# Patient Record
Sex: FEMALE | Race: WHITE | HISPANIC OR LATINO | Employment: PART TIME | URBAN - METROPOLITAN AREA
[De-identification: names, ages, dates, MRNs, and addresses within clinical notes are randomized per-mention and may not be internally consistent; named-entity substitution may affect disease eponyms.]

---

## 2017-01-12 DIAGNOSIS — E66.01 MORBID OBESITY WITH BMI OF 40.0-44.9, ADULT (HCC): ICD-10-CM

## 2017-01-12 RX ORDER — PHENTERMINE HYDROCHLORIDE 37.5 MG/1
CAPSULE ORAL
Qty: 30 CAPSULE | Refills: 0 | OUTPATIENT
Start: 2017-01-12

## 2017-01-16 ENCOUNTER — OFFICE VISIT (OUTPATIENT)
Dept: FAMILY MEDICINE CLINIC | Facility: CLINIC | Age: 42
End: 2017-01-16

## 2017-01-16 VITALS
RESPIRATION RATE: 16 BRPM | HEART RATE: 82 BPM | OXYGEN SATURATION: 97 % | HEIGHT: 63 IN | SYSTOLIC BLOOD PRESSURE: 120 MMHG | TEMPERATURE: 98.4 F | DIASTOLIC BLOOD PRESSURE: 68 MMHG | WEIGHT: 232 LBS | BODY MASS INDEX: 41.11 KG/M2

## 2017-01-16 DIAGNOSIS — I10 BENIGN ESSENTIAL HYPERTENSION: Primary | ICD-10-CM

## 2017-01-16 DIAGNOSIS — E66.01 MORBID OBESITY WITH BMI OF 40.0-44.9, ADULT (HCC): ICD-10-CM

## 2017-01-16 PROCEDURE — 99213 OFFICE O/P EST LOW 20 MIN: CPT | Performed by: PHYSICIAN ASSISTANT

## 2017-01-16 RX ORDER — PHENTERMINE HYDROCHLORIDE 37.5 MG/1
37.5 CAPSULE ORAL EVERY MORNING
Qty: 30 CAPSULE | Refills: 0
Start: 2017-01-16 | End: 2017-02-13 | Stop reason: SDUPTHER

## 2017-01-16 NOTE — PROGRESS NOTES
"Subjective   Angie Patel is a 41 y.o. female.   Chief Complaint   Patient presents with   • Obesity   • Follow-up       History of Present Illness   Shelia is a 41-year-old female who presents for weight loss management.  She has lost 18 pounds since December 15, 2016.  She has been compliant with her phentermine medication.States she is doing well with the die pills.  Denied any chest pain,shortness of air,wheezing or swelling of ankles.  Diet has been healthy. Angie is doing low carbs and increased protein.  Sleep has been normal.  Angie has been not been exercising routinely.      The following portions of the patient's history were reviewed and updated as appropriate: allergies, current medications, past family history, past medical history, past social history and past surgical history.    Review of Systems   Constitutional: Negative.  Negative for chills, fatigue and fever.   HENT: Negative.    Eyes: Negative.    Respiratory: Negative.  Negative for cough, shortness of breath and wheezing.    Cardiovascular: Negative.  Negative for chest pain, palpitations and leg swelling.   Gastrointestinal: Negative.    Endocrine: Negative.    Genitourinary: Negative.    Musculoskeletal: Negative.    Skin: Negative.    Allergic/Immunologic: Negative.    Neurological: Negative.    Hematological: Negative.    Psychiatric/Behavioral: Negative.  Negative for sleep disturbance and suicidal ideas.   All other systems reviewed and are negative.    Vitals:    01/16/17 0857 01/16/17 0914   BP: 116/82 120/68   BP Location: Right arm Left arm   Patient Position: Sitting Sitting   Cuff Size: Adult Adult   Pulse: 82    Resp: 16    Temp: 98.4 °F (36.9 °C)    TempSrc: Oral    SpO2: 97%    Weight: 232 lb (105 kg)    Height: 63\" (160 cm)      Wt Readings from Last 3 Encounters:   01/16/17 232 lb (105 kg)   12/15/16 250 lb (113 kg)   10/21/16 234 lb (106 kg)       BP Readings from Last 3 Encounters:   01/16/17 120/68   12/15/16 110/62 "   10/21/16 98/58     Body mass index is 41.1 kg/(m^2).    Allergies   Allergen Reactions   • Iodine    • Shellfish Allergy        Objective   Physical Exam   Constitutional: She is oriented to person, place, and time. Vital signs are normal. She appears well-developed and well-nourished.   Obese female   Neck: Trachea normal and phonation normal. Neck supple. No edema present.   Cardiovascular: Normal rate, regular rhythm, S1 normal, S2 normal, normal heart sounds and normal pulses.    Pulmonary/Chest: Effort normal and breath sounds normal.   Abdominal: Soft. Normal appearance and bowel sounds are normal. There is no hepatomegaly. There is no tenderness.   Neurological: She is alert and oriented to person, place, and time.   Skin: Skin is warm, dry and intact.   Psychiatric: She has a normal mood and affect. Her speech is normal and behavior is normal. Judgment and thought content normal. Cognition and memory are normal.       Assessment/Plan   Angie was seen today for obesity and follow-up.    Diagnoses and all orders for this visit:    Benign essential hypertension    Morbid obesity with BMI of 40.0-44.9, adult  -     phentermine 37.5 MG capsule; Take 1 capsule by mouth Every Morning.        1.  Stale hypertension: I have rechecked her blood pressure today's office visit and got 120/68 *.  She will continue her current medications at home.  2.  Improving obesity: She has lost 16 pounds on the first month of phentermine.  Dr. Sony Painting has approved a refill.  See Anjel report below.  Angie will return to office in one month for reevaluation.    Dragon transcription disclaimer    Much of this encounter note is an electronic transcription/translation of spoken language to printed text.  The electronic translation of spoken language may permit erroneous, or at times, nonsensical words or phrases to be inadvertently transcribed.  Although I have reviewed the note for such errors, some may still exist.    Kya  Aroldo BARAJAS  Family Practice

## 2017-01-16 NOTE — MR AVS SNAPSHOT
Angie Patel   2017 8:45 AM   Office Visit    Provider:  Kya Kendrick PA-C   Department:  [facility]   Dept Phone:  [phone]                Your Full Care Plan              Your Updated Medication List          This list is accurate as of: 17  9:17 AM.  Always use your most recent med list.                furosemide 20 MG tablet   Commonly known as:  LASIX   Take 1 tablet by mouth daily. As needed       metFORMIN 500 MG tablet   Commonly known as:  GLUCOPHAGE   Take 2 pills in am and one pill at dinnertime.       MOTRIN  MG tablet   Generic drug:  ibuprofen       phentermine 37.5 MG capsule   Take 1 capsule by mouth Every Morning.       spironolactone 100 MG tablet   Commonly known as:  ALDACTONE   TAKE 1 TABLET BY MOUTH EVERY DAY       VENTOLIN  (90 BASE) MCG/ACT inhaler   Generic drug:  albuterol   INHALE 2 PUFFS BY MOUTH FOUR TIMES DAILY AS NEEDED       vitamin D 70790 UNITS capsule capsule   Commonly known as:  ERGOCALCIFEROL   Take 1 capsule by mouth every 7 days.               You Were Diagnosed With        Codes Comments    Benign essential hypertension    -  Primary ICD-10-CM: I10  ICD-9-CM: 401.1     Morbid obesity with BMI of 40.0-44.9, adult     ICD-10-CM: E66.01, Z68.41  ICD-9-CM: 278.01, V85.41       Instructions     None    Patient Instructions History      [employer] Signup     [facility] [employer] allows you to send messages to your doctor, view your test results, renew your prescriptions, schedule appointments, and more. To sign up, go to [employer] and click on the Sign Up Now link in the New User? box. Enter your [employer] Activation Code exactly as it appears below along with the last four digits of your Social Security Number and your Date of Birth () to complete the sign-up process. If you do not sign up before the expiration date, you must request a new code.    [employer] Activation Code:  [email or web address]  Expires: 1/30/2017  8:56 AM    If you have questions, you can email [email or web address] or call [phone] to talk to our MyChart staff. Remember, [employer] is NOT to be used for urgent needs. For medical emergencies, dial 911.               Other Info from Your Visit           Your Appointments     Feb 13, 2017  9:15 AM EST   Office Visit with Kya Kendrick PA-C   [facility] (--)    [address] [id number] [phone]           Arrive 15 minutes prior to appointment.              Allergies     Iodine      Shellfish Allergy        Reason for Visit     Obesity     Follow-up           Vital Signs     Blood Pressure Pulse Temperature Respirations Height Weight    120/68 (BP Location: Left arm, Patient Position: Sitting, Cuff Size: Adult) 82 98.4 °F (36.9 °C) (Oral) 16 63\" (160 cm) 232 lb (105 kg)    Oxygen Saturation Body Mass Index Smoking Status             97% 41.1 kg/m2 Former Smoker         Problems and Diagnoses Noted     Benign essential hypertension    Morbid obesity with BMI of 40.0-44.9, adult      "

## 2017-01-16 NOTE — PROGRESS NOTES
I have reviewed the notes, assessments, and/or procedures performed by ELIAN Sanchez, I concur with her/his documentation of.Angie Patel.

## 2017-01-18 RX ORDER — SPIRONOLACTONE 100 MG/1
TABLET, FILM COATED ORAL
Qty: 90 TABLET | Refills: 0 | Status: SHIPPED | OUTPATIENT
Start: 2017-01-18 | End: 2017-05-01 | Stop reason: SDUPTHER

## 2017-02-13 ENCOUNTER — OFFICE VISIT (OUTPATIENT)
Dept: FAMILY MEDICINE CLINIC | Facility: CLINIC | Age: 42
End: 2017-02-13

## 2017-02-13 VITALS
RESPIRATION RATE: 16 BRPM | SYSTOLIC BLOOD PRESSURE: 122 MMHG | WEIGHT: 222 LBS | BODY MASS INDEX: 39.34 KG/M2 | DIASTOLIC BLOOD PRESSURE: 78 MMHG | HEART RATE: 91 BPM | HEIGHT: 63 IN | TEMPERATURE: 98.8 F | OXYGEN SATURATION: 98 %

## 2017-02-13 DIAGNOSIS — E66.01 MORBID OBESITY, UNSPECIFIED OBESITY TYPE (HCC): ICD-10-CM

## 2017-02-13 DIAGNOSIS — I10 BENIGN ESSENTIAL HYPERTENSION: Primary | ICD-10-CM

## 2017-02-13 PROCEDURE — 99213 OFFICE O/P EST LOW 20 MIN: CPT | Performed by: PHYSICIAN ASSISTANT

## 2017-02-13 RX ORDER — PHENTERMINE HYDROCHLORIDE 37.5 MG/1
37.5 CAPSULE ORAL EVERY MORNING
Qty: 30 CAPSULE | Refills: 0
Start: 2017-02-13 | End: 2017-02-13 | Stop reason: SDUPTHER

## 2017-02-13 NOTE — PROGRESS NOTES
"Subjective   Angie Patel is a 41 y.o. female.   Chief Complaint   Patient presents with   • Obesity   • Follow-up       History of Present Illness     Angie is a 41-year-old female who presents for hypertension and weight loss management.  She has lost 10 pounds since her office visit on January 16, 2017.  She has been eating more protein and 10 carbohydrates a day.  Angie has not been exercising at this time.   Appetite has been healthy.  Sleep has been Denied any chest pain, shortness of air,dizziness,headaches, or swelling of ankles.  No problems with the Phentermine medications.    The following portions of the patient's history were reviewed and updated as appropriate: allergies, current medications, past family history, past medical history, past social history and past surgical history.    Review of Systems   Constitutional: Negative.    HENT: Negative.    Eyes: Negative.    Respiratory: Negative.  Negative for cough, shortness of breath and wheezing.    Cardiovascular: Negative.  Negative for chest pain, palpitations and leg swelling.   Gastrointestinal: Negative.    Endocrine: Negative.    Genitourinary: Negative.    Musculoskeletal: Negative.    Skin: Negative.    Allergic/Immunologic: Negative.    Neurological: Negative.    Hematological: Negative.    Psychiatric/Behavioral: Negative.  Negative for sleep disturbance and suicidal ideas.   All other systems reviewed and are negative.    Vitals:    02/13/17 0931   BP: 122/78   BP Location: Right arm   Patient Position: Sitting   Cuff Size: Adult   Pulse: 91   Resp: 16   Temp: 98.8 °F (37.1 °C)   TempSrc: Oral   SpO2: 98%   Weight: 222 lb (101 kg)   Height: 63\" (160 cm)     Wt Readings from Last 3 Encounters:   02/13/17 222 lb (101 kg)   01/16/17 232 lb (105 kg)   12/15/16 250 lb (113 kg)       BP Readings from Last 3 Encounters:   02/13/17 122/78   01/16/17 120/68   12/15/16 110/62     Body mass index is 39.33 kg/(m^2).    Allergies   Allergen Reactions "   • Iodine    • Shellfish Allergy        Objective   Physical Exam   Constitutional: She is oriented to person, place, and time. Vital signs are normal. She appears well-developed and well-nourished.   Neck: Trachea normal and phonation normal. Neck supple. Carotid bruit is not present. No edema present. No thyroid mass and no thyromegaly present.   Cardiovascular: Normal rate, regular rhythm, S1 normal, S2 normal, normal heart sounds and normal pulses.    Pulmonary/Chest: Effort normal and breath sounds normal.   Abdominal: Soft. Normal appearance and bowel sounds are normal. There is no hepatomegaly. There is no tenderness.   Neurological: She is alert and oriented to person, place, and time.   Skin: Skin is warm, dry and intact.   Psychiatric: She has a normal mood and affect. Her speech is normal and behavior is normal. Judgment and thought content normal. Cognition and memory are normal.       Assessment/Plan   Angie was seen today for obesity and follow-up.    Diagnoses and all orders for this visit:    Benign essential hypertension    Morbid obesity, unspecified obesity type  -     phentermine 37.5 MG capsule; Take 1 capsule by mouth Every Morning.  -     phentermine 37.5 MG capsule; Take 1 capsule by mouth Every Morning.      1. Stable hypertension:  I have rechecked her blood pressure today's office visit and got 128/72 in left arm.  She will continue her current medications at home.  2.  Improving morbid obesity: She has lost 28 pounds during the phentermine and December 2016.  After [facility] has approved a refill.  See below for Anjel information.  I have discussed with Shelia that this may be her last month on phentermine.  Urged her to increase her physical activity and continue eating healthy.  She voiced understanding.      As part of this patient's treatment plan I am prescribing controlled substances.  The patient has been made aware of appropriate use of such medications, including potential risk of  somnolence. Limited ability to drive and/or work safely, and potential for dependence or overdose.  It has also been made clear that these medications are for use by this patient only, without concomitant use of alcohol or other substances unless prescribed.  THERESA report has been reviewed and scanned into the patient's chart.  Theresa number is [id number] dated December 13, 2016.      Dragon transcription disclaimer    Much of this encounter note is an electronic transcription/translation of spoken language to printed text.  The electronic translation of spoken language may permit erroneous, or at times, nonsensical words or phrases to be inadvertently transcribed.  Although I have reviewed the note for such errors, some may still exist.    Kya Kendrick PA-C  [facility]

## 2017-02-14 RX ORDER — PHENTERMINE HYDROCHLORIDE 37.5 MG/1
37.5 CAPSULE ORAL EVERY MORNING
Qty: 30 CAPSULE | Refills: 0
Start: 2017-02-14 | End: 2017-05-01 | Stop reason: SDUPTHER

## 2017-03-03 ENCOUNTER — HOSPITAL ENCOUNTER (EMERGENCY)
Facility: HOSPITAL | Age: 42
Discharge: HOME | End: 2017-03-03
Attending: EMERGENCY MEDICINE | Admitting: EMERGENCY MEDICINE

## 2017-03-03 ENCOUNTER — APPOINTMENT (OUTPATIENT)
Dept: CT IMAGING | Facility: HOSPITAL | Age: 42
End: 2017-03-03

## 2017-03-03 VITALS
DIASTOLIC BLOOD PRESSURE: 76 MMHG | WEIGHT: 211 LBS | RESPIRATION RATE: 16 BRPM | SYSTOLIC BLOOD PRESSURE: 122 MMHG | OXYGEN SATURATION: 100 % | HEIGHT: 63 IN | HEART RATE: 74 BPM | BODY MASS INDEX: 37.39 KG/M2 | TEMPERATURE: 98.4 F

## 2017-03-03 DIAGNOSIS — E28.2 PCOS (POLYCYSTIC OVARIAN SYNDROME): Primary | ICD-10-CM

## 2017-03-03 DIAGNOSIS — N20.1 RIGHT URETERAL STONE: Primary | ICD-10-CM

## 2017-03-03 LAB
ALBUMIN SERPL-MCNC: 4.3 G/DL (ref 3.5–5.2)
ALBUMIN/GLOB SERPL: 1.2 G/DL
ALP SERPL-CCNC: 48 U/L (ref 40–129)
ALT SERPL W P-5'-P-CCNC: 43 U/L (ref 5–33)
ANION GAP SERPL CALCULATED.3IONS-SCNC: 16.9 MMOL/L
AST SERPL-CCNC: 23 U/L (ref 5–32)
BACTERIA UR QL AUTO: ABNORMAL /HPF
BASOPHILS # BLD AUTO: 0.04 10*3/MM3 (ref 0–0.2)
BASOPHILS NFR BLD AUTO: 0.4 % (ref 0–2)
BILIRUB SERPL-MCNC: 0.6 MG/DL (ref 0.2–1.2)
BILIRUB UR QL STRIP: ABNORMAL
BUN BLD-MCNC: 15 MG/DL (ref 6–20)
BUN/CREAT SERPL: 14.2 (ref 7–25)
CALCIUM SPEC-SCNC: 9.6 MG/DL (ref 8.6–10.5)
CHLORIDE SERPL-SCNC: 96 MMOL/L (ref 98–107)
CLARITY UR: ABNORMAL
CO2 SERPL-SCNC: 24.1 MMOL/L (ref 22–29)
COLOR UR: YELLOW
CREAT BLD-MCNC: 1.06 MG/DL (ref 0.57–1)
DEPRECATED RDW RBC AUTO: 37.7 FL (ref 37–54)
EOSINOPHIL # BLD AUTO: 0.18 10*3/MM3 (ref 0.1–0.3)
EOSINOPHIL NFR BLD AUTO: 1.7 % (ref 0–4)
ERYTHROCYTE [DISTWIDTH] IN BLOOD BY AUTOMATED COUNT: 11.9 % (ref 11.5–14.5)
GFR SERPL CREATININE-BSD FRML MDRD: 57 ML/MIN/1.73
GLOBULIN UR ELPH-MCNC: 3.7 GM/DL
GLUCOSE BLD-MCNC: 116 MG/DL (ref 65–99)
GLUCOSE UR STRIP-MCNC: NEGATIVE MG/DL
HCT VFR BLD AUTO: 42.7 % (ref 37–47)
HGB BLD-MCNC: 14.8 G/DL (ref 12–16)
HGB UR QL STRIP.AUTO: ABNORMAL
HYALINE CASTS UR QL AUTO: ABNORMAL /LPF
IMM GRANULOCYTES # BLD: 0.04 10*3/MM3 (ref 0–0.03)
IMM GRANULOCYTES NFR BLD: 0.4 % (ref 0–0.5)
KETONES UR QL STRIP: ABNORMAL
LEUKOCYTE ESTERASE UR QL STRIP.AUTO: NEGATIVE
LYMPHOCYTES # BLD AUTO: 2.18 10*3/MM3 (ref 0.6–4.8)
LYMPHOCYTES NFR BLD AUTO: 20.6 % (ref 20–45)
MCH RBC QN AUTO: 29.9 PG (ref 27–31)
MCHC RBC AUTO-ENTMCNC: 34.7 G/DL (ref 31–37)
MCV RBC AUTO: 86.3 FL (ref 81–99)
MONOCYTES # BLD AUTO: 0.67 10*3/MM3 (ref 0–1)
MONOCYTES NFR BLD AUTO: 6.3 % (ref 3–8)
MUCOUS THREADS URNS QL MICRO: ABNORMAL /HPF
NEUTROPHILS # BLD AUTO: 7.46 10*3/MM3 (ref 1.5–8.3)
NEUTROPHILS NFR BLD AUTO: 70.6 % (ref 45–70)
NITRITE UR QL STRIP: NEGATIVE
NRBC BLD MANUAL-RTO: 0 /100 WBC (ref 0–0)
PH UR STRIP.AUTO: 5.5 [PH] (ref 4.5–8)
PLATELET # BLD AUTO: 283 10*3/MM3 (ref 140–500)
PMV BLD AUTO: 11.6 FL (ref 7.4–10.4)
POTASSIUM BLD-SCNC: 3.6 MMOL/L (ref 3.5–5.2)
PROT SERPL-MCNC: 8 G/DL (ref 6–8.5)
PROT UR QL STRIP: ABNORMAL
RBC # BLD AUTO: 4.95 10*6/MM3 (ref 4.2–5.4)
RBC # UR: ABNORMAL /HPF
REF LAB TEST METHOD: ABNORMAL
SODIUM BLD-SCNC: 137 MMOL/L (ref 136–145)
SP GR UR STRIP: 1.03 (ref 1–1.03)
SQUAMOUS #/AREA URNS HPF: ABNORMAL /HPF
UROBILINOGEN UR QL STRIP: ABNORMAL
WBC NRBC COR # BLD: 10.57 10*3/MM3 (ref 4.8–10.8)
WBC UR QL AUTO: ABNORMAL /HPF

## 2017-03-03 PROCEDURE — 99283 EMERGENCY DEPT VISIT LOW MDM: CPT

## 2017-03-03 PROCEDURE — 99284 EMERGENCY DEPT VISIT MOD MDM: CPT | Performed by: EMERGENCY MEDICINE

## 2017-03-03 PROCEDURE — 80053 COMPREHEN METABOLIC PANEL: CPT | Performed by: EMERGENCY MEDICINE

## 2017-03-03 PROCEDURE — 96375 TX/PRO/DX INJ NEW DRUG ADDON: CPT

## 2017-03-03 PROCEDURE — 25010000002 ONDANSETRON PER 1 MG: Performed by: EMERGENCY MEDICINE

## 2017-03-03 PROCEDURE — 25010000002 HYDROMORPHONE PER 4 MG: Performed by: EMERGENCY MEDICINE

## 2017-03-03 PROCEDURE — 85025 COMPLETE CBC W/AUTO DIFF WBC: CPT | Performed by: EMERGENCY MEDICINE

## 2017-03-03 PROCEDURE — 25010000002 KETOROLAC TROMETHAMINE PER 15 MG: Performed by: EMERGENCY MEDICINE

## 2017-03-03 PROCEDURE — 96361 HYDRATE IV INFUSION ADD-ON: CPT

## 2017-03-03 PROCEDURE — 81001 URINALYSIS AUTO W/SCOPE: CPT | Performed by: EMERGENCY MEDICINE

## 2017-03-03 PROCEDURE — 74176 CT ABD & PELVIS W/O CONTRAST: CPT

## 2017-03-03 PROCEDURE — 87086 URINE CULTURE/COLONY COUNT: CPT | Performed by: EMERGENCY MEDICINE

## 2017-03-03 PROCEDURE — 96374 THER/PROPH/DIAG INJ IV PUSH: CPT

## 2017-03-03 RX ORDER — KETOROLAC TROMETHAMINE 30 MG/ML
30 INJECTION, SOLUTION INTRAMUSCULAR; INTRAVENOUS ONCE
Status: COMPLETED | OUTPATIENT
Start: 2017-03-03 | End: 2017-03-03

## 2017-03-03 RX ORDER — LEVOFLOXACIN 500 MG/1
500 TABLET, FILM COATED ORAL EVERY 24 HOURS
Status: DISCONTINUED | OUTPATIENT
Start: 2017-03-03 | End: 2017-03-04 | Stop reason: HOSPADM

## 2017-03-03 RX ORDER — HYDROCODONE BITARTRATE AND ACETAMINOPHEN 5; 325 MG/1; MG/1
1 TABLET ORAL ONCE
Status: COMPLETED | OUTPATIENT
Start: 2017-03-03 | End: 2017-03-03

## 2017-03-03 RX ORDER — SODIUM CHLORIDE 0.9 % (FLUSH) 0.9 %
10 SYRINGE (ML) INJECTION AS NEEDED
Status: DISCONTINUED | OUTPATIENT
Start: 2017-03-03 | End: 2017-03-04 | Stop reason: HOSPADM

## 2017-03-03 RX ORDER — CIPROFLOXACIN 500 MG/1
500 TABLET, FILM COATED ORAL 2 TIMES DAILY
Qty: 14 TABLET | Refills: 0 | Status: SHIPPED | OUTPATIENT
Start: 2017-03-03 | End: 2017-03-10

## 2017-03-03 RX ORDER — HYDROCODONE BITARTRATE AND ACETAMINOPHEN 5; 325 MG/1; MG/1
1 TABLET ORAL EVERY 8 HOURS PRN
Qty: 15 TABLET | Refills: 0 | Status: SHIPPED | OUTPATIENT
Start: 2017-03-03 | End: 2017-03-08

## 2017-03-03 RX ORDER — ONDANSETRON 4 MG/1
4 TABLET, FILM COATED ORAL EVERY 8 HOURS PRN
Qty: 20 TABLET | Refills: 0 | Status: SHIPPED | OUTPATIENT
Start: 2017-03-03 | End: 2017-03-10

## 2017-03-03 RX ORDER — TAMSULOSIN HYDROCHLORIDE 0.4 MG/1
1 CAPSULE ORAL DAILY
Qty: 5 CAPSULE | Refills: 0 | Status: SHIPPED | OUTPATIENT
Start: 2017-03-03 | End: 2017-03-08

## 2017-03-03 RX ORDER — ONDANSETRON 2 MG/ML
4 INJECTION INTRAMUSCULAR; INTRAVENOUS ONCE
Status: COMPLETED | OUTPATIENT
Start: 2017-03-03 | End: 2017-03-03

## 2017-03-03 RX ADMIN — HYDROMORPHONE HYDROCHLORIDE 1 MG: 1 INJECTION, SOLUTION INTRAMUSCULAR; INTRAVENOUS; SUBCUTANEOUS at 21:05

## 2017-03-03 RX ADMIN — HYDROCODONE BITARTRATE AND ACETAMINOPHEN 1 TABLET: 5; 325 TABLET ORAL at 23:10

## 2017-03-03 RX ADMIN — ONDANSETRON 4 MG: 2 INJECTION, SOLUTION INTRAMUSCULAR; INTRAVENOUS at 21:16

## 2017-03-03 RX ADMIN — SODIUM CHLORIDE 1000 ML: 900 INJECTION, SOLUTION INTRAVENOUS at 21:00

## 2017-03-03 RX ADMIN — KETOROLAC TROMETHAMINE 30 MG: 30 INJECTION, SOLUTION INTRAMUSCULAR at 21:15

## 2017-03-03 RX ADMIN — LEVOFLOXACIN 500 MG: 500 TABLET, FILM COATED ORAL at 23:10

## 2017-03-04 NOTE — DISCHARGE INSTRUCTIONS
Drink plenty of water as tolerated.  Please return to the emergency room for any worsening pain, fevers, nausea, vomiting, difficulties urinating, weakness or any other concerns.

## 2017-03-04 NOTE — ED PROVIDER NOTES
Subjective   History of Present Illness  History of Present Illness    Chief complaint: Abdominal pain, nausea, vomiting    Location: Right sided abdomen, radiating to the belly button    Quality/Severity:  Moderate to severe pain    Timing/Duration: Sudden Onset about 30 minutes ago    Modifying Factors: None    Narrative: Patient presents tonight for evaluation of right-sided abdominal pains with nausea and vomiting symptoms.  She says she went out to eat with her  for dinner tonight and was feeling well at that time.  Shortly afterward,, however, she began having sudden onset pain to the right side of her abdomen that radiated towards her back and down towards her belly button.  She says it feels like her belly button is trying to be removed from the inside out.  She did have some nausea with at least one episode of vomiting.  She denies any diarrhea.  She denies any recent fevers.  She denies any blood in her stools, dysuria or hematuria.  She's never had symptoms like this before.  Her only abdominal surgeries involve gynecologic procedures such as hysterectomy and tubal ligation.  There are no known sick contacts.    Associated Symptoms: As above    Review of Systems   Constitutional: Negative for activity change, chills and fever.   HENT: Negative.  Negative for hearing loss.    Eyes: Negative for pain and visual disturbance.   Respiratory: Negative for cough and shortness of breath.    Cardiovascular: Negative for chest pain.   Gastrointestinal: Positive for abdominal pain, nausea and vomiting. Negative for anal bleeding, blood in stool, constipation and diarrhea.   Genitourinary: Negative for dysuria, frequency and hematuria.   Musculoskeletal: Positive for back pain. Negative for myalgias.   Skin: Negative for color change, rash and wound.   Neurological: Negative for seizures, syncope and headaches.   All other systems reviewed and are negative.      Past Medical History   Diagnosis Date   • Asthma     • Fibromyalgia    • Hypertension    • Polycystic ovaries        Allergies   Allergen Reactions   • Iodine    • Shellfish Allergy        Past Surgical History   Procedure Laterality Date   • Hysterectomy     • Tubal abdominal ligation     • Oophorectomy         History reviewed. No pertinent family history.    Social History     Social History   • Marital status:      Spouse name: N/A   • Number of children: N/A   • Years of education: N/A     Social History Main Topics   • Smoking status: Former Smoker   • Smokeless tobacco: None   • Alcohol use None   • Drug use: None   • Sexual activity: Not Asked     Other Topics Concern   • None     Social History Narrative   • None       ED Triage Vitals   Temp Heart Rate Resp BP SpO2   03/03/17 2038 03/03/17 2038 03/03/17 2038 03/03/17 2038 03/03/17 2038   98.4 °F (36.9 °C) 95 20 119/74 100 %      Temp src Heart Rate Source Patient Position BP Location FiO2 (%)   -- -- -- -- --                Objective   Physical Exam   Constitutional: She is oriented to person, place, and time. She appears well-developed and well-nourished. She appears distressed (moderate).   HENT:   Head: Normocephalic and atraumatic.   Eyes: EOM are normal. Pupils are equal, round, and reactive to light. Right eye exhibits no discharge. Left eye exhibits no discharge.   Neck: Normal range of motion. Neck supple.   Cardiovascular: Normal rate, regular rhythm, normal heart sounds and intact distal pulses.  Exam reveals no gallop and no friction rub.    No murmur heard.  Pulmonary/Chest: Effort normal. No respiratory distress. She has no wheezes. She has no rales. She exhibits no tenderness.   Abdominal: Soft. She exhibits no mass. There is tenderness (mild diffuse right-sided tenderness to palpation but no peritoneal signs). There is no rebound and no guarding. No hernia.   Musculoskeletal: Normal range of motion. She exhibits no edema or deformity.   Neurological: She is alert and oriented to  person, place, and time. No cranial nerve deficit. She exhibits normal muscle tone.   Skin: Skin is warm and dry. No rash noted. She is not diaphoretic. No erythema. No pallor.   Psychiatric: She has a normal mood and affect. Her behavior is normal. Judgment and thought content normal.   Nursing note and vitals reviewed.    Results for orders placed or performed during the hospital encounter of 03/03/17   Comprehensive Metabolic Panel   Result Value Ref Range    Glucose 116 (H) 65 - 99 mg/dL    BUN 15 6 - 20 mg/dL    Creatinine 1.06 (H) 0.57 - 1.00 mg/dL    Sodium 137 136 - 145 mmol/L    Potassium 3.6 3.5 - 5.2 mmol/L    Chloride 96 (L) 98 - 107 mmol/L    CO2 24.1 22.0 - 29.0 mmol/L    Calcium 9.6 8.6 - 10.5 mg/dL    Total Protein 8.0 6.0 - 8.5 g/dL    Albumin 4.30 3.50 - 5.20 g/dL    ALT (SGPT) 43 (H) 5 - 33 U/L    AST (SGOT) 23 5 - 32 U/L    Alkaline Phosphatase 48 40 - 129 U/L    Total Bilirubin 0.6 0.2 - 1.2 mg/dL    eGFR Non African Amer 57 (L) >60 mL/min/1.73    Globulin 3.7 gm/dL    A/G Ratio 1.2 g/dL    BUN/Creatinine Ratio 14.2 7.0 - 25.0    Anion Gap 16.9 mmol/L   Urinalysis With / Culture If Indicated   Result Value Ref Range    Color, UA Yellow Yellow, Straw    Appearance, UA Slightly Cloudy (A) Clear    pH, UA 5.5 4.5 - 8.0    Specific Gravity, UA 1.029 1.003 - 1.030    Glucose, UA Negative Negative    Ketones, UA 15 mg/dL (1+) (A) Negative, 80 mg/dL (3+), >=160 mg/dL (4+)    Bilirubin, UA Moderate (2+) (A) Negative    Blood, UA Large (3+) (A) Negative    Protein,  mg/dL (2+) (A) Negative    Leuk Esterase, UA Negative Negative    Nitrite, UA Negative Negative    Urobilinogen, UA 1.0 E.U./dL 0.2 - 1.0 E.U./dL   CBC Auto Differential   Result Value Ref Range    WBC 10.57 4.80 - 10.80 10*3/mm3    RBC 4.95 4.20 - 5.40 10*6/mm3    Hemoglobin 14.8 12.0 - 16.0 g/dL    Hematocrit 42.7 37.0 - 47.0 %    MCV 86.3 81.0 - 99.0 fL    MCH 29.9 27.0 - 31.0 pg    MCHC 34.7 31.0 - 37.0 g/dL    RDW 11.9 11.5 - 14.5  %    RDW-SD 37.7 37.0 - 54.0 fl    MPV 11.6 (H) 7.4 - 10.4 fL    Platelets 283 140 - 500 10*3/mm3    Neutrophil % 70.6 (H) 45.0 - 70.0 %    Lymphocyte % 20.6 20.0 - 45.0 %    Monocyte % 6.3 3.0 - 8.0 %    Eosinophil % 1.7 0.0 - 4.0 %    Basophil % 0.4 0.0 - 2.0 %    Immature Grans % 0.4 0.0 - 0.5 %    Neutrophils, Absolute 7.46 1.50 - 8.30 10*3/mm3    Lymphocytes, Absolute 2.18 0.60 - 4.80 10*3/mm3    Monocytes, Absolute 0.67 0.00 - 1.00 10*3/mm3    Eosinophils, Absolute 0.18 0.10 - 0.30 10*3/mm3    Basophils, Absolute 0.04 0.00 - 0.20 10*3/mm3    Immature Grans, Absolute 0.04 (H) 0.00 - 0.03 10*3/mm3    nRBC 0.0 0.0 - 0.0 /100 WBC   Urinalysis, Microscopic Only   Result Value Ref Range    RBC, UA Too Numerous to Count (A) None Seen /HPF    WBC, UA 3-5 (A) None Seen /HPF    Bacteria, UA 2+ (A) None Seen /HPF    Squamous Epithelial Cells, UA 13-20 (A) None Seen, 0-2 /HPF    Hyaline Casts, UA None Seen None Seen /LPF    Mucus, UA Moderate/2+ (A) None Seen, Trace /HPF    Methodology Manual Light Microscopy        RADIOLOGY        Study: CT abdomen and pelvis without contrast    Findings: Mild right hydronephrosis secondary to 5 mm stone in the proximal ureter.  Otherwise negative.  Including appendix.  Hysterectomy.    Interpreted Contemporaneously by Dr. Lo, independently viewed by me          Procedures         ED Course  ED Course   Comment By Time   I reviewed the labs and CT results.  Clinical suspicion of kidney stone is confirmed.  Patient feeling much better after IV Dilaudid and Toradol.  We'll discharge home to continue symptomatic management.  Gave information for urology follow-up as it may become necessary for this 5 mm proximal ureteral stone. Jose Mulligan MD 03/03 2977                  MDM  Number of Diagnoses or Management Options  Diagnosis management comments: My differential diagnosis for abdominal pain includes but is not limited to:  Gastritis, gastroenteritis, peptic ulcer disease, GERD,  esophageal perforation, acute appendicitis, mesenteric adenitis, Meckel’s diverticulum, epiploic appendagitis, diverticulitis, colon cancer, ulcerative colitis, Crohn’s disease, intussusception, small bowel obstruction, adhesions, ischemic bowel, perforated viscus, ileus, obstipation, biliary colic, cholecystitis, cholelithiasis, Pete-Mark Obi, hepatitis, pancreatitis, common bile duct obstruction, cholangitis, bile leak, splenic trauma, splenic rupture, splenic infarction, splenic abscess, abdominal abscess, ascites, spontaneous bacterial peritonitis, hernia, UTI, cystitis, prostatitis, ureterolithiasis, urinary obstruction, ovarian cyst, torsion, pregnancy, ectopic pregnancy, PID, pelvic abscess, mittelschmerz, endometriosis, AAA, myocardial infarction, pneumonia, cancer, porphyria, DKA, medications, sickle cell, viral syndrome, zoster       Amount and/or Complexity of Data Reviewed  Clinical lab tests: ordered and reviewed  Tests in the radiology section of CPT®: ordered and reviewed    Risk of Complications, Morbidity, and/or Mortality  Presenting problems: moderate  Diagnostic procedures: moderate  Management options: moderate        Final diagnoses:   Right ureteral stone            Jose Mulligan MD  03/03/17 2075

## 2017-03-05 LAB — BACTERIA SPEC AEROBE CULT: NORMAL

## 2017-03-06 RX ORDER — LISINOPRIL 5 MG/1
TABLET ORAL
Qty: 30 TABLET | Refills: 0 | Status: ON HOLD | OUTPATIENT
Start: 2017-03-06 | End: 2017-03-07 | Stop reason: ALTCHOICE

## 2017-03-07 ENCOUNTER — HOSPITAL ENCOUNTER (OUTPATIENT)
Facility: HOSPITAL | Age: 42
Setting detail: HOSPITAL OUTPATIENT SURGERY
Discharge: HOME | End: 2017-03-07
Attending: UROLOGY | Admitting: UROLOGY

## 2017-03-07 ENCOUNTER — ANESTHESIA EVENT (OUTPATIENT)
Dept: PERIOP | Facility: HOSPITAL | Age: 42
End: 2017-03-07

## 2017-03-07 ENCOUNTER — ANESTHESIA (OUTPATIENT)
Dept: PERIOP | Facility: HOSPITAL | Age: 42
End: 2017-03-07

## 2017-03-07 ENCOUNTER — APPOINTMENT (OUTPATIENT)
Dept: GENERAL RADIOLOGY | Facility: HOSPITAL | Age: 42
End: 2017-03-07

## 2017-03-07 VITALS
BODY MASS INDEX: 37.31 KG/M2 | WEIGHT: 210.56 LBS | TEMPERATURE: 98.2 F | HEIGHT: 63 IN | DIASTOLIC BLOOD PRESSURE: 68 MMHG | OXYGEN SATURATION: 94 % | HEART RATE: 92 BPM | SYSTOLIC BLOOD PRESSURE: 137 MMHG | RESPIRATION RATE: 16 BRPM

## 2017-03-07 PROBLEM — N20.1 RIGHT URETERAL STONE: Status: ACTIVE | Noted: 2017-03-07

## 2017-03-07 PROCEDURE — C1769 GUIDE WIRE: HCPCS | Performed by: UROLOGY

## 2017-03-07 PROCEDURE — 74420 UROGRAPHY RTRGR +-KUB: CPT

## 2017-03-07 PROCEDURE — 25010000002 MIDAZOLAM PER 1 MG: Performed by: ANESTHESIOLOGY

## 2017-03-07 PROCEDURE — 0 IOPAMIDOL PER 1 ML: Performed by: UROLOGY

## 2017-03-07 PROCEDURE — 25010000002 FENTANYL CITRATE (PF) 100 MCG/2ML SOLUTION: Performed by: NURSE ANESTHETIST, CERTIFIED REGISTERED

## 2017-03-07 PROCEDURE — C1758 CATHETER, URETERAL: HCPCS | Performed by: UROLOGY

## 2017-03-07 PROCEDURE — C2617 STENT, NON-COR, TEM W/O DEL: HCPCS | Performed by: UROLOGY

## 2017-03-07 PROCEDURE — 25010000002 PROPOFOL 10 MG/ML EMULSION: Performed by: NURSE ANESTHETIST, CERTIFIED REGISTERED

## 2017-03-07 PROCEDURE — 25010000002 CEFTRIAXONE: Performed by: UROLOGY

## 2017-03-07 DEVICE — URETERAL STENT
Type: IMPLANTABLE DEVICE | Site: URETER | Status: FUNCTIONAL
Brand: PERCUFLEX™ PLUS

## 2017-03-07 RX ORDER — SODIUM CHLORIDE 0.9 % (FLUSH) 0.9 %
1-10 SYRINGE (ML) INJECTION AS NEEDED
Status: DISCONTINUED | OUTPATIENT
Start: 2017-03-07 | End: 2017-03-07 | Stop reason: HOSPADM

## 2017-03-07 RX ORDER — FENTANYL CITRATE 50 UG/ML
50 INJECTION, SOLUTION INTRAMUSCULAR; INTRAVENOUS
Status: DISCONTINUED | OUTPATIENT
Start: 2017-03-07 | End: 2017-03-07 | Stop reason: HOSPADM

## 2017-03-07 RX ORDER — PROMETHAZINE HYDROCHLORIDE 25 MG/ML
12.5 INJECTION, SOLUTION INTRAMUSCULAR; INTRAVENOUS ONCE AS NEEDED
Status: DISCONTINUED | OUTPATIENT
Start: 2017-03-07 | End: 2017-03-07 | Stop reason: HOSPADM

## 2017-03-07 RX ORDER — MIDAZOLAM HYDROCHLORIDE 1 MG/ML
1 INJECTION INTRAMUSCULAR; INTRAVENOUS
Status: DISCONTINUED | OUTPATIENT
Start: 2017-03-07 | End: 2017-03-07 | Stop reason: HOSPADM

## 2017-03-07 RX ORDER — PROMETHAZINE HYDROCHLORIDE 25 MG/1
25 SUPPOSITORY RECTAL ONCE AS NEEDED
Status: DISCONTINUED | OUTPATIENT
Start: 2017-03-07 | End: 2017-03-07 | Stop reason: HOSPADM

## 2017-03-07 RX ORDER — ENALAPRILAT 2.5 MG/2ML
1.25 INJECTION INTRAVENOUS ONCE AS NEEDED
Status: DISCONTINUED | OUTPATIENT
Start: 2017-03-07 | End: 2017-03-07 | Stop reason: HOSPADM

## 2017-03-07 RX ORDER — SODIUM CHLORIDE, SODIUM LACTATE, POTASSIUM CHLORIDE, CALCIUM CHLORIDE 600; 310; 30; 20 MG/100ML; MG/100ML; MG/100ML; MG/100ML
INJECTION, SOLUTION INTRAVENOUS CONTINUOUS PRN
Status: DISCONTINUED | OUTPATIENT
Start: 2017-03-07 | End: 2017-03-07 | Stop reason: SURG

## 2017-03-07 RX ORDER — MAGNESIUM HYDROXIDE 1200 MG/15ML
LIQUID ORAL AS NEEDED
Status: DISCONTINUED | OUTPATIENT
Start: 2017-03-07 | End: 2017-03-07 | Stop reason: HOSPADM

## 2017-03-07 RX ORDER — HYDROCODONE BITARTRATE AND ACETAMINOPHEN 5; 325 MG/1; MG/1
TABLET ORAL
Status: COMPLETED
Start: 2017-03-07 | End: 2017-03-07

## 2017-03-07 RX ORDER — ONDANSETRON 2 MG/ML
4 INJECTION INTRAMUSCULAR; INTRAVENOUS ONCE AS NEEDED
Status: DISCONTINUED | OUTPATIENT
Start: 2017-03-07 | End: 2017-03-07 | Stop reason: HOSPADM

## 2017-03-07 RX ORDER — PROMETHAZINE HYDROCHLORIDE 25 MG/ML
6.25 INJECTION, SOLUTION INTRAMUSCULAR; INTRAVENOUS ONCE AS NEEDED
Status: DISCONTINUED | OUTPATIENT
Start: 2017-03-07 | End: 2017-03-07 | Stop reason: HOSPADM

## 2017-03-07 RX ORDER — HYDRALAZINE HYDROCHLORIDE 20 MG/ML
5 INJECTION INTRAMUSCULAR; INTRAVENOUS
Status: DISCONTINUED | OUTPATIENT
Start: 2017-03-07 | End: 2017-03-07 | Stop reason: HOSPADM

## 2017-03-07 RX ORDER — ONDANSETRON 4 MG/1
8 TABLET, FILM COATED ORAL EVERY 8 HOURS PRN
Qty: 20 TABLET | Refills: 1 | Status: SHIPPED | OUTPATIENT
Start: 2017-03-07 | End: 2017-05-01

## 2017-03-07 RX ORDER — CIPROFLOXACIN 500 MG/1
500 TABLET, FILM COATED ORAL 2 TIMES DAILY
Qty: 10 TABLET | Refills: 0 | Status: SHIPPED | OUTPATIENT
Start: 2017-03-07 | End: 2017-03-12

## 2017-03-07 RX ORDER — PROMETHAZINE HYDROCHLORIDE 25 MG/1
25 TABLET ORAL ONCE AS NEEDED
Status: DISCONTINUED | OUTPATIENT
Start: 2017-03-07 | End: 2017-03-07 | Stop reason: HOSPADM

## 2017-03-07 RX ORDER — LABETALOL HYDROCHLORIDE 5 MG/ML
5 INJECTION, SOLUTION INTRAVENOUS
Status: DISCONTINUED | OUTPATIENT
Start: 2017-03-07 | End: 2017-03-07 | Stop reason: HOSPADM

## 2017-03-07 RX ORDER — HYDROCODONE BITARTRATE AND ACETAMINOPHEN 5; 325 MG/1; MG/1
2 TABLET ORAL ONCE AS NEEDED
Status: COMPLETED | OUTPATIENT
Start: 2017-03-07 | End: 2017-03-07

## 2017-03-07 RX ORDER — MIDAZOLAM HYDROCHLORIDE 1 MG/ML
2 INJECTION INTRAMUSCULAR; INTRAVENOUS
Status: DISCONTINUED | OUTPATIENT
Start: 2017-03-07 | End: 2017-03-07 | Stop reason: HOSPADM

## 2017-03-07 RX ORDER — HYDROCODONE BITARTRATE AND ACETAMINOPHEN 7.5; 325 MG/1; MG/1
1 TABLET ORAL EVERY 4 HOURS PRN
Qty: 40 TABLET | Refills: 0 | Status: SHIPPED | OUTPATIENT
Start: 2017-03-07 | End: 2018-01-08

## 2017-03-07 RX ORDER — FAMOTIDINE 10 MG/ML
20 INJECTION, SOLUTION INTRAVENOUS ONCE
Status: COMPLETED | OUTPATIENT
Start: 2017-03-07 | End: 2017-03-07

## 2017-03-07 RX ORDER — FENTANYL CITRATE 50 UG/ML
INJECTION, SOLUTION INTRAMUSCULAR; INTRAVENOUS AS NEEDED
Status: DISCONTINUED | OUTPATIENT
Start: 2017-03-07 | End: 2017-03-07 | Stop reason: SURG

## 2017-03-07 RX ORDER — SODIUM CHLORIDE, SODIUM LACTATE, POTASSIUM CHLORIDE, CALCIUM CHLORIDE 600; 310; 30; 20 MG/100ML; MG/100ML; MG/100ML; MG/100ML
9 INJECTION, SOLUTION INTRAVENOUS CONTINUOUS
Status: DISCONTINUED | OUTPATIENT
Start: 2017-03-07 | End: 2017-03-07 | Stop reason: HOSPADM

## 2017-03-07 RX ORDER — PROPOFOL 10 MG/ML
VIAL (ML) INTRAVENOUS AS NEEDED
Status: DISCONTINUED | OUTPATIENT
Start: 2017-03-07 | End: 2017-03-07 | Stop reason: SURG

## 2017-03-07 RX ORDER — LIDOCAINE HYDROCHLORIDE 20 MG/ML
INJECTION, SOLUTION INFILTRATION; PERINEURAL AS NEEDED
Status: DISCONTINUED | OUTPATIENT
Start: 2017-03-07 | End: 2017-03-07 | Stop reason: SURG

## 2017-03-07 RX ORDER — MORPHINE SULFATE 2 MG/ML
2 INJECTION, SOLUTION INTRAMUSCULAR; INTRAVENOUS
Status: DISCONTINUED | OUTPATIENT
Start: 2017-03-07 | End: 2017-03-07 | Stop reason: HOSPADM

## 2017-03-07 RX ADMIN — FENTANYL CITRATE 50 MCG: 50 INJECTION INTRAMUSCULAR; INTRAVENOUS at 17:52

## 2017-03-07 RX ADMIN — LIDOCAINE HYDROCHLORIDE 100 MG: 20 INJECTION, SOLUTION INFILTRATION; PERINEURAL at 17:52

## 2017-03-07 RX ADMIN — HYDROCODONE BITARTRATE AND ACETAMINOPHEN 2 TABLET: 5; 325 TABLET ORAL at 19:09

## 2017-03-07 RX ADMIN — PROPOFOL 150 MG: 10 INJECTION, EMULSION INTRAVENOUS at 17:52

## 2017-03-07 RX ADMIN — SODIUM CHLORIDE, POTASSIUM CHLORIDE, SODIUM LACTATE AND CALCIUM CHLORIDE 9 ML/HR: 600; 310; 30; 20 INJECTION, SOLUTION INTRAVENOUS at 16:29

## 2017-03-07 RX ADMIN — SODIUM CHLORIDE, POTASSIUM CHLORIDE, SODIUM LACTATE AND CALCIUM CHLORIDE: 600; 310; 30; 20 INJECTION, SOLUTION INTRAVENOUS at 17:27

## 2017-03-07 RX ADMIN — CEFTRIAXONE 1 G: 1 INJECTION, POWDER, FOR SOLUTION INTRAMUSCULAR; INTRAVENOUS at 18:00

## 2017-03-07 RX ADMIN — FAMOTIDINE 20 MG: 10 INJECTION, SOLUTION INTRAVENOUS at 16:29

## 2017-03-07 RX ADMIN — MIDAZOLAM HYDROCHLORIDE 2 MG: 1 INJECTION, SOLUTION INTRAMUSCULAR; INTRAVENOUS at 17:41

## 2017-03-07 RX ADMIN — FENTANYL CITRATE 50 MCG: 50 INJECTION INTRAMUSCULAR; INTRAVENOUS at 18:16

## 2017-03-07 NOTE — ANESTHESIA PROCEDURE NOTES
Airway  Urgency: elective    Date/Time: 3/7/2017 5:54 PM  Airway not difficult    General Information and Staff    Patient location during procedure: OR  Anesthesiologist: MAUREEN WILLIAM  CRNA: BOB WILLIAM    Indications and Patient Condition  Indications for airway management: airway protection    Preoxygenated: yes  MILS not maintained throughout  Mask difficulty assessment: 1 - vent by mask    Final Airway Details  Final airway type: supraglottic airway      Successful airway: classic  Size 4    Number of attempts at approach: 1    Additional Comments  Pre O2, SIAI

## 2017-03-07 NOTE — ANESTHESIA PREPROCEDURE EVALUATION
Anesthesia Evaluation     Patient summary reviewed and Nursing notes reviewed   no history of anesthetic complications:  NPO Status: > 8 hours   Airway   Mallampati: II  TM distance: >3 FB  Neck ROM: full  Dental - normal exam     Pulmonary - normal exam   (+) asthma,   Cardiovascular - normal exam    (+) hypertension,       Neuro/Psych  (+) headaches, psychiatric history,    GI/Hepatic/Renal/Endo    (+) morbid obesity,     Musculoskeletal     (+) myalgias, neck pain,   Abdominal    Substance History      OB/GYN          Other                                    Anesthesia Plan    ASA 3     general     Anesthetic plan and risks discussed with patient.

## 2017-03-07 NOTE — PLAN OF CARE
Problem: Patient Care Overview (Adult)  Goal: Plan of Care Review  Outcome: Ongoing (interventions implemented as appropriate)    03/07/17 1513   Coping/Psychosocial Response Interventions   Plan Of Care Reviewed With patient   Patient Care Overview   Progress no change       Goal: Adult Individualization and Mutuality  Outcome: Ongoing (interventions implemented as appropriate)    03/07/17 1513   Individualization   Patient Specific Preferences na       Goal: Discharge Needs Assessment  Outcome: Ongoing (interventions implemented as appropriate)    03/07/17 1513   Discharge Needs Assessment   Concerns To Be Addressed denies needs/concerns at this time         Problem: Perioperative Period (Adult)  Goal: Signs and Symptoms of Listed Potential Problems Will be Absent or Manageable (Perioperative Period)  Outcome: Ongoing (interventions implemented as appropriate)    03/07/17 1513   Perioperative Period   Problems Assessed (Perioperative Period) pain;wound complications;embolism;hemorrhage;hypothermia;infection   Problems Present (Perioperative Period) pain

## 2017-03-07 NOTE — OP NOTE
[facility]  Rashid Malloy MD      CYSTOSCOPY URETEROSCOPY LASER LITHOTRIPSY  Procedure Note    Angie Patel  3/7/2017    Pre-op Diagnosis:   Proximal right ureteral stone  Post-op Diagnosis:     Name Procedure(s):  CYSTOSCOPY RT RETROGRADE PYELOGRAM, URETEROSCOPY LASER LITHOTRIPSY W/ STENT, rerograde pyelogram     Surgeon(s):  Rashid Malloy MD    Anesthesia: General    Surgical Assistant: Staff    Staff:   Circulator: Josy Hamilton RN  Laser Staff: Shannon Spurling, RN  Radiology Technologist: Anirudh Newton, EDWARD; Kylee Michaud  Scrub Person: Pedro Dickinson    Findings: Impacted proximal right ureteral stone with hydronephrosis    Description of Procedure: Adequate induction with general anesthesia the patient was placed in the modified supine lithotomy position on the operating table and prepped and draped in a sterile fashion over the genitalia.  Cystoscopy was performed with the 23 Barbadian sheath and 30° lens.  Urethra and bladder are basically normal.  Retrograde pyelogram was performed on the right side and confirmed a proximal obstructing right ureteral stone with hydronephrosis.  A guidewires passed up the right ureter beyond the stone to the kidney.  I then dilated the right ureter progressively from 6 Barbadian to 12 Barbadian.  During the process some small pieces of stone or stone the right ureter.  Right ureteroscopy with the flexible ureteroscope was performed.  Identified area with a stone had been impacted proximally.  KOB is performed.  The stone was identified in the renal pelvis and using the holmium laser cracked up the smaller pieces.    Eventually I removed the ureteroscope.  Over the remaining guidewire was passed a 30 cm 4.6 Barbadian double pigtail ureteral stent without retrieval line.  The patient was then transferred to the recovery room in satisfactory condition.    Complications none.    Estimated Blood Loss: Minimal    Specimens: None      Drains:    30 cm 4.6 Barbadian  double pigtail ureteral stent without retrieval line          Complications: None      Rashid Malloy MD     Date: 3/7/2017  Time: 6:59 PM

## 2017-03-07 NOTE — H&P
CHIEF COMPLAINT: Right ureteral stone with right flank pain, nausea, and vomiting.     HISTORY OF PRESENT ILLNESS: This 41-year-old female relates that this past Friday she developed pain in her right flank with nausea and vomiting and went to the emergency room. She has had some pain and discomfort since that time. She has been on Flomax and hydrocodone. Yesterday, she had difficulty voiding. Her stream was slow. She developed cold sweats.     PAST MEDICAL HISTORY:  1.  Hysterectomy.   2.  Shoulder surgery recently.      SOCIAL HISTORY: . Never smoked. Denies alcohol.     FAMILY HISTORY: Noncontributory.     MEDICATIONS:  1.  Flomax.   2.  Hydrocodone.   3.  Metformin.   4.  Phentermine.   5.  Spironolactone.     ALLERGIES:   1.  GLIPIZIDE.   2.  SHELLFISH.     PHYSICAL EXAMINATION:  GENERAL: Well-developed, well-nourished female in no acute distress.   HEENT: EOMs intact.   NECK: Supple.   LUNGS: Clear to auscultation and percussion bilaterally.   HEART: Regular sinus rhythm, normal S1 and S2, no murmur noted.   ABDOMEN: Soft, nontender. Maybe a little bit of tenderness in her right flank to palpation.   EXTREMITIES: Within normal limits without edema.   NEUROLOGIC: Cranial nerves grossly normal.     IMPRESSION: X-ray examination indicates a right ureteral stone.     PLAN: Cystoscopy with retrograde pyelography. Possible right ureteroscopy, laser destruction of her stone, and stent placement. She knows the risks, including ureteral trauma necessitating open surgery.         LUCIA Warren:rose  D:   03/07/2017 11:47:12  T:   03/07/2017 11:57:36  Job ID:   [id number]  Document ID:   [id number]  cc:   [facility]  Kya Kendrick PA-C

## 2017-03-08 NOTE — ANESTHESIA POSTPROCEDURE EVALUATION
Patient: Angie Patel    Procedure Summary     Date Anesthesia Start Anesthesia Stop Room / Location    03/07/17 0373 0275  [facility] / [facility]       Procedure Diagnosis Surgeon Provider    CYSTOSCOPY RT URETEROSCOPY LASER LITHOTRIPSY W/ STENT, rerograde pyelogram  (Right ) No diagnosis on file. MD Hua Solis MD          Anesthesia Type: general  Last vitals  /83 (03/07/17 1920)    Temp 36.8 °C (98.2 °F) (03/07/17 1911)    Pulse 91 (03/07/17 1920)   Resp 16 (03/07/17 1920)    SpO2 94 % (03/07/17 1920)      Post Anesthesia Care and Evaluation    Patient location during evaluation: bedside  Patient participation: complete - patient participated  Level of consciousness: awake  Pain management: adequate  Airway patency: patent  Anesthetic complications: No anesthetic complications    Cardiovascular status: acceptable  Respiratory status: acceptable  Hydration status: acceptable

## 2017-03-08 NOTE — DISCHARGE INSTRUCTIONS
*****You had Norco for pain at 7:09 pm*****      Outpatient Surgery Guidelines, Adult  Outpatient procedures are those for which the person having the procedure is allowed to go home the same day as the procedure. Various procedures are done on an outpatient basis. You should follow some general guidelines if you will be having an outpatient procedure.  AFTER THE  PROCEDURE  After surgery, you will be taken to a recovery area, where your progress will be monitored. If there are no complications, you will be allowed to go home when you are awake, stable, and taking fluids well. You may have numbness around the surgical site. Healing will take some time. You will have tenderness at the surgical site and may have some swelling and bruising. You may also have some nausea.  HOME CARE INSTRUCTIONS  · Do not drive for 24 hours, or as directed by your health care provider. Do not drive while taking prescription pain medicines.  · Do not drink alcohol for 24 hours.  · Do not make important decisions or sign legal documents for 24 hours.  · Plan on having a responsible adult stay with your for 24 hours following your procedure.  · You may resume a normal diet and activities as directed.  · Do not lift anything heavier than 10 pounds (4.5 kg) or play contact sports until your health care provider says it is okay.  · Only take over-the-counter or prescription medicines as directed by your health care provider.  · Follow up with your health care provider as directed.  SEEK MEDICAL CARE IF:  · You have increased bleeding (more than a small spot) from the surgical site.  · You have redness, swelling, or increasing pain in the wound.  · You see pus coming from the wound.  · You have a fever > 101.  · You notice a bad smell coming from the wound or dressing.  · You feel lightheaded or faint.  · You develop a rash.  · You have trouble breathing.  · You develop allergies.  MAKE SURE YOU:  · Understand these instructions.  · Will watch  your condition.  · Will get help right away if you are not doing well or get worse.

## 2017-03-08 NOTE — PLAN OF CARE
Problem: Patient Care Overview (Adult)  Goal: Plan of Care Review  Outcome: Outcome(s) achieved Date Met:  03/07/17 03/07/17 1944   Coping/Psychosocial Response Interventions   Plan Of Care Reviewed With patient;spouse   Patient Care Overview   Progress improving   Outcome Evaluation   Outcome Summary/Follow up Plan ready for discharge       Goal: Adult Individualization and Mutuality  Outcome: Outcome(s) achieved Date Met:  03/07/17  Goal: Discharge Needs Assessment  Outcome: Outcome(s) achieved Date Met:  03/07/17    Problem: Perioperative Period (Adult)  Goal: Signs and Symptoms of Listed Potential Problems Will be Absent or Manageable (Perioperative Period)  Outcome: Outcome(s) achieved Date Met:  03/07/17

## 2017-03-08 NOTE — PLAN OF CARE
Problem: Patient Care Overview (Adult)  Goal: Plan of Care Review  Outcome: Ongoing (interventions implemented as appropriate)    03/07/17 3478   Coping/Psychosocial Response Interventions   Plan Of Care Reviewed With patient   Patient Care Overview   Progress progress toward functional goals as expected       Goal: Adult Individualization and Mutuality  Outcome: Ongoing (interventions implemented as appropriate)    Problem: Perioperative Period (Adult)  Goal: Signs and Symptoms of Listed Potential Problems Will be Absent or Manageable (Perioperative Period)  Outcome: Ongoing (interventions implemented as appropriate)

## 2017-03-10 ENCOUNTER — OFFICE VISIT (OUTPATIENT)
Dept: FAMILY MEDICINE CLINIC | Facility: CLINIC | Age: 42
End: 2017-03-10

## 2017-03-10 VITALS
HEART RATE: 97 BPM | SYSTOLIC BLOOD PRESSURE: 128 MMHG | HEIGHT: 63 IN | TEMPERATURE: 98 F | WEIGHT: 215 LBS | OXYGEN SATURATION: 98 % | RESPIRATION RATE: 16 BRPM | DIASTOLIC BLOOD PRESSURE: 88 MMHG | BODY MASS INDEX: 38.09 KG/M2

## 2017-03-10 DIAGNOSIS — K59.03 CONSTIPATION DUE TO PAIN MEDICATION THERAPY: ICD-10-CM

## 2017-03-10 DIAGNOSIS — N20.1 RIGHT URETERAL STONE: Primary | ICD-10-CM

## 2017-03-10 DIAGNOSIS — R31.9 HEMATURIA: ICD-10-CM

## 2017-03-10 PROCEDURE — 99213 OFFICE O/P EST LOW 20 MIN: CPT | Performed by: PHYSICIAN ASSISTANT

## 2017-03-10 RX ORDER — METHENAMINE, SODIUM PHOSPHATE, MONOBASIC, MONOHYDRATE, PHENYL SALICYLATE, METHYLENE BLUE, AND HYOSCYAMINE SULFATE 120; 40.8; 36; 10; .12 MG/1; MG/1; MG/1; MG/1; MG/1
CAPSULE ORAL
Refills: 0 | COMMUNITY
Start: 2017-03-08 | End: 2017-05-01

## 2017-03-10 RX ORDER — LACTULOSE 10 G/15ML
20 SOLUTION ORAL 2 TIMES DAILY PRN
Qty: 236 ML | Refills: 0 | Status: SHIPPED | OUTPATIENT
Start: 2017-03-10 | End: 2017-05-01

## 2017-03-10 NOTE — PROGRESS NOTES
Subjective   Angie Patel is a 41 y.o. female.   Chief Complaint   Patient presents with   • Abdominal Pain       History of Present Illness   Angie is a 41 year old female whom presents for up from [facility] emergency room and neurology.  Jory states she was seen at [facility] on March 3,2017 she was diagnosed with right kidney stone.  She was given pain medication, Flomax and Cipro antibiotic's.  She was scheduled to see urology a few days later.  [facility] lithotripsy and a stent placement this week.  States he prescribed an additional Cipro antibiotic prescription and increased her pain medication.  Jory states she has been having ongoing right flank pain since the stent placement.  She has also had hematuria and discomfort when urinating.  States she notified urology office of her symptoms and was told to keep her appointment next week.  Tinea states that she's been having constipation issue since being on the pain medication.  She has tried multiple over-the-counter medications without relief of constipation.  She has been drinking more water and apple/cranberry juice.  It has been restless due to pain due to kidney stone.  Denied any chest pain, shortness of air, nausea, vomiting, diarrhea, dark black stools, fevers, chills, or decreased urination.      The following portions of the patient's history were reviewed and updated as appropriate: allergies, current medications, past family history, past medical history, past social history and past surgical history.    Review of Systems   Constitutional: Negative.  Negative for chills, diaphoresis, fatigue and fever.   HENT: Negative.    Eyes: Negative.    Respiratory: Negative.  Negative for cough, shortness of breath and wheezing.    Cardiovascular: Negative.    Gastrointestinal: Positive for abdominal pain, constipation and nausea. Negative for blood in stool, diarrhea, rectal pain and  "vomiting.   Endocrine: Negative.    Genitourinary: Positive for flank pain and hematuria.   Skin: Negative.    Allergic/Immunologic: Negative.    Neurological: Negative.  Negative for dizziness and light-headedness.   Hematological: Negative.    Psychiatric/Behavioral: Negative.    All other systems reviewed and are negative.    Vitals:    03/10/17 1510   BP: 128/88   BP Location: Right arm   Patient Position: Sitting   Cuff Size: Adult   Pulse: 97   Resp: 16   Temp: 98 °F (36.7 °C)   TempSrc: Oral   SpO2: 98%   Weight: 215 lb (97.5 kg)   Height: 63\" (160 cm)     Wt Readings from Last 3 Encounters:   03/10/17 215 lb (97.5 kg)   03/07/17 210 lb 9 oz (95.5 kg)   03/03/17 211 lb (95.7 kg)       BP Readings from Last 3 Encounters:   03/10/17 128/88   03/07/17 137/68   03/03/17 122/76     Body mass index is 38.09 kg/(m^2).    Allergies   Allergen Reactions   • Iodine Shortness Of Breath     Swelling     • Shellfish Allergy Shortness Of Breath     swelling       Objective   Physical Exam   Constitutional: She is oriented to person, place, and time. Vital signs are normal. She appears well-developed and well-nourished.   Obese female   Neck: Trachea normal and phonation normal. Neck supple.   Cardiovascular: Normal rate, regular rhythm, S1 normal, S2 normal, normal heart sounds and normal pulses.    Pulmonary/Chest: Effort normal and breath sounds normal.   Abdominal: Soft. Normal appearance and bowel sounds are normal. There is no hepatomegaly. There is no tenderness. There is CVA tenderness (slight right flank pain S/P stent placement/kidney stone).       Lymphadenopathy:        Right: No inguinal adenopathy present.        Left: No inguinal adenopathy present.   Neurological: She is alert and oriented to person, place, and time.   Skin: Skin is warm, dry and intact.   Psychiatric: She has a normal mood and affect. Her speech is normal and behavior is normal. Judgment and thought content normal. Cognition and memory are " normal.       Assessment/Plan   Angie was seen today for abdominal pain.    Diagnoses and all orders for this visit:    Right ureteral stone  -     Urine Culture    Hematuria  -     Urine Culture    Constipation due to pain medication therapy  -     lactulose (CHRONULAC) 10 GM/15ML solution; Take 30 mL by mouth 2 (Two) Times a Day As Needed (constipation).      1.  New hematuria with right ureteral stone:  I have reviewed her hospital and neurology notes with her at today's office visit.  A urine culture was collected and sent to the laboratory for further evaluation.  Unable to do urinalysis in office due to Uribel medication. U suspect her pain is from her ureteral stone and stent. Angie will keep her appointment with Urology next week.  Her symptoms worsen she is to go to the nearest emergency room.  She voiced understanding.  2.  New constipation due to pain medication: Angie has tried over-the-counter medications for her constipation.  States she needs to take the pain medication due to her kidney stone.  I have prescribed lactulose solution to pharmacy.  She will take as directed.        Yesica transcription disclaimer    Much of this encounter note is an electronic transcription/translation of spoken language to printed text.  The electronic translation of spoken language may permit erroneous, or at times, nonsensical words or phrases to be inadvertently transcribed.  Although I have reviewed the note for such errors, some may still exist.    Kya Kendrick PA-C  Family Practice

## 2017-03-12 LAB
BACTERIA UR CULT: NO GROWTH
BACTERIA UR CULT: NORMAL

## 2017-03-13 ENCOUNTER — TELEPHONE (OUTPATIENT)
Dept: FAMILY MEDICINE CLINIC | Facility: CLINIC | Age: 42
End: 2017-03-13

## 2017-03-13 NOTE — TELEPHONE ENCOUNTER
----- Message from Kya Kendrick PA-C sent at 3/12/2017  8:51 AM EDT -----  Please notify patient that her urine C&S showed no growth.  Keep appointment with urology.

## 2017-05-01 ENCOUNTER — OFFICE VISIT (OUTPATIENT)
Dept: FAMILY MEDICINE CLINIC | Facility: CLINIC | Age: 42
End: 2017-05-01

## 2017-05-01 VITALS
BODY MASS INDEX: 39.99 KG/M2 | WEIGHT: 225.7 LBS | DIASTOLIC BLOOD PRESSURE: 80 MMHG | SYSTOLIC BLOOD PRESSURE: 132 MMHG | HEART RATE: 87 BPM | HEIGHT: 63 IN | OXYGEN SATURATION: 98 %

## 2017-05-01 DIAGNOSIS — H53.9 VISION CHANGES: Primary | ICD-10-CM

## 2017-05-01 DIAGNOSIS — E66.01 MORBID OBESITY, UNSPECIFIED OBESITY TYPE (HCC): ICD-10-CM

## 2017-05-01 DIAGNOSIS — R60.9 PITTING EDEMA: ICD-10-CM

## 2017-05-01 DIAGNOSIS — E66.9 OBESITY (BMI 35.0-39.9 WITHOUT COMORBIDITY): ICD-10-CM

## 2017-05-01 DIAGNOSIS — H53.8 BLURRED VISION, BILATERAL: ICD-10-CM

## 2017-05-01 DIAGNOSIS — D32.9 MENINGIOMA (HCC): ICD-10-CM

## 2017-05-01 PROCEDURE — 99214 OFFICE O/P EST MOD 30 MIN: CPT | Performed by: PHYSICIAN ASSISTANT

## 2017-05-01 RX ORDER — PHENTERMINE HYDROCHLORIDE 37.5 MG/1
37.5 CAPSULE ORAL EVERY MORNING
Qty: 30 CAPSULE | Refills: 0
Start: 2017-05-01 | End: 2017-06-05 | Stop reason: SDUPTHER

## 2017-05-01 RX ORDER — SPIRONOLACTONE 100 MG/1
100 TABLET, FILM COATED ORAL DAILY
Qty: 90 TABLET | Refills: 3 | Status: SHIPPED | OUTPATIENT
Start: 2017-05-01 | End: 2018-04-15 | Stop reason: SDUPTHER

## 2017-05-01 RX ORDER — PHENTERMINE HYDROCHLORIDE 37.5 MG/1
37.5 CAPSULE ORAL EVERY MORNING
Qty: 30 CAPSULE | Refills: 0 | Status: CANCELLED
Start: 2017-05-01

## 2017-05-07 DIAGNOSIS — E28.2 PCOS (POLYCYSTIC OVARIAN SYNDROME): ICD-10-CM

## 2017-06-03 DIAGNOSIS — E28.2 PCOS (POLYCYSTIC OVARIAN SYNDROME): ICD-10-CM

## 2017-06-05 ENCOUNTER — OFFICE VISIT (OUTPATIENT)
Dept: FAMILY MEDICINE CLINIC | Facility: CLINIC | Age: 42
End: 2017-06-05

## 2017-06-05 VITALS
TEMPERATURE: 98.4 F | OXYGEN SATURATION: 98 % | HEART RATE: 80 BPM | WEIGHT: 217 LBS | SYSTOLIC BLOOD PRESSURE: 118 MMHG | BODY MASS INDEX: 38.45 KG/M2 | HEIGHT: 63 IN | RESPIRATION RATE: 16 BRPM | DIASTOLIC BLOOD PRESSURE: 62 MMHG

## 2017-06-05 DIAGNOSIS — I10 BENIGN ESSENTIAL HYPERTENSION: ICD-10-CM

## 2017-06-05 DIAGNOSIS — E66.9 OBESITY (BMI 35.0-39.9 WITHOUT COMORBIDITY): ICD-10-CM

## 2017-06-05 DIAGNOSIS — E78.00 HYPERCHOLESTEROLEMIA: Primary | ICD-10-CM

## 2017-06-05 DIAGNOSIS — E66.01 MORBID OBESITY, UNSPECIFIED OBESITY TYPE (HCC): ICD-10-CM

## 2017-06-05 DIAGNOSIS — E55.9 VITAMIN D DEFICIENCY: ICD-10-CM

## 2017-06-05 LAB
25(OH)D3+25(OH)D2 SERPL-MCNC: 18.7 NG/ML
ALBUMIN SERPL-MCNC: 4.2 G/DL (ref 3.5–5.2)
ALBUMIN/GLOB SERPL: 1.4 G/DL
ALP SERPL-CCNC: 45 U/L (ref 40–129)
ALT SERPL-CCNC: 23 U/L (ref 5–33)
AST SERPL-CCNC: 18 U/L (ref 5–32)
BASOPHILS # BLD AUTO: 0.04 10*3/MM3 (ref 0–0.2)
BASOPHILS NFR BLD AUTO: 0.6 % (ref 0–2)
BILIRUB SERPL-MCNC: 0.6 MG/DL (ref 0.2–1.2)
BUN SERPL-MCNC: 13 MG/DL (ref 6–20)
BUN/CREAT SERPL: 16 (ref 7–25)
CALCIUM SERPL-MCNC: 9.2 MG/DL (ref 8.6–10.5)
CHLORIDE SERPL-SCNC: 99 MMOL/L (ref 98–107)
CHOLEST SERPL-MCNC: 161 MG/DL (ref 0–200)
CO2 SERPL-SCNC: 24.2 MMOL/L (ref 22–29)
CREAT SERPL-MCNC: 0.81 MG/DL (ref 0.57–1)
EOSINOPHIL # BLD AUTO: 0.34 10*3/MM3 (ref 0.1–0.3)
EOSINOPHIL NFR BLD AUTO: 4.8 % (ref 0–4)
ERYTHROCYTE [DISTWIDTH] IN BLOOD BY AUTOMATED COUNT: 12.5 % (ref 11.5–14.5)
GLOBULIN SER CALC-MCNC: 3.1 GM/DL
GLUCOSE SERPL-MCNC: 100 MG/DL (ref 65–99)
HCT VFR BLD AUTO: 41.6 % (ref 37–47)
HDLC SERPL-MCNC: 39 MG/DL (ref 40–60)
HGB BLD-MCNC: 13.9 G/DL (ref 12–16)
IMM GRANULOCYTES # BLD: 0.01 10*3/MM3 (ref 0–0.03)
IMM GRANULOCYTES NFR BLD: 0.1 % (ref 0–0.5)
LDLC SERPL CALC-MCNC: 99 MG/DL (ref 0–100)
LDLC/HDLC SERPL: 2.53 {RATIO}
LYMPHOCYTES # BLD AUTO: 2.21 10*3/MM3 (ref 0.6–4.8)
LYMPHOCYTES NFR BLD AUTO: 30.9 % (ref 20–45)
MCH RBC QN AUTO: 29.8 PG (ref 27–31)
MCHC RBC AUTO-ENTMCNC: 33.4 G/DL (ref 31–37)
MCV RBC AUTO: 89.1 FL (ref 81–99)
MONOCYTES # BLD AUTO: 0.43 10*3/MM3 (ref 0–1)
MONOCYTES NFR BLD AUTO: 6 % (ref 3–8)
NEUTROPHILS # BLD AUTO: 4.12 10*3/MM3 (ref 1.5–8.3)
NEUTROPHILS NFR BLD AUTO: 57.6 % (ref 45–70)
NRBC BLD AUTO-RTO: 0 /100 WBC (ref 0–0)
PLATELET # BLD AUTO: 292 10*3/MM3 (ref 140–500)
POTASSIUM SERPL-SCNC: 4.4 MMOL/L (ref 3.5–5.2)
PROT SERPL-MCNC: 7.3 G/DL (ref 6–8.5)
RBC # BLD AUTO: 4.67 10*6/MM3 (ref 4.2–5.4)
SODIUM SERPL-SCNC: 139 MMOL/L (ref 136–145)
TRIGL SERPL-MCNC: 116 MG/DL (ref 0–150)
VLDLC SERPL CALC-MCNC: 23.2 MG/DL (ref 7–27)
WBC # BLD AUTO: 7.15 10*3/MM3 (ref 4.8–10.8)

## 2017-06-05 PROCEDURE — 99213 OFFICE O/P EST LOW 20 MIN: CPT | Performed by: PHYSICIAN ASSISTANT

## 2017-06-05 RX ORDER — ALBUTEROL SULFATE 90 UG/1
AEROSOL, METERED RESPIRATORY (INHALATION)
Qty: 18 G | Refills: 0 | Status: SHIPPED | OUTPATIENT
Start: 2017-06-05 | End: 2017-06-24 | Stop reason: SDUPTHER

## 2017-06-05 NOTE — PROGRESS NOTES
Subjective   Angie Patel is a 42 y.o. female.   She  is here today to f/u on   Chief Complaint   Patient presents with   • Obesity   • Hyperlipidemia   • Hypertension     Chief Complaint   Patient presents with   • Obesity   • Hyperlipidemia   • Hypertension         History of Present Illness     Angie is a 42-year-old female who presents for obesity management.  Lost 8 pounds since May 1, 2017.  She has been taking phentermine once a day.Angie has been going to [employer] six times a week.  She exercises 90 minutes a day. Appetite has been healthy.  Sleep has been normal.  Angie is doing well with her medications for her hypercholesterolemia, hypertension and vitamin D deficiency.  She is fasting at today's office visit.  She has been taking her metformin medication twice a day.  Denied any chest pain, shortness of air, dizziness, or swelling of her ankles.      The following portions of the patient's history were reviewed and updated as appropriate: allergies, current medications, past family history, past medical history, past social history and past surgical history.    Review of Systems   Constitutional: Negative.    HENT: Negative.    Eyes: Negative.    Respiratory: Negative.  Negative for cough, shortness of breath and wheezing.    Cardiovascular: Negative.  Negative for chest pain, palpitations and leg swelling.   Gastrointestinal: Negative.    Endocrine: Negative.    Genitourinary: Negative.    Musculoskeletal: Negative.    Skin: Negative.    Allergic/Immunologic: Negative.    Neurological: Negative.    Hematological: Negative.    Psychiatric/Behavioral: Positive for sleep disturbance. Negative for suicidal ideas.   All other systems reviewed and are negative.      Objective    Vitals:    06/05/17 0756   BP: 118/62   Pulse: 80   Resp: 16   Temp: 98.4 °F (36.9 °C)   SpO2: 98%       Allergies   Allergen Reactions   • Iodine Shortness Of Breath     Swelling     • Shellfish Allergy Shortness Of Breath      swelling       Body mass index is 38.44 kg/(m^2).     Wt Readings from Last 3 Encounters:   06/05/17 217 lb (98.4 kg)   05/01/17 225 lb 11.2 oz (102 kg)   03/10/17 215 lb (97.5 kg)       BP Readings from Last 3 Encounters:   06/05/17 118/62   05/01/17 132/80   03/10/17 128/88     BP Readings from Last 3 Encounters:   06/05/17 118/62   05/01/17 132/80   03/10/17 128/88       Physical Exam   Constitutional: She is oriented to person, place, and time. Vital signs are normal. She appears well-developed and well-nourished.   Neck: Trachea normal and phonation normal. Neck supple. Carotid bruit is not present. No edema present.   Cardiovascular: Normal rate, regular rhythm, S1 normal, S2 normal, normal heart sounds and normal pulses.    Pulmonary/Chest: Effort normal and breath sounds normal.   Abdominal: Soft. Normal appearance and bowel sounds are normal. There is no hepatomegaly. There is no tenderness.   Neurological: She is alert and oriented to person, place, and time.   Skin: Skin is warm, dry and intact.   Psychiatric: She has a normal mood and affect. Her speech is normal and behavior is normal. Judgment and thought content normal. Cognition and memory are normal.       Invalid input(s): 2W    Assessment/Plan   Angie was seen today for obesity, hyperlipidemia and hypertension.    Diagnoses and all orders for this visit:    Hypercholesterolemia  -     Comprehensive Metabolic Panel  -     Lipid Panel With LDL / HDL Ratio    Benign essential hypertension  -     CBC & Differential  -     Comprehensive Metabolic Panel  -     Lipid Panel With LDL / HDL Ratio    Vitamin D deficiency  -     Vitamin D 25 Hydroxy    Obesity (BMI 35.0-39.9 without comorbidity)      1.  Stable hypertension: I have rechecked her blood pressure at today's office visit and got 102/64 in left arm.  She is currently doing well with her medications.  No refills required at this time.  2.  Chronic type for cholesterolemia: She is fasting will  have a CBC, CMP and a lipid profile.  Angie will be notified of test results when completed.  3.  Chronic vitamin D deficiency: She has been taking her vitamin D medication once a week.  She will have a vitamin D blood work collected at today's office visit.  She'll be notified of test results when completed.  4.  Improving obesity:  Angie has lost 8 pounds in the past month.  Dr. Saravia has approved a refill on her phentermine medication.  See below for Theresa information.  Angie was instructed to continue exercising and eating healthy.  She will schedule follow-up appointment in one month for reevaluation.          There are no Patient Instructions on file for this visit.      As part of this patient's treatment plan I am prescribing controlled substances.  The patient has been made aware of appropriate use of such medications, including potential risk of somnolence. Limited ability to drive and/or work safely, and potential for dependence or overdose.  It has also been made clear that these medications are for use by this patient only, without concomitant use of alcohol or other substances unless prescribed.  THERESA report has been reviewed and scanned into the patient's chart.  Theresa number is [id number] dated April 29, 2017.    Dragon transcription disclaimer    Much of this encounter note is an electronic transcription/translation of spoken language to printed text.  The electronic translation of spoken language may permit erroneous, or at times, nonsensical words or phrases to be inadvertently transcribed.  Although I have reviewed the note for such errors, some may still exist.    Kya Kendrick PA-C  Family Practice

## 2017-06-07 ENCOUNTER — TELEPHONE (OUTPATIENT)
Dept: FAMILY MEDICINE CLINIC | Facility: CLINIC | Age: 42
End: 2017-06-07

## 2017-06-07 NOTE — TELEPHONE ENCOUNTER
----- Message from Kya Kendrick PA-C sent at 6/7/2017  7:34 AM EDT -----  1.  Notify patient that her Vitamin D level was 18.7.  Has she been taking her Vitamin D 3 50,000 IU once a week? If, so please continue.  Plan to repeat in 6 months.  2.  CBC was normal.  3.  FBS was slight elevated at 100 but has improved from 3 months.  4.  Cholesterol was 161,triglycerides 116,  HDL 39 and LDL was 99.  These are normal. Please continue her medications at home,.

## 2017-06-07 NOTE — TELEPHONE ENCOUNTER
LEFT MESSAGE INFORMED PATIENT TO CONTINUE VIT D AND TO RECHECK IN 6 MOS AND SUGAR WAS SLIGHTLY ELEVATED AND TO RECHECK IN 3 MOS

## 2017-06-12 RX ORDER — PHENTERMINE HYDROCHLORIDE 37.5 MG/1
37.5 CAPSULE ORAL EVERY MORNING
Qty: 30 CAPSULE | Refills: 0
Start: 2017-06-12 | End: 2017-07-31

## 2017-06-26 RX ORDER — ALBUTEROL SULFATE 90 UG/1
AEROSOL, METERED RESPIRATORY (INHALATION)
Qty: 18 G | Refills: 3 | Status: SHIPPED | OUTPATIENT
Start: 2017-06-26 | End: 2017-09-11 | Stop reason: SDUPTHER

## 2017-06-29 DIAGNOSIS — E28.2 PCOS (POLYCYSTIC OVARIAN SYNDROME): ICD-10-CM

## 2017-07-03 ENCOUNTER — OFFICE VISIT (OUTPATIENT)
Dept: FAMILY MEDICINE CLINIC | Facility: CLINIC | Age: 42
End: 2017-07-03

## 2017-07-03 VITALS
HEART RATE: 99 BPM | OXYGEN SATURATION: 100 % | WEIGHT: 222 LBS | DIASTOLIC BLOOD PRESSURE: 76 MMHG | BODY MASS INDEX: 39.34 KG/M2 | HEIGHT: 63 IN | SYSTOLIC BLOOD PRESSURE: 110 MMHG | TEMPERATURE: 97.9 F | RESPIRATION RATE: 16 BRPM

## 2017-07-03 DIAGNOSIS — R07.89 CHEST TIGHTNESS OR PRESSURE: Primary | ICD-10-CM

## 2017-07-03 DIAGNOSIS — E66.9 OBESITY (BMI 35.0-39.9 WITHOUT COMORBIDITY): ICD-10-CM

## 2017-07-03 DIAGNOSIS — F41.9 ANXIETY: ICD-10-CM

## 2017-07-03 PROCEDURE — 99214 OFFICE O/P EST MOD 30 MIN: CPT | Performed by: PHYSICIAN ASSISTANT

## 2017-07-03 PROCEDURE — 93000 ELECTROCARDIOGRAM COMPLETE: CPT | Performed by: PHYSICIAN ASSISTANT

## 2017-07-03 RX ORDER — BUSPIRONE HYDROCHLORIDE 5 MG/1
5 TABLET ORAL 3 TIMES DAILY
Qty: 90 TABLET | Refills: 1 | Status: SHIPPED | OUTPATIENT
Start: 2017-07-03 | End: 2017-08-27 | Stop reason: SDUPTHER

## 2017-07-03 NOTE — PROGRESS NOTES
Subjective   Angie Patel is a 42 y.o. female.   Chief Complaint   Patient presents with   • Weight Loss     management       History of Present Illness     Angie  is a 42-year-old female who presents for weight loss management.  She has gained 5 pounds since June 5, 2017.  She has been having a chest tightness and asthma issues in her chest.  Stated that the tightness occurred while visiting her family in [place]. Her son had a concussion and they stayed with her parents.  She was exposed to constant tobacco smoke.  When she got home she started all of her medications  again.  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   The breathing issues are better but still having chest tightness.  States the chest tightness comes and goes.  She has noticed it happens more when she is stressed.  Denied any chest pain,shortness of air,wheezing,headaches or swelling of ankles.  Sleep has been decreased the past few weeks.  Appetite has been better.      The following portions of the patient's history were reviewed and updated as appropriate: allergies, current medications, past family history, past medical history, past social history and past surgical history.    Review of Systems   Constitutional: Negative.    HENT: Negative.    Eyes: Negative.    Respiratory: Positive for chest tightness.    Cardiovascular: Negative.    Gastrointestinal: Negative.    Endocrine: Negative.    Genitourinary: Negative.    Musculoskeletal: Negative.    Skin: Negative.    Allergic/Immunologic: Negative.    Neurological: Negative.    Hematological: Negative.    Psychiatric/Behavioral: Positive for sleep disturbance. Negative for suicidal ideas. The patient is nervous/anxious.    All other systems reviewed and are negative.    Vitals:    07/03/17 0815   BP: 110/76   BP Location: Right arm   Patient Position: Sitting   Cuff Size: Large Adult   Pulse: 99   Resp: 16   Temp: 97.9 °F (36.6 °C)   TempSrc: Oral   SpO2: 100%   Weight: 222  "lb (101 kg)   Height: 63\" (160 cm)     Body mass index is 39.33 kg/(m^2).    Wt Readings from Last 3 Encounters:   07/03/17 222 lb (101 kg)   06/05/17 217 lb (98.4 kg)   05/01/17 225 lb 11.2 oz (102 kg)       BP Readings from Last 3 Encounters:   07/03/17 110/76   06/05/17 118/62   05/01/17 132/80     Objective   Physical Exam   Constitutional: She is oriented to person, place, and time. Vital signs are normal. She appears well-developed and well-nourished.   HENT:   Head: Normocephalic and atraumatic.   Right Ear: Hearing, tympanic membrane, external ear and ear canal normal.   Left Ear: Hearing, tympanic membrane, external ear and ear canal normal.   Nose: Nose normal. Right sinus exhibits no maxillary sinus tenderness and no frontal sinus tenderness. Left sinus exhibits no maxillary sinus tenderness and no frontal sinus tenderness.   Mouth/Throat: Uvula is midline, oropharynx is clear and moist and mucous membranes are normal.   Eyes: Conjunctivae, EOM and lids are normal.   Neck: Trachea normal and phonation normal. Neck supple. Carotid bruit is not present. No edema present. No thyromegaly present.   Cardiovascular: Normal rate, regular rhythm, S1 normal, S2 normal, normal heart sounds and normal pulses.    Pulmonary/Chest: Effort normal and breath sounds normal.   Abdominal: Soft. Normal appearance, normal aorta and bowel sounds are normal. There is no hepatomegaly. There is no tenderness.   Lymphadenopathy:     She has no cervical adenopathy.   Neurological: She is alert and oriented to person, place, and time.   Skin: Skin is warm, dry and intact.   Psychiatric: Her speech is normal and behavior is normal. Judgment and thought content normal. Her mood appears anxious. Cognition and memory are normal.           ECG 12 Lead  Date/Time: 7/3/2017 8:51 AM  Performed by: CORNELIUS MILNER  Authorized by: CORNELIUS MILNER   Comparison: not compared with previous ECG   Rhythm: sinus rhythm  Rate: normal  BPM: " 80  Conduction: conduction normal  ST Segments: ST segments normal  T Waves: T waves normal  QRS axis: normal  Clinical impression: normal ECG  Comments: Indication: Chest tightness  P/RI:  96/146 ms  QRS:  90 ms  Qt/QTc:  436/503 ms            Assessment/Plan   Angie was seen today for weight loss.    Diagnoses and all orders for this visit:    Chest tightness or pressure  -     ECG 12 Lead    Obesity (BMI 35.0-39.9 without comorbidity)    Anxiety  -     busPIRone (BUSPAR) 5 MG tablet; Take 1 tablet by mouth 3 (Three) Times a Day.        1.  New chest tightness: Angie had an EKG which was normal at today's office visit.  I suspect her symptoms are related to stress.  We will monitor for now.  2.  New Anxiety: I have prescribed generic BuSpar to pharmacy.  She will take this up to 3 times a day as needed.  Angie will return to office in one month for reevaluation.  3.  Chronic Obesity: She will stop her phentermine medication.  The medication is not helping her lose weight.  She will continue to exercise and eat healthy.        Dragon transcription disclaimer    Much of this encounter note is an electronic transcription/translation of spoken language to printed text.  The electronic translation of spoken language may permit erroneous, or at times, nonsensical words or phrases to be inadvertently transcribed.  Although I have reviewed the note for such errors, some may still exist.    Kya Kendrick PA-C  Family Practice

## 2017-07-25 DIAGNOSIS — R60.9 PITTING EDEMA: ICD-10-CM

## 2017-07-25 RX ORDER — FUROSEMIDE 20 MG/1
TABLET ORAL
Qty: 30 TABLET | Refills: 0 | Status: SHIPPED | OUTPATIENT
Start: 2017-07-25 | End: 2017-09-05 | Stop reason: SDUPTHER

## 2017-07-25 RX ORDER — FUROSEMIDE 20 MG/1
TABLET ORAL
Qty: 30 TABLET | Refills: 0 | Status: SHIPPED | OUTPATIENT
Start: 2017-07-25 | End: 2017-07-31 | Stop reason: DRUGHIGH

## 2017-07-31 ENCOUNTER — OFFICE VISIT (OUTPATIENT)
Dept: FAMILY MEDICINE CLINIC | Facility: CLINIC | Age: 42
End: 2017-07-31

## 2017-07-31 VITALS
HEART RATE: 101 BPM | SYSTOLIC BLOOD PRESSURE: 110 MMHG | OXYGEN SATURATION: 98 % | HEIGHT: 63 IN | BODY MASS INDEX: 38.27 KG/M2 | TEMPERATURE: 98.4 F | WEIGHT: 216 LBS | DIASTOLIC BLOOD PRESSURE: 70 MMHG | RESPIRATION RATE: 16 BRPM

## 2017-07-31 DIAGNOSIS — F41.9 ANXIETY: Primary | ICD-10-CM

## 2017-07-31 DIAGNOSIS — E66.9 OBESITY (BMI 35.0-39.9 WITHOUT COMORBIDITY): ICD-10-CM

## 2017-07-31 PROCEDURE — 99213 OFFICE O/P EST LOW 20 MIN: CPT | Performed by: PHYSICIAN ASSISTANT

## 2017-07-31 NOTE — PROGRESS NOTES
"Subjective   Angie Patel is a 42 y.o. female.   Chief Complaint   Patient presents with   • Obesity     management       History of Present Illness     Angie is a 42 year old female who presents with obesity management.  She has lost 6 pounds since July 3, 2017. She has been off of the medication for the past month.  Diet has been healthy.  Sleep has been normal. Angie has been exercising several times a week.   Doing well with the Buspar medication.  States the medication has helped with her anxiety.  Stress has been decreased since her friend's daughter has moved.  Denied any suicide or homicidal thoughts.        The following portions of the patient's history were reviewed and updated as appropriate: allergies, current medications, past family history, past medical history, past social history and past surgical history.    Review of Systems   Constitutional: Negative.    HENT: Negative.    Eyes: Negative.    Respiratory: Negative.  Negative for cough, shortness of breath and wheezing.    Cardiovascular: Negative.  Negative for chest pain, palpitations and leg swelling.   Gastrointestinal: Negative.    Endocrine: Negative.    Genitourinary: Negative.    Musculoskeletal: Negative.    Skin: Negative.    Allergic/Immunologic: Negative.    Neurological: Negative.    Hematological: Negative.    Psychiatric/Behavioral: Negative.  Negative for sleep disturbance and suicidal ideas.   All other systems reviewed and are negative.    Vitals:    07/31/17 0840   BP: 110/70   BP Location: Left arm   Patient Position: Sitting   Cuff Size: Large Adult   Pulse: 101   Resp: 16   Temp: 98.4 °F (36.9 °C)   TempSrc: Oral   SpO2: 98%   Weight: 216 lb (98 kg)   Height: 63\" (160 cm)     Wt Readings from Last 3 Encounters:   07/31/17 216 lb (98 kg)   07/03/17 222 lb (101 kg)   06/05/17 217 lb (98.4 kg)       BP Readings from Last 3 Encounters:   07/31/17 110/70   07/03/17 110/76   06/05/17 118/62     Body mass index is 38.26 " kg/(m^2).  Allergies   Allergen Reactions   • Iodine Shortness Of Breath     Swelling     • Shellfish Allergy Shortness Of Breath     swelling       Objective   Physical Exam   Constitutional: She is oriented to person, place, and time. Vital signs are normal. She appears well-developed and well-nourished.   Neck: Trachea normal and normal range of motion. Neck supple. No edema present.   Cardiovascular: Normal rate, regular rhythm, S1 normal, S2 normal, normal heart sounds and normal pulses.    Pulmonary/Chest: Effort normal and breath sounds normal.   Abdominal: Soft. Normal appearance and bowel sounds are normal. There is no hepatomegaly. There is no tenderness.   Neurological: She is alert and oriented to person, place, and time.   Skin: Skin is warm, dry and intact.   Psychiatric: She has a normal mood and affect. Her speech is normal and behavior is normal. Judgment and thought content normal. Cognition and memory are normal.       Assessment/Plan   Angie was seen today for obesity.    Diagnoses and all orders for this visit:    Anxiety    Obesity (BMI 35.0-39.9 without comorbidity)    1.  Stable anxiety: Angie is doing well with the BuSpar medication.  No refills required at this time.  I've asked her to return to office in 3 months for reevaluation.  2.  Improving obesity: She has been off the phentermine for the past month.  She has lost 6 pounds by dieting and exercising.  I have given her encouragement and praise.  She will continue eating healthy and exercising regularly.        Dragon transcription disclaimer    Much of this encounter note is an electronic transcription/translation of spoken language to printed text.  The electronic translation of spoken language may permit erroneous, or at times, nonsensical words or phrases to be inadvertently transcribed.  Although I have reviewed the note for such errors, some may still exist.    Kya Kendrick PA-C  Family Practice

## 2017-08-27 DIAGNOSIS — F41.9 ANXIETY: ICD-10-CM

## 2017-08-28 RX ORDER — BUSPIRONE HYDROCHLORIDE 5 MG/1
TABLET ORAL
Qty: 90 TABLET | Refills: 0 | Status: SHIPPED | OUTPATIENT
Start: 2017-08-28 | End: 2017-10-12 | Stop reason: SDUPTHER

## 2017-09-05 DIAGNOSIS — R60.9 PITTING EDEMA: ICD-10-CM

## 2017-09-05 RX ORDER — FUROSEMIDE 20 MG/1
TABLET ORAL
Qty: 30 TABLET | Refills: 2 | Status: SHIPPED | OUTPATIENT
Start: 2017-09-05 | End: 2017-11-25 | Stop reason: SDUPTHER

## 2017-09-11 RX ORDER — ALBUTEROL SULFATE 90 UG/1
AEROSOL, METERED RESPIRATORY (INHALATION)
Qty: 18 G | Refills: 6 | Status: SHIPPED | OUTPATIENT
Start: 2017-09-11 | End: 2018-01-27 | Stop reason: SDUPTHER

## 2017-10-12 DIAGNOSIS — F41.9 ANXIETY: ICD-10-CM

## 2017-10-12 RX ORDER — BUSPIRONE HYDROCHLORIDE 5 MG/1
TABLET ORAL
Qty: 90 TABLET | Refills: 0 | Status: SHIPPED | OUTPATIENT
Start: 2017-10-12 | End: 2017-11-12 | Stop reason: SDUPTHER

## 2017-10-18 DIAGNOSIS — E28.2 PCOS (POLYCYSTIC OVARIAN SYNDROME): ICD-10-CM

## 2017-11-06 ENCOUNTER — TELEPHONE (OUTPATIENT)
Dept: FAMILY MEDICINE CLINIC | Facility: CLINIC | Age: 42
End: 2017-11-06

## 2017-11-06 ENCOUNTER — OFFICE VISIT (OUTPATIENT)
Dept: FAMILY MEDICINE CLINIC | Facility: CLINIC | Age: 42
End: 2017-11-06

## 2017-11-06 ENCOUNTER — HOSPITAL ENCOUNTER (OUTPATIENT)
Dept: CT IMAGING | Facility: HOSPITAL | Age: 42
Discharge: HOME | End: 2017-11-06
Admitting: PHYSICIAN ASSISTANT

## 2017-11-06 VITALS
HEART RATE: 85 BPM | TEMPERATURE: 98.5 F | HEIGHT: 63 IN | BODY MASS INDEX: 41.99 KG/M2 | RESPIRATION RATE: 16 BRPM | OXYGEN SATURATION: 99 % | WEIGHT: 237 LBS | DIASTOLIC BLOOD PRESSURE: 70 MMHG | SYSTOLIC BLOOD PRESSURE: 118 MMHG

## 2017-11-06 DIAGNOSIS — F41.9 ANXIETY: ICD-10-CM

## 2017-11-06 DIAGNOSIS — R10.9 LEFT FLANK PAIN: Primary | ICD-10-CM

## 2017-11-06 DIAGNOSIS — R10.9 ACUTE LEFT FLANK PAIN: Primary | ICD-10-CM

## 2017-11-06 DIAGNOSIS — Z23 NEED FOR INFLUENZA VACCINATION: ICD-10-CM

## 2017-11-06 DIAGNOSIS — Z87.442 HISTORY OF KIDNEY STONES: ICD-10-CM

## 2017-11-06 DIAGNOSIS — R31.9 HEMATURIA, UNSPECIFIED TYPE: ICD-10-CM

## 2017-11-06 LAB
BILIRUB BLD-MCNC: NEGATIVE MG/DL
CLARITY, POC: CLEAR
COLOR UR: YELLOW
GLUCOSE UR STRIP-MCNC: NEGATIVE MG/DL
KETONES UR QL: NEGATIVE
LEUKOCYTE EST, POC: NEGATIVE
NITRITE UR-MCNC: NEGATIVE MG/ML
PH UR: 5 [PH] (ref 5–8)
PROT UR STRIP-MCNC: NEGATIVE MG/DL
RBC # UR STRIP: ABNORMAL /UL
SP GR UR: 1.02 (ref 1–1.03)
UROBILINOGEN UR QL: NORMAL

## 2017-11-06 PROCEDURE — 81002 URINALYSIS NONAUTO W/O SCOPE: CPT | Performed by: PHYSICIAN ASSISTANT

## 2017-11-06 PROCEDURE — 90686 IIV4 VACC NO PRSV 0.5 ML IM: CPT | Performed by: PHYSICIAN ASSISTANT

## 2017-11-06 PROCEDURE — 74176 CT ABD & PELVIS W/O CONTRAST: CPT

## 2017-11-06 PROCEDURE — 99214 OFFICE O/P EST MOD 30 MIN: CPT | Performed by: PHYSICIAN ASSISTANT

## 2017-11-06 PROCEDURE — 90471 IMMUNIZATION ADMIN: CPT | Performed by: PHYSICIAN ASSISTANT

## 2017-11-06 RX ORDER — METHOCARBAMOL 500 MG/1
500 TABLET, FILM COATED ORAL 3 TIMES DAILY
Qty: 30 TABLET | Refills: 0 | Status: SHIPPED | OUTPATIENT
Start: 2017-11-06 | End: 2018-03-15

## 2017-11-06 NOTE — PROGRESS NOTES
"Subjective   Angie Patel is a 42 y.o. female.   Chief Complaint   Patient presents with   • Anxiety     management     History of Present Illness     Angie is a 42 year old female who presents Side he management.  States she is taking 10 mg of Buspar in the am and 5 mg at night.  States this has helped greatly.  Denied any Suicidal or homicidal thoughts.   has notices a changed for the better.  Angie has been going to counseling once a week to every two weeks.  States she has had decreased stress at home. She also has been having some mid back pain that started yesterday. The pain comes and goes.  States if she gets in the \"right position\" she has no pain.   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  Denied any recent injury/trauma.    Angie has a history of kidney stones.  She has had some dysuria symptoms for the past day. She has had some nausea off and on.  Denied any hematuria,urine hesitancy,urgency,frequency,fevers,chills,vomiting or diarrhea. Diet ocampo been poor. Sleep has been restless due to back pain.    The following portions of the patient's history were reviewed and updated as appropriate: allergies, current medications, past family history, past medical history, past social history and past surgical history.    Review of Systems   Constitutional: Negative.  Negative for chills and fever.   HENT: Negative.    Eyes: Negative.    Respiratory: Negative.  Negative for cough, shortness of breath and wheezing.    Cardiovascular: Negative.  Negative for chest pain, palpitations and leg swelling.   Gastrointestinal: Positive for abdominal pain and nausea. Negative for diarrhea and vomiting.   Endocrine: Negative.    Genitourinary: Positive for dysuria and flank pain. Negative for decreased urine volume, frequency, hematuria, pelvic pain and urgency.   Skin: Negative.    Allergic/Immunologic: Negative.    Neurological: Negative.    Hematological: Negative.    Psychiatric/Behavioral: Negative for sleep " "disturbance and suicidal ideas. The patient is nervous/anxious.    All other systems reviewed and are negative.    Vitals:    11/06/17 0906   BP: 118/70   BP Location: Right arm   Patient Position: Sitting   Cuff Size: Large Adult   Pulse: 85   Resp: 16   Temp: 98.5 °F (36.9 °C)   TempSrc: Oral   SpO2: 99%   Weight: 237 lb (108 kg)   Height: 63\" (160 cm)       Wt Readings from Last 3 Encounters:   11/06/17 237 lb (108 kg)   07/31/17 216 lb (98 kg)   07/03/17 222 lb (101 kg)       BP Readings from Last 3 Encounters:   11/06/17 118/70   07/31/17 110/70   07/03/17 110/76     Body mass index is 41.98 kg/(m^2).  Allergies   Allergen Reactions   • Iodine Shortness Of Breath     Swelling     • Shellfish Allergy Shortness Of Breath     swelling       Objective   Physical Exam   Constitutional: She is oriented to person, place, and time. Vital signs are normal. She appears well-developed and well-nourished.   Neck: Trachea normal and phonation normal. Neck supple. No edema present.   Cardiovascular: Normal rate, regular rhythm, S1 normal, S2 normal, normal heart sounds and normal pulses.    Pulmonary/Chest: Effort normal and breath sounds normal.   Abdominal: Soft. Normal appearance and bowel sounds are normal. There is no hepatosplenomegaly or hepatomegaly. There is no tenderness. There is CVA tenderness (left). There is no rigidity, no rebound, no guarding, no tenderness at McBurney's point and negative Rocha's sign.   Neurological: She is alert and oriented to person, place, and time.   Skin: Skin is warm, dry and intact.   Psychiatric: She has a normal mood and affect. Her speech is normal and behavior is normal. Judgment and thought content normal. Cognition and memory are normal.         Results for orders placed or performed in visit on 11/06/17   POCT urinalysis dipstick, manual   Result Value Ref Range    Color Yellow Yellow, Straw, Dark Yellow, Margot    Clarity, UA Clear Clear    Glucose, UA Negative Negative, " 1000 mg/dL (3+) mg/dL    Bilirubin Negative Negative    Ketones, UA Negative Negative    Specific Gravity  1.020 1.005 - 1.030    Blood, UA Trace (A) Negative    pH, Urine 5.0 5.0 - 8.0    Protein, POC Negative Negative mg/dL    Urobilinogen, UA Normal Normal    Leukocytes Negative Negative    Nitrite, UA Negative Negative     Assessment/Plan   Angie was seen today for anxiety.    Diagnoses and all orders for this visit:    Acute left flank pain  -     Urine Culture - Urine, Urine, Clean Catch  -     CT Abdomen Pelvis Without Contrast  -     POCT urinalysis dipstick, manual    Hematuria, unspecified type  -     Urine Culture - Urine, Urine, Clean Catch  -     CT Abdomen Pelvis Without Contrast  -     POCT urinalysis dipstick, manual    Need for influenza vaccination  -     Flu Vaccine Quad PF 3YR+    Anxiety    History of kidney stones  -     Urine Culture - Urine, Urine, Clean Catch  -     CT Abdomen Pelvis Without Contrast    1.  New acute left flank pain with hematuria: Angie had an in office urinalysis that showed trace blood at today's office visit.  She has a history of kidney stones.  I will schedule a CT of abdomen and pelvis without contrast using kidney stone protocol for further evaluation.  A urine culture was sent to the laboratory for further evaluation.  I will hold any antibiotic for now.  2.  Need for influenza vaccination: She would like to have the flu shot at today's office visit.  She has given written consent and will receive immunization today.  3.  Stable anxiety doing well with the BuSpar medication.  No refills required at this time.  I've asked her to schedule follow up appointment in 6 months for reevaluation.          Dragon transcription disclaimer    Much of this encounter note is an electronic transcription/translation of spoken language to printed text.  The electronic translation of spoken language may permit erroneous, or at times, nonsensical words or phrases to be inadvertently  transcribed.  Although I have reviewed the note for such errors, some may still exist.    Kya Kendrick PA-C  Family Practice

## 2017-11-06 NOTE — TELEPHONE ENCOUNTER
----- Message from Kya Kendrick PA-C sent at 11/6/2017 12:40 PM EST -----  Notify patient that her CT of abdomen and pelvis showed no kidney stones.  Suspect she may have a pulled muscle in back.  I have sent a prescription of her muscle relaxer to pharmacy to use.  If no improvement please notify office.

## 2017-11-08 ENCOUNTER — TELEPHONE (OUTPATIENT)
Dept: FAMILY MEDICINE CLINIC | Facility: CLINIC | Age: 42
End: 2017-11-08

## 2017-11-08 LAB
BACTERIA UR CULT: NO GROWTH
BACTERIA UR CULT: NORMAL

## 2017-11-12 DIAGNOSIS — F41.9 ANXIETY: ICD-10-CM

## 2017-11-13 RX ORDER — BUSPIRONE HYDROCHLORIDE 5 MG/1
TABLET ORAL
Qty: 90 TABLET | Refills: 0 | Status: SHIPPED | OUTPATIENT
Start: 2017-11-13 | End: 2017-12-19 | Stop reason: SDUPTHER

## 2017-11-25 DIAGNOSIS — R60.9 PITTING EDEMA: ICD-10-CM

## 2017-11-27 RX ORDER — FUROSEMIDE 20 MG/1
TABLET ORAL
Qty: 30 TABLET | Refills: 3 | Status: SHIPPED | OUTPATIENT
Start: 2017-11-27 | End: 2018-03-11 | Stop reason: SDUPTHER

## 2017-12-19 DIAGNOSIS — F41.9 ANXIETY: ICD-10-CM

## 2017-12-20 RX ORDER — BUSPIRONE HYDROCHLORIDE 5 MG/1
TABLET ORAL
Qty: 90 TABLET | Refills: 0 | Status: SHIPPED | OUTPATIENT
Start: 2017-12-20 | End: 2018-02-05 | Stop reason: SDUPTHER

## 2018-01-08 ENCOUNTER — OFFICE VISIT (OUTPATIENT)
Dept: FAMILY MEDICINE CLINIC | Facility: CLINIC | Age: 43
End: 2018-01-08

## 2018-01-08 VITALS
TEMPERATURE: 98.2 F | OXYGEN SATURATION: 98 % | SYSTOLIC BLOOD PRESSURE: 110 MMHG | HEART RATE: 94 BPM | RESPIRATION RATE: 16 BRPM | WEIGHT: 245 LBS | HEIGHT: 63 IN | DIASTOLIC BLOOD PRESSURE: 74 MMHG | BODY MASS INDEX: 43.41 KG/M2

## 2018-01-08 DIAGNOSIS — N95.1 MENOPAUSAL SYMPTOMS: ICD-10-CM

## 2018-01-08 DIAGNOSIS — E28.2 PCOS (POLYCYSTIC OVARIAN SYNDROME): ICD-10-CM

## 2018-01-08 DIAGNOSIS — E66.01 MORBID OBESITY WITH BMI OF 45.0-49.9, ADULT (HCC): ICD-10-CM

## 2018-01-08 DIAGNOSIS — F41.9 ANXIETY: Primary | ICD-10-CM

## 2018-01-08 DIAGNOSIS — E55.9 VITAMIN D DEFICIENCY: ICD-10-CM

## 2018-01-08 DIAGNOSIS — R63.5 WEIGHT GAIN: ICD-10-CM

## 2018-01-08 PROCEDURE — 99214 OFFICE O/P EST MOD 30 MIN: CPT | Performed by: PHYSICIAN ASSISTANT

## 2018-01-08 NOTE — PROGRESS NOTES
Subjective   Angie Patel is a 42 y.o. female.   Chief Complaint   Patient presents with   • Anxiety     management   • Weight Loss     management       History of Present Illness     Angie is a 42-year-old female who presents for anxiety and weight lost management.  She has gained 8 pounds since November 6, 2017. Angie has had a partial hysterectomy.  She has had increased hot flashes over the past several months.  She is concerned about her hormone levels and menopausal symptoms.  She has a history of PCO S in currently takes metformin.  She has gained weight over the past several months.  She has gone to the gym 3 times a week for cardio and 2 times a week for yoga.  Her diet has been healthy by doing low carbohydrates.  Sleep has been normal to slightly restless due to hot flashes.  She has been taking BuSpar 10 mg in morning and 5 mg in the afternoon for her anxiety.  States this has helped.  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  Denied any suicidal or homicidal ideation. Angie like to be placed on weight loss medication.  Angie was on phentermine in past but had to stop due to increased chest tightness/pressures.  She had a negative cardiac workup.  Denied any chest pain, shortness of air, dizziness, upper respiratory symptoms, or swelling of ankles.  She has seen Dr. Deborah Alcazar, GYN, in the past for her gynecological needs.  At any suicidal or homicidal ideation.    The following portions of the patient's history were reviewed and updated as appropriate: allergies, current medications, past family history, past medical history, past social history and past surgical history.    Review of Systems   Constitutional: Positive for unexpected weight change.   HENT: Negative.    Eyes: Negative.    Respiratory: Negative.    Cardiovascular: Negative.  Negative for chest pain, palpitations and leg swelling.   Gastrointestinal: Negative.  Negative for constipation, diarrhea, nausea and vomiting.  "  Endocrine: Negative.    Genitourinary: Negative.    Musculoskeletal: Negative.    Skin: Negative.    Allergic/Immunologic: Negative.    Neurological: Negative.    Hematological: Negative.    Psychiatric/Behavioral: Negative for sleep disturbance and suicidal ideas. The patient is nervous/anxious.    All other systems reviewed and are negative.    Vitals:    01/08/18 1059   BP: 110/74   BP Location: Left arm   Patient Position: Sitting   Cuff Size: Large Adult   Pulse: 94   Resp: 16   Temp: 98.2 °F (36.8 °C)   TempSrc: Oral   SpO2: 98%   Weight: 111 kg (245 lb)   Height: 160 cm (63\")     Body mass index is 43.4 kg/(m^2).     .  Wt Readings from Last 3 Encounters:   01/08/18 111 kg (245 lb)   11/06/17 108 kg (237 lb)   07/31/17 98 kg (216 lb)       BP Readings from Last 3 Encounters:   01/08/18 110/74   11/06/17 118/70   07/31/17 110/70     Body mass index is 43.4 kg/(m^2).  Allergies   Allergen Reactions   • Iodine Shortness Of Breath     Swelling     • Shellfish Allergy Shortness Of Breath     swelling       Objective   Physical Exam   Constitutional: She is oriented to person, place, and time. Vital signs are normal. She appears well-developed and well-nourished.   Morbid obese female   HENT:   Head: Normocephalic and atraumatic.   Right Ear: Hearing, tympanic membrane, external ear and ear canal normal.   Left Ear: Hearing, tympanic membrane, external ear and ear canal normal.   Nose: Nose normal. Right sinus exhibits no maxillary sinus tenderness and no frontal sinus tenderness. Left sinus exhibits no maxillary sinus tenderness and no frontal sinus tenderness.   Mouth/Throat: Uvula is midline, oropharynx is clear and moist and mucous membranes are normal.   Eyes: Conjunctivae, EOM and lids are normal.   Neck: Trachea normal and phonation normal. Neck supple. Carotid bruit is not present. No edema present. No thyroid mass and no thyromegaly present.   Cardiovascular: Normal rate, regular rhythm, S1 normal, S2 " normal, normal heart sounds and normal pulses.    Pulmonary/Chest: Effort normal and breath sounds normal.   Abdominal: Soft. Normal appearance, normal aorta and bowel sounds are normal. There is no hepatomegaly. There is no tenderness.   Lymphadenopathy:     She has no cervical adenopathy.   Neurological: She is alert and oriented to person, place, and time.   Skin: Skin is warm, dry and intact.   Psychiatric: She has a normal mood and affect. Her speech is normal and behavior is normal. Judgment and thought content normal. Cognition and memory are normal.       Assessment/Plan   Angie was seen today for anxiety and weight loss.    Diagnoses and all orders for this visit:    Anxiety    Morbid obesity with BMI of 45.0-49.9, adult  -     Thyroid Panel With TSH  -     Discontinue: naltrexone-bupropion ER (CONTRAVE) 8-90 MG tablet; Take 2 tablets by mouth Every 12 (Twelve) Hours.  -     Discontinue: naltrexone-bupropion ER (CONTRAVE) 8-90 MG tablet; Take 2 tablets by mouth Every 12 (Twelve) Hours.  -     naltrexone-bupropion ER (CONTRAVE) 8-90 MG tablet; Take 2 tablets by mouth Every 12 (Twelve) Hours.    Menopausal symptoms  -     Estradiol  -     Progesterone  -     CBC & Differential  -     Basic Metabolic Panel    Vitamin D deficiency  -     Vitamin D 25 Hydroxy    Weight gain  -     Thyroid Panel With TSH    PCOS (polycystic ovarian syndrome)  -     Hemoglobin A1c  -     Basic Metabolic Panel      1.  Chronic and stable anxiety: She is doing well with her BuSpar medication.  No refills are required at this time.  2.  New menopausal symptoms: Angie has had a partial hysterectomy in past.  She will have a thyroid profile with TSH, estradiol, progesterone, CBC and a BMP collected at today's office visit for further evaluation.  She will be notified of test results when completed.  I've asked her to schedule follow-up appointment with her gynecologist, Dr. Deborah Alcazar.  She voiced understanding.  3.  Chronic  vitamin D deficiency: She will have a vitamin D blood work collected at today's office visit for further evaluation of her status.  She'll be notified of test results and medication changes when completed.  4.  Chronic and uncontrolled morbid obesity with weight gain: I have discussed medication options for weight loss.  She had been on phentermine in the past but stopped due to increased chest pressure.  I have discussed Contrave patient with her.  She would like to try this medication if possible.  I have given her written prescription with a savings card.  She will return to office in one month for weight management.  I have encouraged her to increase physical activity and decrease carbohydrates in diet.  5.  Chronic PCO S: In addition to above blood work she will have a hemoglobin A1c collected.  She will continue her metformin prescription at home for now.  She will be notified of any medication changes after test results are completed.        [employer] transcription disclaimer    Much of this encounter note is an electronic transcription/translation of spoken language to printed text.  The electronic translation of spoken language may permit erroneous, or at times, nonsensical words or phrases to be inadvertently transcribed.  Although I have reviewed the note for such errors, some may still exist.    Kya Kendrick PA-C  Family Practice

## 2018-01-09 LAB
25(OH)D3+25(OH)D2 SERPL-MCNC: 18.1 NG/ML
BASOPHILS # BLD AUTO: 0.04 10*3/MM3 (ref 0–0.2)
BASOPHILS NFR BLD AUTO: 0.5 % (ref 0–2)
BUN SERPL-MCNC: 11 MG/DL (ref 6–20)
BUN/CREAT SERPL: 14.5 (ref 7–25)
CALCIUM SERPL-MCNC: 9.6 MG/DL (ref 8.6–10.5)
CHLORIDE SERPL-SCNC: 102 MMOL/L (ref 98–107)
CO2 SERPL-SCNC: 28.7 MMOL/L (ref 22–29)
CREAT SERPL-MCNC: 0.76 MG/DL (ref 0.57–1)
EOSINOPHIL # BLD AUTO: 0.22 10*3/MM3 (ref 0.1–0.3)
EOSINOPHIL NFR BLD AUTO: 2.7 % (ref 0–4)
ERYTHROCYTE [DISTWIDTH] IN BLOOD BY AUTOMATED COUNT: 12.3 % (ref 11.5–14.5)
ESTRADIOL SERPL-MCNC: 15 PG/ML
FT4I SERPL CALC-MCNC: 2.2 (ref 1.2–4.9)
GLUCOSE SERPL-MCNC: 93 MG/DL (ref 65–99)
HBA1C MFR BLD: 5.7 % (ref 4.8–5.6)
HCT VFR BLD AUTO: 41.3 % (ref 37–47)
HGB BLD-MCNC: 14 G/DL (ref 12–16)
IMM GRANULOCYTES # BLD: 0.02 10*3/MM3 (ref 0–0.03)
IMM GRANULOCYTES NFR BLD: 0.2 % (ref 0–0.5)
LYMPHOCYTES # BLD AUTO: 2.47 10*3/MM3 (ref 0.6–4.8)
LYMPHOCYTES NFR BLD AUTO: 30.3 % (ref 20–45)
MCH RBC QN AUTO: 30.2 PG (ref 27–31)
MCHC RBC AUTO-ENTMCNC: 33.9 G/DL (ref 31–37)
MCV RBC AUTO: 89.2 FL (ref 81–99)
MONOCYTES # BLD AUTO: 0.56 10*3/MM3 (ref 0–1)
MONOCYTES NFR BLD AUTO: 6.9 % (ref 3–8)
NEUTROPHILS # BLD AUTO: 4.85 10*3/MM3 (ref 1.5–8.3)
NEUTROPHILS NFR BLD AUTO: 59.4 % (ref 45–70)
NRBC BLD AUTO-RTO: 0 /100 WBC (ref 0–0)
PLATELET # BLD AUTO: 287 10*3/MM3 (ref 140–500)
POTASSIUM SERPL-SCNC: 4.3 MMOL/L (ref 3.5–5.2)
PROGEST SERPL-MCNC: 0.2 NG/ML
RBC # BLD AUTO: 4.63 10*6/MM3 (ref 4.2–5.4)
SODIUM SERPL-SCNC: 142 MMOL/L (ref 136–145)
T3RU NFR SERPL: 25 % (ref 24–39)
T4 SERPL-MCNC: 8.9 UG/DL (ref 4.5–12)
TSH SERPL DL<=0.005 MIU/L-ACNC: 1.63 UIU/ML (ref 0.45–4.5)
WBC # BLD AUTO: 8.16 10*3/MM3 (ref 4.8–10.8)

## 2018-01-10 ENCOUNTER — TELEPHONE (OUTPATIENT)
Dept: FAMILY MEDICINE CLINIC | Facility: CLINIC | Age: 43
End: 2018-01-10

## 2018-01-10 DIAGNOSIS — E55.9 VITAMIN D DEFICIENCY: ICD-10-CM

## 2018-01-10 RX ORDER — ERGOCALCIFEROL 1.25 MG/1
50000 CAPSULE ORAL
Qty: 12 CAPSULE | Refills: 3 | Status: SHIPPED | OUTPATIENT
Start: 2018-01-10 | End: 2018-03-15

## 2018-01-10 NOTE — TELEPHONE ENCOUNTER
----- Message from Kya Kendrick PA-C sent at 1/10/2018  7:12 AM EST -----  1.  Notify patient that her estradiol and progesterone levels were normal.  2.  CBC was normal.  She is not anemic.  3.  Random blood sugar was normal at 93 with hemoglobin A1c of 5.7.  A1c was slightly elevated.  Please continue her metformin as directed.  4.  Thyroid profile was normal.  5.  Vitamin D level was decreased at 18.1.  She has vitamin D deficiency.  Has she been taking her vitamin D3 50,000 international units weekly?

## 2018-01-21 DIAGNOSIS — E28.2 PCOS (POLYCYSTIC OVARIAN SYNDROME): ICD-10-CM

## 2018-01-29 RX ORDER — ALBUTEROL SULFATE 90 UG/1
AEROSOL, METERED RESPIRATORY (INHALATION)
Qty: 18 G | Refills: 0 | Status: SHIPPED | OUTPATIENT
Start: 2018-01-29 | End: 2018-02-18 | Stop reason: SDUPTHER

## 2018-02-05 ENCOUNTER — OFFICE VISIT (OUTPATIENT)
Dept: FAMILY MEDICINE CLINIC | Facility: CLINIC | Age: 43
End: 2018-02-05

## 2018-02-05 VITALS
TEMPERATURE: 98.2 F | WEIGHT: 246 LBS | OXYGEN SATURATION: 98 % | SYSTOLIC BLOOD PRESSURE: 116 MMHG | DIASTOLIC BLOOD PRESSURE: 74 MMHG | HEIGHT: 63 IN | BODY MASS INDEX: 43.59 KG/M2 | RESPIRATION RATE: 16 BRPM | HEART RATE: 99 BPM

## 2018-02-05 DIAGNOSIS — F41.9 ANXIETY: Primary | ICD-10-CM

## 2018-02-05 DIAGNOSIS — E66.01 MORBID OBESITY WITH BMI OF 40.0-44.9, ADULT (HCC): ICD-10-CM

## 2018-02-05 PROCEDURE — 99213 OFFICE O/P EST LOW 20 MIN: CPT | Performed by: PHYSICIAN ASSISTANT

## 2018-02-05 RX ORDER — PHENTERMINE HYDROCHLORIDE 37.5 MG/1
37.5 CAPSULE ORAL EVERY MORNING
Qty: 30 CAPSULE | Refills: 0
Start: 2018-02-05 | End: 2018-03-05 | Stop reason: SINTOL

## 2018-02-05 RX ORDER — BUSPIRONE HYDROCHLORIDE 5 MG/1
5 TABLET ORAL 3 TIMES DAILY
Qty: 90 TABLET | Refills: 6 | Status: SHIPPED | OUTPATIENT
Start: 2018-02-05 | End: 2018-08-02 | Stop reason: SDUPTHER

## 2018-02-05 NOTE — PROGRESS NOTES
Subjective   Angie Patel is a 42 y.o. female.   Chief Complaint   Patient presents with   • Weight Loss     management       History of Present Illness     Angie is a 42-year-old female who presents for weight loss management.  She has gained 1 pound since January 8, 2018. Angie took the Contrave but had to stop due to increased dizziness and nausea.  She has been eating less carbs and exercising daily.  She has taken OTC Phentermine in the past and lost weight with the medication.  She had some heart palpation that we were unsure if the Phentermine was causing or if stress induced.  Denied any chest pain,shortness of air,wheezing,swelling of ankles.   Sleep has been normal to slightly decreased.  Angie is doing well with her BuSpar medication for her anxiety.  States she is not having any side effects with medication.  Helps for her breakthrough panic and anxiety issues.  She denied any suicidal or homicidal thoughts.    The following portions of the patient's history were reviewed and updated as appropriate: allergies, current medications, past family history, past medical history, past social history and past surgical history.    Review of Systems   Constitutional: Negative.    HENT: Negative.    Eyes: Negative.    Respiratory: Negative.    Cardiovascular: Negative.  Negative for chest pain, palpitations and leg swelling.   Gastrointestinal: Negative.    Endocrine: Negative.    Genitourinary: Negative.    Musculoskeletal: Negative.    Skin: Negative.    Allergic/Immunologic: Negative.    Neurological: Negative.    Hematological: Negative.    Psychiatric/Behavioral: Negative.  Negative for sleep disturbance and suicidal ideas. The patient is not nervous/anxious.    All other systems reviewed and are negative.      Social History   Substance Use Topics   • Smoking status: Former Smoker   • Smokeless tobacco: None   • Alcohol use No       Vitals:    02/05/18 0850   BP: 116/74   BP Location: Left arm   Patient  "Position: Sitting   Cuff Size: Large Adult   Pulse: 99   Resp: 16   Temp: 98.2 °F (36.8 °C)   TempSrc: Oral   SpO2: 98%   Weight: 112 kg (246 lb)   Height: 160 cm (63\")       Wt Readings from Last 3 Encounters:   02/05/18 112 kg (246 lb)   01/08/18 111 kg (245 lb)   11/06/17 108 kg (237 lb)       BP Readings from Last 3 Encounters:   02/05/18 116/74   01/08/18 110/74   11/06/17 118/70     Body mass index is 43.58 kg/(m^2).  Allergies   Allergen Reactions   • Iodine Shortness Of Breath     Swelling     • Shellfish Allergy Shortness Of Breath     swelling       Objective   Physical Exam   Constitutional: She is oriented to person, place, and time. Vital signs are normal. She appears well-developed and well-nourished.   Morbid obese female   Neck: Trachea normal and phonation normal. Neck supple. Carotid bruit is not present. No edema present.   Cardiovascular: Normal rate, regular rhythm, S1 normal, S2 normal, normal heart sounds and normal pulses.    Pulmonary/Chest: Effort normal and breath sounds normal.   Abdominal: Soft. Normal appearance and bowel sounds are normal. There is no hepatomegaly. There is no tenderness.   Neurological: She is alert and oriented to person, place, and time.   Skin: Skin is warm, dry and intact.   Psychiatric: She has a normal mood and affect. Her speech is normal and behavior is normal. Judgment and thought content normal. Cognition and memory are normal.       Assessment/Plan   Angie was seen today for weight loss.    Diagnoses and all orders for this visit:    Anxiety  -     busPIRone (BUSPAR) 5 MG tablet; Take 1 tablet by mouth 3 (Three) Times a Day.    Morbid obesity with BMI of 40.0-44.9, adult  -     phentermine 37.5 MG capsule; Take 1 capsule by mouth Every Morning.      1.  Chronic and stable anxiety:  Angie is doing well with her BuSpar medication.  I have sent a refill to her local pharmacy.  I will  reevaluate at office visit in one month.  2.  Chronic uncontrolled morbid " obesity:  Angie had to stop her Contrave medication due to side effects.  We will repeat start phentermine medication.  This prescription was approved by Dr. Sony Painting.  Angie has signed the appropriate agreement and consent forms.  See below for Theresa information.  She was instructed to increase physical activity and decrease fatty food and carbohydrates in diet.  Plan to return to office in one month for weight management.        As part of this patient's treatment plan I am prescribing controlled substances.  The patient has been made aware of appropriate use of such medications, including potential risk of somnolence. Limited ability to drive and/or work safely, and potential for dependence or overdose.  It has also been made clear that these medications are for use by this patient only, without concomitant use of alcohol or other substances unless prescribed.  THERESA report has been reviewed and scanned into the patient's chart.  Theresa number is [id number] dated February 3, 2018.

## 2018-02-19 RX ORDER — ALBUTEROL SULFATE 90 UG/1
AEROSOL, METERED RESPIRATORY (INHALATION)
Qty: 18 G | Refills: 3 | Status: SHIPPED | OUTPATIENT
Start: 2018-02-19 | End: 2019-04-19 | Stop reason: SDUPTHER

## 2018-03-05 ENCOUNTER — OFFICE VISIT (OUTPATIENT)
Dept: FAMILY MEDICINE CLINIC | Facility: CLINIC | Age: 43
End: 2018-03-05

## 2018-03-05 VITALS
HEIGHT: 63 IN | BODY MASS INDEX: 43.23 KG/M2 | DIASTOLIC BLOOD PRESSURE: 66 MMHG | SYSTOLIC BLOOD PRESSURE: 98 MMHG | RESPIRATION RATE: 16 BRPM | OXYGEN SATURATION: 98 % | WEIGHT: 244 LBS | HEART RATE: 113 BPM | TEMPERATURE: 98.3 F

## 2018-03-05 DIAGNOSIS — E66.01 MORBID OBESITY WITH BODY MASS INDEX (BMI) OF 40.0 TO 44.9 IN ADULT (HCC): Primary | ICD-10-CM

## 2018-03-05 DIAGNOSIS — Z12.31 SCREENING MAMMOGRAM, ENCOUNTER FOR: ICD-10-CM

## 2018-03-05 PROCEDURE — 99212 OFFICE O/P EST SF 10 MIN: CPT | Performed by: PHYSICIAN ASSISTANT

## 2018-03-05 NOTE — PROGRESS NOTES
"Subjective   Angie Patel is a 42 y.o. female.   Chief Complaint   Patient presents with   • Weight Loss     management       History of Present Illness     Angie is a 42 -year-old female who presents for weight loss management.  She has lost 2 pounds in the past month on phentermine medication. She has had some  Nausea and vomiting with the Phentermine medication.  She stopped the medication for a few days.  She then restarted the Phentermine a few day later and had the same reaction.  Sleep has been restless.  Diet has been healthy.  She is decreasing the carbs in her diet. Angie is exercising 3-4 times a week.  Denied any chest pain,shortness of air,wheezing or swelling of ankles.     The following portions of the patient's history were reviewed and updated as appropriate: allergies, current medications, past family history, past medical history, past social history and past surgical history.    Review of Systems   Constitutional: Negative.    HENT: Negative.    Eyes: Negative.    Respiratory: Negative.  Negative for cough, shortness of breath, wheezing and stridor.    Cardiovascular: Negative.  Negative for chest pain, palpitations and leg swelling.   Gastrointestinal: Negative.    Endocrine: Negative.    Genitourinary: Negative.    Musculoskeletal: Negative.    Skin: Negative.    Allergic/Immunologic: Negative.    Neurological: Negative.    Hematological: Negative.    Psychiatric/Behavioral: Negative.  Negative for sleep disturbance and suicidal ideas. The patient is not nervous/anxious.    All other systems reviewed and are negative.      Social History   Substance Use Topics   • Smoking status: Former Smoker   • Smokeless tobacco: None   • Alcohol use No     Vitals:    03/05/18 0920   BP: 98/66   BP Location: Left arm   Patient Position: Sitting   Cuff Size: Large Adult   Pulse: 113   Resp: 16   Temp: 98.3 °F (36.8 °C)   TempSrc: Oral   SpO2: 98%   Weight: 111 kg (244 lb)   Height: 160 cm (63\")       Wt " Readings from Last 3 Encounters:   03/05/18 111 kg (244 lb)   02/05/18 112 kg (246 lb)   01/08/18 111 kg (245 lb)       BP Readings from Last 3 Encounters:   03/05/18 98/66   02/05/18 116/74   01/08/18 110/74     Body mass index is 43.22 kg/(m^2).  Allergies   Allergen Reactions   • Iodine Shortness Of Breath     Swelling     • Shellfish Allergy Shortness Of Breath     swelling       Objective   Physical Exam   Constitutional: She is oriented to person, place, and time. Vital signs are normal. She appears well-developed and well-nourished.   Morbid obese [place]   Neck: Trachea normal and phonation normal. Neck supple. No edema present.   Cardiovascular: Normal rate, regular rhythm, S1 normal, S2 normal, normal heart sounds and normal pulses.    Pulmonary/Chest: Effort normal and breath sounds normal.   Abdominal: Soft. Normal appearance and bowel sounds are normal. There is no hepatomegaly. There is no tenderness.   Neurological: She is alert and oriented to person, place, and time.   Skin: Skin is warm, dry and intact.   Psychiatric: She has a normal mood and affect. Her speech is normal and behavior is normal. Judgment and thought content normal. Cognition and memory are normal.       Assessment/Plan   Angie was seen today for weight loss.    Diagnoses and all orders for this visit:    Morbid obesity with body mass index (BMI) of 40.0 to 44.9 in adult    Screening mammogram, encounter for  -     Mammo Screening Bilateral With CAD; Future      1.  Chronic and improving morbid obesity: Angie had to stop the phentermine medication due to nausea and vomiting.  I've encouraged Angie to continue exercising routinely and to continue dieting and by decreasing carbohydrates.  She voiced understanding.  2.  Need for screening mammogram: I have placed an order for bilateral screening mammogram at the [facility].  She will schedule her own appointment.

## 2018-03-09 ENCOUNTER — HOSPITAL ENCOUNTER (OUTPATIENT)
Dept: MAMMOGRAPHY | Facility: HOSPITAL | Age: 43
Discharge: HOME | End: 2018-03-09
Admitting: PHYSICIAN ASSISTANT

## 2018-03-09 DIAGNOSIS — Z12.31 SCREENING MAMMOGRAM, ENCOUNTER FOR: ICD-10-CM

## 2018-03-09 PROCEDURE — 77067 SCR MAMMO BI INCL CAD: CPT

## 2018-03-11 DIAGNOSIS — R60.9 PITTING EDEMA: ICD-10-CM

## 2018-03-12 ENCOUNTER — TELEPHONE (OUTPATIENT)
Dept: FAMILY MEDICINE CLINIC | Facility: CLINIC | Age: 43
End: 2018-03-12

## 2018-03-12 RX ORDER — FUROSEMIDE 20 MG/1
TABLET ORAL
Qty: 30 TABLET | Refills: 0 | Status: SHIPPED | OUTPATIENT
Start: 2018-03-12 | End: 2018-04-07 | Stop reason: SDUPTHER

## 2018-03-12 NOTE — TELEPHONE ENCOUNTER
----- Message from Kya Kendrick PA-C sent at 3/12/2018  7:31 AM EDT -----  Notify patient that her mammogram was normal.  Plan to repeat in one year.

## 2018-03-15 ENCOUNTER — OFFICE VISIT (OUTPATIENT)
Dept: OBSTETRICS AND GYNECOLOGY | Facility: CLINIC | Age: 43
End: 2018-03-15

## 2018-03-15 VITALS
DIASTOLIC BLOOD PRESSURE: 80 MMHG | HEIGHT: 64 IN | SYSTOLIC BLOOD PRESSURE: 124 MMHG | WEIGHT: 249 LBS | BODY MASS INDEX: 42.51 KG/M2

## 2018-03-15 DIAGNOSIS — E28.2 POLYCYSTIC OVARIES: ICD-10-CM

## 2018-03-15 DIAGNOSIS — G43.109 MIGRAINE WITH AURA AND WITHOUT STATUS MIGRAINOSUS, NOT INTRACTABLE: ICD-10-CM

## 2018-03-15 DIAGNOSIS — N95.1 MENOPAUSAL SYMPTOMS: ICD-10-CM

## 2018-03-15 DIAGNOSIS — Z01.419 PAP SMEAR, LOW-RISK: Primary | ICD-10-CM

## 2018-03-15 DIAGNOSIS — R10.2 PELVIC PAIN: ICD-10-CM

## 2018-03-15 DIAGNOSIS — Z01.419 ENCOUNTER FOR GYNECOLOGICAL EXAMINATION WITHOUT ABNORMAL FINDING: ICD-10-CM

## 2018-03-15 DIAGNOSIS — Z11.51 SPECIAL SCREENING EXAMINATION FOR HUMAN PAPILLOMAVIRUS (HPV): ICD-10-CM

## 2018-03-15 PROCEDURE — 99386 PREV VISIT NEW AGE 40-64: CPT | Performed by: OBSTETRICS & GYNECOLOGY

## 2018-03-15 PROCEDURE — 99213 OFFICE O/P EST LOW 20 MIN: CPT | Performed by: OBSTETRICS & GYNECOLOGY

## 2018-03-15 NOTE — PROGRESS NOTES
"GYN Annual Exam     CC- Here for annual exam.     Angie Patel is a 42 y.o. female previous patient not seen in the last three years who presents for annual well woman exam. Pt has had a hysterectomy in the past for DUB. She believes she still has both ovaries. She is \" a mess\" . She is having hot flashes, pelvic pain and ocular migraines. She has PCOS and is on metformin but is unsure if her disease is well controlled. She has had lapsed gyn care and need to get caught up. She was considering going to a \"hormone clinic\" to help and we discussed that she is NOT a candidate for ERT due to ocular migraines and r/o CVA. She also has B9 meningioma and gets scanned q 3 years and is due this year.     OB History      Para Term  AB Living    2 1 1  1     SAB TAB Ectopic Molar Multiple Live Births    1               Menarche: 12  Current contraception: status post hysterectomy  History of abnormal Pap smear: no  History of abnormal mammogram: no  Family history of uterine, colon or ovarian cancer: no  Family history of breast cancer: no  H/o STDs: none  Gardasil: none    Health Maintenance   Topic Date Due   • TDAP/TD VACCINES (1 - Tdap) 1995   • PAP SMEAR  2016   • LIPID PANEL  2018   • INFLUENZA VACCINE  Completed       Past Medical History:   Diagnosis Date   • Asthma    • Cardiomyopathy     with pregnancy   • Fibromyalgia    • Fibromyalgia    • Hypertension    • Kidney calculi    • Meningioma     benign brain    • Meningioma    • Migraine     ocular   • Polycystic ovaries        Past Surgical History:   Procedure Laterality Date   • CYSTOSCOPY URETEROSCOPY LASER LITHOTRIPSY Right 3/7/2017    Procedure: CYSTOSCOPY RT URETEROSCOPY LASER LITHOTRIPSY W/ STENT, rerograde pyelogram ;  Surgeon: Rashid Malloy MD;  [facility];  Service:    • FOOT SURGERY Left     tendon repair   • HYSTERECTOMY     • OOPHORECTOMY     • TUBAL ABDOMINAL LIGATION           Current Outpatient " Prescriptions:   •  busPIRone (BUSPAR) 5 MG tablet, Take 1 tablet by mouth 3 (Three) Times a Day., Disp: 90 tablet, Rfl: 6  •  furosemide (LASIX) 20 MG tablet, TAKE 1 TABLET BY MOUTH DAILY AS NEEDED, Disp: 30 tablet, Rfl: 0  •  ibuprofen (MOTRIN IB) 200 MG tablet, Take 2 tablets by mouth Every 6 (Six) Hours As Needed., Disp: , Rfl:   •  metFORMIN (GLUCOPHAGE) 500 MG tablet, TAKE 2 TABLETS BY MOUTH EVERY MORNING AND ONE AT DINNER, Disp: 90 tablet, Rfl: 6  •  spironolactone (ALDACTONE) 100 MG tablet, Take 1 tablet by mouth Daily., Disp: 90 tablet, Rfl: 3  •  VENTOLIN  (90 Base) MCG/ACT inhaler, INHALE 2 PUFFS BY MOUTH FOUR TIMES DAILY AS NEEDED, Disp: 18 g, Rfl: 3    Allergies   Allergen Reactions   • Iodine Shortness Of Breath     Swelling     • Shellfish Allergy Shortness Of Breath     swelling       Social History   Substance Use Topics   • Smoking status: Former Smoker   • Smokeless tobacco: Not on file   • Alcohol use No       Family History   Problem Relation Age of Onset   • Heart disease Maternal Uncle    • Heart disease Paternal Aunt    • Diabetes Maternal Grandmother    • Heart disease Maternal Grandfather    • Heart disease Paternal Grandfather    • Breast cancer Neg Hx    • Ovarian cancer Neg Hx    • Colon cancer Neg Hx    • Deep vein thrombosis Neg Hx    • Pulmonary embolism Neg Hx        Review of Systems   Constitutional: Positive for fatigue and unexpected weight change (gain). Negative for appetite change and fever.   Eyes: Positive for visual disturbance (with HA).   Respiratory: Negative for cough and shortness of breath.    Cardiovascular: Negative for chest pain and palpitations.   Gastrointestinal: Negative for abdominal distention, abdominal pain, constipation, diarrhea and nausea.   Endocrine: Positive for heat intolerance.   Genitourinary: Positive for pelvic pain. Negative for dyspareunia, dysuria, menstrual problem and vaginal discharge.   Musculoskeletal: Positive for back pain.  "  Skin: Negative for color change and rash.   Neurological: Negative for headaches.   Hematological: Negative for adenopathy. Does not bruise/bleed easily.   Psychiatric/Behavioral: Positive for sleep disturbance (night sweats). Negative for dysphoric mood. The patient is not nervous/anxious.        /80   Ht 162.6 cm (64\")   Wt 113 kg (249 lb)   Breastfeeding? No   BMI 42.74 kg/m²     Physical Exam   Constitutional: She is oriented to person, place, and time. She appears well-developed and well-nourished.   HENT:   Head: Normocephalic and atraumatic.   Eyes: Conjunctivae are normal. No scleral icterus.   Neck: Neck supple. No thyromegaly present.   Cardiovascular: Normal rate, regular rhythm and normal heart sounds.    Pulmonary/Chest: Effort normal and breath sounds normal. Right breast exhibits no inverted nipple, no mass, no nipple discharge, no skin change and no tenderness. Left breast exhibits no inverted nipple, no mass, no nipple discharge, no skin change and no tenderness.   Abdominal: Soft. Bowel sounds are normal. She exhibits no distension and no mass. There is no tenderness. There is no rebound and no guarding. No hernia.   Genitourinary: Pelvic exam was performed with patient supine. There is no rash, tenderness or lesion on the right labia. There is no rash, tenderness or lesion on the left labia. Right adnexum displays no mass, no tenderness and no fullness. Left adnexum displays no mass, no tenderness and no fullness. No erythema, tenderness or bleeding in the vagina. No foreign body in the vagina. No signs of injury around the vagina. No vaginal discharge found.   Genitourinary Comments: Cystocele noted  Exam limited due to habitus   Neurological: She is alert and oriented to person, place, and time.   Skin: Skin is warm and dry.   Psychiatric: She has a normal mood and affect. Her behavior is normal. Judgment and thought content normal.   Nursing note and vitals reviewed.       "   Assessment/Plan    1) GYN HM: check pap/HPV   SBE demonstrated and encouraged.  2) STD screening: declines Condoms encouraged.  3) Contraception: s/p hysterectomy  4) Family Planning: no plans., encourage folic acid daily  5) Diet and Exercise discussed  6) Smoking Status: non smker  7) Pelvic pain - check TVUS  8) MMG-  UTD 3/2018 normal per pt  9)Follow up prn or 1 year   10) Hot flashes and PCOS- schedule fasting labs : insulin, glucose, SH, FSh, E2 HgA1c. F/U afterwards to discuss tx options Not E2 candidate.  11) ? BSO- request old chart.     Angie was seen today for gynecologic exam.    Diagnoses and all orders for this visit:    Pap smear, low-risk  -     Pap IG, HPV-hr    Special screening examination for human papillomavirus (HPV)  -     Pap IG, HPV-hr    Menopausal symptoms    Migraine with aura and without status migrainosus, not intractable    Polycystic ovaries    Encounter for gynecological examination without abnormal finding    Pelvic pain          Deborah Alcazar MD  3/15/2018  12:56 PM

## 2018-03-16 ENCOUNTER — LAB (OUTPATIENT)
Dept: OBSTETRICS AND GYNECOLOGY | Facility: CLINIC | Age: 43
End: 2018-03-16

## 2018-03-16 ENCOUNTER — PROCEDURE VISIT (OUTPATIENT)
Dept: OBSTETRICS AND GYNECOLOGY | Facility: CLINIC | Age: 43
End: 2018-03-16

## 2018-03-16 DIAGNOSIS — N83.202 CYST OF LEFT OVARY: ICD-10-CM

## 2018-03-16 DIAGNOSIS — R23.2 HOT FLASHES: Primary | ICD-10-CM

## 2018-03-16 DIAGNOSIS — E28.2 PCOS (POLYCYSTIC OVARIAN SYNDROME): ICD-10-CM

## 2018-03-16 DIAGNOSIS — R10.2 PELVIC PAIN IN FEMALE: Primary | ICD-10-CM

## 2018-03-16 PROCEDURE — 76830 TRANSVAGINAL US NON-OB: CPT | Performed by: OBSTETRICS & GYNECOLOGY

## 2018-03-18 NOTE — PROGRESS NOTES
PIP= normal pelvic US. Small cyst seen in L ovary < 2 cms, doubt it is the cause of her pain. No comparable data.

## 2018-03-20 LAB
CYTOLOGIST CVX/VAG CYTO: NORMAL
CYTOLOGY CVX/VAG DOC THIN PREP: NORMAL
DX ICD CODE: NORMAL
HIV 1 & 2 AB SER-IMP: NORMAL
HPV I/H RISK 1 DNA CVX QL PROBE+SIG AMP: NEGATIVE
Lab: NORMAL
OTHER STN SPEC: NORMAL
PATH REPORT.FINAL DX SPEC: NORMAL
STAT OF ADQ CVX/VAG CYTO-IMP: NORMAL

## 2018-03-21 LAB
ESTRADIOL FREE MFR SERPL: 3.7 %
ESTRADIOL FREE SERPL-MCNC: 4.3 PG/ML
ESTRADIOL SERPL HS-MCNC: 117 PG/ML
FSH SERPL-ACNC: 4.3 MIU/ML
GLUCOSE P FAST SERPL-MCNC: 92 MG/DL (ref 65–99)
HBA1C MFR BLD: 5.5 % (ref 4.8–5.6)
INSULIN SERPL-ACNC: 57.7 UIU/ML (ref 2.6–24.9)
LH SERPL-ACNC: 6.4 MIU/ML
TSH SERPL DL<=0.005 MIU/L-ACNC: 2.97 MIU/ML (ref 0.27–4.2)

## 2018-03-25 NOTE — PROGRESS NOTES
PIP= normal thyroid. Labs do NOT indicate menopause. Her fasting insulin is high at 57. We will discuss further tx options at her f/u visit.

## 2018-04-07 DIAGNOSIS — R60.9 PITTING EDEMA: ICD-10-CM

## 2018-04-09 RX ORDER — FUROSEMIDE 20 MG/1
TABLET ORAL
Qty: 30 TABLET | Refills: 0 | Status: SHIPPED | OUTPATIENT
Start: 2018-04-09 | End: 2018-05-03 | Stop reason: SDUPTHER

## 2018-04-16 ENCOUNTER — OFFICE VISIT (OUTPATIENT)
Dept: OBSTETRICS AND GYNECOLOGY | Facility: CLINIC | Age: 43
End: 2018-04-16

## 2018-04-16 VITALS
DIASTOLIC BLOOD PRESSURE: 84 MMHG | BODY MASS INDEX: 42.85 KG/M2 | SYSTOLIC BLOOD PRESSURE: 120 MMHG | HEIGHT: 64 IN | WEIGHT: 251 LBS

## 2018-04-16 DIAGNOSIS — E28.2 PCOS (POLYCYSTIC OVARIAN SYNDROME): Primary | ICD-10-CM

## 2018-04-16 DIAGNOSIS — R10.11 CHRONIC RUQ PAIN: ICD-10-CM

## 2018-04-16 DIAGNOSIS — K21.9 GASTROESOPHAGEAL REFLUX DISEASE, ESOPHAGITIS PRESENCE NOT SPECIFIED: ICD-10-CM

## 2018-04-16 DIAGNOSIS — R19.8 ALTERNATING CONSTIPATION AND DIARRHEA: ICD-10-CM

## 2018-04-16 DIAGNOSIS — G89.29 CHRONIC RUQ PAIN: ICD-10-CM

## 2018-04-16 PROCEDURE — 99213 OFFICE O/P EST LOW 20 MIN: CPT | Performed by: OBSTETRICS & GYNECOLOGY

## 2018-04-16 RX ORDER — SPIRONOLACTONE 100 MG/1
100 TABLET, FILM COATED ORAL DAILY
Qty: 90 TABLET | Refills: 0 | Status: SHIPPED | OUTPATIENT
Start: 2018-04-16 | End: 2018-07-09 | Stop reason: SDUPTHER

## 2018-05-03 DIAGNOSIS — R60.9 PITTING EDEMA: ICD-10-CM

## 2018-05-03 RX ORDER — FUROSEMIDE 20 MG/1
TABLET ORAL
Qty: 30 TABLET | Refills: 5 | Status: SHIPPED | OUTPATIENT
Start: 2018-05-03 | End: 2018-09-23 | Stop reason: SDUPTHER

## 2018-06-04 ENCOUNTER — OFFICE VISIT (OUTPATIENT)
Dept: FAMILY MEDICINE CLINIC | Facility: CLINIC | Age: 43
End: 2018-06-04

## 2018-06-04 VITALS
RESPIRATION RATE: 16 BRPM | OXYGEN SATURATION: 99 % | DIASTOLIC BLOOD PRESSURE: 72 MMHG | HEIGHT: 64 IN | HEART RATE: 111 BPM | WEIGHT: 248 LBS | TEMPERATURE: 98.7 F | SYSTOLIC BLOOD PRESSURE: 120 MMHG | BODY MASS INDEX: 42.34 KG/M2

## 2018-06-04 DIAGNOSIS — E66.01 MORBID OBESITY WITH BODY MASS INDEX (BMI) OF 40.0 TO 44.9 IN ADULT (HCC): ICD-10-CM

## 2018-06-04 DIAGNOSIS — F41.9 ANXIETY: Primary | ICD-10-CM

## 2018-06-04 DIAGNOSIS — Z79.899 HIGH RISK MEDICATION USE: ICD-10-CM

## 2018-06-04 PROCEDURE — 99213 OFFICE O/P EST LOW 20 MIN: CPT | Performed by: PHYSICIAN ASSISTANT

## 2018-06-04 RX ORDER — PHENTERMINE HYDROCHLORIDE 37.5 MG/1
37.5 CAPSULE ORAL EVERY MORNING
Qty: 30 CAPSULE | Refills: 0
Start: 2018-06-04 | End: 2018-07-05 | Stop reason: SDUPTHER

## 2018-06-04 NOTE — PROGRESS NOTES
I have reviewed the notes, assessments, and/or procedures performed by Kya PLUMMER, I concur with her/his documentation of Angie Patel.

## 2018-06-04 NOTE — PROGRESS NOTES
"Subjective   Angie Patel is a 43 y.o. female.   Chief Complaint   Patient presents with   • Anxiety     management   • Weight Loss     management       History of Present Illness     Angie is a 43 year old female who presents for anxiety and weight loss management. She has lost 3 pounds since April 16,2018. She has been taking Buspar 5 mg two in am and one in the pm. States the medication helps with her anxiety.  Denied any Suicidal or homicidal thoughts.  She restarted the Phentermine 10 days ago and has not had any side effects.  Denied any chest pain, shortness of air,wheezing swelling of ankles or heart palpitations.  Sleep has been normal.  Appetite has been healthy.       The following portions of the patient's history were reviewed and updated as appropriate: allergies, current medications, past family history, past medical history, past social history and past surgical history.    Review of Systems   Constitutional: Negative.    HENT: Negative.    Eyes: Negative.    Respiratory: Negative.  Negative for cough, shortness of breath and wheezing.    Cardiovascular: Negative.  Negative for chest pain, palpitations and leg swelling.   Gastrointestinal: Negative.    Endocrine: Negative.    Genitourinary: Negative.    Musculoskeletal: Negative.    Skin: Negative.    Allergic/Immunologic: Negative.    Neurological: Negative.    Hematological: Negative.    Psychiatric/Behavioral: Negative for sleep disturbance and suicidal ideas. The patient is nervous/anxious.    All other systems reviewed and are negative.      Social History   Substance Use Topics   • Smoking status: Former Smoker   • Smokeless tobacco: Not on file   • Alcohol use No     Vitals:    06/04/18 0907   BP: 120/72   BP Location: Left arm   Patient Position: Sitting   Cuff Size: Adult   Pulse: 111   Resp: 16   Temp: 98.7 °F (37.1 °C)   TempSrc: Oral   SpO2: 99%   Weight: 112 kg (248 lb)   Height: 162.6 cm (64\")     Wt Readings from Last 3 Encounters: "   06/04/18 112 kg (248 lb)   04/16/18 114 kg (251 lb)   03/15/18 113 kg (249 lb)       BP Readings from Last 3 Encounters:   06/04/18 120/72   04/16/18 120/84   03/15/18 124/80       Body mass index is 42.57 kg/m².  Allergies   Allergen Reactions   • Iodine Shortness Of Breath     Swelling     • Shellfish Allergy Shortness Of Breath     swelling       Objective   Physical Exam   Constitutional: She is oriented to person, place, and time. Vital signs are normal. She appears well-developed and well-nourished.   Cardiovascular: Normal rate, regular rhythm, S1 normal, S2 normal, normal heart sounds and normal pulses.    Pulmonary/Chest: Effort normal and breath sounds normal.   Abdominal: Soft. Normal appearance and bowel sounds are normal. There is no hepatomegaly. There is no tenderness.   Neurological: She is alert and oriented to person, place, and time.   Skin: Skin is warm, dry and intact.   Psychiatric: She has a normal mood and affect. Her speech is normal and behavior is normal. Judgment and thought content normal. Cognition and memory are normal.       Assessment/Plan   Angie was seen today for anxiety and weight loss.    Diagnoses and all orders for this visit:    Anxiety    High risk medication use  -     [id number] [facility] -    Morbid obesity with body mass index (BMI) of 40.0 to 44.9 in adult  -     [id number] [facility] -  -     phentermine 37.5 MG capsule; Take 1 capsule by mouth Every Morning.    1.  Chronic and stable anxiety: Doing well with her BuSpar medication.  No refills required at this time.  We'll reevaluate at office visit in one month.  2.  Chronic morbid obesity: She has restarted her phentermine medication for the past 10 days without side effects.  We'll continue phentermine for weight loss for now.  Dr. Ayden Saravia has approved a refill.  Angie will have a urine drug screen for baseline collected at today's office visit.  She'll be notified of test results when completed.  See below  for Theresa information.      As part of this patient's treatment plan I am prescribing controlled substances.  The patient has been made aware of appropriate use of such medications, including potential risk of somnolence. Limited ability to drive and/or work safely, and potential for dependence or overdose.  It has also been made clear that these medications are for use by this patient only, without concomitant use of alcohol or other substances unless prescribed.  THERESA report has been reviewed and scanned into the patient's chart.  Theresa number is [id number] dated on June 2,2018.

## 2018-06-05 LAB
AMPHETAMINES UR QL SCN: NEGATIVE NG/ML
BARBITURATES UR QL SCN: NEGATIVE NG/ML
BENZODIAZ UR QL: NEGATIVE NG/ML
BZE UR QL: NEGATIVE NG/ML
CANNABINOIDS UR QL SCN: NEGATIVE NG/ML
OPIATES UR QL: NEGATIVE NG/ML
PCP UR QL: NEGATIVE NG/ML

## 2018-06-15 ENCOUNTER — TELEPHONE (OUTPATIENT)
Dept: NEUROSURGERY | Facility: CLINIC | Age: 43
End: 2018-06-15

## 2018-06-15 DIAGNOSIS — R90.89 ABNORMAL FINDING ON MRI OF BRAIN: Primary | ICD-10-CM

## 2018-06-15 NOTE — TELEPHONE ENCOUNTER
Patient due 3Y recall in July for meningioma/brain imaging abnormality with repeat MRI brain +/- w/3D SPGR. Old imaging loaded, old records scanned. New order in (all prior imaging has been done at [facility]). Letter sent.

## 2018-07-05 ENCOUNTER — OFFICE VISIT (OUTPATIENT)
Dept: FAMILY MEDICINE CLINIC | Facility: CLINIC | Age: 43
End: 2018-07-05

## 2018-07-05 VITALS
HEART RATE: 100 BPM | OXYGEN SATURATION: 99 % | SYSTOLIC BLOOD PRESSURE: 120 MMHG | TEMPERATURE: 98.7 F | WEIGHT: 245 LBS | RESPIRATION RATE: 16 BRPM | BODY MASS INDEX: 41.83 KG/M2 | HEIGHT: 64 IN | DIASTOLIC BLOOD PRESSURE: 68 MMHG

## 2018-07-05 DIAGNOSIS — E66.01 MORBID OBESITY WITH BODY MASS INDEX (BMI) OF 40.0 TO 44.9 IN ADULT (HCC): ICD-10-CM

## 2018-07-05 DIAGNOSIS — F51.01 PRIMARY INSOMNIA: Primary | ICD-10-CM

## 2018-07-05 PROCEDURE — 99213 OFFICE O/P EST LOW 20 MIN: CPT | Performed by: PHYSICIAN ASSISTANT

## 2018-07-05 RX ORDER — PHENTERMINE HYDROCHLORIDE 37.5 MG/1
37.5 CAPSULE ORAL EVERY MORNING
Qty: 30 CAPSULE | Refills: 0 | Status: CANCELLED | OUTPATIENT
Start: 2018-07-05

## 2018-07-05 RX ORDER — TRAZODONE HYDROCHLORIDE 50 MG/1
50 TABLET ORAL NIGHTLY
Qty: 30 TABLET | Refills: 2 | Status: SHIPPED | OUTPATIENT
Start: 2018-07-05 | End: 2019-01-07

## 2018-07-05 RX ORDER — PHENTERMINE HYDROCHLORIDE 37.5 MG/1
37.5 CAPSULE ORAL EVERY MORNING
Qty: 30 CAPSULE | Refills: 0 | Status: SHIPPED | OUTPATIENT
Start: 2018-07-05 | End: 2019-01-07

## 2018-07-05 NOTE — PROGRESS NOTES
"Subjective   Angie Patel is a 43 y.o. female.   Chief Complaint   Patient presents with   • Weight Loss     management       History of Present Illness     Angie is 43-year-old female who presents for weight loss management.  She has lost 3 pounds since June 4, 2018. She is doing well with the Phentermine medication without side effects.  Denied any chest pain,shortness of air,wheezing,swelling of ankles or abdominal pain. States she has been taking her BuSpar as directed.  She is still having some depression and mood swings especially at night.  Denied any suicidal or homicidal thoughts. Appetite has been healthy.  Sleep has been restless.      The following portions of the patient's history were reviewed and updated as appropriate: allergies, current medications, past family history, past medical history, past social history and past surgical history.    Review of Systems   Constitutional: Negative.    HENT: Negative.    Eyes: Negative.    Respiratory: Negative.    Cardiovascular: Negative.    Gastrointestinal: Negative.    Endocrine: Negative.    Genitourinary: Negative.    Musculoskeletal: Negative.    Skin: Negative.    Allergic/Immunologic: Negative.    Neurological: Negative.    Hematological: Negative.    Psychiatric/Behavioral: Positive for sleep disturbance. Negative for suicidal ideas.   All other systems reviewed and are negative.    Social History   Substance Use Topics   • Smoking status: Former Smoker   • Smokeless tobacco: Not on file   • Alcohol use No     .  Vitals:    07/05/18 0855   BP: 120/68   BP Location: Left arm   Patient Position: Sitting   Cuff Size: Adult   Pulse: 100   Resp: 16   Temp: 98.7 °F (37.1 °C)   TempSrc: Oral   SpO2: 99%   Weight: 111 kg (245 lb)   Height: 162.6 cm (64\")       Wt Readings from Last 3 Encounters:   07/05/18 111 kg (245 lb)   06/04/18 112 kg (248 lb)   04/16/18 114 kg (251 lb)       BP Readings from Last 3 Encounters:   07/05/18 120/68   06/04/18 120/72 "   04/16/18 120/84     Body mass index is 42.05 kg/m².  Allergies   Allergen Reactions   • Iodine Shortness Of Breath     Swelling     • Shellfish Allergy Shortness Of Breath     swelling       Objective   Physical Exam   Constitutional: She is oriented to person, place, and time. Vital signs are normal. She appears well-developed and well-nourished.   Neck: Trachea normal and phonation normal. Neck supple. No edema present.   Cardiovascular: Normal rate, regular rhythm, S1 normal, S2 normal, normal heart sounds and normal pulses.    Pulmonary/Chest: Effort normal and breath sounds normal.   Abdominal: Soft. Normal appearance and bowel sounds are normal. There is no hepatomegaly. There is no tenderness.   Neurological: She is alert and oriented to person, place, and time.   Skin: Skin is warm, dry and intact. Capillary refill takes less than 2 seconds.   Psychiatric: Her speech is normal and behavior is normal. Judgment and thought content normal. Cognition and memory are normal. She exhibits a depressed mood.       Assessment/Plan   Angie was seen today for weight loss.    Diagnoses and all orders for this visit:    Primary insomnia  -     traZODone (DESYREL) 50 MG tablet; Take 1 tablet by mouth Every Night.    Morbid obesity with body mass index (BMI) of 40.0 to 44.9 in adult (CMS/[facility])  -     phentermine 37.5 MG capsule; Take 1 capsule by mouth Every Morning.    Other orders  -     Cancel: phentermine 37.5 MG capsule; Take 1 capsule by mouth Every Morning.    1.  Improving morbid obesity: Angie has lost 3 pounds in the past month on phentermine medication.  She is doing well with the medication without side effects.  Dr. Ayden Saravia has approved a refill.  See below for Anjel information.  I've asked her to continue to eat healthy but increase her physical activity.  She will return to office in one month for weight management.  2.  New primary insomnia: I will add trazodone nightly to her BuSpar medication.   I'm hoping this will help with her symptoms as well as help her sleep.  I'll reevaluate at office visit in one month.        As part of this patient's treatment plan I am prescribing controlled substances.  The patient has been made aware of appropriate use of such medications, including potential risk of somnolence. Limited ability to drive and/or work safely, and potential for dependence or overdose.  It has also been made clear that these medications are for use by this patient only, without concomitant use of alcohol or other substances unless prescribed.  THERESA report has been reviewed and scanned into the patient's chart.  Theresa number is [id number] dated June 2, 2018.

## 2018-07-06 DIAGNOSIS — E28.2 PCOS (POLYCYSTIC OVARIAN SYNDROME): ICD-10-CM

## 2018-07-09 RX ORDER — SPIRONOLACTONE 100 MG/1
100 TABLET, FILM COATED ORAL DAILY
Qty: 90 TABLET | Refills: 0 | Status: SHIPPED | OUTPATIENT
Start: 2018-07-09 | End: 2018-10-11 | Stop reason: SDUPTHER

## 2018-07-23 ENCOUNTER — OFFICE VISIT (OUTPATIENT)
Dept: NEUROSURGERY | Facility: CLINIC | Age: 43
End: 2018-07-23

## 2018-07-23 VITALS
HEART RATE: 84 BPM | WEIGHT: 243 LBS | BODY MASS INDEX: 41.48 KG/M2 | HEIGHT: 64 IN | RESPIRATION RATE: 18 BRPM | DIASTOLIC BLOOD PRESSURE: 42 MMHG | SYSTOLIC BLOOD PRESSURE: 82 MMHG

## 2018-07-23 DIAGNOSIS — D32.9 MENINGIOMA (HCC): Primary | ICD-10-CM

## 2018-07-23 PROCEDURE — 99203 OFFICE O/P NEW LOW 30 MIN: CPT | Performed by: NURSE PRACTITIONER

## 2018-07-23 NOTE — PROGRESS NOTES
"Subjective   Patient ID: Angie Patel is a 43 y.o. female is here today for follow-up meningioma. Patient presents accompanied by her .    History of Present Illness     She returns the office today with known history of a small right frontoparietal meningioma.  This has been followed with serial imaging and she has had no surgery.  The meningioma was incidentally discovered during a workup for headaches and confusion.  She has had new MRI imaging for continued surveillance.  She was last here in 2015 and imaging at that time was stable.    Since her last office visit, she reports that she is being worked up from a medical issues, including fibromyalgia, insomnia and sleep apnea.  She also is concerned that she might have rheumatoid arthritis secondary to joint pain.  She denies any neuro changes, including double and blurry vision as well as balance and gait issues.  She does have ongoing headaches that are unchanged in presentation and are consistent with history of ocular migraines.    She presents with her .    BP (!) 82/42 (BP Location: Left arm, Patient Position: Sitting)   Pulse 84   Resp 18   Ht 162.6 cm (64\")   Wt 110 kg (243 lb)   BMI 41.71 kg/m²     The following portions of the patient's history were reviewed and updated as appropriate: allergies, current medications, past family history, past medical history, past social history, past surgical history and problem list.    Review of Systems   HENT: Positive for tinnitus. Negative for hearing loss.    Eyes: Positive for visual disturbance (difficulty focusing vision).   Musculoskeletal: Negative for gait problem.   Neurological: Positive for numbness (right hand, right foot) and headaches (occular migraines more frequent). Negative for dizziness, tremors, seizures, syncope, speech difficulty, weakness and light-headedness.   Psychiatric/Behavioral: Positive for decreased concentration (possibly due to lack of sleep) and sleep " disturbance. Negative for confusion.       Objective   Physical Exam   Constitutional: She is oriented to person, place, and time. She appears well-developed and well-nourished. She is cooperative.   Very pleasant, middle-aged, morbidly obese female   HENT:   Head: Normocephalic and atraumatic.   Eyes: EOM are normal.   Neck: Neck supple. No tracheal deviation present.   Pulmonary/Chest: Effort normal.   Musculoskeletal: Normal range of motion. She exhibits tenderness.   Moving all extremities well   Neurological: She is alert and oriented to person, place, and time. She has normal strength. She displays no tremor. No cranial nerve deficit or sensory deficit. Coordination and gait normal. GCS eye subscore is 4. GCS verbal subscore is 5. GCS motor subscore is 6.   Gait is stable and upright  Able to heel and toe walk, able to tandem  Cranial nerves II through XII grossly intact   Skin: Skin is warm and dry.   Psychiatric: She has a normal mood and affect. Her behavior is normal. Thought content normal.   Vitals reviewed.    Neurologic Exam     Mental Status   Oriented to person, place, and time.     Cranial Nerves     CN III, IV, VI   Extraocular motions are normal.     Motor Exam     Strength   Strength 5/5 throughout.       Assessment/Plan   Independent Review of Radiographic Studies:    MRI of the brain with and without contrast from [facility] dated July 9 reveals stable findings.  There has been no change the small 1 cm meningioma in the right frontal parietal region.  No new abnormalities identified.    Consulting providers notes reviewed    Medical Decision Making:    She returns the office today for ongoing follow-up with history of a small right frontal meningioma.  Exam as noted above, no red flags.  From our standpoint she is doing well.  She has many other medical issues that are being addressed by other consulting providers.  MRI imaging reveals stable findings with regards to the small meningioma that is  completely unchanged in size since her last imaging 3 years ago.  We'll plan on seeing her back in 3 years with repeat imaging for continued surveillance.    Plan: Return to office in 3 years with MRI brain  Angie was seen today for brain tumor.    Diagnoses and all orders for this visit:    Meningioma (CMS/HCC)  -     MRI Brain With & Without Contrast; Future      Return in about 3 years (around 7/23/2021).

## 2018-08-02 DIAGNOSIS — F41.9 ANXIETY: ICD-10-CM

## 2018-08-02 RX ORDER — BUSPIRONE HYDROCHLORIDE 5 MG/1
TABLET ORAL
Qty: 90 TABLET | Refills: 0 | Status: SHIPPED | OUTPATIENT
Start: 2018-08-02 | End: 2018-08-29 | Stop reason: SDUPTHER

## 2018-08-29 DIAGNOSIS — F41.9 ANXIETY: ICD-10-CM

## 2018-08-29 RX ORDER — BUSPIRONE HYDROCHLORIDE 5 MG/1
TABLET ORAL
Qty: 90 TABLET | Refills: 0 | Status: SHIPPED | OUTPATIENT
Start: 2018-08-29 | End: 2018-09-23 | Stop reason: SDUPTHER

## 2018-09-23 DIAGNOSIS — R60.9 PITTING EDEMA: ICD-10-CM

## 2018-09-23 DIAGNOSIS — F41.9 ANXIETY: ICD-10-CM

## 2018-09-24 RX ORDER — BUSPIRONE HYDROCHLORIDE 5 MG/1
TABLET ORAL
Qty: 90 TABLET | Refills: 0 | Status: SHIPPED | OUTPATIENT
Start: 2018-09-24 | End: 2018-10-20 | Stop reason: SDUPTHER

## 2018-09-24 RX ORDER — FUROSEMIDE 20 MG/1
TABLET ORAL
Qty: 30 TABLET | Refills: 0 | Status: SHIPPED | OUTPATIENT
Start: 2018-09-24 | End: 2018-10-20 | Stop reason: SDUPTHER

## 2018-10-11 RX ORDER — SPIRONOLACTONE 100 MG/1
100 TABLET, FILM COATED ORAL DAILY
Qty: 90 TABLET | Refills: 0 | Status: SHIPPED | OUTPATIENT
Start: 2018-10-11 | End: 2019-01-05 | Stop reason: SDUPTHER

## 2018-10-20 DIAGNOSIS — R60.9 PITTING EDEMA: ICD-10-CM

## 2018-10-20 DIAGNOSIS — F41.9 ANXIETY: ICD-10-CM

## 2018-10-22 RX ORDER — BUSPIRONE HYDROCHLORIDE 5 MG/1
TABLET ORAL
Qty: 90 TABLET | Refills: 1 | Status: SHIPPED | OUTPATIENT
Start: 2018-10-22 | End: 2018-12-12 | Stop reason: SDUPTHER

## 2018-10-22 RX ORDER — FUROSEMIDE 20 MG/1
TABLET ORAL
Qty: 30 TABLET | Refills: 5 | Status: SHIPPED | OUTPATIENT
Start: 2018-10-22 | End: 2019-03-26 | Stop reason: SDUPTHER

## 2018-12-12 DIAGNOSIS — F41.9 ANXIETY: ICD-10-CM

## 2018-12-12 RX ORDER — BUSPIRONE HYDROCHLORIDE 5 MG/1
TABLET ORAL
Qty: 90 TABLET | Refills: 0 | Status: SHIPPED | OUTPATIENT
Start: 2018-12-12 | End: 2019-01-06 | Stop reason: SDUPTHER

## 2019-01-06 DIAGNOSIS — F41.9 ANXIETY: ICD-10-CM

## 2019-01-07 ENCOUNTER — HOSPITAL ENCOUNTER (OUTPATIENT)
Dept: GENERAL RADIOLOGY | Facility: HOSPITAL | Age: 44
Discharge: HOME | End: 2019-01-07
Admitting: PHYSICIAN ASSISTANT

## 2019-01-07 ENCOUNTER — OFFICE VISIT (OUTPATIENT)
Dept: FAMILY MEDICINE CLINIC | Facility: CLINIC | Age: 44
End: 2019-01-07

## 2019-01-07 VITALS
HEIGHT: 64 IN | WEIGHT: 260 LBS | RESPIRATION RATE: 16 BRPM | SYSTOLIC BLOOD PRESSURE: 90 MMHG | DIASTOLIC BLOOD PRESSURE: 64 MMHG | BODY MASS INDEX: 44.39 KG/M2 | OXYGEN SATURATION: 98 % | TEMPERATURE: 98.2 F | HEART RATE: 87 BPM

## 2019-01-07 DIAGNOSIS — E04.9 THYROID ENLARGEMENT: ICD-10-CM

## 2019-01-07 DIAGNOSIS — M25.50 ARTHRALGIA, UNSPECIFIED JOINT: Primary | ICD-10-CM

## 2019-01-07 DIAGNOSIS — I10 BENIGN ESSENTIAL HYPERTENSION: ICD-10-CM

## 2019-01-07 DIAGNOSIS — R05.3 CHRONIC COUGH: ICD-10-CM

## 2019-01-07 DIAGNOSIS — E66.01 MORBID OBESITY WITH BODY MASS INDEX (BMI) OF 40.0 TO 44.9 IN ADULT (HCC): ICD-10-CM

## 2019-01-07 DIAGNOSIS — E28.2 PCOS (POLYCYSTIC OVARIAN SYNDROME): ICD-10-CM

## 2019-01-07 DIAGNOSIS — E78.00 HYPERCHOLESTEROLEMIA: ICD-10-CM

## 2019-01-07 DIAGNOSIS — E55.9 VITAMIN D DEFICIENCY: ICD-10-CM

## 2019-01-07 PROCEDURE — 99214 OFFICE O/P EST MOD 30 MIN: CPT | Performed by: PHYSICIAN ASSISTANT

## 2019-01-07 PROCEDURE — 71046 X-RAY EXAM CHEST 2 VIEWS: CPT

## 2019-01-07 RX ORDER — MULTIVIT WITH MINERALS/LUTEIN
1000 TABLET ORAL DAILY
COMMUNITY
End: 2019-10-17

## 2019-01-07 RX ORDER — SPIRONOLACTONE 100 MG/1
100 TABLET, FILM COATED ORAL DAILY
Qty: 90 TABLET | Refills: 0 | Status: SHIPPED | OUTPATIENT
Start: 2019-01-07 | End: 2019-01-28 | Stop reason: SDUPTHER

## 2019-01-07 RX ORDER — METFORMIN HYDROCHLORIDE 750 MG/1
1500 TABLET, EXTENDED RELEASE ORAL
COMMUNITY
End: 2019-09-30 | Stop reason: SDUPTHER

## 2019-01-07 RX ORDER — BUSPIRONE HYDROCHLORIDE 5 MG/1
TABLET ORAL
Qty: 90 TABLET | Refills: 0 | Status: SHIPPED | OUTPATIENT
Start: 2019-01-07 | End: 2019-01-07

## 2019-01-07 NOTE — PROGRESS NOTES
"Subjective   Angie Patel is a 43 y.o. female presents for   Chief Complaint   Patient presents with   • Polycystic Ovary Syndrome     management       History of Present Illness  Angie is a 43-year-old female who presents for polycystic ovary syndrome.  She has gained 17 pounds since July 23, 2018.  She had lab work done by Dr. Paul at [facility] hormone specialist were collected on July 11, 2018.  Cholesterol was 162, triglycerides 140, HDL 35 and LDL 99.  Fasting blood sugar was normal at 79.  TSH was 2.8.  D level was decreased at 23.3.  Hemoglobin A1c was 5.7.  Vitamin B12 was 318. These results will be  scanned into her electronic medical record.  Several complaints at office visit today.    The integrative hormone specialist due to her polycystic ovary.  States Dr. Paul today for extended release metformin 750 mg to take to tablets daily.  States she has noticed some improvement with nausea, vomiting and loose stools.  1.  Angie chronic joint pain that has worsen over the past 6 months or longer.  She has seen rheumatologist, Dr. Lesly Leon, past who diagnosed her with fibromyalgia.  States she has not been taking any medications for this.  She has days where she feels \"paralyzed in her lower legs\".  The last time she had this was 2-3 months ago.  Denied any recent injury or trauma to joints.  States his had increased fatigue symptoms as well.  2.  She has had a chronic cough and slight wheezing over the past several months.  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  States her mother-in-law's house is very junky.  States her mother-in-law is a \"quarter\".  States she has been trying to clean out her mother-in-law's house and has been exposed to mold, dust and mouse droppings.  States she feels better when she is not at her mother-in-law's house.  She is wondering if she is allergic to mold, dust or mice feces.  Denied any fevers, chills, headache, shortness of " air, dizziness, headache or swelling of ankles.  She does use an inhaler off and on which helps.  She has not had any allergy testing.  3.  Angie she is having a hard time losing weight again.  She had been on phentermine in the past which helped slightly but she did have side effects to the medication.  States it made her feel funny at times.  Her diet has been healthy.  Sleep has been normal to slightly restless.  She is trying to be more active.  4.  She has hypertension.  Has been taking spironolactone for her pap PCO S has helped lower her blood pressure.  She also takes Lasix as needed for swelling.  Denied any chest pain, shortness of air, dizziness, vision changes or swelling of ankles.    The following portions of the patient's history were reviewed and updated as appropriate: allergies, current medications, past family history, past medical history, past social history, past surgical history and problem list.    Review of Systems   Constitutional: Positive for fatigue. Negative for chills and fever.   HENT: Negative.    Eyes: Negative.    Respiratory: Positive for cough and wheezing. Negative for shortness of breath.    Cardiovascular: Negative.  Negative for chest pain, palpitations and leg swelling.   Gastrointestinal: Negative.  Negative for anal bleeding, blood in stool, constipation, diarrhea and vomiting.   Endocrine: Negative.    Genitourinary: Negative.    Musculoskeletal: Positive for arthralgias.   Skin: Negative.    Allergic/Immunologic: Negative.    Neurological: Negative.  Negative for dizziness, light-headedness and headaches.   Hematological: Negative.    Psychiatric/Behavioral: Negative for sleep disturbance and suicidal ideas. The patient is nervous/anxious.    All other systems reviewed and are negative.        Vitals:    01/07/19 0840   BP: 90/64   BP Location: Left arm   Patient Position: Sitting   Cuff Size: Adult   Pulse: 87   Resp: 16   Temp: 98.2 °F (36.8 °C)   TempSrc: Oral   SpO2:  "98%   Weight: 118 kg (260 lb)   Height: 162.6 cm (64\")     Wt Readings from Last 3 Encounters:   01/07/19 118 kg (260 lb)   07/23/18 110 kg (243 lb)   07/05/18 111 kg (245 lb)       BP Readings from Last 3 Encounters:   01/07/19 90/64   07/23/18 (!) 82/42   07/05/18 120/68       Social History     Socioeconomic History   • Marital status:      Spouse name: Not on file   • Number of children: Not on file   • Years of education: Not on file   • Highest education level: Not on file   Social Needs   • Financial resource strain: Not on file   • Food insecurity - worry: Not on file   • Food insecurity - inability: Not on file   • Transportation needs - medical: Not on file   • Transportation needs - non-medical: Not on file   Occupational History   • Occupation:      Employer: SELF-EMPLOYED     Comment: full time   Tobacco Use   • Smoking status: Former Smoker   • Smokeless tobacco: Never Used   Substance and Sexual Activity   • Alcohol use: Yes     Comment: occ'l   • Drug use: No   • Sexual activity: Defer     Partners: Male     Birth control/protection: Surgical     Comment: hysterectomy   Other Topics Concern   • Not on file   Social History Narrative   • Not on file       Allergies   Allergen Reactions   • Iodine Shortness Of Breath     Swelling     • Shellfish Allergy Shortness Of Breath     swelling       Body mass index is 44.63 kg/m².    Objective   Physical Exam   Constitutional: She is oriented to person, place, and time. Vital signs are normal. She appears well-developed and well-nourished.   Morbid obese female   HENT:   Head: Normocephalic and atraumatic.   Right Ear: Hearing, tympanic membrane, external ear and ear canal normal.   Left Ear: Hearing, tympanic membrane, external ear and ear canal normal.   Nose: Nose normal. Right sinus exhibits no maxillary sinus tenderness and no frontal sinus tenderness. Left sinus exhibits no maxillary sinus tenderness and no frontal sinus tenderness. "   Mouth/Throat: Uvula is midline, oropharynx is clear and moist and mucous membranes are normal.   Eyes: Conjunctivae, EOM and lids are normal. Pupils are equal, round, and reactive to light.   Neck: Trachea normal and phonation normal. Neck supple. Carotid bruit is not present. No edema present. Thyromegaly present.       Cardiovascular: Normal rate, regular rhythm, S1 normal, S2 normal, normal heart sounds and normal pulses.   Pulmonary/Chest: Effort normal and breath sounds normal.   Abdominal: Soft. Normal appearance, normal aorta and bowel sounds are normal. There is no hepatomegaly. There is no tenderness.   Lymphadenopathy:     She has no cervical adenopathy.   Neurological: She is alert and oriented to person, place, and time. She has normal strength.   Skin: Skin is warm, dry and intact. Capillary refill takes less than 2 seconds.   Psychiatric: She has a normal mood and affect. Her speech is normal and behavior is normal. Judgment and thought content normal. Cognition and memory are normal.       Assessment/Plan   Angie was seen today for polycystic ovary syndrome.    Diagnoses and all orders for this visit:    Arthralgia, unspecified joint  -     BON  -     CBC & Differential  -     Thyroid Panel With TSH  -     Rheumatoid Factor  -     C-reactive protein  -     Cyclic Citrul Peptide Antibody, IgG / IgA    Chronic cough  -     Allergens With / Total IgE Area 5  -     XR Chest PA & Lateral    Hypercholesterolemia  -     CBC & Differential  -     Comprehensive Metabolic Panel    Benign essential hypertension    Vitamin D deficiency  -     Vitamin D 25 Hydroxy    Morbid obesity with body mass index (BMI) of 40.0 to 44.9 in adult (CMS/HCC)  -     Thyroid Panel With TSH    PCOS (polycystic ovarian syndrome)  -     Comprehensive Metabolic Panel  -     Hemoglobin A1c    Thyroid enlargement  -     US Thyroid; Future    1. Chronic arthralgias:  Angie will have an BON, CBC, thyroid profile with TSH, rheumatoid  factor, CRP and a CCP antibody testing for further evaluation of her arthralgias.  She'll be notified of test results when completed.  We will consider referral to her rheumatologist if laboratory results are abnormal.  We will also consider starting gabapentin or Lyrica for fibromyalgia symptoms.  Continue will be notified of test results and any medication changes.  2.  New cough: She has had a cough off and on for the past 1-2 months.  I suspect this may be allergy induced due to her exposure to her mother-in-law's house.  She will have a total IgE area 5 allergy testing collected at office visit today.  Continue will also have a chest x-ray at the [facility].  She'll be notified of test results and any medication changes.  We'll consider referral to pulmonologist/allergist in future if warranted.  3.  Chronic hypercholesterolemia: She is fasting and will have a CBC, CMP and a lipid profile.  She'll be notified of test results when completed.  4.  Chronic and stable hypertension: I have rechecked her blood pressure at office visits today in got 100/68.  I've instructed Shelia to decrease her spironolactone to 50 mg daily.  We will return to office in one month for hypertension management.  She voiced understanding.  5.  Chronic vitamin D deficiency: She has been taking over-the-counter vitamin D 3 over-the-counter 6 months.  She will have a repeat vitamin D level collected at office visit today.  I have reviewed her laboratory results from July 11, 2018 with her.  Her vitamin D was 23.3.  She will be notified of test results and any medication changes.  6.  Chronic morbid obesity: She will have a thyroid profile with TSH collected at office visit today.  Continue will be notified of test results when completed.  I've encouraged her to decrease carbohydrates and fatty food in diet.  Also encouraged to increase physical activity for weight loss.  She may join a gym and/or Weight [employer].  She will return to  office in one month for weight management.    7.  Chronic and stable PCOS: She will have a CMP and A1c collected office visit today.  She will continue her metformin extended release 750 mg 2 tablets daily for now.  She'll be notified of test results and any medication changes.    8.  New Right thyroid enlargement: She will have a thyroid profile with TSH collected at office visit today.  I have scheduled an ultrasound of thyroid for further evaluation.  She'll be notified of test results when completed.      Kya Kendrick PA-C    [facility]  [address] [place] [id number]  Dept: [phone]  Dept Fax: [phone]  Loc: [phone]  Loc Fax: [phone]

## 2019-01-10 LAB
25(OH)D3+25(OH)D2 SERPL-MCNC: 70.2 NG/ML
A ALTERNATA IGE QN: <0.1 KU/L
A FUMIGATUS IGE QN: <0.1 KU/L
ALBUMIN SERPL-MCNC: 4.6 G/DL (ref 3.5–5.2)
ALBUMIN/GLOB SERPL: 1.4 G/DL
ALP SERPL-CCNC: 46 U/L (ref 40–129)
ALT SERPL-CCNC: 41 U/L (ref 5–33)
ANA SER QL: POSITIVE
AST SERPL-CCNC: 27 U/L (ref 5–32)
BASOPHILS # BLD AUTO: 0.04 10*3/MM3 (ref 0–0.2)
BASOPHILS NFR BLD AUTO: 0.4 % (ref 0–2)
BERMUDA GRASS IGE QN: 0.21 KU/L
BILIRUB SERPL-MCNC: 0.3 MG/DL (ref 0.2–1.2)
BOXELDER IGE QN: 0.23 KU/L
BUN SERPL-MCNC: 11 MG/DL (ref 6–20)
BUN/CREAT SERPL: 13.3 (ref 7–25)
C HERBARUM IGE QN: <0.1 KU/L
CALCIUM SERPL-MCNC: 9.9 MG/DL (ref 8.6–10.5)
CAT DANDER IGE QN: 1.69 KU/L
CCP IGA+IGG SERPL IA-ACNC: 7 UNITS (ref 0–19)
CHLORIDE SERPL-SCNC: 100 MMOL/L (ref 98–107)
CMN PIGWEED IGE QN: 0.21 KU/L
CO2 SERPL-SCNC: 26 MMOL/L (ref 22–29)
COMMON RAGWEED IGE QN: 0.26 KU/L
CONV CLASS DESCRIPTION: ABNORMAL
COTTONWOOD IGE QN: 0.23 KU/L
CREAT SERPL-MCNC: 0.83 MG/DL (ref 0.57–1)
CRP SERPL-MCNC: 0.77 MG/DL (ref 0–0.5)
D FARINAE IGE QN: 10.2 KU/L
D PTERONYSS IGE QN: 7.25 KU/L
DOG DANDER IGE QN: 0.91 KU/L
DSDNA AB SER-ACNC: 1 IU/ML (ref 0–9)
EOSINOPHIL # BLD AUTO: 0.27 10*3/MM3 (ref 0.1–0.3)
EOSINOPHIL NFR BLD AUTO: 3 % (ref 0–4)
ERYTHROCYTE [DISTWIDTH] IN BLOOD BY AUTOMATED COUNT: 12.4 % (ref 11.5–14.5)
FT4I SERPL CALC-MCNC: 2.2 (ref 1.2–4.9)
GLOBULIN SER CALC-MCNC: 3.4 GM/DL
GLUCOSE SERPL-MCNC: 102 MG/DL (ref 65–99)
HBA1C MFR BLD: 6.1 % (ref 4.8–5.6)
HCT VFR BLD AUTO: 42.5 % (ref 37–47)
HGB BLD-MCNC: 14 G/DL (ref 12–16)
IGE SERPL-ACNC: 213 IU/ML (ref 0–100)
IMM GRANULOCYTES # BLD AUTO: 0.05 10*3/MM3 (ref 0–0.03)
IMM GRANULOCYTES NFR BLD AUTO: 0.6 % (ref 0–0.5)
LONDON PLANE IGE QN: 0.2 KU/L
LYMPHOCYTES # BLD AUTO: 2.48 10*3/MM3 (ref 0.6–4.8)
LYMPHOCYTES NFR BLD AUTO: 27.6 % (ref 20–45)
Lab: NORMAL
MCH RBC QN AUTO: 30.2 PG (ref 27–31)
MCHC RBC AUTO-ENTMCNC: 32.9 G/DL (ref 31–37)
MCV RBC AUTO: 91.6 FL (ref 81–99)
MONOCYTES # BLD AUTO: 0.52 10*3/MM3 (ref 0–1)
MONOCYTES NFR BLD AUTO: 5.8 % (ref 3–8)
MOUSE URINE PROT IGE QN: <0.1 KU/L
MT JUNIPER IGE QN: 1.05 KU/L
NEUTROPHILS # BLD AUTO: 5.63 10*3/MM3 (ref 1.5–8.3)
NEUTROPHILS NFR BLD AUTO: 62.6 % (ref 45–70)
NRBC BLD AUTO-RTO: 0 /100 WBC (ref 0–0)
P NOTATUM IGE QN: <0.1 KU/L
PECAN/HICK TREE IGE QN: 0.24 KU/L
PLATELET # BLD AUTO: 301 10*3/MM3 (ref 140–500)
POTASSIUM SERPL-SCNC: 4.5 MMOL/L (ref 3.5–5.2)
PROT SERPL-MCNC: 8 G/DL (ref 6–8.5)
RBC # BLD AUTO: 4.64 10*6/MM3 (ref 4.2–5.4)
RHEUMATOID FACT SERPL-ACNC: <10 IU/ML (ref 0–13.9)
ROACH IGE QN: 11.3 KU/L
SALTWORT IGE QN: 0.52 KU/L
SHEEP SORREL IGE QN: 0.3 KU/L
SILVER BIRCH IGE QN: 0.17 KU/L
SODIUM SERPL-SCNC: 141 MMOL/L (ref 136–145)
T3RU NFR SERPL: 25 % (ref 24–39)
T4 SERPL-MCNC: 8.9 UG/DL (ref 4.5–12)
TIMOTHY IGE QN: 0.22 KU/L
TSH SERPL DL<=0.005 MIU/L-ACNC: 2.75 UIU/ML (ref 0.45–4.5)
WALNUT IGE QN: 0.27 KU/L
WBC # BLD AUTO: 8.99 10*3/MM3 (ref 4.8–10.8)
WHITE ASH IGE QN: 0.26 KU/L
WHITE ELM IGE QN: 0.21 KU/L
WHITE MULBERRY IGE QN: 0.15 KU/L
WHITE OAK IGE QN: 0.2 KU/L

## 2019-01-11 DIAGNOSIS — R76.8 POSITIVE ANA (ANTINUCLEAR ANTIBODY): Primary | ICD-10-CM

## 2019-01-11 DIAGNOSIS — R76.9 POSITIVE ALLERGY TEST: ICD-10-CM

## 2019-01-11 DIAGNOSIS — M25.50 ARTHRALGIA, UNSPECIFIED JOINT: ICD-10-CM

## 2019-01-28 RX ORDER — SPIRONOLACTONE 100 MG/1
100 TABLET, FILM COATED ORAL DAILY
Qty: 90 TABLET | Refills: 1 | Status: SHIPPED | OUTPATIENT
Start: 2019-01-28 | End: 2019-07-21 | Stop reason: SDUPTHER

## 2019-02-07 ENCOUNTER — OFFICE VISIT (OUTPATIENT)
Dept: FAMILY MEDICINE CLINIC | Facility: CLINIC | Age: 44
End: 2019-02-07

## 2019-02-07 VITALS
WEIGHT: 265 LBS | OXYGEN SATURATION: 99 % | BODY MASS INDEX: 45.24 KG/M2 | SYSTOLIC BLOOD PRESSURE: 100 MMHG | HEART RATE: 88 BPM | DIASTOLIC BLOOD PRESSURE: 60 MMHG | RESPIRATION RATE: 16 BRPM | HEIGHT: 64 IN | TEMPERATURE: 98.5 F

## 2019-02-07 DIAGNOSIS — E66.01 MORBID OBESITY WITH BMI OF 40.0-44.9, ADULT (HCC): ICD-10-CM

## 2019-02-07 DIAGNOSIS — I10 BENIGN ESSENTIAL HYPERTENSION: Primary | ICD-10-CM

## 2019-02-07 DIAGNOSIS — M79.7 FIBROMYALGIA: ICD-10-CM

## 2019-02-07 PROCEDURE — 99213 OFFICE O/P EST LOW 20 MIN: CPT | Performed by: PHYSICIAN ASSISTANT

## 2019-02-07 RX ORDER — PHENTERMINE HYDROCHLORIDE 37.5 MG/1
37.5 CAPSULE ORAL EVERY MORNING
Qty: 30 CAPSULE | Refills: 0
Start: 2019-02-07 | End: 2019-02-14 | Stop reason: SDUPTHER

## 2019-02-07 NOTE — PATIENT INSTRUCTIONS
Myofascial Pain Syndrome and Fibromyalgia  Myofascial pain syndrome and fibromyalgia are both pain disorders. This pain may be felt mainly in your muscles.  · Myofascial pain syndrome:  ? Always has trigger points or tender points in the muscle that will cause pain when pressed. The pain may come and go.  ? Usually affects your neck, upper back, and shoulder areas. The pain often radiates into your arms and hands.  · Fibromyalgia:  ? Has muscle pains and tenderness that come and go.  ? Is often associated with fatigue and sleep disturbances.  ? Has trigger points.  ? Tends to be long-lasting (chronic), but is not life-threatening.    Fibromyalgia and myofascial pain are not the same. However, they often occur together. If you have both conditions, each can make the other worse. Both are common and can cause enough pain and fatigue to make day-to-day activities difficult.  What are the causes?  The exact causes of fibromyalgia and myofascial pain are not known. People with certain gene types may be more likely to develop fibromyalgia. Some factors can be triggers for both conditions, such as:  · Spine disorders.  · Arthritis.  · Severe injury (trauma) and other physical stressors.  · Being under a lot of stress.  · A medical illness.    What are the signs or symptoms?  Fibromyalgia  The main symptom of fibromyalgia is widespread pain and tenderness in your muscles. This can vary over time. Pain is sometimes described as stabbing, shooting, or burning. You may have tingling or numbness, too. You may also have sleep problems and fatigue. You may wake up feeling tired and groggy (fibro fog). Other symptoms may include:  · Bowel and bladder problems.  · Headaches.  · Visual problems.  · Problems with odors and noises.  · Depression or mood changes.  · Painful menstrual periods (dysmenorrhea).  · Dry skin or eyes.    Myofascial pain syndrome  Symptoms of myofascial pain syndrome include:  · Tight, ropy bands of  muscle.  · Uncomfortable sensations in muscular areas, such as:  ? Aching.  ? Cramping.  ? Burning.  ? Numbness.  ? Tingling.  ? Muscle weakness.  · Trouble moving certain muscles freely (range of motion).    How is this diagnosed?  There are no specific tests to diagnose fibromyalgia or myofascial pain syndrome. Both can be hard to diagnose because their symptoms are common in many other conditions. Your health care provider may suspect one or both of these conditions based on your symptoms and medical history. Your health care provider will also do a physical exam.  The key to diagnosing fibromyalgia is having pain, fatigue, and other symptoms for more than three months that cannot be explained by another condition.  The key to diagnosing myofascial pain syndrome is finding trigger points in muscles that are tender and cause pain elsewhere in your body (referred pain).  How is this treated?  Treating fibromyalgia and myofascial pain often requires a team of health care providers. This usually starts with your primary provider and a physical therapist. You may also find it helpful to work with alternative health care providers, such as massage therapists or acupuncturists.  Treatment for fibromyalgia may include medicines. This may include nonsteroidal anti-inflammatory drugs (NSAIDs), along with other medicines.  Treatment for myofascial pain may also include:  · NSAIDs.  · Cooling and stretching of muscles.  · Trigger point injections.  · Sound wave (ultrasound) treatments to stimulate muscles.    Follow these instructions at home:  · Take medicines only as directed by your health care provider.  · Exercise as directed by your health care provider or physical therapist.  · Try to avoid stressful situations.  · Practice relaxation techniques to control your stress. You may want to try:  ? Biofeedback.  ? Visual imagery.  ? Hypnosis.  ? Muscle relaxation.  ? Yoga.  ? Meditation.  · Talk to your health care provider  about alternative treatments, such as acupuncture or massage treatment.  · Maintain a healthy lifestyle. This includes eating a healthy diet and getting enough sleep.  · Consider joining a support group.  · Do not do activities that stress or strain your muscles. That includes repetitive motions and heavy lifting.  Where to find more information:  · National Fibromyalgia Association: www.fmaware.org  · Arthritis Foundation: www.arthritis.org  · American Chronic Pain Association: www.theacpa.org/condition/myofascial-pain  Contact a health care provider if:  · You have new symptoms.  · Your symptoms get worse.  · You have side effects from your medicines.  · You have trouble sleeping.  · Your condition is causing depression or anxiety.  This information is not intended to replace advice given to you by your health care provider. Make sure you discuss any questions you have with your health care provider.  Document Released: 12/18/2006 Document Revised: 05/25/2017 Document Reviewed: 09/23/2015  Elsevier Interactive Patient Education © 2018 [employer] Inc[employer]

## 2019-02-07 NOTE — PROGRESS NOTES
Subjective   Angie Patel is a 43 y.o. female presents for   Chief Complaint   Patient presents with   • Hypertension     management       History of Present Illness     Angie is a 43 year old female who presents for hypertension and weight management.  Saw dermatologist was diagnosed with fibromyalgia.  States she has had chronic pain for several months to year.  She has noticed her legs swell and get very tender at times.  Currently taking spironolactone and occasional Lasix to help for swelling.  She has cut back on salt and sodium in diet.  She is drinking more water.  Denied any recent injury or trauma to the legs.  Denied any chest pain, shortness of air, dizziness, vision changes or abdominal pain.  She has tried phentermine in past but stopped due to causing some increased heart rate.  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  She would like to try this again for weight loss.  She is due to see the rheumatologist again in a few weeks.  She had additional testing done at her last office visit.  Bowel movements have been normal for her.  Denied any suicidal or homicidal ideation.    The following portions of the patient's history were reviewed and updated as appropriate: allergies, current medications, past family history, past medical history, past social history, past surgical history and problem list.    Review of Systems   Constitutional: Negative.    HENT: Negative.    Eyes: Negative.    Respiratory: Negative.  Negative for cough, shortness of breath and wheezing.    Cardiovascular: Positive for leg swelling. Negative for chest pain and palpitations.   Gastrointestinal: Negative.  Negative for constipation, diarrhea, nausea and vomiting.   Endocrine: Negative.    Genitourinary: Negative.    Musculoskeletal: Positive for arthralgias.   Skin: Negative.    Allergic/Immunologic: Negative.    Neurological: Negative.    Hematological: Negative.   "  Psychiatric/Behavioral: Negative.  Negative for suicidal ideas.   All other systems reviewed and are negative.        Vitals:    02/07/19 0757   BP: 100/60   BP Location: Left arm   Patient Position: Sitting   Cuff Size: Adult   Pulse: 88   Resp: 16   Temp: 98.5 °F (36.9 °C)   TempSrc: Oral   SpO2: 99%   Weight: 120 kg (265 lb)   Height: 162.6 cm (64\")     Wt Readings from Last 3 Encounters:   02/07/19 120 kg (265 lb)   01/07/19 118 kg (260 lb)   07/23/18 110 kg (243 lb)       BP Readings from Last 3 Encounters:   02/07/19 100/60   01/07/19 90/64   07/23/18 (!) 82/42     Social History     Socioeconomic History   • Marital status:      Spouse name: Not on file   • Number of children: Not on file   • Years of education: Not on file   • Highest education level: Not on file   Social Needs   • Financial resource strain: Not on file   • Food insecurity - worry: Not on file   • Food insecurity - inability: Not on file   • Transportation needs - medical: Not on file   • Transportation needs - non-medical: Not on file   Occupational History   • Occupation:      Employer: SELF-EMPLOYED     Comment: full time   Tobacco Use   • Smoking status: Former Smoker   • Smokeless tobacco: Never Used   Substance and Sexual Activity   • Alcohol use: Yes     Comment: occ'l   • Drug use: No   • Sexual activity: Defer     Partners: Male     Birth control/protection: Surgical     Comment: hysterectomy   Other Topics Concern   • Not on file   Social History Narrative   • Not on file       Allergies   Allergen Reactions   • Iodine Shortness Of Breath     Swelling     • Shellfish Allergy Shortness Of Breath     swelling       Body mass index is 45.49 kg/m².    Objective   Physical Exam   Constitutional: She is oriented to person, place, and time. Vital signs are normal. She appears well-developed and well-nourished.   Morbid obese female   Neck: Trachea normal and phonation normal. Neck supple. Carotid bruit is not present. " No edema present.   Cardiovascular: Normal rate, regular rhythm, S1 normal, S2 normal, normal heart sounds and normal pulses.   Pulmonary/Chest: Effort normal and breath sounds normal.   Abdominal: Soft. Normal appearance, normal aorta and bowel sounds are normal. There is no hepatomegaly. There is no tenderness.   Musculoskeletal:        Right lower leg: She exhibits tenderness. She exhibits no bony tenderness, no swelling, no edema, no deformity and no laceration.        Left lower leg: She exhibits tenderness. She exhibits no bony tenderness, no swelling, no edema, no deformity and no laceration.    Right calf measures 28.5 cm in diameter.  Negative Homans sign or varicose veins noted  Left calf measured 28.5 cm in diameter.  Negative Homans sign or varicose veins noted.   Neurological: She is alert and oriented to person, place, and time.   Skin: Skin is warm, dry and intact. Capillary refill takes less than 2 seconds.   Psychiatric: She has a normal mood and affect. Her speech is normal and behavior is normal. Judgment and thought content normal. Cognition and memory are normal.       Assessment/Plan   Angie was seen today for hypertension.    Diagnoses and all orders for this visit:    Benign essential hypertension    Morbid obesity with BMI of 40.0-44.9, adult (CMS/[facility])  -     phentermine 37.5 MG capsule; Take 1 capsule by mouth Every Morning. BMI 45.5    Fibromyalgia    1.  Stable and chronic hypertension: I have rechecked blood pressure at office visit today in got 100/60 in left arm.  He will continue her current medication at home as directed.  Follow-up in one month.  2.  Chronic morbid obesity: She has gained 5 pounds since January 7, 2019.  We have discussed restarting the phentermine medication.  She has signed the appropriate agreement and consent for the phentermine.  Dr. Ayden Saravia has approved a short-term phentermine medication.  Angie will return to office in one month for reevaluation of  blood pressure and weight.  She was encouraged to decrease her carbohydrates and fatty food in diet.  She'll also increase physical activity.  3.  New fibromyalgia: She will keep her follow-up appointments with rheumatology.  We have discussed may be starting Cymbalta/Lyrica/gabapentin in future if no improvement.  She wants to try to lose weight first.      As part of this patient's treatment plan I am prescribing controlled substances.  The patient has been made aware of appropriate use of such medications, including potential risk of somnolence. Limited ability to drive and/or work safely, and potential for dependence or overdose.  It has also been made clear that these medications are for use by this patient only, without concomitant use of alcohol or other substances unless prescribed.  THERESA report has been reviewed and scanned into the patient's chart.  Theresa number is [id number] dated February 5, 2019.    Kya Kendrick PA-C    [facility]  [address] [place] [id number]  Dept: [phone]  Dept Fax: [phone]  Loc: [phone]  Loc Fax: [phone]

## 2019-02-13 ENCOUNTER — HOSPITAL ENCOUNTER (OUTPATIENT)
Dept: ULTRASOUND IMAGING | Facility: HOSPITAL | Age: 44
Discharge: HOME | End: 2019-02-13
Admitting: PHYSICIAN ASSISTANT

## 2019-02-13 DIAGNOSIS — E04.9 THYROID ENLARGEMENT: ICD-10-CM

## 2019-02-13 PROCEDURE — 76536 US EXAM OF HEAD AND NECK: CPT

## 2019-02-14 DIAGNOSIS — E66.01 MORBID OBESITY WITH BMI OF 40.0-44.9, ADULT (HCC): ICD-10-CM

## 2019-02-14 RX ORDER — PHENTERMINE HYDROCHLORIDE 37.5 MG/1
37.5 CAPSULE ORAL EVERY MORNING
Qty: 30 CAPSULE | Refills: 0 | Status: SHIPPED | OUTPATIENT
Start: 2019-02-14 | End: 2019-03-14

## 2019-02-14 NOTE — TELEPHONE ENCOUNTER
Called and spoke with patient    She is aware her medication Phentermine has been sent to her pharmacy.

## 2019-02-14 NOTE — PROGRESS NOTES
Pt was informed and states at her last appt she did not get her  phentermine called into her pharmacy. Can you send this in today?

## 2019-03-14 ENCOUNTER — OFFICE VISIT (OUTPATIENT)
Dept: FAMILY MEDICINE CLINIC | Facility: CLINIC | Age: 44
End: 2019-03-14

## 2019-03-14 VITALS
HEART RATE: 100 BPM | SYSTOLIC BLOOD PRESSURE: 101 MMHG | WEIGHT: 257 LBS | DIASTOLIC BLOOD PRESSURE: 65 MMHG | HEIGHT: 64 IN | BODY MASS INDEX: 43.87 KG/M2 | RESPIRATION RATE: 16 BRPM | TEMPERATURE: 98.2 F | OXYGEN SATURATION: 98 %

## 2019-03-14 DIAGNOSIS — M79.7 FIBROMYALGIA: Primary | ICD-10-CM

## 2019-03-14 DIAGNOSIS — E66.01 MORBID OBESITY WITH BMI OF 40.0-44.9, ADULT (HCC): ICD-10-CM

## 2019-03-14 PROCEDURE — 99213 OFFICE O/P EST LOW 20 MIN: CPT | Performed by: PHYSICIAN ASSISTANT

## 2019-03-14 RX ORDER — PHENTERMINE HYDROCHLORIDE 37.5 MG/1
37.5 CAPSULE ORAL EVERY MORNING
Qty: 30 CAPSULE | Refills: 0
Start: 2019-03-14 | End: 2019-04-18

## 2019-03-14 NOTE — PROGRESS NOTES
"Subjective   Angie Patel is a 43 y.o. female presents for   Chief Complaint   Patient presents with   • Weight Check       History of Present Illness     Angie is a 43-year-old female who presents for weight loss and fibro-myalgia management.  She has lost 8 pounds since February 7, 2019.  She has been doing more exercising at home.  Diet has been healthy. She has cut back on crabs/pasta.  Sleep has been normal.  Denied any chest pain,shortness of air, wheezing or swelling of ankles.  Her Fibromyalgias has been flaring up due to weather changes.      The following portions of the patient's history were reviewed and updated as appropriate: allergies, current medications, past family history, past medical history, past social history, past surgical history and problem list.    Review of Systems   Constitutional: Negative.    HENT: Negative.    Eyes: Negative.    Respiratory: Negative.  Negative for cough, shortness of breath and wheezing.    Cardiovascular: Negative.  Negative for chest pain, palpitations and leg swelling.   Gastrointestinal: Negative.    Endocrine: Negative.    Genitourinary: Negative.    Musculoskeletal: Positive for arthralgias.   Skin: Negative.    Allergic/Immunologic: Negative.    Neurological: Negative.    Hematological: Negative.    Psychiatric/Behavioral: Negative.    All other systems reviewed and are negative.        Vitals:    03/14/19 0901   BP: 101/65   Pulse: 100   Resp: 16   Temp: 98.2 °F (36.8 °C)   SpO2: 98%   Weight: 117 kg (257 lb)   Height: 162.6 cm (64\")     Wt Readings from Last 3 Encounters:   03/14/19 117 kg (257 lb)   02/07/19 120 kg (265 lb)   01/07/19 118 kg (260 lb)     BP Readings from Last 3 Encounters:   03/14/19 101/65   02/07/19 100/60   01/07/19 90/64     Social History     Socioeconomic History   • Marital status:      Spouse name: Not on file   • Number of children: Not on file   • Years of education: Not on file   • Highest education level: Not on file "   Social Needs   • Financial resource strain: Not on file   • Food insecurity - worry: Not on file   • Food insecurity - inability: Not on file   • Transportation needs - medical: Not on file   • Transportation needs - non-medical: Not on file   Occupational History   • Occupation:      Employer: SELF-EMPLOYED     Comment: full time   Tobacco Use   • Smoking status: Former Smoker   • Smokeless tobacco: Never Used   Substance and Sexual Activity   • Alcohol use: Yes     Comment: occ'l   • Drug use: No   • Sexual activity: Defer     Partners: Male     Birth control/protection: Surgical     Comment: hysterectomy   Other Topics Concern   • Not on file   Social History Narrative   • Not on file       Allergies   Allergen Reactions   • Iodine Shortness Of Breath     Swelling     • Shellfish Allergy Shortness Of Breath     swelling       Body mass index is 44.11 kg/m².    Objective   Physical Exam   Constitutional: She is oriented to person, place, and time. Vital signs are normal. She appears well-developed and well-nourished.   Morbid obese female   Neck: Trachea normal and phonation normal. Neck supple. No edema present.   Cardiovascular: Normal rate, regular rhythm, S1 normal, S2 normal, normal heart sounds and normal pulses.   Pulmonary/Chest: Effort normal and breath sounds normal.   Abdominal: Soft. Normal appearance, normal aorta and bowel sounds are normal. There is no hepatomegaly. There is no tenderness.   Neurological: She is alert and oriented to person, place, and time.   Skin: Skin is warm, dry and intact. Capillary refill takes less than 2 seconds.   Psychiatric: She has a normal mood and affect. Her speech is normal and behavior is normal. Judgment and thought content normal. Cognition and memory are normal.       Assessment/Plan   Angie was seen today for weight check.    Diagnoses and all orders for this visit:    Fibromyalgia    Morbid obesity with BMI of 40.0-44.9, adult (CMS/[facility])  -      phentermine 37.5 MG capsule; Take 1 capsule by mouth Every Morning. BMI 44.1    1.  Improving morbid obesity: Doing well with the phentermine medication.  She has lost 8 pounds since starting the medication last month.  She will continue making the dietary changes and increase her physical activity.  Dr. Ayden Saravia has approved a refill for her short-term phentermine medication.  See below for Theresa information.  She will return to office in 1 month for weight management.  2.  Chronic fibromyalgia: She has seen a slight improvement with her weight loss with her joint pain.  She may use over-the-counter Turmeric or glucosamine daily to see if this will help with inflammation.  Will reevaluate at next office visit.    As part of this patient's treatment plan I am prescribing controlled substances.  The patient has been made aware of appropriate use of such medications, including potential risk of somnolence. Limited ability to drive and/or work safely, and potential for dependence or overdose.  It has also been made clear that these medications are for use by this patient only, without concomitant use of alcohol or other substances unless prescribed.  THERESA report has been reviewed and scanned into the patient's chart.  Theresa number is [id number] dated February 5, 2019.    KARL Solorio [facility]  [address] [place] [id number]  Dept: [phone]  Dept Fax: [phone]  Loc: [phone]  Loc Fax: [phone]

## 2019-03-26 DIAGNOSIS — R60.9 PITTING EDEMA: ICD-10-CM

## 2019-03-26 RX ORDER — FUROSEMIDE 20 MG/1
TABLET ORAL
Qty: 30 TABLET | Refills: 2 | Status: SHIPPED | OUTPATIENT
Start: 2019-03-26 | End: 2019-06-06 | Stop reason: SDUPTHER

## 2019-04-18 ENCOUNTER — OFFICE VISIT (OUTPATIENT)
Dept: FAMILY MEDICINE CLINIC | Facility: CLINIC | Age: 44
End: 2019-04-18

## 2019-04-18 VITALS
DIASTOLIC BLOOD PRESSURE: 90 MMHG | HEIGHT: 64 IN | OXYGEN SATURATION: 99 % | RESPIRATION RATE: 16 BRPM | WEIGHT: 256 LBS | SYSTOLIC BLOOD PRESSURE: 120 MMHG | HEART RATE: 109 BPM | TEMPERATURE: 98.1 F | BODY MASS INDEX: 43.71 KG/M2

## 2019-04-18 DIAGNOSIS — E66.01 MORBID OBESITY WITH BMI OF 40.0-44.9, ADULT (HCC): Primary | ICD-10-CM

## 2019-04-18 PROCEDURE — 99213 OFFICE O/P EST LOW 20 MIN: CPT | Performed by: PHYSICIAN ASSISTANT

## 2019-04-18 NOTE — PROGRESS NOTES
"Subjective   Angie Patel is a 43 y.o. female presents for   Chief Complaint   Patient presents with   • Weight Check     phentermine refill       History of Present Illness     Angie is a 43-year-old female who presents for weight loss management.  She has lost 1 pound since March 14, 2019.  She has taken 2 months of phentermine medication with a total weight loss of 9 pounds. Diet has been healthy.  Sleep hs been normal.  Angie has been working outside and not going to the gym .  Denied any chest pain,shortness of air swelling of ankles or headaches.    The following portions of the patient's history were reviewed and updated as appropriate: allergies, current medications, past family history, past medical history, past social history, past surgical history and problem list.    Review of Systems   Constitutional: Negative.    HENT: Negative.    Eyes: Negative.    Respiratory: Negative.  Negative for cough, shortness of breath and wheezing.    Cardiovascular: Negative.  Negative for chest pain, palpitations and leg swelling.   Gastrointestinal: Negative.    Endocrine: Negative.    Genitourinary: Negative.    Musculoskeletal: Negative.    Skin: Negative.    Allergic/Immunologic: Negative.    Neurological: Negative.  Negative for dizziness, light-headedness and headaches.   Hematological: Negative.    Psychiatric/Behavioral: Negative.    All other systems reviewed and are negative.        Vitals:    04/18/19 1426   BP: 120/90   Pulse: 109   Resp: 16   Temp: 98.1 °F (36.7 °C)   SpO2: 99%   Weight: 116 kg (256 lb)   Height: 162.6 cm (64\")     Wt Readings from Last 3 Encounters:   04/18/19 116 kg (256 lb)   03/14/19 117 kg (257 lb)   02/07/19 120 kg (265 lb)     BP Readings from Last 3 Encounters:   04/18/19 120/90   03/14/19 101/65   02/07/19 100/60     Social History     Socioeconomic History   • Marital status:      Spouse name: Not on file   • Number of children: Not on file   • Years of education: Not on " file   • Highest education level: Not on file   Occupational History   • Occupation:      Employer: SELF-EMPLOYED     Comment: full time   Tobacco Use   • Smoking status: Former Smoker   • Smokeless tobacco: Never Used   Substance and Sexual Activity   • Alcohol use: Yes     Comment: occ'l   • Drug use: No   • Sexual activity: Defer     Partners: Male     Birth control/protection: Surgical     Comment: hysterectomy       Allergies   Allergen Reactions   • Iodine Shortness Of Breath     Swelling     • Shellfish Allergy Shortness Of Breath     swelling       Body mass index is 43.94 kg/m².    Objective   Physical Exam   Constitutional: She is oriented to person, place, and time. Vital signs are normal. She appears well-developed and well-nourished.   Morbid Obese female   Neck: Trachea normal and phonation normal. No edema present.   Cardiovascular: Normal rate, regular rhythm, S1 normal, S2 normal, normal heart sounds and normal pulses.   No murmur heard.  Pulmonary/Chest: Effort normal and breath sounds normal.   Abdominal: Soft. Normal appearance, normal aorta and bowel sounds are normal. There is no hepatomegaly. There is no tenderness.   Neurological: She is alert and oriented to person, place, and time.   Skin: Skin is warm, dry and intact. Capillary refill takes less than 2 seconds.   Psychiatric: She has a normal mood and affect. Her speech is normal and behavior is normal. Judgment and thought content normal. Cognition and memory are normal.       Assessment/Plan   Angie was seen today for weight check.    Diagnoses and all orders for this visit:    Morbid obesity with BMI of 40.0-44.9, adult (CMS/HCC)  -     phentermine 37.5 MG capsule; Take 1 capsule by mouth Every Morning. BMI 43.9    Mrs. Angie Patel was seen in office today for chronic and slight improving morbid obesity: She has lost 1 pound on phentermine in the past month.  Have asked her to increase her physical activity.  Dr. Cardoza  Garima has approved a refill on phentermine.  I have encouraged her to continue to eat healthy.  See below for Theresa information.  She will return to office in 1 month for weight reevaluation.      As part of this patient's treatment plan I am prescribing controlled substances.  The patient has been made aware of appropriate use of such medications, including potential risk of somnolence. Limited ability to drive and/or work safely, and potential for dependence or overdose.  It has also been made clear that these medications are for use by this patient only, without concomitant use of alcohol or other substances unless prescribed.  THERESA report has been reviewed and scanned into the patient's chart.  Theresa number [id number] dated February 5, 2019.      KARL Solorio [facility]  [address] [place] [id number]  Dept: [phone]  Dept Fax: [phone]  Loc: [phone]  Loc Fax: [phone]

## 2019-04-19 RX ORDER — ALBUTEROL SULFATE 90 UG/1
AEROSOL, METERED RESPIRATORY (INHALATION)
Qty: 18 G | Refills: 0 | Status: SHIPPED | OUTPATIENT
Start: 2019-04-19 | End: 2019-05-09 | Stop reason: SDUPTHER

## 2019-04-19 RX ORDER — PHENTERMINE HYDROCHLORIDE 37.5 MG/1
37.5 CAPSULE ORAL EVERY MORNING
Qty: 30 CAPSULE | Refills: 0
Start: 2019-04-19 | End: 2019-05-16

## 2019-05-09 RX ORDER — ALBUTEROL SULFATE 90 UG/1
AEROSOL, METERED RESPIRATORY (INHALATION)
Qty: 18 G | Refills: 0 | Status: SHIPPED | OUTPATIENT
Start: 2019-05-09 | End: 2019-06-03 | Stop reason: SDUPTHER

## 2019-05-16 ENCOUNTER — OFFICE VISIT (OUTPATIENT)
Dept: FAMILY MEDICINE CLINIC | Facility: CLINIC | Age: 44
End: 2019-05-16

## 2019-05-16 VITALS
HEIGHT: 64 IN | SYSTOLIC BLOOD PRESSURE: 104 MMHG | RESPIRATION RATE: 17 BRPM | BODY MASS INDEX: 43.02 KG/M2 | WEIGHT: 252 LBS | DIASTOLIC BLOOD PRESSURE: 62 MMHG | OXYGEN SATURATION: 98 % | TEMPERATURE: 98.5 F | HEART RATE: 117 BPM

## 2019-05-16 DIAGNOSIS — Z12.31 SCREENING MAMMOGRAM, ENCOUNTER FOR: ICD-10-CM

## 2019-05-16 DIAGNOSIS — E66.01 MORBID OBESITY WITH BODY MASS INDEX (BMI) OF 40.0 TO 44.9 IN ADULT (HCC): Primary | ICD-10-CM

## 2019-05-16 PROCEDURE — 99213 OFFICE O/P EST LOW 20 MIN: CPT | Performed by: PHYSICIAN ASSISTANT

## 2019-05-16 NOTE — PROGRESS NOTES
"Subjective   Angie Patel is a 43 y.o. female presents for   Chief Complaint   Patient presents with   • Weight Check     management       History of Present Illness     Angie is a 43-year-old female who presents for weight loss management.  She has lost 4 pounds since April 18, 2019.  She has lost a total of 10 pounds since February 7, 2019. She has been taking the Phentermine daily.  Diet has been improving. Sleep hs been normal. She active working outside.   States that she is feeling better.  Denied any chest pain,shortness of air,wheezing or swelling of ankles.     The following portions of the patient's history were reviewed and updated as appropriate: allergies, current medications, past family history, past medical history, past social history, past surgical history and problem list.    Review of Systems   Constitutional: Negative.    HENT: Negative.    Eyes: Negative.    Respiratory: Negative.  Negative for cough, shortness of breath and wheezing.    Cardiovascular: Negative.  Negative for chest pain, palpitations and leg swelling.   Gastrointestinal: Negative.    Endocrine: Negative.    Genitourinary: Negative.    Musculoskeletal: Negative.    Skin: Negative.    Allergic/Immunologic: Negative.    Neurological: Negative.  Negative for dizziness, light-headedness and headaches.   Hematological: Negative.    Psychiatric/Behavioral: Negative.    All other systems reviewed and are negative.        Vitals:    05/16/19 1032   BP: 104/62   Pulse: 117   Resp: 17   Temp: 98.5 °F (36.9 °C)   SpO2: 98%   Weight: 114 kg (252 lb)   Height: 162.6 cm (64\")     Wt Readings from Last 3 Encounters:   05/16/19 114 kg (252 lb)   04/18/19 116 kg (256 lb)   03/14/19 117 kg (257 lb)     BP Readings from Last 3 Encounters:   05/16/19 104/62   04/18/19 120/90   03/14/19 101/65     Social History     Socioeconomic History   • Marital status:      Spouse name: Not on file   • Number of children: Not on file   • Years of " education: Not on file   • Highest education level: Not on file   Occupational History   • Occupation:      Employer: SELF-EMPLOYED     Comment: full time   Tobacco Use   • Smoking status: Former Smoker   • Smokeless tobacco: Never Used   Substance and Sexual Activity   • Alcohol use: Yes     Comment: occ'l   • Drug use: No   • Sexual activity: Defer     Partners: Male     Birth control/protection: Surgical     Comment: hysterectomy       Allergies   Allergen Reactions   • Iodine Shortness Of Breath     Swelling     • Shellfish Allergy Shortness Of Breath     swelling       Body mass index is 43.26 kg/m².    Objective   Physical Exam   Constitutional: She is oriented to person, place, and time. Vital signs are normal. She appears well-developed and well-nourished.   Morbid obese female   Neck: Trachea normal and phonation normal. Neck supple. No edema present. No thyroid mass and no thyromegaly present.   Cardiovascular: Normal rate, regular rhythm, S1 normal, S2 normal, normal heart sounds and normal pulses.   Pulmonary/Chest: Effort normal and breath sounds normal.   Abdominal: Soft. Normal appearance, normal aorta and bowel sounds are normal. There is no hepatomegaly. There is no tenderness.   Neurological: She is alert and oriented to person, place, and time.   Skin: Skin is warm, dry and intact. Capillary refill takes less than 2 seconds.   Psychiatric: She has a normal mood and affect. Her speech is normal and behavior is normal. Judgment and thought content normal. Cognition and memory are normal.       Assessment/Plan   Angie was seen today for weight check.    Diagnoses and all orders for this visit:    Morbid obesity with body mass index (BMI) of 40.0 to 44.9 in adult (CMS/[facility])  -     Mammo Screening Bilateral With CAD; Future    Screening mammogram, encounter for  -     Mammo Screening Bilateral With CAD; Future      1.  Chronic with slight improvement of morbid obesity: She has been on  phentermine medication for the past 3 months.  She has lost a total of 13 pounds on the medication.  I have asked her to try weight watchers to see if this will help with her weight loss as well.  She will eat healthy and increase her physical activity.  She will return to office in follow-up 2019 for annual physical examination.  2.  Need for screening mammogram: She is due for her mammogram.  I have placed orders for the [place] facility.  She will be notified of test results when completed.    [facility]  [address] [place] [id number]  Dept: [phone]  Dept Fax: [phone]  Loc: [phone]  Loc Fax: [phone]

## 2019-06-03 RX ORDER — ALBUTEROL SULFATE 90 UG/1
AEROSOL, METERED RESPIRATORY (INHALATION)
Qty: 18 G | Refills: 0 | Status: SHIPPED | OUTPATIENT
Start: 2019-06-03 | End: 2019-06-23 | Stop reason: SDUPTHER

## 2019-06-06 DIAGNOSIS — R60.9 PITTING EDEMA: ICD-10-CM

## 2019-06-06 RX ORDER — FUROSEMIDE 20 MG/1
TABLET ORAL
Qty: 30 TABLET | Refills: 0 | Status: SHIPPED | OUTPATIENT
Start: 2019-06-06 | End: 2019-06-29 | Stop reason: SDUPTHER

## 2019-06-23 RX ORDER — ALBUTEROL SULFATE 90 UG/1
AEROSOL, METERED RESPIRATORY (INHALATION)
Qty: 18 G | Refills: 0 | Status: SHIPPED | OUTPATIENT
Start: 2019-06-23 | End: 2019-07-13 | Stop reason: SDUPTHER

## 2019-06-29 DIAGNOSIS — R60.9 PITTING EDEMA: ICD-10-CM

## 2019-06-30 RX ORDER — FUROSEMIDE 20 MG/1
TABLET ORAL
Qty: 30 TABLET | Refills: 6 | Status: SHIPPED | OUTPATIENT
Start: 2019-06-30 | End: 2019-12-11 | Stop reason: SDUPTHER

## 2019-07-14 RX ORDER — ALBUTEROL SULFATE 90 UG/1
AEROSOL, METERED RESPIRATORY (INHALATION)
Qty: 18 G | Refills: 6 | Status: SHIPPED | OUTPATIENT
Start: 2019-07-14 | End: 2019-12-02 | Stop reason: SDUPTHER

## 2019-07-22 RX ORDER — SPIRONOLACTONE 100 MG/1
100 TABLET, FILM COATED ORAL DAILY
Qty: 90 TABLET | Refills: 0 | Status: SHIPPED | OUTPATIENT
Start: 2019-07-22 | End: 2019-10-13 | Stop reason: SDUPTHER

## 2019-08-22 DIAGNOSIS — Z00.00 PHYSICAL EXAM, ANNUAL: ICD-10-CM

## 2019-08-22 DIAGNOSIS — E78.00 HYPERCHOLESTEROLEMIA: ICD-10-CM

## 2019-08-22 DIAGNOSIS — I10 BENIGN ESSENTIAL HYPERTENSION: Primary | ICD-10-CM

## 2019-08-22 DIAGNOSIS — R73.9 HYPERGLYCEMIA: ICD-10-CM

## 2019-08-22 DIAGNOSIS — E04.9 THYROID ENLARGEMENT: ICD-10-CM

## 2019-08-22 DIAGNOSIS — E55.9 VITAMIN D DEFICIENCY: ICD-10-CM

## 2019-09-30 DIAGNOSIS — R73.9 HYPERGLYCEMIA: Primary | ICD-10-CM

## 2019-09-30 RX ORDER — METFORMIN HYDROCHLORIDE 750 MG/1
1500 TABLET, EXTENDED RELEASE ORAL
Qty: 60 TABLET | Refills: 6 | Status: SHIPPED | OUTPATIENT
Start: 2019-09-30 | End: 2020-04-15

## 2019-10-03 ENCOUNTER — OFFICE VISIT (OUTPATIENT)
Dept: FAMILY MEDICINE CLINIC | Facility: CLINIC | Age: 44
End: 2019-10-03

## 2019-10-03 VITALS
OXYGEN SATURATION: 98 % | BODY MASS INDEX: 44.73 KG/M2 | HEART RATE: 96 BPM | SYSTOLIC BLOOD PRESSURE: 128 MMHG | WEIGHT: 262 LBS | RESPIRATION RATE: 16 BRPM | DIASTOLIC BLOOD PRESSURE: 84 MMHG | TEMPERATURE: 98 F | HEIGHT: 64 IN

## 2019-10-03 DIAGNOSIS — E28.2 PCOS (POLYCYSTIC OVARIAN SYNDROME): Primary | ICD-10-CM

## 2019-10-03 DIAGNOSIS — M79.669 PAIN AND SWELLING OF LOWER LEG, UNSPECIFIED LATERALITY: ICD-10-CM

## 2019-10-03 DIAGNOSIS — Z23 NEED FOR TETANUS, DIPHTHERIA, AND ACELLULAR PERTUSSIS (TDAP) VACCINE IN PATIENT OF ADOLESCENT AGE OR OLDER: ICD-10-CM

## 2019-10-03 DIAGNOSIS — M79.89 PAIN AND SWELLING OF LOWER LEG, UNSPECIFIED LATERALITY: ICD-10-CM

## 2019-10-03 PROCEDURE — 99213 OFFICE O/P EST LOW 20 MIN: CPT | Performed by: PHYSICIAN ASSISTANT

## 2019-10-03 NOTE — PROGRESS NOTES
Subjective   Angie Patel is a 44 y.o. female presents for   Chief Complaint   Patient presents with   • PCOS, Not Currently Desiring Pregnancy     management   • Edema       History of Present Illness     Angie is a 44-year-old female who presents for PCOS management.  Currently taking Metformin 750 two tablets at dinner.  She was out of her medication for a few days.  She has noticed that the more she exercises or walks, her legs get swollen.  Angie has decreased her salt/sodium.  She has gained 10 pounds since May 209.   She takes the Lasix as needed for swelling.  She takes the Aldactone daily.  She has had CHF in the past when she was pregnant with her son.  Angie went to [place] last month and was gone for about 2 weeks.  States she did wear her compression hose when she was traveling on the airplane and while walking around while on vacation.  States while on airplane she did get up from time to time to walk around the airplane.  Denied any chest pain, shortness of air, dizziness, vision changes or injury to legs.  She refused updated flu shot at office visit today.    The following portions of the patient's history were reviewed and updated as appropriate: allergies, current medications, past family history, past medical history, past social history, past surgical history and problem list.    Review of Systems   Constitutional: Negative.  Negative for chills, fatigue and fever.   HENT: Negative.    Eyes: Negative.    Respiratory: Negative.  Negative for cough, chest tightness, shortness of breath and wheezing.    Cardiovascular: Positive for leg swelling. Negative for chest pain and palpitations.   Gastrointestinal: Negative.  Negative for abdominal pain, diarrhea, nausea and vomiting.   Endocrine: Negative.    Genitourinary: Negative.    Musculoskeletal: Negative.    Skin: Negative.    Allergic/Immunologic: Negative.    Neurological: Negative.  Negative for dizziness, light-headedness and headaches.  "  Hematological: Negative.    Psychiatric/Behavioral: Negative.  Negative for sleep disturbance.   All other systems reviewed and are negative.        Vitals:    10/03/19 1318   BP: 128/84   Pulse: 96   Resp: 16   Temp: 98 °F (36.7 °C)   SpO2: 98%   Weight: 119 kg (262 lb)   Height: 162.6 cm (64\")     Wt Readings from Last 3 Encounters:   10/03/19 119 kg (262 lb)   09/07/19 118 kg (260 lb)   05/16/19 114 kg (252 lb)     BP Readings from Last 3 Encounters:   10/03/19 128/84   09/07/19 140/82   05/16/19 104/62     Social History     Socioeconomic History   • Marital status:      Spouse name: Not on file   • Number of children: Not on file   • Years of education: Not on file   • Highest education level: Not on file   Occupational History   • Occupation:      Employer: SELF-EMPLOYED     Comment: full time   Tobacco Use   • Smoking status: Former Smoker   • Smokeless tobacco: Never Used   Substance and Sexual Activity   • Alcohol use: Yes     Comment: occ'l   • Drug use: No   • Sexual activity: Defer     Partners: Male     Birth control/protection: Surgical     Comment: hysterectomy       Allergies   Allergen Reactions   • Iodine Shortness Of Breath     Swelling     • Shellfish Allergy Shortness Of Breath     swelling       Body mass index is 44.97 kg/m².    Objective   Physical Exam   Constitutional: She is oriented to person, place, and time. Vital signs are normal. She appears well-developed and well-nourished.   Morbid obese female   Neck: Trachea normal and phonation normal. Neck supple. Normal carotid pulses, no hepatojugular reflux and no JVD present. No tracheal tenderness present. Carotid bruit is not present. Edema (slight non pitting edema noted of B/L lower legs) present. No tracheal deviation present. No thyroid mass and no thyromegaly present.   Cardiovascular: Normal rate, regular rhythm, S1 normal, S2 normal, normal heart sounds and normal pulses.   No murmur heard.  Pulmonary/Chest: " Effort normal and breath sounds normal.   Abdominal: Soft. Normal appearance, normal aorta and bowel sounds are normal. There is no hepatomegaly. There is no tenderness.   Musculoskeletal:        Right lower leg: She exhibits tenderness and swelling (slight non pitting edema). She exhibits no bony tenderness.        Left lower leg: She exhibits tenderness. She exhibits no bony tenderness and no edema. Swelling: slight non pitting swelling noted.   Negative B/L Homans sign or palpable   Neurological: She is alert and oriented to person, place, and time.   Skin: Skin is warm, dry and intact. Capillary refill takes less than 2 seconds.   Psychiatric: She has a normal mood and affect. Her speech is normal and behavior is normal. Judgment and thought content normal. Cognition and memory are normal.       Assessment/Plan   Angie was seen today for pcos, not currently desiring pregnancy and edema.    Diagnoses and all orders for this visit:    PCOS (polycystic ovarian syndrome)    Pain and swelling of lower leg, unspecified laterality  -     US venous doppler lower extremity bilateral (duplex); Future  -     proBNP; Future    Need for tetanus, diphtheria, and acellular pertussis (Tdap) vaccine in patient of adolescent age or older  -     Tdap Vaccine Greater Than or Equal To 8yo IM    1.  Chronic PCOS: She will return to office next week for fasting blood work.  She will continue her metformin for now.  Angie will be notified of test results and any medication changes.  2.  New pain and swelling of bilateral lower legs: I have placed orders for an ultrasound venous Doppler of bilateral lower extremities for further evaluation of her symptoms.  We will check a pro BNP with her fasting blood work next week.  She will continue the Lasix and Aldactone medications at home for now.  She will be notified of test results when completed.  Will consider referral to cardiologist and/or echocardiogram in future if warranted.  She  voiced understanding.  3.  Need for updated Tdap vaccination: Unable to give immunization at office visit today due to not being available.  She will get an updated Tdap with fasting blood work next week.    [facility]  [address] [place] [id number]  Dept: [phone]  Dept Fax: [phone]  Loc: [phone]  Loc Fax: [phone]

## 2019-10-04 ENCOUNTER — RESULTS ENCOUNTER (OUTPATIENT)
Dept: FAMILY MEDICINE CLINIC | Facility: CLINIC | Age: 44
End: 2019-10-04

## 2019-10-04 DIAGNOSIS — M79.669 PAIN AND SWELLING OF LOWER LEG, UNSPECIFIED LATERALITY: ICD-10-CM

## 2019-10-04 DIAGNOSIS — M79.89 PAIN AND SWELLING OF LOWER LEG, UNSPECIFIED LATERALITY: ICD-10-CM

## 2019-10-08 ENCOUNTER — CLINICAL SUPPORT (OUTPATIENT)
Dept: FAMILY MEDICINE CLINIC | Facility: CLINIC | Age: 44
End: 2019-10-08

## 2019-10-08 ENCOUNTER — HOSPITAL ENCOUNTER (OUTPATIENT)
Dept: ULTRASOUND IMAGING | Facility: HOSPITAL | Age: 44
Discharge: HOME | End: 2019-10-08
Admitting: PHYSICIAN ASSISTANT

## 2019-10-08 DIAGNOSIS — Z23 NEED FOR TDAP VACCINATION: Primary | ICD-10-CM

## 2019-10-08 DIAGNOSIS — M79.89 PAIN AND SWELLING OF LOWER LEG, UNSPECIFIED LATERALITY: ICD-10-CM

## 2019-10-08 DIAGNOSIS — M79.669 PAIN AND SWELLING OF LOWER LEG, UNSPECIFIED LATERALITY: ICD-10-CM

## 2019-10-08 PROCEDURE — 90715 TDAP VACCINE 7 YRS/> IM: CPT | Performed by: PHYSICIAN ASSISTANT

## 2019-10-08 PROCEDURE — 93970 EXTREMITY STUDY: CPT

## 2019-10-08 PROCEDURE — 90471 IMMUNIZATION ADMIN: CPT | Performed by: PHYSICIAN ASSISTANT

## 2019-10-09 LAB
25(OH)D3+25(OH)D2 SERPL-MCNC: 30 NG/ML (ref 30–100)
ALBUMIN SERPL-MCNC: 4.4 G/DL (ref 3.5–5.2)
ALBUMIN/GLOB SERPL: 1.4 G/DL
ALP SERPL-CCNC: 51 U/L (ref 39–117)
ALT SERPL-CCNC: 27 U/L (ref 1–33)
AST SERPL-CCNC: 19 U/L (ref 1–32)
BASOPHILS # BLD AUTO: 0.05 10*3/MM3 (ref 0–0.2)
BASOPHILS NFR BLD AUTO: 0.6 % (ref 0–1.5)
BILIRUB SERPL-MCNC: <0.2 MG/DL (ref 0.2–1.2)
BUN SERPL-MCNC: 8 MG/DL (ref 6–20)
BUN/CREAT SERPL: 10.7 (ref 7–25)
CALCIUM SERPL-MCNC: 9.4 MG/DL (ref 8.6–10.5)
CHLORIDE SERPL-SCNC: 100 MMOL/L (ref 98–107)
CHOLEST SERPL-MCNC: 156 MG/DL (ref 0–200)
CHOLEST/HDLC SERPL: 3.71 {RATIO}
CO2 SERPL-SCNC: 28.2 MMOL/L (ref 22–29)
CREAT SERPL-MCNC: 0.75 MG/DL (ref 0.57–1)
EOSINOPHIL # BLD AUTO: 0.11 10*3/MM3 (ref 0–0.4)
EOSINOPHIL NFR BLD AUTO: 1.2 % (ref 0.3–6.2)
ERYTHROCYTE [DISTWIDTH] IN BLOOD BY AUTOMATED COUNT: 12.4 % (ref 12.3–15.4)
GLOBULIN SER CALC-MCNC: 3.2 GM/DL
GLUCOSE SERPL-MCNC: 94 MG/DL (ref 65–99)
HBA1C MFR BLD: 6 % (ref 4.8–5.6)
HCT VFR BLD AUTO: 41.9 % (ref 34–46.6)
HDLC SERPL-MCNC: 42 MG/DL (ref 40–60)
HGB BLD-MCNC: 14 G/DL (ref 12–15.9)
IMM GRANULOCYTES # BLD AUTO: 0.05 10*3/MM3 (ref 0–0.05)
IMM GRANULOCYTES NFR BLD AUTO: 0.6 % (ref 0–0.5)
LDLC SERPL CALC-MCNC: 92 MG/DL (ref 0–100)
LYMPHOCYTES # BLD AUTO: 2.25 10*3/MM3 (ref 0.7–3.1)
LYMPHOCYTES NFR BLD AUTO: 25 % (ref 19.6–45.3)
MCH RBC QN AUTO: 29.8 PG (ref 26.6–33)
MCHC RBC AUTO-ENTMCNC: 33.4 G/DL (ref 31.5–35.7)
MCV RBC AUTO: 89.1 FL (ref 79–97)
MONOCYTES # BLD AUTO: 0.49 10*3/MM3 (ref 0.1–0.9)
MONOCYTES NFR BLD AUTO: 5.4 % (ref 5–12)
NEUTROPHILS # BLD AUTO: 6.05 10*3/MM3 (ref 1.7–7)
NEUTROPHILS NFR BLD AUTO: 67.2 % (ref 42.7–76)
NRBC BLD AUTO-RTO: 0 /100 WBC (ref 0–0.2)
NT-PROBNP SERPL-MCNC: <5 PG/ML (ref 0–130)
PLATELET # BLD AUTO: 300 10*3/MM3 (ref 140–450)
POTASSIUM SERPL-SCNC: 4.6 MMOL/L (ref 3.5–5.2)
PROT SERPL-MCNC: 7.6 G/DL (ref 6–8.5)
RBC # BLD AUTO: 4.7 10*6/MM3 (ref 3.77–5.28)
SODIUM SERPL-SCNC: 140 MMOL/L (ref 136–145)
TRIGL SERPL-MCNC: 109 MG/DL (ref 0–150)
TSH SERPL DL<=0.005 MIU/L-ACNC: 2.85 UIU/ML (ref 0.27–4.2)
VLDLC SERPL CALC-MCNC: 21.8 MG/DL
WBC # BLD AUTO: 9 10*3/MM3 (ref 3.4–10.8)

## 2019-10-14 RX ORDER — SPIRONOLACTONE 100 MG/1
100 TABLET, FILM COATED ORAL DAILY
Qty: 90 TABLET | Refills: 2 | Status: SHIPPED | OUTPATIENT
Start: 2019-10-14 | End: 2020-07-15

## 2019-10-15 ENCOUNTER — APPOINTMENT (OUTPATIENT)
Dept: MRI IMAGING | Facility: HOSPITAL | Age: 44
End: 2019-10-15

## 2019-10-15 ENCOUNTER — HOSPITAL ENCOUNTER (EMERGENCY)
Facility: HOSPITAL | Age: 44
Discharge: HOME | End: 2019-10-15
Attending: EMERGENCY MEDICINE | Admitting: EMERGENCY MEDICINE

## 2019-10-15 ENCOUNTER — APPOINTMENT (OUTPATIENT)
Dept: CT IMAGING | Facility: HOSPITAL | Age: 44
End: 2019-10-15

## 2019-10-15 VITALS
TEMPERATURE: 97.9 F | WEIGHT: 260 LBS | HEART RATE: 88 BPM | RESPIRATION RATE: 16 BRPM | DIASTOLIC BLOOD PRESSURE: 90 MMHG | OXYGEN SATURATION: 100 % | BODY MASS INDEX: 44.39 KG/M2 | SYSTOLIC BLOOD PRESSURE: 146 MMHG | HEIGHT: 64 IN

## 2019-10-15 DIAGNOSIS — R41.82 ALTERED MENTAL STATUS, UNSPECIFIED ALTERED MENTAL STATUS TYPE: Primary | ICD-10-CM

## 2019-10-15 LAB
ALBUMIN SERPL-MCNC: 4.2 G/DL (ref 3.5–5.2)
ALBUMIN/GLOB SERPL: 1.3 G/DL
ALP SERPL-CCNC: 45 U/L (ref 39–117)
ALT SERPL W P-5'-P-CCNC: 22 U/L (ref 1–33)
ANION GAP SERPL CALCULATED.3IONS-SCNC: 12.1 MMOL/L (ref 5–15)
AST SERPL-CCNC: 15 U/L (ref 1–32)
BASOPHILS # BLD AUTO: 0.04 10*3/MM3 (ref 0–0.2)
BASOPHILS NFR BLD AUTO: 0.5 % (ref 0–1.5)
BILIRUB SERPL-MCNC: 0.2 MG/DL (ref 0.2–1.2)
BILIRUB UR QL STRIP: NEGATIVE
BUN BLD-MCNC: 12 MG/DL (ref 6–20)
BUN/CREAT SERPL: 17.6 (ref 7–25)
CALCIUM SPEC-SCNC: 9.2 MG/DL (ref 8.6–10.5)
CHLORIDE SERPL-SCNC: 99 MMOL/L (ref 98–107)
CLARITY UR: ABNORMAL
CO2 SERPL-SCNC: 23.9 MMOL/L (ref 22–29)
COLOR UR: YELLOW
CREAT BLD-MCNC: 0.68 MG/DL (ref 0.57–1)
DEPRECATED RDW RBC AUTO: 42.1 FL (ref 37–54)
EOSINOPHIL # BLD AUTO: 0.15 10*3/MM3 (ref 0–0.4)
EOSINOPHIL NFR BLD AUTO: 1.7 % (ref 0.3–6.2)
ERYTHROCYTE [DISTWIDTH] IN BLOOD BY AUTOMATED COUNT: 12.6 % (ref 12.3–15.4)
GFR SERPL CREATININE-BSD FRML MDRD: 94 ML/MIN/1.73
GLOBULIN UR ELPH-MCNC: 3.2 GM/DL
GLUCOSE BLD-MCNC: 126 MG/DL (ref 65–99)
GLUCOSE UR STRIP-MCNC: NEGATIVE MG/DL
HCT VFR BLD AUTO: 39.3 % (ref 34–46.6)
HGB BLD-MCNC: 13 G/DL (ref 12–15.9)
HGB UR QL STRIP.AUTO: NEGATIVE
IMM GRANULOCYTES # BLD AUTO: 0.06 10*3/MM3 (ref 0–0.05)
IMM GRANULOCYTES NFR BLD AUTO: 0.7 % (ref 0–0.5)
KETONES UR QL STRIP: ABNORMAL
LEUKOCYTE ESTERASE UR QL STRIP.AUTO: NEGATIVE
LYMPHOCYTES # BLD AUTO: 2.35 10*3/MM3 (ref 0.7–3.1)
LYMPHOCYTES NFR BLD AUTO: 26.6 % (ref 19.6–45.3)
MCH RBC QN AUTO: 30 PG (ref 26.6–33)
MCHC RBC AUTO-ENTMCNC: 33.1 G/DL (ref 31.5–35.7)
MCV RBC AUTO: 90.6 FL (ref 79–97)
MONOCYTES # BLD AUTO: 0.79 10*3/MM3 (ref 0.1–0.9)
MONOCYTES NFR BLD AUTO: 8.9 % (ref 5–12)
NEUTROPHILS # BLD AUTO: 5.45 10*3/MM3 (ref 1.7–7)
NEUTROPHILS NFR BLD AUTO: 61.6 % (ref 42.7–76)
NITRITE UR QL STRIP: NEGATIVE
NRBC BLD AUTO-RTO: 0 /100 WBC (ref 0–0.2)
PH UR STRIP.AUTO: 6 [PH] (ref 5–8)
PLATELET # BLD AUTO: 277 10*3/MM3 (ref 140–450)
PMV BLD AUTO: 11.7 FL (ref 6–12)
POTASSIUM BLD-SCNC: 4.3 MMOL/L (ref 3.5–5.2)
PROT SERPL-MCNC: 7.4 G/DL (ref 6–8.5)
PROT UR QL STRIP: ABNORMAL
RBC # BLD AUTO: 4.34 10*6/MM3 (ref 3.77–5.28)
SODIUM BLD-SCNC: 135 MMOL/L (ref 136–145)
SP GR UR STRIP: >=1.03 (ref 1–1.03)
UROBILINOGEN UR QL STRIP: ABNORMAL
WBC NRBC COR # BLD: 8.84 10*3/MM3 (ref 3.4–10.8)

## 2019-10-15 PROCEDURE — 25010000002 LORAZEPAM PER 2 MG: Performed by: NURSE PRACTITIONER

## 2019-10-15 PROCEDURE — 36415 COLL VENOUS BLD VENIPUNCTURE: CPT

## 2019-10-15 PROCEDURE — 99284 EMERGENCY DEPT VISIT MOD MDM: CPT

## 2019-10-15 PROCEDURE — 70553 MRI BRAIN STEM W/O & W/DYE: CPT

## 2019-10-15 PROCEDURE — 0 GADOBENATE DIMEGLUMINE 529 MG/ML SOLUTION: Performed by: EMERGENCY MEDICINE

## 2019-10-15 PROCEDURE — A9577 INJ MULTIHANCE: HCPCS | Performed by: EMERGENCY MEDICINE

## 2019-10-15 PROCEDURE — 70450 CT HEAD/BRAIN W/O DYE: CPT

## 2019-10-15 PROCEDURE — 96375 TX/PRO/DX INJ NEW DRUG ADDON: CPT

## 2019-10-15 PROCEDURE — 81003 URINALYSIS AUTO W/O SCOPE: CPT | Performed by: NURSE PRACTITIONER

## 2019-10-15 PROCEDURE — 96374 THER/PROPH/DIAG INJ IV PUSH: CPT

## 2019-10-15 PROCEDURE — 85025 COMPLETE CBC W/AUTO DIFF WBC: CPT | Performed by: NURSE PRACTITIONER

## 2019-10-15 PROCEDURE — 93010 ELECTROCARDIOGRAM REPORT: CPT | Performed by: INTERNAL MEDICINE

## 2019-10-15 PROCEDURE — 93005 ELECTROCARDIOGRAM TRACING: CPT | Performed by: NURSE PRACTITIONER

## 2019-10-15 PROCEDURE — 80053 COMPREHEN METABOLIC PANEL: CPT | Performed by: NURSE PRACTITIONER

## 2019-10-15 RX ORDER — LORAZEPAM 2 MG/ML
1 INJECTION INTRAMUSCULAR ONCE
Status: COMPLETED | OUTPATIENT
Start: 2019-10-15 | End: 2019-10-15

## 2019-10-15 RX ORDER — SODIUM CHLORIDE 0.9 % (FLUSH) 0.9 %
10 SYRINGE (ML) INJECTION AS NEEDED
Status: DISCONTINUED | OUTPATIENT
Start: 2019-10-15 | End: 2019-10-15 | Stop reason: HOSPADM

## 2019-10-15 RX ADMIN — GADOBENATE DIMEGLUMINE 20 ML: 529 INJECTION, SOLUTION INTRAVENOUS at 15:26

## 2019-10-15 RX ADMIN — LORAZEPAM 1 MG: 2 INJECTION INTRAMUSCULAR; INTRAVENOUS at 14:31

## 2019-10-15 NOTE — ED PROVIDER NOTES
"  EMERGENCY DEPARTMENT ENCOUNTER    CHIEF COMPLAINT  Chief Complaint: memory loss  History given by:patient  History limited by:none  Time Seen: 1126  Room Number: 33/33  PMD: Kya Kendrick PA-C      HPI:  Pt is a 44 y.o. female who presents with an episode of 2 hours of memory loss that occurred \"early this AM.\" Pt states that she was in bed and woke up to go to the bathroom. Pt states that walked to the bathroom, urinated, then walked out to check the weather on her phone. Pt states that while checking the weather on her phone she got a text at 0253 and then \"my arm dropped and I dropped my phone.\" Pt states that she was able to use her arm and pick her phone up right afterwards. Pt states that from her perspective she remembers picking up her phone and then walking down the hallway to wake her  up to go to bed. Pt states in reality she did not appear on camera in her house in the room where her  was until 0445 and that she does not know what happened between 0216-1337 this AM. Pt states that she called her PCP a couple of hours later and was unable to get in and was referred to ED. Pt states that she does not take any sleep aids, medication change, pain medication, or any EtOH use. Pt denies prior episodes.  Past Medical History of brain tumor followed by  (neurosurgery), fibromyalgia, and PCOS.   Family at bedside.         Duration: \"this AM\"  Quality:memory loss  Intensity/Severity:moderate  Associated Symptoms:\"arm dropped:  Previous Episodes:none  Treatment before arrival:Pt contacted her PCP who referred her to the ED.     PAST MEDICAL HISTORY  Active Ambulatory Problems     Diagnosis Date Noted   • Magnetic resonance imaging of brain abnormal 03/02/2016   • Benign essential hypertension 03/02/2016   • Blurring of visual image 03/02/2016   • Fatigue 03/02/2016   • Confusional state 03/02/2016   • Abnormal gait 03/02/2016   • Hypercholesterolemia 03/02/2016   • Headache 03/02/2016 "   • Meningioma (CMS/HCC) 03/02/2016   • Migraine 03/02/2016   • Morbid obesity (CMS/HCC) 03/02/2016   • Neck pain 03/02/2016   • Nausea 03/02/2016   • Pitting edema 03/02/2016   • Polycystic ovaries 03/02/2016   • Rib pain on left side 03/02/2016   • Pain of sternum 03/02/2016   • Vaginal irritation 03/02/2016   • Vitamin D deficiency 03/02/2016   • Tobacco abuse counseling 05/16/2016   • Hypotensive episode 10/21/2016   • Acute bilateral thoracic back pain 10/21/2016   • Pendulous breast 10/21/2016   • Memory changes 10/21/2016   • Weight gain 12/15/2016   • PCOS (polycystic ovarian syndrome) 12/15/2016   • Menopausal symptoms 12/15/2016   • Insulin resistance syndrome 12/15/2016   • Morbid obesity with BMI of 40.0-44.9, adult (CMS/HCC) 12/15/2016   • Right ureteral stone 03/07/2017   • Hematuria 03/10/2017   • Constipation due to pain medication therapy 03/10/2017   • Vision changes 05/01/2017   • Blurred vision, bilateral 05/01/2017   • Obesity (BMI 35.0-39.9 without comorbidity) 05/01/2017   • Chest tightness or pressure 07/03/2017   • Anxiety 07/03/2017   • Need for influenza vaccination 11/06/2017   • Acute left flank pain 11/06/2017   • History of kidney stones 11/06/2017   • Morbid obesity with body mass index (BMI) of 40.0 to 44.9 in adult (CMS/HCC) 01/08/2018   • Screening mammogram, encounter for 03/05/2018   • Meningioma (CMS/HCC)    • Fibromyalgia    • High risk medication use 06/04/2018   • Primary insomnia 07/05/2018   • Arthralgia 01/07/2019   • Chronic cough 01/07/2019   • Thyroid enlargement 01/07/2019     Resolved Ambulatory Problems     Diagnosis Date Noted   • Acute bronchitis 03/02/2016   • Acute upper respiratory infection 03/02/2016   • Acute sinusitis 03/02/2016   • Impacted cerumen 03/02/2016   • Grief 03/02/2016   • Wheezing 03/02/2016     Past Medical History:   Diagnosis Date   • Anxiety    • Asthma    • Cardiomyopathy (CMS/HCC)    • Fibromyalgia    • Fibromyalgia    • Hypertension    •  Insomnia    • Kidney calculi    • Meningioma (CMS/HCC)    • Meningioma (CMS/HCC)    • Migraine    • Polycystic ovaries        PAST SURGICAL HISTORY  Past Surgical History:   Procedure Laterality Date   • CYSTOSCOPY URETEROSCOPY LASER LITHOTRIPSY Right 3/7/2017    Procedure: CYSTOSCOPY RT URETEROSCOPY LASER LITHOTRIPSY W/ STENT, rerograde pyelogram ;  Surgeon: Rashid Malloy MD;  Location: [facility] OR;  Service:    • FOOT SURGERY Left     tendon repair   • HYSTERECTOMY     • OOPHORECTOMY     • SHOULDER SURGERY Right 2009   • TUBAL ABDOMINAL LIGATION     • WISDOM TOOTH EXTRACTION         FAMILY HISTORY  Family History   Problem Relation Age of Onset   • Heart disease Paternal Aunt    • Diabetes Paternal Aunt    • Diabetes Maternal Grandmother    • Heart disease Maternal Grandfather    • Cancer Maternal Grandfather    • Heart disease Paternal Grandfather    • Heart disease Paternal Uncle    • Breast cancer Neg Hx    • Ovarian cancer Neg Hx    • Colon cancer Neg Hx    • Deep vein thrombosis Neg Hx    • Pulmonary embolism Neg Hx        SOCIAL HISTORY  Social History     Socioeconomic History   • Marital status:      Spouse name: Not on file   • Number of children: Not on file   • Years of education: Not on file   • Highest education level: Not on file   Occupational History   • Occupation: dog groomer     Employer: SELF-EMPLOYED     Comment: full time   Tobacco Use   • Smoking status: Former Smoker   • Smokeless tobacco: Never Used   Substance and Sexual Activity   • Alcohol use: Yes     Comment: occ'l   • Drug use: No   • Sexual activity: Defer     Partners: Male     Birth control/protection: Surgical     Comment: hysterectomy         ALLERGIES  Iodine and Shellfish allergy    REVIEW OF SYSTEMS  Review of Systems   Constitutional: Negative for chills and fever.   HENT: Negative for sore throat.    Respiratory: Negative for shortness of breath.    Cardiovascular: Negative for chest pain.  "  Gastrointestinal: Negative for nausea and vomiting.   Genitourinary: Negative for dysuria.   Musculoskeletal: Negative for back pain.        + \"arm dropped\"   Skin: Negative for rash.   Neurological: Negative for dizziness.   Psychiatric/Behavioral: Positive for confusion (memory loss). The patient is not nervous/anxious.        PHYSICAL EXAM  ED Triage Vitals   Temp Heart Rate Resp BP SpO2   10/15/19 1030 10/15/19 1030 10/15/19 1030 10/15/19 1117 10/15/19 1030   97.9 °F (36.6 °C) 111 16 130/69 100 %       Physical Exam   Constitutional: She is well-developed, well-nourished, and in no distress.   HENT:   Head: Normocephalic.   Mouth/Throat: Mucous membranes are normal.   Eyes: No scleral icterus.   Neck: Normal range of motion.   Cardiovascular: Normal rate, regular rhythm and normal heart sounds.   Pulmonary/Chest: Effort normal and breath sounds normal.   Abdominal: Soft. There is no tenderness.   Musculoskeletal: Normal range of motion.   Neurological: She is alert. She has normal sensation and normal strength. She has a normal Finger-Nose-Finger Test.   Skin: Skin is warm and dry.   Psychiatric: Mood and affect normal.   Nursing note and vitals reviewed.      LAB RESULTS  Recent Results (from the past 24 hour(s))   Comprehensive Metabolic Panel    Collection Time: 10/15/19 12:37 PM   Result Value Ref Range    Glucose 126 (H) 65 - 99 mg/dL    BUN 12 6 - 20 mg/dL    Creatinine 0.68 0.57 - 1.00 mg/dL    Sodium 135 (L) 136 - 145 mmol/L    Potassium 4.3 3.5 - 5.2 mmol/L    Chloride 99 98 - 107 mmol/L    CO2 23.9 22.0 - 29.0 mmol/L    Calcium 9.2 8.6 - 10.5 mg/dL    Total Protein 7.4 6.0 - 8.5 g/dL    Albumin 4.20 3.50 - 5.20 g/dL    ALT (SGPT) 22 1 - 33 U/L    AST (SGOT) 15 1 - 32 U/L    Alkaline Phosphatase 45 39 - 117 U/L    Total Bilirubin 0.2 0.2 - 1.2 mg/dL    eGFR Non African Amer 94 >60 mL/min/1.73    Globulin 3.2 gm/dL    A/G Ratio 1.3 g/dL    BUN/Creatinine Ratio 17.6 7.0 - 25.0    Anion Gap 12.1 5.0 - " 15.0 mmol/L   CBC Auto Differential    Collection Time: 10/15/19 12:37 PM   Result Value Ref Range    WBC 8.84 3.40 - 10.80 10*3/mm3    RBC 4.34 3.77 - 5.28 10*6/mm3    Hemoglobin 13.0 12.0 - 15.9 g/dL    Hematocrit 39.3 34.0 - 46.6 %    MCV 90.6 79.0 - 97.0 fL    MCH 30.0 26.6 - 33.0 pg    MCHC 33.1 31.5 - 35.7 g/dL    RDW 12.6 12.3 - 15.4 %    RDW-SD 42.1 37.0 - 54.0 fl    MPV 11.7 6.0 - 12.0 fL    Platelets 277 140 - 450 10*3/mm3    Neutrophil % 61.6 42.7 - 76.0 %    Lymphocyte % 26.6 19.6 - 45.3 %    Monocyte % 8.9 5.0 - 12.0 %    Eosinophil % 1.7 0.3 - 6.2 %    Basophil % 0.5 0.0 - 1.5 %    Immature Grans % 0.7 (H) 0.0 - 0.5 %    Neutrophils, Absolute 5.45 1.70 - 7.00 10*3/mm3    Lymphocytes, Absolute 2.35 0.70 - 3.10 10*3/mm3    Monocytes, Absolute 0.79 0.10 - 0.90 10*3/mm3    Eosinophils, Absolute 0.15 0.00 - 0.40 10*3/mm3    Basophils, Absolute 0.04 0.00 - 0.20 10*3/mm3    Immature Grans, Absolute 0.06 (H) 0.00 - 0.05 10*3/mm3    nRBC 0.0 0.0 - 0.2 /100 WBC   Urinalysis With Microscopic If Indicated (No Culture) - Urine, Clean Catch    Collection Time: 10/15/19 12:47 PM   Result Value Ref Range    Color, UA Yellow Yellow, Straw    Appearance, UA Cloudy (A) Clear    pH, UA 6.0 5.0 - 8.0    Specific Gravity, UA >=1.030 1.005 - 1.030    Glucose, UA Negative Negative    Ketones, UA Trace (A) Negative    Bilirubin, UA Negative Negative    Blood, UA Negative Negative    Protein, UA Trace (A) Negative    Leuk Esterase, UA Negative Negative    Nitrite, UA Negative Negative    Urobilinogen, UA 0.2 E.U./dL 0.2 - 1.0 E.U./dL       I ordered the above labs and reviewed the results    RADIOLOGY  MRI Brain With & Without Contrast   Preliminary Result   1. 1 cm meningioma overlying the right frontal lobe. No evidence of   acute infarction.    2. Area of ill-defined enhancement involving the right frontal lobe   superolaterally measuring approximately 1.3 cm in maximum transverse   dimension with a central linear area of  enhancement likely representing   a vessel. The site represents either an area of capillary telangectasia   or developmental venous anomaly. However, the appearance is nonspecific   and comparison with prior studies is required to assess stability.       The above information was called to and discussed with June Osorio.          CT Head Without Contrast   Final Result       There is no evidence for acute intracranial pathology.       There is a 7 mm partially calcified extra-axial lesion abutting the   superior and lateral aspect of the right frontal lobe. The lesion is   probably representative of a partially calcified meningioma but could be   further characterized with MR imaging as clinically indicated.   Apparently, the patient has a history of a brain tumor. Perhaps this   history refers to this presumptive meningioma. If the history does not   refer to this presumptive meningioma, then the tumor may be occult on CT   imaging and again would be better delineated on an MRI study.       These findings and recommendations were discussed with June Osorio on 10/15/2019 at approximately 12:00 PM.        Radiation dose reduction techniques were utilized, including automated   exposure control and exposure modulation based on body size.       This report was finalized on 10/15/2019 1:03 PM by Dr. Shemar Lomeli M.D.              I ordered the above noted radiological studies and reviewed the images on the PACS system.  Spoke with Dr. Lomeli regarding CT scan results        EKG    ekg was interpreted by Dr. Gruber, see Dr. Gruber's note for interpretation.      PROGRESS AND CONSULTS      1141-Anjel [id number] reviewed and is negative.     1200: Reviewed pt's history and workup with Dr. Gruber.  At bedside evaluation, they agree with the plan of care.    1205-Per  (radiology), CT head is negative acute.     1613-Rechecked pt. Pt is resting comfortably with stable vitals. Pt  "informed of work up in the ED which is negative acute. Reviewed implications of results, diagnosis, meds, responsibility to follow up, warning signs and symptoms of possible worsening, potential complications and reasons to return to ER with patient.  Discussed all results and noted any abnormalities with patient.  Discussed absolute need to recheck abnormalities with her PCP and neurologist.    Discussed plan for discharge, as there is no emergent indication for admission.  Pt is agreeable and understands need for follow up and repeat testing.  Pt is aware that discharge does not mean that nothing is wrong but it indicates no emergency is present.  Pt is discharged with instructions to follow up with primary care doctor to have their blood pressure rechecked.       DIAGNOSIS  Final diagnoses:   Altered mental status, unspecified altered mental status type       FOLLOW UP   Kya Kendrick, KARL  [address] [facility] [address] [phone]    In 1 day          COURSE & MEDICAL DECISION MAKING  Pertinent Labs and Imaging studies that were ordered and reviewed are noted above.  Results were reviewed/discussed with the patient and they were also made aware of online assess.   Pt also made aware that some labs, such as cultures, will not be resulted during ER visit and follow up with PMD is necessary.     MEDICATIONS GIVEN IN ER  Medications   sodium chloride 0.9 % flush 10 mL (not administered)   LORazepam (ATIVAN) injection 1 mg (1 mg Intravenous Given 10/15/19 1431)   gadobenate dimeglumine (MULTIHANCE) injection 20 mL (20 mL Intravenous Given 10/15/19 1526)       /82 (BP Location: Right arm, Patient Position: Lying)   Pulse 88   Temp 97.9 °F (36.6 °C) (Tympanic)   Resp 16   Ht 162.6 cm (64\")   Wt 118 kg (260 lb)   SpO2 100%   BMI 44.63 kg/m²       I personally reviewed the past medical history, past surgical history, social history, family history, current medications and allergies as " they appear in this chart.  The scribe's note accurately reflects the work and decisions made by me.     Documentation assistance provided by ruben Kowalski for BARTOLO Barr on 10/15/2019 at 11:21 AM. Information recorded by the scribe was done at my direction and has been verified and validated by me.                 Sary Kowalski  10/15/19 7089       June Osorio, HAN  10/15/19 0085

## 2019-10-15 NOTE — DISCHARGE INSTRUCTIONS
Continue current home medications  Follow-up with your PMD, call in the a.m. to schedule an appointment  Return to the ER for fever, chills, chest pain, shortness of breath, dizziness, weakness or any new or worsening symptoms

## 2019-10-15 NOTE — ED PROVIDER NOTES
"The patient presents complaining of an episode of AMS lasting two hours this morning. Pt states she woke up to check a text message at 0253 and noticed her \"right arm wasn't working\" but this lasted for \"a moment\". Pt states she was able to  her clothes following that moment and was able to get dressed and remembers walking into a different part of the house but states she arrived in that new room at 0445. Pt states she is unsure of what occurred between 0300 and 0445. Pt states she now feels fatigued and feels as if her right arm is \"waking from being asleep\". Pt denies any right face or leg tingling, numbness, or weakness. Pt denies any use of sleep aids.     EKG          EKG time: 1347  Rhythm/Rate: sinus rhythm 84  P waves and AR: normal  QRS, axis: normal   ST and T waves: normal     Interpreted Contemporaneously by me, independently viewed  Unchanged compared to prior 1/28/13      Physical Exam:  Patient is nontoxic appearing and in NAD. The pt is alert and oriented X 3.  HENT: normocephalic, atraumatic  Lungs/cardiovascular: The pt's heart is RRR without murmur. The pt's lungs are clear to auscultation.  Back/extremities: FROM  Neuro:  GCS is 15  Cranial nerves II-XII are intact.  No facial droop. Uvula is midline. No tongue deviation. PERRL and EOMI. There is no dysarthria, aphasia or slurred speech.  Sensation is intact to light touch bilaterally and is symmetrical in the face, BUE and BLE.  Strength is 5/5 and symmetrical in the BUE and BLE.  Finger to nose is intact    Plan: Discussed plan to do an MRI. Pt understands and agrees with the plan. All questions have been answered.      MD ATTESTATION NOTE    The JENNY and I have discussed this patient's history, physical exam, and treatment plan.  I have reviewed the documentation and personally had a face to face interaction with the patient. I affirm the documentation and agree with the treatment and plan.  The attached note describes my personal " findings.    Documentation assistance provided by ruben Desai for Dr Gruber. Information recorded by the scribe was done at my direction and has been verified and validated by me.           Kayleigh Desai  10/15/19 [id number]       Cr Gruber MD  10/15/19 [id number]

## 2019-10-15 NOTE — ED NOTES
"Patient reported that she got up to go to the bathroom. Stated that after using the bathroom, stated \" I grabbed my phone to read an text and my arm just dropped' \" I felt like I was in a fish bowl just looking around and watching things happen, things seemed like they were happening really fast around me and I couldn't comprehend\" Patient stated that she had a TDAP last week in the same arm that she has the numbness is.      reports that patient seemed really confused.        Mango Kramer, BHARAT  10/15/19 1125       Mango Kramer, RN  10/15/19 1139    "

## 2019-10-17 ENCOUNTER — OFFICE VISIT (OUTPATIENT)
Dept: FAMILY MEDICINE CLINIC | Facility: CLINIC | Age: 44
End: 2019-10-17

## 2019-10-17 VITALS
HEIGHT: 64 IN | DIASTOLIC BLOOD PRESSURE: 68 MMHG | HEART RATE: 107 BPM | RESPIRATION RATE: 16 BRPM | BODY MASS INDEX: 44.73 KG/M2 | OXYGEN SATURATION: 99 % | TEMPERATURE: 98 F | WEIGHT: 262 LBS | SYSTOLIC BLOOD PRESSURE: 138 MMHG

## 2019-10-17 DIAGNOSIS — R93.0 ABNORMAL MRI OF HEAD: ICD-10-CM

## 2019-10-17 DIAGNOSIS — R41.3 MEMORY LOSS: ICD-10-CM

## 2019-10-17 DIAGNOSIS — R20.0 RIGHT ARM NUMBNESS: ICD-10-CM

## 2019-10-17 DIAGNOSIS — D32.9 MENINGIOMA (HCC): Primary | ICD-10-CM

## 2019-10-17 PROCEDURE — 99213 OFFICE O/P EST LOW 20 MIN: CPT | Performed by: PHYSICIAN ASSISTANT

## 2019-10-17 NOTE — PROGRESS NOTES
Subjective   Angie Patel is a 44 y.o. female presents for   Chief Complaint   Patient presents with   • Follow-up     from hospital       History of Present Illness     Angie is a 44-year-old female who presents for follow-up from [facility] emergency room on October 15, 2019.  Angie states she took a Lasix medication around dinnertime.  She got up in the middle the night to use the bathroom.  On the way back to bed she noted she had a text message around 3 AM.  She opened the text message and was reading that is when she noticed her right arm was not working and felt numb.  She dropped her phone on the floor.  She was bent down to  the phone.  She proceeded to get dressed when her arm numbness resolved.  Angie was walking down the hallway to find  when she noticed there was a bright light at the end of hallway.  States she cannot remember any likes being on or anything after this point in time.  Her  states that   it was about 30 minutes later that she woke him up.   denied any slurred speech, facial drooping, facial asymmetry, or gait disturbance.  she doesn't recall any syncopal episodes of loss of consciousness.  Angie that she did have a headache shortly after this episode.  Denied any injury to head, falls or injury.  She has had a meningoma in the brain that has been followed by [facility] neurosurgery since 2014.  Had MRI performed at year by Dr. Diaz.  States she got concerned and had  take her to [facility].  There the emergency room provider performed CAT scan and MRI of brain.  They could not find any abnormalities except for the meningoma.  She was told to follow-up with her PCP and neurosurgery.  Her appetite and sleep have been normal.  Denied any chest pain, shortness of air, dizziness, headaches, vision changes, urine/bowel incontinence or swelling of ankles.  She is feeling well at office visit today with no lingering issues.      The following  "portions of the patient's history were reviewed and updated as appropriate: allergies, current medications, past family history, past medical history, past social history, past surgical history and problem list.    Review of Systems   Constitutional: Negative.    HENT: Negative.    Eyes: Negative.  Negative for pain, discharge, redness, itching and visual disturbance.   Respiratory: Negative.  Negative for cough, chest tightness, shortness of breath and wheezing.    Cardiovascular: Negative.  Negative for chest pain, palpitations and leg swelling.   Gastrointestinal: Negative.    Endocrine: Negative.    Genitourinary: Negative.    Musculoskeletal: Negative.    Skin: Negative.    Allergic/Immunologic: Negative.    Neurological: Positive for numbness. Negative for dizziness, tremors, seizures, syncope, facial asymmetry, speech difficulty, weakness, light-headedness and headaches.   Hematological: Negative.    Psychiatric/Behavioral: Negative.  Negative for sleep disturbance.   All other systems reviewed and are negative.        Vitals:    10/17/19 1451   BP: 138/68   Pulse: 107   Resp: 16   Temp: 98 °F (36.7 °C)   SpO2: 99%   Weight: 119 kg (262 lb)   Height: 162.6 cm (64\")     Wt Readings from Last 3 Encounters:   10/17/19 119 kg (262 lb)   10/15/19 118 kg (260 lb)   10/03/19 119 kg (262 lb)     BP Readings from Last 3 Encounters:   10/17/19 138/68   10/15/19 146/90   10/03/19 128/84     Social History     Socioeconomic History   • Marital status:      Spouse name: Not on file   • Number of children: Not on file   • Years of education: Not on file   • Highest education level: Not on file   Occupational History   • Occupation:      Employer: SELF-EMPLOYED     Comment: full time   Tobacco Use   • Smoking status: Former Smoker   • Smokeless tobacco: Never Used   Substance and Sexual Activity   • Alcohol use: Yes     Comment: occ'l   • Drug use: No   • Sexual activity: Defer     Partners: Male     Birth " control/protection: Surgical     Comment: hysterectomy       Allergies   Allergen Reactions   • Iodine Shortness Of Breath     Swelling     • Shellfish Allergy Shortness Of Breath     swelling       Body mass index is 44.97 kg/m².    Objective   Physical Exam   Constitutional: She is oriented to person, place, and time. Vital signs are normal. She appears well-developed and well-nourished.   Obese female   HENT:   Head: Normocephalic and atraumatic.   Right Ear: Hearing, tympanic membrane, external ear and ear canal normal.   Left Ear: Hearing, tympanic membrane, external ear and ear canal normal.   Nose: Nose normal. Right sinus exhibits no maxillary sinus tenderness and no frontal sinus tenderness. Left sinus exhibits no maxillary sinus tenderness and no frontal sinus tenderness.   Mouth/Throat: Uvula is midline, oropharynx is clear and moist and mucous membranes are normal.   Eyes: Conjunctivae, EOM and lids are normal. Pupils are equal, round, and reactive to light.   Fundoscopic exam:       The right eye shows no papilledema.        The left eye shows no papilledema.   Neck: Trachea normal and phonation normal. Neck supple. No tracheal tenderness present. Carotid bruit is not present. No tracheal deviation and no edema present. No thyroid mass and no thyromegaly present.   Cardiovascular: Normal rate, regular rhythm, S1 normal, S2 normal, normal heart sounds, intact distal pulses and normal pulses.   No murmur heard.  Pulmonary/Chest: Effort normal and breath sounds normal.   Abdominal: Soft. Normal appearance, normal aorta and bowel sounds are normal. There is no hepatomegaly. There is no tenderness.   Lymphadenopathy:     She has no cervical adenopathy.   Neurological: She is alert and oriented to person, place, and time. She has normal strength and normal reflexes. No cranial nerve deficit or sensory deficit. She displays a negative Romberg sign.   Skin: Skin is warm, dry and intact. Capillary refill takes  less than 2 seconds.   Psychiatric: She has a normal mood and affect. Her speech is normal and behavior is normal. Judgment and thought content normal. Cognition and memory are normal.       Assessment/Plan   Angie was seen today for follow-up.    Diagnoses and all orders for this visit:    Meningioma (CMS/HCC)  -     Ambulatory Referral to Neurosurgery  -     Ambulatory Referral to Neurology    Memory loss  -     Ambulatory Referral to Neurosurgery  -     Ambulatory Referral to Neurology    Right arm numbness  -     Ambulatory Referral to Neurosurgery  -     Ambulatory Referral to Neurology    Abnormal MRI of head  -     Ambulatory Referral to Neurosurgery  -     Ambulatory Referral to Neurology      Mrs. Angie Patel was seen in office today with  for follow-up from [facility] on October 15, 2019.  I have reviewed her ER report, laboratory results and imaging studies with them at office visit today.  She has a history of meningioma that has been followed by [facility] neurosurgery.  We will schedule a follow-up appointment with him for reevaluation.  She had new right arm numbness with memory lapses.  I will schedule an appointment with neurologist to rule out a possible seizure versus TIA event.  She may start low-dose aspirin for now.    KARL Solorio [facility]  [address] [address]  Dept: [phone]  Dept Fax: [phone]  Loc: [phone]  Loc Fax: [phone]       MRI Brain With & Without Contrast [[id number]] (Order [id number])   Order   Status: Final result   Study Notes        Margot Eaton on 10/15/2019  3:33 PM   Episode at approx 0300hrs of RT arm numbness/weakness and memory loss.  PT doesn't believe she fell.  HX: meningioma  No SX  No CA  Prev MRI brain at [facility]. in the last year. If needed please contact RSS.   PT falling asleep during exam. Ran SWI and  GRE due to motion. All images turned in.      Appointment Information     PACS Images      Radiology Images   Study Result     MRI EXAMINATION BRAIN WITH AND WITHOUT CONTRAST     HISTORY: Memory loss, right arm weakness, meningioma.     COMPARISON: CT head 10/15/2019.     TECHNIQUE: A MRI examination of the brain was performed before and after  the intravenous administration of contrast utilizing sagittal T1, axial  diffusion, T1, T2, T2 FLAIR, susceptibility weighted imaging, as well as  sagittal, axial and coronal T1 postcontrast weighted sequences.     FINDINGS: There is no evidence of restricted diffusion to suggest acute  infarction. The brain and ventricles are symmetrical. There is expected  flow-void in the basilar artery and in the distal aspect of the internal  carotid arteries bilaterally on the axial T2 sequence. After contrast  administration, extra-axial enhancing mass is appreciated overlying the  right frontal lobe superolaterally corresponding to the partially  calcified mass noted on the CT examination and consistent with a small  meningioma. This is best demonstrated on the coronal and sagittal T1  postcontrast weighted sequences. It measures approximately 5 mm in  craniocaudal dimension, 10 mm in transverse dimension and 9 mm in AP  dimension. An area of enhancement involving the subcortical white matter  of the right frontal lobe is noted, somewhat indistinct and associated  with a vessel likely representing a small developmental venous anomaly  or area of capillary telangectasia.  The appearance is nonspecific.  Comparison with prior examination is suggested. Should a prior  examination be made available for comparison, I would be more than happy  to compare the studies and to generate a supplemental report.     IMPRESSION:  1. 1 cm meningioma overlying the right frontal lobe. No evidence of  acute infarction.   2. Area of ill-defined enhancement involving the right frontal  lobe  superolaterally measuring approximately 1.3 cm in maximum transverse  dimension with a central linear area of enhancement likely representing  a vessel. The site represents either an area of capillary telangectasia  or developmental venous anomaly. However, the appearance is nonspecific  and comparison with prior studies is required to assess stability.     The above information was called to and discussed with June Osorio.     This report was finalized on 10/16/2019 7:56 AM by Dr. Femi Vasquez M.D.      Imaging     MRI Brain With & Without Contrast (Order: [id number]) - 10/15/2019   Result History     MRI Brain With & Without Contrast (Order #[id number]) on 10/16/2019 - Order Result History Report - Result Edited   Reprint Order Requisition     MRI Brain With & Without Contrast (Order #[id number]) on 10/15/19   Signed by     Signed Date/Time  Phone Pager   FEMI VASQUEZ 10/16/2019 07:56 [phone]    Exam Information     Status Exam Begun  Exam Ended    Final [99] 10/15/2019 15:09 10/15/2019 15:42   Questions      STEREOTACTIC GUIDANCE PROTOCOL? No [0]      Patient Pregnant No      Imaging Related Medications     Medication   gadobenate dimeglumine (MULTIHANCE) injection 20 mL   Route:   Intravenous   Admin Amount:   20 mL   Volume:   20 mL   Last Admin Time:   10/15/19 1526   Number of Expected Doses:   1   Most Recent Administration:   User Action Time Recorded Time Dose Route Site Comment Action Reason   Margot Eaton ANTHONY 10/15/19 1526 10/15/19 1527 20 mL Intravenous Left Arm [id number]  MAY 2022 Given    Full Administration Report              External Results Report     Open External Results Report    Encounter     View Encounter          Intra Procedure Documentation     Intra Procedure Documentation   Order-Level Documents - 10/15/2019:     Scan on 10/15/2019 1347: ECG 12-LEADScan on 10/15/2019 1347: ECG 12-LEAD   Scan on 10/15/2019: 10/15/2019Scan on 10/15/2019: 10/15/2019           Encounter-Level Documents - 10/15/2019:     Document on 10/15/2019 1623 by June Osorio, APRN: ED After Visit Summary   Scan on 10/15/2019: AVS 10/15/2019Scan on 10/15/2019: AVS 10/15/2019   Scan on 10/15/2019: GADOLINIUM CONTRAST INJECTION- 10/15/2019Scan on 10/15/2019: GADOLINIUM CONTRAST INJECTION- 10/15/2019   Scan on 10/15/2019: MRI INPATIENT HISTORY-10/15/2019Scan on 10/15/2019: MRI INPATIENT HISTORY-10/15/2019          Implants     None   Patient Care Timeline     No data selected in time range   Reason For Exam   Priority: STAT   memory loss lost night, right arm weakness and hx of meningoma   Results Routing Tracking     View Results Routing Information   Order Report     Order Details       Admission on 10/15/2019, Discharged on 10/15/2019   Component Date Value Ref Range Status   • Glucose 10/15/2019 126* 65 - 99 mg/dL Final   • BUN 10/15/2019 12  6 - 20 mg/dL Final   • Creatinine 10/15/2019 0.68  0.57 - 1.00 mg/dL Final   • Sodium 10/15/2019 135* 136 - 145 mmol/L Final   • Potassium 10/15/2019 4.3  3.5 - 5.2 mmol/L Final   • Chloride 10/15/2019 99  98 - 107 mmol/L Final   • CO2 10/15/2019 23.9  22.0 - 29.0 mmol/L Final   • Calcium 10/15/2019 9.2  8.6 - 10.5 mg/dL Final   • Total Protein 10/15/2019 7.4  6.0 - 8.5 g/dL Final   • Albumin 10/15/2019 4.20  3.50 - 5.20 g/dL Final   • ALT (SGPT) 10/15/2019 22  1 - 33 U/L Final   • AST (SGOT) 10/15/2019 15  1 - 32 U/L Final    Specimen hemolyzed.  Results may be affected.   • Alkaline Phosphatase 10/15/2019 45  39 - 117 U/L Final   • Total Bilirubin 10/15/2019 0.2  0.2 - 1.2 mg/dL Final   • eGFR Non African Amer 10/15/2019 94  >60 mL/min/1.73 Final   • Globulin 10/15/2019 3.2  gm/dL Final   • A/G Ratio 10/15/2019 1.3  g/dL Final   • BUN/Creatinine Ratio 10/15/2019 17.6  7.0 - 25.0 Final   • Anion Gap 10/15/2019 12.1  5.0 - 15.0 mmol/L Final   • Color, UA 10/15/2019 Yellow  Yellow, Straw Final   • Appearance, UA 10/15/2019 Cloudy* Clear Final   • pH,  UA 10/15/2019 6.0  5.0 - 8.0 Final   • Specific Gravity, UA 10/15/2019 >=1.030  1.005 - 1.030 Final   • Glucose, UA 10/15/2019 Negative  Negative Final   • Ketones, UA 10/15/2019 Trace* Negative Final   • Bilirubin, UA 10/15/2019 Negative  Negative Final   • Blood, UA 10/15/2019 Negative  Negative Final   • Protein, UA 10/15/2019 Trace* Negative Final   • Leuk Esterase, UA 10/15/2019 Negative  Negative Final   • Nitrite, UA 10/15/2019 Negative  Negative Final   • Urobilinogen, UA 10/15/2019 0.2 E.U./dL  0.2 - 1.0 E.U./dL Final   • WBC 10/15/2019 8.84  3.40 - 10.80 10*3/mm3 Final   • RBC 10/15/2019 4.34  3.77 - 5.28 10*6/mm3 Final   • Hemoglobin 10/15/2019 13.0  12.0 - 15.9 g/dL Final   • Hematocrit 10/15/2019 39.3  34.0 - 46.6 % Final   • MCV 10/15/2019 90.6  79.0 - 97.0 fL Final   • MCH 10/15/2019 30.0  26.6 - 33.0 pg Final   • MCHC 10/15/2019 33.1  31.5 - 35.7 g/dL Final   • RDW 10/15/2019 12.6  12.3 - 15.4 % Final   • RDW-SD 10/15/2019 42.1  37.0 - 54.0 fl Final   • MPV 10/15/2019 11.7  6.0 - 12.0 fL Final   • Platelets 10/15/2019 277  140 - 450 10*3/mm3 Final   • Neutrophil % 10/15/2019 61.6  42.7 - 76.0 % Final   • Lymphocyte % 10/15/2019 26.6  19.6 - 45.3 % Final   • Monocyte % 10/15/2019 8.9  5.0 - 12.0 % Final   • Eosinophil % 10/15/2019 1.7  0.3 - 6.2 % Final   • Basophil % 10/15/2019 0.5  0.0 - 1.5 % Final   • Immature Grans % 10/15/2019 0.7* 0.0 - 0.5 % Final   • Neutrophils, Absolute 10/15/2019 5.45  1.70 - 7.00 10*3/mm3 Final   • Lymphocytes, Absolute 10/15/2019 2.35  0.70 - 3.10 10*3/mm3 Final   • Monocytes, Absolute 10/15/2019 0.79  0.10 - 0.90 10*3/mm3 Final   • Eosinophils, Absolute 10/15/2019 0.15  0.00 - 0.40 10*3/mm3 Final   • Basophils, Absolute 10/15/2019 0.04  0.00 - 0.20 10*3/mm3 Final   • Immature Grans, Absolute 10/15/2019 0.06* 0.00 - 0.05 10*3/mm3 Final   • nRBC 10/15/2019 0.0  0.0 - 0.2 /100 WBC Final

## 2019-10-18 ENCOUNTER — TELEPHONE (OUTPATIENT)
Dept: FAMILY MEDICINE CLINIC | Facility: CLINIC | Age: 44
End: 2019-10-18

## 2019-10-21 NOTE — TELEPHONE ENCOUNTER
She should be able to exercise for now if she is feeling okay.  Keep appointments with specialist.

## 2019-10-21 NOTE — TELEPHONE ENCOUNTER
She has an appointment with neurosurgery on October 25, 2019.  Please write letter to not do any physical training until cleared by neurosurgery later this week.

## 2019-10-22 NOTE — PROGRESS NOTES
"Subjective   History of Present Illness: Angie Patel is a 44 y.o. female is here today for follow-up with a new Brain MRI. Patient was last seen on 07/23/18 for a meningioma by Toya Rose.  An MRI was done recently because of an episode where she has no memory for about a 2-hour period and had trouble using her right arm.  He presented to the hospital because of her fear of a stroke and the MRI was done re-diagnosing what it been identified in October 2014.  The patient reports intermittent headaches, dizziness and blurry vision.    Headache    The problem occurs intermittently. Associated symptoms include blurred vision and dizziness.       The following portions of the patient's history were reviewed and updated as appropriate: allergies, current medications, past family history, past medical history, past social history, past surgical history and problem list.    Review of Systems   Constitutional: Negative.    Eyes: Positive for blurred vision.   Respiratory: Negative.  Negative for chest tightness and shortness of breath.    Cardiovascular: Negative.  Negative for chest pain.   Gastrointestinal: Negative.    Endocrine: Negative.    Genitourinary: Negative.    Musculoskeletal: Negative.    Skin: Negative.    Allergic/Immunologic: Negative.    Neurological: Positive for dizziness and headaches.   Hematological: Negative.    Psychiatric/Behavioral: Negative.    All other systems reviewed and are negative.      Objective     Vitals:    10/25/19 1337   BP: 110/82   Weight: 119 kg (262 lb)   Height: 162.6 cm (64\")     Body mass index is 44.97 kg/m².      Physical Exam  Neurologic Exam     Physical Exam:    CONSTITUTIONAL: This 44 year old right handed  female appears well developed, well-nourished and in no acute distress.    HEAD & FACE: the head and face are symmetric, normocephalic and atraumatic.    EYES: Inspection of the conjunctivae and lids reveals no swelling, erythema or discharge.  Pupils are " round, equal and reactive to light and there is no scleral icterus.    FUNDOSCOPIC:  There are no retinal hemorrhages bilaterally.  There is no papilledema bilaterally.    EARS, NOSE, MOUTH & THROAT: On inspection, the ears, nose and oral cavity are within normal limits.    NECK: the neck is supple and symmetric. The trachea is midline with no masses.    PULMONARY: Respiratory effort is normal with no increased work of breathing or signs of respiratory distress.    CARDIOVASCULAR: Pedal pulses are +2/4 bilaterally. Examination of the extremities shows no edema or varicosities.    LYMPHATIC: There is no palpable lymphadenopathy of the neck.    MUSCULOSKELETAL: Gait and station are within normal limits. The spine has normal alignment and range of motion,    SKIN: The skin is warm, dry and intact    NEUROLOGIC:    Cranial Nerves    CN II (Optic): Visual fields are full to confrontation. Fundoscopic exam is normal with sharp discs and no vascular changes. Venous pulsations are present bilaterally. Pupils are 4 mm and briskly reactive to light.  CN III, IV, VI (Oculomotor, Trochlear, Abducens): Extraocular movements are intact without ptosis.  Slight nystagmus 1-2 beats with far lateral gaze each direction.  CN V (Trigeminal): Facial sensation is intact to pinprick in all 3 divisions bilaterally. Corneal responses are intact.  CN VII (Facial): Face is symmetric with normal eye closure and smile.  CN VII (Acoustic): Hearing is normal to rubbing fingers  CN IX, X (Glossopharyngeal, Vagus): Palate elevates symmetrically. Phonation is normal.  CN XI (Spinal Accessory): Head turning and shoulder shrug are intact  CN XII (Hypoglossal): Tongue is midline with normal movements and no atrophy.     Normal motor strength noted. Muscle bulk and tone are normal.  Sensory exam is normal to all modalities.  Reflexes on the right side demonstrates 2/4 Triceps Reflex, 2/4 Biceps Reflex, 2/4 Brachioradialis Reflex, 2/4 Knee Jerk Reflex,  1/4 Ankle Jerk Reflex and no ankle clonus on the right.   Reflexes on the left side demonstrates 2/4 Triceps Reflex, 2/4 Biceps Reflex, 2/4 Brachioradialis Reflex, 2/4 Knee Jerk Reflex, 1/4 Ankle Jerk Reflex and no ankle clonus on the left.  Superficial/Primitive Reflexes: primitive reflexes were absent.  Etelvina's Babinski or clonus  No coordination deficit observed.  Cortical function is intact and without deficits. Speech is normal.    PSYCHIATRIC: oriented to person, place and time. Patient's mood and affect are normal.    Assessment/Plan   Independent Review of Radiographic Studies:      MRI imaging done of the brain as far back as October 19, 2014 scanned into Epic documents the presence of the meningioma 1 cm in size in the right frontal region which would suggest it is unchanged on the current studies 5 years later.    I personally reviewed the images from the following studies.    CT scan of the head done on October 15, 2019 reveal a 7 mm calcified extra-axial lesion consistent with meningioma in the right frontal region.    MRI of the brain done on October 15, 2019 at [facility] reveals a 1 cm meningioma overlying the right frontal lobe without mass-effect or edema.  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    Medical Decision Making:      This MRI is now the fifth consecutive year that she has been evaluated for an incidental meningioma and all of the prior MRIs show the right frontal meningioma stable in size, shape and appearance.  Since there is no slow growth and no threat to the neurologic elements I would not recommend any additional follow-up or intervention.  Certainly if she develops symptoms as she did this summer MRI scan be repeated but it is unlikely that this meningioma will cause her problem given the last 5  years of follow-up stability.      The symptoms she had is quite an unusual transient neurologic event that I agree needs a medical neurology evaluation since there is no anatomical structural explanation for the event.    Angie was seen today for follow-up and brain tumor.    Diagnoses and all orders for this visit:    Meningioma (CMS/HCC)      Return if symptoms worsen or fail to improve.           Roddy Mak MD FACS FAANS  Neurological Surgery

## 2019-10-25 ENCOUNTER — OFFICE VISIT (OUTPATIENT)
Dept: NEUROSURGERY | Facility: CLINIC | Age: 44
End: 2019-10-25

## 2019-10-25 VITALS
BODY MASS INDEX: 44.73 KG/M2 | SYSTOLIC BLOOD PRESSURE: 110 MMHG | HEIGHT: 64 IN | DIASTOLIC BLOOD PRESSURE: 82 MMHG | WEIGHT: 262 LBS

## 2019-10-25 DIAGNOSIS — D32.9 MENINGIOMA (HCC): Primary | ICD-10-CM

## 2019-10-25 PROCEDURE — 99213 OFFICE O/P EST LOW 20 MIN: CPT | Performed by: NEUROLOGICAL SURGERY

## 2019-10-31 ENCOUNTER — OFFICE VISIT (OUTPATIENT)
Dept: FAMILY MEDICINE CLINIC | Facility: CLINIC | Age: 44
End: 2019-10-31

## 2019-10-31 VITALS
TEMPERATURE: 97.9 F | HEART RATE: 99 BPM | BODY MASS INDEX: 45.07 KG/M2 | DIASTOLIC BLOOD PRESSURE: 88 MMHG | RESPIRATION RATE: 16 BRPM | WEIGHT: 264 LBS | HEIGHT: 64 IN | OXYGEN SATURATION: 99 % | SYSTOLIC BLOOD PRESSURE: 132 MMHG

## 2019-10-31 DIAGNOSIS — R29.818 NEUROLOGICAL ABNORMALITY: ICD-10-CM

## 2019-10-31 DIAGNOSIS — R07.9 LEFT-SIDED CHEST PAIN: Primary | ICD-10-CM

## 2019-10-31 DIAGNOSIS — D32.9 MENINGIOMA (HCC): ICD-10-CM

## 2019-10-31 PROCEDURE — 99214 OFFICE O/P EST MOD 30 MIN: CPT | Performed by: PHYSICIAN ASSISTANT

## 2019-10-31 PROCEDURE — 93000 ELECTROCARDIOGRAM COMPLETE: CPT | Performed by: PHYSICIAN ASSISTANT

## 2019-11-05 ENCOUNTER — OFFICE VISIT (OUTPATIENT)
Dept: NEUROLOGY | Facility: CLINIC | Age: 44
End: 2019-11-05

## 2019-11-05 VITALS
OXYGEN SATURATION: 99 % | SYSTOLIC BLOOD PRESSURE: 124 MMHG | BODY MASS INDEX: 45.41 KG/M2 | HEART RATE: 107 BPM | WEIGHT: 266 LBS | HEIGHT: 64 IN | DIASTOLIC BLOOD PRESSURE: 82 MMHG

## 2019-11-05 DIAGNOSIS — R26.89 BALANCE PROBLEMS: Primary | ICD-10-CM

## 2019-11-05 DIAGNOSIS — G47.50 PARASOMNIA, UNSPECIFIED TYPE: ICD-10-CM

## 2019-11-05 DIAGNOSIS — R56.9 NOCTURNAL SEIZURES (HCC): ICD-10-CM

## 2019-11-05 DIAGNOSIS — G47.33 OBSTRUCTIVE SLEEP APNEA SYNDROME: ICD-10-CM

## 2019-11-05 PROCEDURE — 99245 OFF/OP CONSLTJ NEW/EST HI 55: CPT | Performed by: PSYCHIATRY & NEUROLOGY

## 2019-11-05 NOTE — PATIENT INSTRUCTIONS
[facility]  Yee Piper MD  Neurology clinic  [phone]    With anti-seizure medications, you may initially notice side effects of fatigue, drowsiness, unsteadiness, and dizziness.  Other possible side effects include nausea, abdominal pain, headache, blurry or double vision, slurred speech and mood changes.  Generally, patients will noticed these symptoms when the medication is first started or with higher doses and will go away with time.    It is import to consistently take your medication every day.  Missing just one dose may put you at risk for a breakthrough seizure.  Consider using reminders on your phone or a pill box.    If you develop a rash, please call the neurology clinic immediately or notify another healthcare professional, as this may be potentially life-threatening.  If you are unable to reach a healthcare professional, go to the emergency room immediately for further evaluation.    If you develop thoughts of wanting to hurt yourself or others, please call the neurology clinic immediately to notify another healthcare professional.  If you are unable to reach a healthcare professional, go to the emergency room immediately for further evaluation.    Taking anti-seizure medications may increase the risk of birth defects.  If you are a female of child-bearing potential, it is recommended that you take folic acid (1-4 mg) daily.  This may reduce the risk of birth defects while pregnant and taking seizure medication.  If you become pregnant, contact our office immediately. You will need to be followed very closely (at least monthly appointments).  I also recommend contacting The [employer] American Antiepileptic Drug Pregnancy Registry at www.aedpregnancyregistry.org or 1-225.717.1191.    It is the [place] state law that you cannot drive within 90 days of a seizure.    You should avoid certain activities that if you were to have a seizure, you could harm yourself or others. In  general, it is recommended that you avoid operating heavy machinery or power tools, swimming or taking baths by yourself (showers are ok), don't stand over open flames, don't get on high ladders or the roof.  I also recommend to avoid sleeping on your stomach.    For further information on epilepsy and resources available to patients and their families, please visit the [facility] at www.efky.org or call [phone].

## 2019-11-05 NOTE — PROGRESS NOTES
Subjective:     Patient ID: Angie Patel is a 44 y.o. female.    Ms. Patel is a 44-year-old female with a history of headache, meningioma, and fibromyalgia who is seen in consultation at the request of Kya Kendrick for the evaluation of episodic loss time.  The patient was referred on October 17, 2019.  I reviewed the referring provider's note from that date.  The patient was in the emergency room on October 15.  She had gotten up in the middle the night use the bathroom.  On her way back to bed she noted she had text messages around 3 AM.  When she was reading them she noticed that her right arm was not working and felt numb.  She dropped her phone onto the floor.  The arm numbness eventually resolved.  She has a known meningioma and is followed by neurosurgery since 2014.  Referral to neurology was made.  I reviewed the patient's labs.  Her CMP on October 15 was essentially normal, her CBC was normal, and her TSH was normal.  Her MRI of the brain was in October 15, 2019.  It shows a 1 cm meningioma overlying the right frontal lobe.     The patient reports that a few weeks ago she woke up in the middle the night and her right arm went weak and numb for about a minute.  She also remembered all of the lights being on the home but they probably were not.  She has some loss time.  She felt like she immediately went to wake up her  but based on time stamps on her cell phone and security camera was there was 2 hours.  To her knowledge she is never had a seizure.  She has woken up with all of her muscles hurting for the past 2 years and it has occurred once per month.  She also wakes up with headaches and has snoring.  She was previously diagnosed a year ago with sleep apnea and expert sleep.  She never was started on CPAP.  She was told that there was concern about sleeping seizures but there was no further evaluation.  She has no history of sleepwalking.  A week and a half ago she was out to dinner with her  son and had inappropriate laughter that resulted in her spitting food on him.  She also then started crying and felt like she was tipping over to the left-hand side.  It lasted for about a minute.  Her son's voice sounded far away.  She also reports that when she was initially diagnosed with her meningioma she was seeing water running or melting in her peripheral vision.  She was told that it was an ocular migraine.  Sometimes she still sees things flashing by in her peripheral vision that will make her want to turn.    Risk factors:  Childhood/febrile seizures? no  Head trauma/pathology? Has known meningioma  CNS infections? no  Family history of seizures? Dad has seizures (in adulthood)  Driving? yes  Child bearing/family planning? No s/p hysterectomy          The following portions of the patient's history were reviewed and updated as appropriate: allergies, current medications, past family history, past medical history, past social history, past surgical history and problem list.    Review of Systems   Constitutional: Positive for fatigue. Negative for activity change and appetite change.   HENT: Positive for tinnitus. Negative for ear pain and facial swelling.    Eyes: Positive for visual disturbance. Negative for photophobia and pain.   Respiratory: Negative for cough, chest tightness and shortness of breath.    Cardiovascular: Positive for leg swelling. Negative for chest pain and palpitations.   Gastrointestinal: Negative for abdominal pain, nausea and vomiting.   Endocrine: Negative for cold intolerance, heat intolerance and polydipsia.   Musculoskeletal: Positive for arthralgias, back pain, gait problem, joint swelling, myalgias and neck pain.   Skin: Negative for color change, rash and wound.   Allergic/Immunologic: Positive for food allergies. Negative for environmental allergies and immunocompromised state.   Neurological: Positive for dizziness, seizures (not sure), light-headedness, numbness and  headaches. Negative for tremors, syncope, facial asymmetry, speech difficulty and weakness.   Hematological: Negative for adenopathy. Does not bruise/bleed easily.   Psychiatric/Behavioral: Positive for confusion and sleep disturbance. Negative for agitation, behavioral problems, decreased concentration, dysphoric mood, hallucinations, self-injury and suicidal ideas. The patient is not nervous/anxious and is not hyperactive.     I reviewed the ROS documented by the MA.  All other systems negative.      Objective:    Neurologic Exam    Physical Exam   Constitutional:  Vital signs reviewed.  No apparent distress.  Well groomed.  Eyes:  No injection, no icterus.  Fundoscopic exam performed.  No papilledema appreciated bilaterally.   Respiratory:  Normal effort.  Clear to auscultation bilaterally.  Cardiovascular:  Regular rate and rhythm.  No murmurs.  No carotid bruits. Symmetric radial pulses.  Musculoskeletal: Normal station.  Gait steady.  Normal arm swing.  Patient able to walk on heels and toes.  Some trouble with tandem gait and swaying with Romberg.  Muscle tone and bulk normal in the bilateral upper and lower extremities.  Strength is 5/5 in the bilateral upper and lower extremities proximally and distally unless otherwise specified in the neurological exam.  Skin:  No rashes.  Warm, dry, and intact.  Psychiatric:  Good mood.  Normal affect.    Neurologic:  Mental status-  The patient is alert and oriented to person, place and time. Attention/concentration is within normal limits.  Speech is fluent without dysarthria.  The patient is able to name, repeat and follow complex commands without difficulty.  Immediate memory intact.  Recalled 1 out of 3 words after 4 minutes.  Fund of knowledge normal.  Cranial nerves- Pupils equally round and reactive to light with intact accomodation.  Visual fields intact.  Extraocular movements intact.  Facial sensation intact.  Smile symmetric.  Hearing intact to finger-rub  bilaterally.  Palate elevates symmetrically.  SCM and trapezius are 5/5 bilaterally.  Tongue is midline.  Motor-  See musculoskeletal above.  No tremor.  Reflexes- 2+ in the bilateral biceps, brachioradialis, patellar and achilles.  Toes down-going bilaterally.  Sensation- Intact to pinprick and vibration in bilateral upper and lower extremities symmetrically.  Coordination- Intact to finger to nose and heel knee shin bilaterally.   Gait- See musculoskeletal exam above.     Assessment/Plan:  The patient is a 44-year-old female with history of a known meningioma, fibromyalgia, headache, diabetes, and asthma who presents today for the evaluation of spells.    1.  Transient spells-the patient describes 4 different transient episodes of concern.  For a while she will wake up out of sleep with muscle aches.  She also reports seeing things in her peripheral vision.  More recently she had a transient episode of right arm numbness and weakness followed by 2 hours of unexplained loss time and then another episode of inappropriate emotions and leaning to one side.  Given her history of meningioma, seizures are on the differential.  TIA seems unlikely given her age and minimal risk factors.  For further evaluation I would like to do an overnight in lab sleep study with full seizure montage EEG.  We discussed my concerns about potential seizures and whether or not she would like to empirically start on a medication.  The patient declined at this time and would like to wait until after her testing which is not unreasonable.  We also discussed that in the [facility] patients cannot drive within 90 days of a seizure.  Due to her balance issues I would like to check her B12 level.  It looks like it was last checked 3 years ago and was low then.  She does not take B12 supplementation.     Problems Addressed this Visit     None      Visit Diagnoses     Balance problems    -  Primary    Relevant Orders    Vitamin B12     Obstructive sleep apnea syndrome        Relevant Orders    Polysomnography 4 or More Parameters    Parasomnia, unspecified type        Relevant Orders    Polysomnography 4 or More Parameters    Nocturnal seizures (CMS/HCC)        Relevant Orders    Polysomnography 4 or More Parameters

## 2019-12-02 RX ORDER — ALBUTEROL SULFATE 90 UG/1
AEROSOL, METERED RESPIRATORY (INHALATION)
Qty: 18 G | Refills: 0 | Status: SHIPPED | OUTPATIENT
Start: 2019-12-02 | End: 2019-12-27

## 2019-12-11 DIAGNOSIS — R60.9 PITTING EDEMA: ICD-10-CM

## 2019-12-11 RX ORDER — FUROSEMIDE 20 MG/1
TABLET ORAL
Qty: 30 TABLET | Refills: 0 | Status: SHIPPED | OUTPATIENT
Start: 2019-12-11 | End: 2020-01-06

## 2019-12-27 RX ORDER — ALBUTEROL SULFATE 90 UG/1
AEROSOL, METERED RESPIRATORY (INHALATION)
Qty: 18 G | Refills: 0 | Status: SHIPPED | OUTPATIENT
Start: 2019-12-27 | End: 2020-04-25 | Stop reason: SDUPTHER

## 2020-01-04 DIAGNOSIS — R60.9 PITTING EDEMA: ICD-10-CM

## 2020-01-06 RX ORDER — FUROSEMIDE 20 MG/1
TABLET ORAL
Qty: 30 TABLET | Refills: 0 | Status: SHIPPED | OUTPATIENT
Start: 2020-01-06 | End: 2020-01-31

## 2020-01-22 ENCOUNTER — HOSPITAL ENCOUNTER (OUTPATIENT)
Dept: SLEEP MEDICINE | Facility: HOSPITAL | Age: 45
Discharge: HOME | End: 2020-01-22
Admitting: PSYCHIATRY & NEUROLOGY

## 2020-01-22 VITALS — BODY MASS INDEX: 45.41 KG/M2 | HEIGHT: 64 IN | WEIGHT: 266 LBS

## 2020-01-22 DIAGNOSIS — G47.33 OBSTRUCTIVE SLEEP APNEA SYNDROME: ICD-10-CM

## 2020-01-22 DIAGNOSIS — R56.9 NOCTURNAL SEIZURES (HCC): ICD-10-CM

## 2020-01-22 DIAGNOSIS — G47.50 PARASOMNIA, UNSPECIFIED TYPE: ICD-10-CM

## 2020-01-22 PROCEDURE — 95810 POLYSOM 6/> YRS 4/> PARAM: CPT

## 2020-01-31 DIAGNOSIS — R60.9 PITTING EDEMA: ICD-10-CM

## 2020-01-31 RX ORDER — FUROSEMIDE 20 MG/1
TABLET ORAL
Qty: 30 TABLET | Refills: 0 | Status: SHIPPED | OUTPATIENT
Start: 2020-01-31 | End: 2020-03-22

## 2020-02-07 ENCOUNTER — OFFICE VISIT (OUTPATIENT)
Dept: FAMILY MEDICINE CLINIC | Facility: CLINIC | Age: 45
End: 2020-02-07

## 2020-02-07 ENCOUNTER — HOSPITAL ENCOUNTER (OUTPATIENT)
Dept: GENERAL RADIOLOGY | Facility: HOSPITAL | Age: 45
Discharge: HOME | End: 2020-02-07
Admitting: PHYSICIAN ASSISTANT

## 2020-02-07 VITALS
HEIGHT: 64 IN | HEART RATE: 108 BPM | DIASTOLIC BLOOD PRESSURE: 68 MMHG | WEIGHT: 273.8 LBS | SYSTOLIC BLOOD PRESSURE: 120 MMHG | TEMPERATURE: 98.1 F | OXYGEN SATURATION: 98 % | BODY MASS INDEX: 46.74 KG/M2

## 2020-02-07 DIAGNOSIS — S99.921A FOOT INJURY, RIGHT, INITIAL ENCOUNTER: ICD-10-CM

## 2020-02-07 DIAGNOSIS — M79.671 ACUTE PAIN OF RIGHT FOOT: Primary | ICD-10-CM

## 2020-02-07 DIAGNOSIS — E66.01 MORBID OBESITY WITH BMI OF 45.0-49.9, ADULT (HCC): ICD-10-CM

## 2020-02-07 DIAGNOSIS — M79.671 ACUTE PAIN OF RIGHT FOOT: ICD-10-CM

## 2020-02-07 PROCEDURE — 73630 X-RAY EXAM OF FOOT: CPT

## 2020-02-07 PROCEDURE — 99214 OFFICE O/P EST MOD 30 MIN: CPT | Performed by: PHYSICIAN ASSISTANT

## 2020-02-07 NOTE — PATIENT INSTRUCTIONS
Weight watchers  intermittent diet  Mediterranean Diet  A Mediterranean diet refers to food and lifestyle choices that are based on the traditions of countries located on the [place]. This way of eating has been shown to help prevent certain conditions and improve outcomes for people who have chronic diseases, like kidney disease and heart disease.  What are tips for following this plan?  Lifestyle  · Cook and eat meals together with your family, when possible.  · Drink enough fluid to keep your urine clear or pale yellow.  · Be physically active every day. This includes:  ? Aerobic exercise like running or swimming.  ? Leisure activities like gardening, walking, or housework.  · Get 7-8 hours of sleep each night.  · If recommended by your health care provider, drink red wine in moderation. This means 1 glass a day for nonpregnant women and 2 glasses a day for men. A glass of wine equals 5 oz (150 mL).  Reading food labels    · Check the serving size of packaged foods. For foods such as rice and pasta, the serving size refers to the amount of cooked product, not dry.  · Check the total fat in packaged foods. Avoid foods that have saturated fat or trans fats.  · Check the ingredients list for added sugars, such as corn syrup.  Shopping  · At the grocery store, buy most of your food from the areas near the walls of the store. This includes:  ? Fresh fruits and vegetables (produce).  ? Grains, beans, nuts, and seeds. Some of these may be available in unpackaged forms or large amounts (in bulk).  ? Fresh seafood.  ? Poultry and eggs.  ? Low-fat dairy products.  · Buy whole ingredients instead of prepackaged foods.  · Buy fresh fruits and vegetables in-season from local farmers markets.  · Buy frozen fruits and vegetables in resealable bags.  · If you do not have access to quality fresh seafood, buy precooked frozen shrimp or canned fish, such as tuna, salmon, or sardines.  · Buy small amounts of raw or cooked  vegetables, salads, or olives from the deli or salad bar at your store.  · Stock your pantry so you always have certain foods on hand, such as olive oil, canned tuna, canned tomatoes, rice, pasta, and beans.  Cooking  · Cook foods with extra-virgin olive oil instead of using butter or other vegetable oils.  · Have meat as a side dish, and have vegetables or grains as your main dish. This means having meat in small portions or adding small amounts of meat to foods like pasta or stew.  · Use beans or vegetables instead of meat in common dishes like chili or lasagna.  · [place] with different cooking methods. Try roasting or broiling vegetables instead of steaming or sautéeing them.  · Add frozen vegetables to soups, stews, pasta, or rice.  · Add nuts or seeds for added healthy fat at each meal. You can add these to yogurt, salads, or vegetable dishes.  · Marinate fish or vegetables using olive oil, lemon juice, garlic, and fresh herbs.  Meal planning    · Plan to eat 1 vegetarian meal one day each week. Try to work up to 2 vegetarian meals, if possible.  · Eat seafood 2 or more times a week.  · Have healthy snacks readily available, such as:  ? Vegetable sticks with hummus.  ? Greek yogurt.  ? Fruit and nut trail mix.  · Eat balanced meals throughout the week. This includes:  ? Fruit: 2-3 servings a day  ? Vegetables: 4-5 servings a day  ? Low-fat dairy: 2 servings a day  ? Fish, poultry, or lean meat: 1 serving a day  ? Beans and legumes: 2 or more servings a week  ? Nuts and seeds: 1-2 servings a day  ? Whole grains: 6-8 servings a day  ? Extra-virgin olive oil: 3-4 servings a day  · Limit red meat and sweets to only a few servings a month  What are my food choices?  · Mediterranean diet  ? Recommended  ? Grains: Whole-grain pasta. Brown rice. Bulgar wheat. Polenta. Couscous. Whole-wheat bread. Oatmeal. Quinoa.  ? Vegetables: Artichokes. Beets. Broccoli. Cabbage. Carrots. Eggplant. Green beans. Chard. Kale.  Spinach. Onions. Leeks. Peas. Squash. Tomatoes. Peppers. Radishes.  ? Fruits: Apples. Apricots. Avocado. Berries. Bananas. Cherries. Dates. Figs. Grapes. Nate. Melon. Oranges. Peaches. Plums. Pomegranate.  ? Meats and other protein foods: Beans. Almonds. Sunflower seeds. Pine nuts. Peanuts. Cod. [place]. Scallops. Shrimp. Tuna. Tilapia. Clams. Oysters. Eggs.  ? Dairy: Low-fat milk. Cheese. Greek yogurt.  ? Beverages: Water. Red wine. Herbal tea.  ? Fats and oils: Extra virgin olive oil. Avocado oil. Grape seed oil.  ? Sweets and desserts: Greek yogurt with honey. Baked apples. Poached pears. Trail mix.  ? Seasoning and other foods: Basil. Cilantro. Coriander. Cumin. Mint. Parsley. Carlo. Rosemary. Tarragon. Garlic. Oregano. Thyme. Pepper. Balsalmic vinegar. Tahini. Hummus. Tomato sauce. Olives. Mushrooms.  ? Limit these  ? Grains: Prepackaged pasta or rice dishes. Prepackaged cereal with added sugar.  ? Vegetables: Deep fried potatoes (french fries).  ? Fruits: Fruit canned in syrup.  ? Meats and other protein foods: Beef. Pork. Lamb. Poultry with skin. Hot dogs. Cisse.  ? Dairy: Ice cream. Sour cream. Whole milk.  ? Beverages: Juice. Sugar-sweetened soft drinks. Beer. Liquor and spirits.  ? Fats and oils: Butter. Canola oil. Vegetable oil. Beef fat (tallow). Lard.  ? Sweets and desserts: Cookies. Cakes. Pies. Candy.  ? Seasoning and other foods: Mayonnaise. Premade sauces and marinades.  ? The items listed may not be a complete list. Talk with your dietitian about what dietary choices are right for you.  Summary  · The Mediterranean diet includes both food and lifestyle choices.  · Eat a variety of fresh fruits and vegetables, beans, nuts, seeds, and whole grains.  · Limit the amount of red meat and sweets that you eat.  · Talk with your health care provider about whether it is safe for you to drink red wine in moderation. This means 1 glass a day for nonpregnant women and 2 glasses a day for men. A glass of wine  equals 5 oz (150 mL).  This information is not intended to replace advice given to you by your health care provider. Make sure you discuss any questions you have with your health care provider.  Document Released: 08/10/2017 Document Revised: 09/12/2017 Document Reviewed: 08/10/2017  [employer]vier Interactive Patient Education © 2019 [employer]

## 2020-02-07 NOTE — PROGRESS NOTES
Subjective   Angie Patel is a 44 y.o. female presents for   Chief Complaint   Patient presents with   • Weight Gain   • Foot Pain     right foot x 4 weeks       History of Present Illness     Shelia is a 44-year-old female who presents with several complaints at office visit.  1.  She has gained approximately 13 pounds since November 5, 2019. She has been eating better.  She has been unable to walk due to her her pain/injury.    2.  She has been having right foot pain for the past 4 weeks. She was breaking up a dog fight on January 12,2020 .  She was on the floor reaching up on the bed to break up a fight.  .  She can't recall if she hit her foot on anything. She didn't hear a pop of her foot.  She felt pain when she walks .  She feel the pain around her right ankle area. She ocampo been applying ice  without releif of pain.  She has had some swelling in her ankle/foot.      The following portions of the patient's history were reviewed and updated as appropriate: allergies, current medications, past family history, past medical history, past social history, past surgical history and problem list.    Review of Systems   Constitutional: Negative.         Weight gain   HENT: Negative.    Eyes: Negative.    Respiratory: Negative.  Negative for cough, shortness of breath and wheezing.    Cardiovascular: Negative.  Negative for chest pain, palpitations and leg swelling.   Gastrointestinal: Negative.    Endocrine: Negative.    Genitourinary: Negative.    Musculoskeletal: Positive for arthralgias (right foot pain) and gait problem.   Skin: Negative.    Allergic/Immunologic: Negative.    Hematological: Negative.    Psychiatric/Behavioral: Negative.    All other systems reviewed and are negative.        Vitals:    02/07/20 1324 02/07/20 1355   BP: 124/96 120/68   BP Location: Left arm Left arm   Patient Position: Sitting Sitting   Cuff Size: Large Adult Adult   Pulse: 108    Temp: 98.1 °F (36.7 °C)    TempSrc: Oral    SpO2:  "98%    Weight: 124 kg (273 lb 12.8 oz)    Height: 163.1 cm (64.2\")      Wt Readings from Last 3 Encounters:   02/07/20 124 kg (273 lb 12.8 oz)   01/22/20 121 kg (266 lb)   11/05/19 121 kg (266 lb)     BP Readings from Last 3 Encounters:   02/07/20 120/68   11/05/19 124/82   10/31/19 132/88     Social History     Socioeconomic History   • Marital status:      Spouse name: Not on file   • Number of children: Not on file   • Years of education: Not on file   • Highest education level: Not on file   Occupational History   • Occupation:      Employer: SELF-EMPLOYED     Comment: full time   Tobacco Use   • Smoking status: Former Smoker   • Smokeless tobacco: Never Used   Substance and Sexual Activity   • Alcohol use: Yes     Comment: occ'l   • Drug use: No   • Sexual activity: Defer     Partners: Male     Birth control/protection: Surgical     Comment: hysterectomy       Allergies   Allergen Reactions   • Iodine Shortness Of Breath     Swelling     • Shellfish Allergy Shortness Of Breath     swelling       Body mass index is 46.71 kg/m².    Objective   Physical Exam   Constitutional: She is oriented to person, place, and time. Vital signs are normal. She appears well-developed and well-nourished.   Morbid obese female   HENT:   Head: Normocephalic and atraumatic.   Right Ear: Hearing, tympanic membrane, external ear and ear canal normal.   Left Ear: Hearing, tympanic membrane, external ear and ear canal normal.   Nose: Nose normal. Right sinus exhibits no maxillary sinus tenderness and no frontal sinus tenderness. Left sinus exhibits no maxillary sinus tenderness and no frontal sinus tenderness.   Mouth/Throat: Uvula is midline, oropharynx is clear and moist and mucous membranes are normal.   Eyes: Pupils are equal, round, and reactive to light. Conjunctivae, EOM and lids are normal.   Neck: Trachea normal and phonation normal. Neck supple. No tracheal tenderness present. No tracheal deviation and no " edema present. No thyroid mass and no thyromegaly present.   Cardiovascular: Normal rate, regular rhythm, S1 normal, S2 normal, normal heart sounds and normal pulses.   No murmur heard.  Pulmonary/Chest: Effort normal and breath sounds normal.   Abdominal: Soft. Normal appearance, normal aorta and bowel sounds are normal. There is no hepatomegaly. There is no tenderness.   Musculoskeletal:        Right foot: There is tenderness and bony tenderness. There is normal range of motion, no swelling, normal capillary refill, no crepitus and no deformity.        Lymphadenopathy:     She has no cervical adenopathy.   Neurological: She is alert and oriented to person, place, and time. She has normal strength. No sensory deficit.   Skin: Skin is warm, dry and intact. Capillary refill takes less than 2 seconds.   Psychiatric: She has a normal mood and affect. Her speech is normal and behavior is normal. Judgment and thought content normal. Cognition and memory are normal.       Assessment/Plan   Angie was seen today for weight gain and foot pain.    Diagnoses and all orders for this visit:    Acute pain of right foot  -     XR Foot 3+ View Right; Future    Foot injury, right, initial encounter  -     XR Foot 3+ View Right; Future    Morbid obesity with BMI of 45.0-49.9, adult (CMS/[facility])      1.  New right foot pain with right foot injury: States she was trying to separate a dog fight on her narinder size bed approximately January 12, 2020.  She had sudden right foot/ankle pain with swelling.  She did not seek medical attention until today.  The area is very tender to palpation.  We will check a right foot x-ray to rule out possible stress fracture.  She will be notified of test results when completed.  This may be also a tendon strain.  She will be notified of test results when completed.  Will consider referral to podiatry in future if warranted.  She may continue taking over-the-counter NSAIDs for now as needed.  2.  Chronic and  uncontrolled morbid obesity: We have discussed different weight loss options.  I have given her suggestions about intermittent fasting as well as joining weight watchers.  Also given her a written pamphlet to take home about the Mediterranean diet.  Will hold diet pills for now due to having side effects in past.  Will reevaluate her weight with her physical examination in April 2020.  We will check fasting labs at that time as well.      KARL Solorio [facility] MEDICINE  [address] [place] [id number]  Dept: [phone]  Dept Fax: [phone]  Loc: [phone]  Loc Fax: [phone]           No visits with results within 2 Week(s) from this visit.   Latest known visit with results is:   Admission on 10/15/2019, Discharged on 10/15/2019   Component Date Value Ref Range Status   • Glucose 10/15/2019 126* 65 - 99 mg/dL Final   • BUN 10/15/2019 12  6 - 20 mg/dL Final   • Creatinine 10/15/2019 0.68  0.57 - 1.00 mg/dL Final   • Sodium 10/15/2019 135* 136 - 145 mmol/L Final   • Potassium 10/15/2019 4.3  3.5 - 5.2 mmol/L Final   • Chloride 10/15/2019 99  98 - 107 mmol/L Final   • CO2 10/15/2019 23.9  22.0 - 29.0 mmol/L Final   • Calcium 10/15/2019 9.2  8.6 - 10.5 mg/dL Final   • Total Protein 10/15/2019 7.4  6.0 - 8.5 g/dL Final   • Albumin 10/15/2019 4.20  3.50 - 5.20 g/dL Final   • ALT (SGPT) 10/15/2019 22  1 - 33 U/L Final   • AST (SGOT) 10/15/2019 15  1 - 32 U/L Final    Specimen hemolyzed.  Results may be affected.   • Alkaline Phosphatase 10/15/2019 45  39 - 117 U/L Final   • Total Bilirubin 10/15/2019 0.2  0.2 - 1.2 mg/dL Final   • eGFR Non African Amer 10/15/2019 94  >60 mL/min/1.73 Final   • Globulin 10/15/2019 3.2  gm/dL Final   • A/G Ratio 10/15/2019 1.3  g/dL Final   • BUN/Creatinine Ratio 10/15/2019 17.6  7.0 - 25.0 Final   • Anion Gap 10/15/2019 12.1  5.0 - 15.0 mmol/L Final   • Color, UA 10/15/2019 Yellow  Yellow, Straw Final   •  Appearance, UA 10/15/2019 Cloudy* Clear Final   • pH, UA 10/15/2019 6.0  5.0 - 8.0 Final   • Specific Gravity, UA 10/15/2019 >=1.030  1.005 - 1.030 Final   • Glucose, UA 10/15/2019 Negative  Negative Final   • Ketones, UA 10/15/2019 Trace* Negative Final   • Bilirubin, UA 10/15/2019 Negative  Negative Final   • Blood, UA 10/15/2019 Negative  Negative Final   • Protein, UA 10/15/2019 Trace* Negative Final   • Leuk Esterase, UA 10/15/2019 Negative  Negative Final   • Nitrite, UA 10/15/2019 Negative  Negative Final   • Urobilinogen, UA 10/15/2019 0.2 E.U./dL  0.2 - 1.0 E.U./dL Final   • WBC 10/15/2019 8.84  3.40 - 10.80 10*3/mm3 Final   • RBC 10/15/2019 4.34  3.77 - 5.28 10*6/mm3 Final   • Hemoglobin 10/15/2019 13.0  12.0 - 15.9 g/dL Final   • Hematocrit 10/15/2019 39.3  34.0 - 46.6 % Final   • MCV 10/15/2019 90.6  79.0 - 97.0 fL Final   • MCH 10/15/2019 30.0  26.6 - 33.0 pg Final   • MCHC 10/15/2019 33.1  31.5 - 35.7 g/dL Final   • RDW 10/15/2019 12.6  12.3 - 15.4 % Final   • RDW-SD 10/15/2019 42.1  37.0 - 54.0 fl Final   • MPV 10/15/2019 11.7  6.0 - 12.0 fL Final   • Platelets 10/15/2019 277  140 - 450 10*3/mm3 Final   • Neutrophil % 10/15/2019 61.6  42.7 - 76.0 % Final   • Lymphocyte % 10/15/2019 26.6  19.6 - 45.3 % Final   • Monocyte % 10/15/2019 8.9  5.0 - 12.0 % Final   • Eosinophil % 10/15/2019 1.7  0.3 - 6.2 % Final   • Basophil % 10/15/2019 0.5  0.0 - 1.5 % Final   • Immature Grans % 10/15/2019 0.7* 0.0 - 0.5 % Final   • Neutrophils, Absolute 10/15/2019 5.45  1.70 - 7.00 10*3/mm3 Final   • Lymphocytes, Absolute 10/15/2019 2.35  0.70 - 3.10 10*3/mm3 Final   • Monocytes, Absolute 10/15/2019 0.79  0.10 - 0.90 10*3/mm3 Final   • Eosinophils, Absolute 10/15/2019 0.15  0.00 - 0.40 10*3/mm3 Final   • Basophils, Absolute 10/15/2019 0.04  0.00 - 0.20 10*3/mm3 Final   • Immature Grans, Absolute 10/15/2019 0.06* 0.00 - 0.05 10*3/mm3 Final   • nRBC 10/15/2019 0.0  0.0 - 0.2 /100 WBC Final       Answers for HPI/ROS  submitted by the patient on 2/6/2020   What is the primary reason for your visit?: Other  Please describe your symptoms.: Weight gain, headaches  Have you had these symptoms before?: Yes  How long have you been having these symptoms?: Other

## 2020-02-09 PROCEDURE — 95810 POLYSOM 6/> YRS 4/> PARAM: CPT | Performed by: PSYCHIATRY & NEUROLOGY

## 2020-02-27 ENCOUNTER — OFFICE VISIT (OUTPATIENT)
Dept: NEUROLOGY | Facility: CLINIC | Age: 45
End: 2020-02-27

## 2020-02-27 VITALS
OXYGEN SATURATION: 98 % | HEART RATE: 109 BPM | BODY MASS INDEX: 47.97 KG/M2 | WEIGHT: 281 LBS | HEIGHT: 64 IN | SYSTOLIC BLOOD PRESSURE: 128 MMHG | DIASTOLIC BLOOD PRESSURE: 72 MMHG

## 2020-02-27 DIAGNOSIS — R07.9 LEFT-SIDED CHEST PAIN: Primary | ICD-10-CM

## 2020-02-27 DIAGNOSIS — R60.9 PITTING EDEMA: ICD-10-CM

## 2020-02-27 DIAGNOSIS — R00.0 TACHYCARDIA: ICD-10-CM

## 2020-02-27 DIAGNOSIS — R42 DIZZINESS: ICD-10-CM

## 2020-02-27 DIAGNOSIS — G47.33 OBSTRUCTIVE SLEEP APNEA: Primary | ICD-10-CM

## 2020-02-27 PROCEDURE — 99215 OFFICE O/P EST HI 40 MIN: CPT | Performed by: PSYCHIATRY & NEUROLOGY

## 2020-02-27 NOTE — PROGRESS NOTES
Subjective:     Patient ID: Angie Patel is a 44 y.o. female.    Ms. Patel is a 44-year-old female with history of headaches, meningioma, and fibromyalgia who presents to the neurology clinic today as an established patient for follow-up for transient spells.  The patient was last seen as a new patient on November 5, 2019.  For details regarding her history please refer to that note, in summary, the patient reported for different transient episodes of concerns.  For a while she would wake up out of her sleep with muscle aches.  She also reported seeing things in her peripheral vision.  She also had a transient episode of right arm numbness and weakness followed by 2 hours of unexplained loss time.  She also had an episode of inappropriate emotions and leaning to one side.  She had a history of sleep apnea and therefore we repeated an in lab sleep study with a full montage EEG.  This was done on January 22.  Her AHI at that time was 58.5.  The EEG did not show anything potentially epileptogenic.  Due to her balance issues I wanted to check a B12 level.  It was checked 3 years ago and was low then and she did not take supplementation.  This was ordered on November 5 but has not been completed.  The patient reports returns today reports she has had a few more symptoms.  Before Thanksgiving during the night she remembers waking up.  The next thing she knew she was laying in the bed a different way.  Her  have found her slumped off the bed with her legs underneath the bed.  She was asleep and a little tough to wake up.  She also reports that if she laughs too hard she feels like she is being pulled into a tunnel.  She feels some weakness and she thinks it may sound similar to cataplexy.  She denies any loss of consciousness.  She also reports auditory hypnagogic hallucinations and sleep paralysis.  Once for about 15 to 20 seconds she got hung up on her words.  The patient has also been noted to be persistently  tachycardic.  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  She has not seen them since 2014.  She does feel like she has been retaining water.    ESS:  1. Sitting and reading? 3  2. Watching TV? 2  3. Sitting inactive in a public place? 0  4. As a passenger in a car for an hour without a break? 1  5. Lying down to rest in the afternoon when able? 1  6. Sitting and talking to someone? 0  7. Sitting quietly after lunch without EtOH? 1  8. In a car while stopped for a few minutes in traffic? 0  Total: 8      The following portions of the patient's history were reviewed and updated as appropriate: allergies, current medications, past family history, past medical history, past social history, past surgical history and problem list.    Review of Systems     Objective:    Neurologic Exam    Physical Exam   We checked orthostatics today and they were negative.    Assessment/Plan:  Ms. Patel is a 44-year-old female with history of headaches, meningioma, and fibromyalgia who presents to the neurology clinic today for follow-up.    1.  Transient spells-the patient continues to have some transient episodes.  The most recent episode was during her sleep.  She also has been noting some cataplexy type episode.  It is very possible that her symptoms may be due to her untreated severe obstructive sleep apnea.  What I would like to do is go ahead and get her started on CPAP.  At her follow-up visit, if her symptoms continue then I would recommend an M SLT to evaluate for possible narcolepsy.  I have a low clinical suspicion for seizures at this point.  I do not feel that the benefits of an antiseizure medication would outweigh the risk.    2.  Tachycardia-I have noticed that the patient's has had elevated heart rates over 100.  I would like to contact her primary care provider to see about a possible referral to cardiology.  I am okay with her exercising from a  neurological perspective.  I would like to get her primary care provider's input regarding high intensity interval training with her tachycardia.    A total of 40 minutes of face-to-face time was spent with the patient and her  and greater than 50% that time was spent on counseling regarding her test results, treatment options, and prognosis.     Problems Addressed this Visit     None      Visit Diagnoses     Obstructive sleep apnea    -  Primary    Tachycardia

## 2020-03-02 ENCOUNTER — OFFICE VISIT (OUTPATIENT)
Dept: CARDIOLOGY | Facility: CLINIC | Age: 45
End: 2020-03-02

## 2020-03-02 VITALS
DIASTOLIC BLOOD PRESSURE: 96 MMHG | HEART RATE: 98 BPM | HEIGHT: 64 IN | SYSTOLIC BLOOD PRESSURE: 132 MMHG | BODY MASS INDEX: 47.51 KG/M2 | WEIGHT: 278.3 LBS

## 2020-03-02 DIAGNOSIS — R07.89 CHEST PAIN, ATYPICAL: ICD-10-CM

## 2020-03-02 DIAGNOSIS — G47.33 OSA (OBSTRUCTIVE SLEEP APNEA): ICD-10-CM

## 2020-03-02 DIAGNOSIS — E66.01 MORBID OBESITY WITH BMI OF 45.0-49.9, ADULT (HCC): ICD-10-CM

## 2020-03-02 DIAGNOSIS — I10 BENIGN ESSENTIAL HYPERTENSION: ICD-10-CM

## 2020-03-02 DIAGNOSIS — R00.2 PALPITATIONS: Primary | ICD-10-CM

## 2020-03-02 PROCEDURE — 99204 OFFICE O/P NEW MOD 45 MIN: CPT | Performed by: INTERNAL MEDICINE

## 2020-03-02 PROCEDURE — 93000 ELECTROCARDIOGRAM COMPLETE: CPT | Performed by: INTERNAL MEDICINE

## 2020-03-02 NOTE — PROGRESS NOTES
PATIENTINFORMATION    Date of Office Visit: 2020  Encounter Provider: Behzad Roblero MD  Place of Service: [facility] CARDIOLOGY  Patient Name: Angie Patel  : 1975    Subjective:     Encounter Date:2020      Patient ID: Angie Patel is a 44 y.o. female.    Chief Complaint   Patient presents with   • Hypertension   • Edema     HPI  Ms. Patel is a 44 years old female patient with past medical history of diabetes, reviewed obstructive sleep apnea that was recently diagnosed was referred to cardiology clinic for evaluation of chest pain and palpitations.  Patient reports past medical history of peripartum cardiomyopathy back in  for which she was admitted in the hospital and eventually significantly improved.  Otherwise she did not have any known significant past history of cardiovascular illness.  For the last several months she reports having spells that she describes as falling asleep suddenly and feeling very tired and having no recollection of episode for which she has been following up with neurology and eventually she was diagnosed with severe obstructive sleep apnea and pending delivery of CPAP machine to her home.  She had other studies including EEG.  She reports having intermittent episodes of chest pain localized to the left lateral chest that usually comes at rest and described as cramping.  She used to be involved in high intensity exercise training and denied having any chest pain or significant shortness of breath during sessions.  She stopped doing that about 2 months ago because of ongoing medical evaluation for sleep apnea/'spells'.  She denied exertional symptoms.  She reports having intermittent episodes of palpitations that can last for several minutes with heart beating fast even at rest.  Happen once or twice a day.  no known exacerbating or relieving factor.  She denied presyncope or syncope during those episodes.  She also reports  hair baseline heart rate and blood pressure has been running on the higher side lately.  She denied any orthopnea, PND.  But she has had intermittent bilateral lower extremity swelling for which she has been taking Lasix for about a year now.  She takes metformin for diabetes and spironolactone for PCOS    ROS   All systems reviewed and negative except as noted in HPI.    Past Medical History:   Diagnosis Date   • Anxiety    • Asthma    • Cardiomyopathy (CMS/HCC)     with pregnancy   • Fibromyalgia    • Fibromyalgia    • Fibromyalgia, primary    • Headache, tension-type    • Hypertension    • Insomnia    • Kidney calculi    • Memory loss    • Meningioma (CMS/HCC)     benign brain    • Meningioma (CMS/HCC)    • Migraine     ocular   • Polycystic ovaries    • Seizures (CMS/HCC)        Past Surgical History:   Procedure Laterality Date   • CYSTOSCOPY URETEROSCOPY LASER LITHOTRIPSY Right 3/7/2017    Procedure: CYSTOSCOPY RT URETEROSCOPY LASER LITHOTRIPSY W/ STENT, rerograde pyelogram ;  Surgeon: Rashid Malloy MD;  Location: [facility];  Service:    • FOOT SURGERY Left     tendon repair   • HYSTERECTOMY     • OOPHORECTOMY     • SHOULDER SURGERY Right 2009   • TUBAL ABDOMINAL LIGATION     • WISDOM TOOTH EXTRACTION         Social History     Socioeconomic History   • Marital status:      Spouse name: Not on file   • Number of children: Not on file   • Years of education: Not on file   • Highest education level: Not on file   Occupational History   • Occupation:      Employer: SELF-EMPLOYED     Comment: full time   Tobacco Use   • Smoking status: Former Smoker   • Smokeless tobacco: Never Used   Substance and Sexual Activity   • Alcohol use: Yes     Comment: occ'l   • Drug use: No   • Sexual activity: Defer     Partners: Male     Birth control/protection: Surgical     Comment: hysterectomy       Family History   Problem Relation Age of Onset   • Heart disease Paternal Aunt    • Diabetes Paternal  "Aunt    • Diabetes Maternal Grandmother    • Neuropathy Maternal Grandmother    • Thyroid disease Maternal Grandmother    • Heart disease Maternal Grandfather    • Cancer Maternal Grandfather    • Heart disease Paternal Grandfather    • Heart disease Paternal Uncle    • Arthritis Mother    • Migraines Mother    • Seizures Father    • Breast cancer Neg Hx    • Ovarian cancer Neg Hx    • Colon cancer Neg Hx    • Deep vein thrombosis Neg Hx    • Pulmonary embolism Neg Hx            ECG 12 Lead  Date/Time: 3/2/2020 12:47 PM  Performed by: Behzad Roblero MD  Authorized by: Behzad Roblero MD   Comparison: compared with previous ECG from 10/31/2019  Similar to previous ECG  Rhythm: sinus rhythm  Rate: normal  Conduction: conduction normal  ST Segments: ST segments normal  T Waves: T waves normal  QRS axis: normal  Other findings: non-specific ST-T wave changes    Clinical impression: normal ECG               Objective:     /96 (BP Location: Left arm, Patient Position: Sitting)   Pulse 98   Ht 162.6 cm (64\")   Wt 126 kg (278 lb 4.8 oz)   BMI 47.77 kg/m²  Body mass index is 47.77 kg/m².     Physical Exam   Constitutional: She is oriented to person, place, and time. She appears well-developed and well-nourished. No distress.   Morbidly obese   HENT:   Head: Normocephalic and atraumatic.   Eyes: Pupils are equal, round, and reactive to light. EOM are normal.   Neck: Normal range of motion. Neck supple. No thyromegaly present.   Cardiovascular: Normal rate, regular rhythm, normal heart sounds and intact distal pulses. Exam reveals no gallop and no friction rub.   No murmur heard.  Pulmonary/Chest: Effort normal and breath sounds normal. No respiratory distress. She has no wheezes. She has no rales. She exhibits no tenderness.   Abdominal: Soft. Bowel sounds are normal. She exhibits no distension. There is no guarding.   Musculoskeletal: Normal range of motion. She exhibits edema. She exhibits no " deformity.   Neurological: She is alert and oriented to person, place, and time. She has normal reflexes. No cranial nerve deficit.   Skin: Skin is warm and dry. No rash noted. She is not diaphoretic.   Psychiatric: She has a normal mood and affect. Judgment normal.       Review Of Data: I have reviewed labs done recently as well as notes from different office visits that are included in HPI.      Assessment/Plan:         Palpitations    Benign essential hypertension    Chest pain, atypical     BL LE edema-she only had only trace of swelling today.    Morbidly obese with complications including PCOS, diabetes, obstructive sleep apnea-     Patient does not get chest pain during exertion and is atypical.  She reports having terminated palpitations setting for SVT or atrial fibrillation.  We will send patient on Holter monitor and get echocardiogram to evaluate left ventricular ejection fraction.  Repeat blood pressure revealed 140/90 mmHg in both arms.  And advised to check blood pressure at home and bring logs.  Encourage patient to continue exercise programs as she does not have any reasons to restrict her.     Diagnosis and plan of care discussed with patient and verbalized understanding.           Behzad Roblero MD  03/02/20  1:50 PM

## 2020-03-04 ENCOUNTER — HOSPITAL ENCOUNTER (OUTPATIENT)
Dept: CARDIOLOGY | Facility: HOSPITAL | Age: 45
Discharge: HOME | End: 2020-03-04
Admitting: INTERNAL MEDICINE

## 2020-03-04 VITALS — DIASTOLIC BLOOD PRESSURE: 90 MMHG | SYSTOLIC BLOOD PRESSURE: 154 MMHG | HEART RATE: 98 BPM | OXYGEN SATURATION: 98 %

## 2020-03-04 DIAGNOSIS — R00.2 PALPITATIONS: ICD-10-CM

## 2020-03-04 LAB
BH CV ECHO MEAS - ACS: 2 CM
BH CV ECHO MEAS - AO MAX PG (FULL): 5.1 MMHG
BH CV ECHO MEAS - AO MAX PG: 9.9 MMHG
BH CV ECHO MEAS - AO MEAN PG (FULL): 3.7 MMHG
BH CV ECHO MEAS - AO MEAN PG: 6.7 MMHG
BH CV ECHO MEAS - AO ROOT AREA (BSA CORRECTED): 1.2
BH CV ECHO MEAS - AO ROOT AREA: 5.3 CM^2
BH CV ECHO MEAS - AO ROOT DIAM: 2.6 CM
BH CV ECHO MEAS - AO V2 MAX: 157.5 CM/SEC
BH CV ECHO MEAS - AO V2 MEAN: 122.9 CM/SEC
BH CV ECHO MEAS - AO V2 VTI: 23.4 CM
BH CV ECHO MEAS - AVA(I,A): 1.9 CM^2
BH CV ECHO MEAS - AVA(I,D): 1.9 CM^2
BH CV ECHO MEAS - AVA(V,A): 1.8 CM^2
BH CV ECHO MEAS - AVA(V,D): 1.8 CM^2
BH CV ECHO MEAS - BSA(HAYCOCK): 2.5 M^2
BH CV ECHO MEAS - BSA: 2.2 M^2
BH CV ECHO MEAS - BZI_BMI: 47.7 KILOGRAMS/M^2
BH CV ECHO MEAS - BZI_METRIC_HEIGHT: 162.6 CM
BH CV ECHO MEAS - BZI_METRIC_WEIGHT: 126.1 KG
BH CV ECHO MEAS - EDV(MOD-SP2): 92 ML
BH CV ECHO MEAS - EDV(MOD-SP4): 70 ML
BH CV ECHO MEAS - EDV(TEICH): 53.9 ML
BH CV ECHO MEAS - EF(CUBED): 60.8 %
BH CV ECHO MEAS - EF(MOD-BP): 62 %
BH CV ECHO MEAS - EF(MOD-SP2): 62 %
BH CV ECHO MEAS - EF(MOD-SP4): 61.4 %
BH CV ECHO MEAS - EF(TEICH): 53.3 %
BH CV ECHO MEAS - ESV(MOD-SP2): 35 ML
BH CV ECHO MEAS - ESV(MOD-SP4): 27 ML
BH CV ECHO MEAS - ESV(TEICH): 25.1 ML
BH CV ECHO MEAS - FS: 26.8 %
BH CV ECHO MEAS - IVS/LVPW: 1
BH CV ECHO MEAS - IVSD: 1.1 CM
BH CV ECHO MEAS - LAT PEAK E' VEL: 11 CM/SEC
BH CV ECHO MEAS - LV DIASTOLIC VOL/BSA (35-75): 31.1 ML/M^2
BH CV ECHO MEAS - LV MASS(C)D: 115.9 GRAMS
BH CV ECHO MEAS - LV MASS(C)DI: 51.5 GRAMS/M^2
BH CV ECHO MEAS - LV MAX PG: 4.8 MMHG
BH CV ECHO MEAS - LV MEAN PG: 3 MMHG
BH CV ECHO MEAS - LV SYSTOLIC VOL/BSA (12-30): 12 ML/M^2
BH CV ECHO MEAS - LV V1 MAX: 109.6 CM/SEC
BH CV ECHO MEAS - LV V1 MEAN: 82.6 CM/SEC
BH CV ECHO MEAS - LV V1 VTI: 16.9 CM
BH CV ECHO MEAS - LVIDD: 3.6 CM
BH CV ECHO MEAS - LVIDS: 2.6 CM
BH CV ECHO MEAS - LVLD AP2: 7.5 CM
BH CV ECHO MEAS - LVLD AP4: 7.2 CM
BH CV ECHO MEAS - LVLS AP2: 6.5 CM
BH CV ECHO MEAS - LVLS AP4: 6.2 CM
BH CV ECHO MEAS - LVOT AREA (M): 2.5 CM^2
BH CV ECHO MEAS - LVOT AREA: 2.6 CM^2
BH CV ECHO MEAS - LVOT DIAM: 1.8 CM
BH CV ECHO MEAS - LVPWD: 1 CM
BH CV ECHO MEAS - MED PEAK E' VEL: 10 CM/SEC
BH CV ECHO MEAS - MV A MAX VEL: 104.7 CM/SEC
BH CV ECHO MEAS - MV DEC SLOPE: 654.5 CM/SEC^2
BH CV ECHO MEAS - MV DEC TIME: 0.14 SEC
BH CV ECHO MEAS - MV E MAX VEL: 88.1 CM/SEC
BH CV ECHO MEAS - MV E/A: 0.84
BH CV ECHO MEAS - MV MAX PG: 3.5 MMHG
BH CV ECHO MEAS - MV MEAN PG: 2.1 MMHG
BH CV ECHO MEAS - MV P1/2T MAX VEL: 86.9 CM/SEC
BH CV ECHO MEAS - MV P1/2T: 38.9 MSEC
BH CV ECHO MEAS - MV V2 MAX: 93.7 CM/SEC
BH CV ECHO MEAS - MV V2 MEAN: 70 CM/SEC
BH CV ECHO MEAS - MV V2 VTI: 17 CM
BH CV ECHO MEAS - MVA P1/2T LCG: 2.5 CM^2
BH CV ECHO MEAS - MVA(P1/2T): 5.7 CM^2
BH CV ECHO MEAS - MVA(VTI): 2.6 CM^2
BH CV ECHO MEAS - PA MAX PG (FULL): 8 MMHG
BH CV ECHO MEAS - PA MAX PG: 12.5 MMHG
BH CV ECHO MEAS - PA V2 MAX: 176.5 CM/SEC
BH CV ECHO MEAS - PULM DIAS VEL: 68.3 CM/SEC
BH CV ECHO MEAS - PULM S/D: 0.75
BH CV ECHO MEAS - PULM SYS VEL: 51 CM/SEC
BH CV ECHO MEAS - PVA(V,A): 2.7 CM^2
BH CV ECHO MEAS - PVA(V,D): 2.7 CM^2
BH CV ECHO MEAS - QP/QS: 1.9
BH CV ECHO MEAS - RV MAX PG: 4.4 MMHG
BH CV ECHO MEAS - RV MEAN PG: 2.9 MMHG
BH CV ECHO MEAS - RV V1 MAX: 105 CM/SEC
BH CV ECHO MEAS - RV V1 MEAN: 81.1 CM/SEC
BH CV ECHO MEAS - RV V1 VTI: 18.4 CM
BH CV ECHO MEAS - RVOT AREA: 4.5 CM^2
BH CV ECHO MEAS - RVOT DIAM: 2.4 CM
BH CV ECHO MEAS - SI(AO): 55.5 ML/M^2
BH CV ECHO MEAS - SI(CUBED): 12.5 ML/M^2
BH CV ECHO MEAS - SI(LVOT): 19.7 ML/M^2
BH CV ECHO MEAS - SI(MOD-SP2): 25.3 ML/M^2
BH CV ECHO MEAS - SI(MOD-SP4): 19.1 ML/M^2
BH CV ECHO MEAS - SI(TEICH): 12.8 ML/M^2
BH CV ECHO MEAS - SV(AO): 124.9 ML
BH CV ECHO MEAS - SV(CUBED): 28 ML
BH CV ECHO MEAS - SV(LVOT): 44.3 ML
BH CV ECHO MEAS - SV(MOD-SP2): 57 ML
BH CV ECHO MEAS - SV(MOD-SP4): 43 ML
BH CV ECHO MEAS - SV(RVOT): 82.4 ML
BH CV ECHO MEAS - SV(TEICH): 28.8 ML
BH CV ECHO MEASUREMENTS AVERAGE E/E' RATIO: 8.39
LEFT ATRIUM VOLUME INDEX: 20 ML/M2
LEFT ATRIUM VOLUME: 45 CM3
MAXIMAL PREDICTED HEART RATE: 177 BPM
SINUS: 2.3 CM
STJ: 2.3 CM
STRESS TARGET HR: 150 BPM

## 2020-03-04 PROCEDURE — 93306 TTE W/DOPPLER COMPLETE: CPT | Performed by: INTERNAL MEDICINE

## 2020-03-04 PROCEDURE — 25010000002 PERFLUTREN (DEFINITY) 8.476 MG IN SODIUM CHLORIDE 0.9 % 10 ML INJECTION: Performed by: INTERNAL MEDICINE

## 2020-03-04 PROCEDURE — 93306 TTE W/DOPPLER COMPLETE: CPT

## 2020-03-04 RX ADMIN — PERFLUTREN 1.5 ML: 6.52 INJECTION, SUSPENSION INTRAVENOUS at 14:48

## 2020-03-05 ENCOUNTER — HOSPITAL ENCOUNTER (OUTPATIENT)
Dept: CARDIOLOGY | Facility: HOSPITAL | Age: 45
Discharge: HOME | End: 2020-03-05
Admitting: INTERNAL MEDICINE

## 2020-03-05 DIAGNOSIS — R00.2 PALPITATIONS: ICD-10-CM

## 2020-03-05 PROCEDURE — 93226 XTRNL ECG REC<48 HR SCAN A/R: CPT

## 2020-03-05 PROCEDURE — 93225 XTRNL ECG REC<48 HRS REC: CPT

## 2020-03-09 PROCEDURE — 93227 XTRNL ECG REC<48 HR R&I: CPT | Performed by: INTERNAL MEDICINE

## 2020-03-09 NOTE — PROGRESS NOTES
I call patient with results and unfortunately she did not answer the phone and left a voicemail to call back.

## 2020-03-21 DIAGNOSIS — R60.9 PITTING EDEMA: ICD-10-CM

## 2020-03-22 RX ORDER — FUROSEMIDE 20 MG/1
TABLET ORAL
Qty: 90 TABLET | Refills: 0 | Status: SHIPPED | OUTPATIENT
Start: 2020-03-22 | End: 2021-01-12

## 2020-03-30 ENCOUNTER — TELEPHONE (OUTPATIENT)
Dept: NEUROLOGY | Facility: CLINIC | Age: 45
End: 2020-03-30

## 2020-03-30 NOTE — TELEPHONE ENCOUNTER
Reached out to patient to see if she has received her CPAP. She stated yes and she went through [employer] ([phone]).

## 2020-04-14 ENCOUNTER — OFFICE VISIT (OUTPATIENT)
Dept: CARDIOLOGY | Facility: CLINIC | Age: 45
End: 2020-04-14

## 2020-04-14 VITALS
BODY MASS INDEX: 47.46 KG/M2 | HEART RATE: 91 BPM | WEIGHT: 278 LBS | HEIGHT: 64 IN | DIASTOLIC BLOOD PRESSURE: 85 MMHG | SYSTOLIC BLOOD PRESSURE: 149 MMHG

## 2020-04-14 DIAGNOSIS — I10 BENIGN ESSENTIAL HYPERTENSION: Primary | ICD-10-CM

## 2020-04-14 DIAGNOSIS — G47.33 OSA (OBSTRUCTIVE SLEEP APNEA): ICD-10-CM

## 2020-04-14 DIAGNOSIS — R60.0 LOWER LEG EDEMA: ICD-10-CM

## 2020-04-14 PROCEDURE — 99442 PR PHYS/QHP TELEPHONE EVALUATION 11-20 MIN: CPT | Performed by: INTERNAL MEDICINE

## 2020-04-14 RX ORDER — CHLORTHALIDONE 25 MG/1
25 TABLET ORAL DAILY
Qty: 90 TABLET | Refills: 3 | Status: SHIPPED | OUTPATIENT
Start: 2020-04-14 | End: 2021-01-25

## 2020-04-14 NOTE — PROGRESS NOTES
PATIENTINFORMATION    Date of Office Visit: 2020  Encounter Provider: Behzad Roblero MD  Place of Service: [facility]  Patient Name: Angie Patel  : 1975    Subjective:     Encounter Date:2020      Patient ID: Angie Patel is a 44 y.o. female.    Chief Complaint   Patient presents with   • Hypertension     TELEVISIT     HPI  Ms. Patel is 44 years old young lady with past medical history of morbid obesity, hypertension and extremity swelling, palpitations following up for uncontrolled blood pressure and palpitations.  Since last visit palpitations and sob resolved and her blood pressure initially improved and she continued to go to gym.  But because of social do something for COVID-19 infection she has stayed home and not been exercising very well.  But she has not started wearing CPAP machine.  Overall  blood pressure has been running on the higher side with systolic in the 140s.  And she has had bilateral lower extremity swelling for which she uses Lasix as needed but seems to have been persisting lately.  She takes Aldactone for PCOS with metformin.  Otherwise she denied any cough, fever, shortness of breath, orthopnea, PND, chest pain or palpitations since last visit.  No other significant complaints today  She admits not losing weight since last visit.      ROS   All systems reviewed and negative except as noted in HPI.    Past Medical History:   Diagnosis Date   • Anxiety    • Asthma    • Cardiomyopathy (CMS/HCC)     with pregnancy   • Fibromyalgia    • Fibromyalgia, primary    • Headache, tension-type    • Hypertension    • Insomnia    • Kidney calculi    • Memory loss    • Meningioma (CMS/HCC)     benign brain    • Migraine     ocular   • KACI (obstructive sleep apnea)    • Palpitations    • Polycystic ovaries    • Seizures (CMS/HCC)        Past Surgical History:   Procedure Laterality Date   • CYSTOSCOPY URETEROSCOPY LASER LITHOTRIPSY  "Right 3/7/2017    Procedure: CYSTOSCOPY RT URETEROSCOPY LASER LITHOTRIPSY W/ STENT, rerograde pyelogram ;  Surgeon: Rashid Malloy MD;  Location: [facility];  Service:    • FOOT SURGERY Left     tendon repair   • HYSTERECTOMY     • OOPHORECTOMY     • SHOULDER SURGERY Right 2009   • TUBAL ABDOMINAL LIGATION     • WISDOM TOOTH EXTRACTION         Social History     Socioeconomic History   • Marital status:      Spouse name: Not on file   • Number of children: Not on file   • Years of education: Not on file   • Highest education level: Not on file   Occupational History   • Occupation:      Employer: SELF-EMPLOYED     Comment: full time   Tobacco Use   • Smoking status: Former Smoker   • Smokeless tobacco: Never Used   Substance and Sexual Activity   • Alcohol use: Yes     Comment: occ'l   • Drug use: No   • Sexual activity: Defer     Partners: Male     Birth control/protection: Surgical     Comment: hysterectomy       Family History   Problem Relation Age of Onset   • Heart disease Paternal Aunt    • Diabetes Paternal Aunt    • Diabetes Maternal Grandmother    • Neuropathy Maternal Grandmother    • Thyroid disease Maternal Grandmother    • Heart disease Maternal Grandfather    • Cancer Maternal Grandfather    • Heart disease Paternal Grandfather    • Heart disease Paternal Uncle    • Arthritis Mother    • Migraines Mother    • Seizures Father    • Breast cancer Neg Hx    • Ovarian cancer Neg Hx    • Colon cancer Neg Hx    • Deep vein thrombosis Neg Hx    • Pulmonary embolism Neg Hx          Procedures       Objective:     /85 (BP Location: Left arm, Patient Position: Sitting, Cuff Size: Adult)   Pulse 91   Ht 162.6 cm (64\")   Wt 126 kg (278 lb)   BMI 47.72 kg/m²  Body mass index is 47.72 kg/m².     Physical Exam   Not done as this is a tele-visit    Review Of Data:   I have reviewed labs since last visit.      Assessment/Plan:         Palpitations-resolved     Benign essential " hypertension- uncontrolled     Chest pain, atypical -resolved    BL LE edema-persistent    Morbidly obese with complications including PCOS, diabetes, obstructive sleep apnea-counseled on weight loss but she cannot go to the gym now because of social distancing    Complaint CPAP machine     Start chlorthalidone 25 mg daily and advised patient to wear thigh-high compression stocking and keep leg elevated while lying flat at night.  She is on Aldactone for PCOS and deferred using lisinopril for now  Patient advised to send blood pressure logs in the next 2 weeks.      Diagnosis and plan of care discussed with patient and verbalized understanding.      This patient has consented to a telehealth visit via telephone. The visit was scheduled as a telehealth visit to comply with patient safety concerns in accordance with CDC recommendations.  All vitals recorded within this visit are reported by the patient.  I spent  30 minutes in total including but not limited to the 15 minutes spent in direct conversation with this patient.          Behzad Roblero MD  04/14/20  12:19

## 2020-04-15 DIAGNOSIS — R73.9 HYPERGLYCEMIA: ICD-10-CM

## 2020-04-15 RX ORDER — METFORMIN HYDROCHLORIDE 750 MG/1
1500 TABLET, EXTENDED RELEASE ORAL
Qty: 60 TABLET | Refills: 6 | Status: SHIPPED | OUTPATIENT
Start: 2020-04-15 | End: 2020-04-25 | Stop reason: SDUPTHER

## 2020-04-25 DIAGNOSIS — R73.9 HYPERGLYCEMIA: ICD-10-CM

## 2020-04-27 RX ORDER — ALBUTEROL SULFATE 90 UG/1
2 AEROSOL, METERED RESPIRATORY (INHALATION) 4 TIMES DAILY PRN
Qty: 18 G | Refills: 0 | Status: SHIPPED | OUTPATIENT
Start: 2020-04-27 | End: 2020-05-22

## 2020-04-27 RX ORDER — METFORMIN HYDROCHLORIDE 750 MG/1
1500 TABLET, EXTENDED RELEASE ORAL
Qty: 60 TABLET | Refills: 6 | Status: SHIPPED | OUTPATIENT
Start: 2020-04-27 | End: 2021-05-24

## 2020-04-28 ENCOUNTER — TELEPHONE (OUTPATIENT)
Dept: CARDIOLOGY | Facility: CLINIC | Age: 45
End: 2020-04-28

## 2020-05-22 RX ORDER — ALBUTEROL SULFATE 90 UG/1
AEROSOL, METERED RESPIRATORY (INHALATION)
Qty: 18 G | Refills: 0 | Status: SHIPPED | OUTPATIENT
Start: 2020-05-22 | End: 2020-06-16

## 2020-06-16 RX ORDER — ALBUTEROL SULFATE 90 UG/1
AEROSOL, METERED RESPIRATORY (INHALATION)
Qty: 18 G | Refills: 0 | Status: SHIPPED | OUTPATIENT
Start: 2020-06-16 | End: 2020-07-15

## 2020-06-26 DIAGNOSIS — E78.00 HYPERCHOLESTEROLEMIA: ICD-10-CM

## 2020-06-26 DIAGNOSIS — Z00.00 ENCOUNTER FOR ANNUAL PHYSICAL EXAM: Primary | ICD-10-CM

## 2020-06-26 DIAGNOSIS — E55.9 VITAMIN D DEFICIENCY: ICD-10-CM

## 2020-06-26 DIAGNOSIS — Z11.59 ENCOUNTER FOR HEPATITIS C SCREENING TEST FOR LOW RISK PATIENT: ICD-10-CM

## 2020-06-26 DIAGNOSIS — R73.9 HYPERGLYCEMIA: ICD-10-CM

## 2020-06-30 LAB
25(OH)D3+25(OH)D2 SERPL-MCNC: 29.8 NG/ML (ref 30–100)
ALBUMIN SERPL-MCNC: 4.4 G/DL (ref 3.5–5.2)
ALBUMIN/GLOB SERPL: 1.4 G/DL
ALP SERPL-CCNC: 45 U/L (ref 39–117)
ALT SERPL-CCNC: 34 U/L (ref 1–33)
AST SERPL-CCNC: 20 U/L (ref 1–32)
BASOPHILS # BLD AUTO: 0.04 10*3/MM3 (ref 0–0.2)
BASOPHILS NFR BLD AUTO: 0.4 % (ref 0–1.5)
BILIRUB SERPL-MCNC: 0.4 MG/DL (ref 0.2–1.2)
BUN SERPL-MCNC: 16 MG/DL (ref 6–20)
BUN/CREAT SERPL: 18.8 (ref 7–25)
CALCIUM SERPL-MCNC: 9.7 MG/DL (ref 8.6–10.5)
CHLORIDE SERPL-SCNC: 96 MMOL/L (ref 98–107)
CHOLEST SERPL-MCNC: 144 MG/DL (ref 0–200)
CO2 SERPL-SCNC: 26.8 MMOL/L (ref 22–29)
CREAT SERPL-MCNC: 0.85 MG/DL (ref 0.57–1)
EOSINOPHIL # BLD AUTO: 0.17 10*3/MM3 (ref 0–0.4)
EOSINOPHIL NFR BLD AUTO: 1.9 % (ref 0.3–6.2)
ERYTHROCYTE [DISTWIDTH] IN BLOOD BY AUTOMATED COUNT: 12.1 % (ref 12.3–15.4)
GLOBULIN SER CALC-MCNC: 3.2 GM/DL
GLUCOSE SERPL-MCNC: 130 MG/DL (ref 65–99)
HBA1C MFR BLD: 6.9 % (ref 4.8–5.6)
HCT VFR BLD AUTO: 39.4 % (ref 34–46.6)
HCV AB S/CO SERPL IA: <0.1 S/CO RATIO (ref 0–0.9)
HDLC SERPL-MCNC: 34 MG/DL (ref 40–60)
HGB BLD-MCNC: 13.3 G/DL (ref 12–15.9)
IMM GRANULOCYTES # BLD AUTO: 0.05 10*3/MM3 (ref 0–0.05)
IMM GRANULOCYTES NFR BLD AUTO: 0.6 % (ref 0–0.5)
LDLC SERPL CALC-MCNC: 75 MG/DL (ref 0–100)
LDLC/HDLC SERPL: 2.21 {RATIO}
LYMPHOCYTES # BLD AUTO: 2.29 10*3/MM3 (ref 0.7–3.1)
LYMPHOCYTES NFR BLD AUTO: 25.4 % (ref 19.6–45.3)
MCH RBC QN AUTO: 30.2 PG (ref 26.6–33)
MCHC RBC AUTO-ENTMCNC: 33.8 G/DL (ref 31.5–35.7)
MCV RBC AUTO: 89.5 FL (ref 79–97)
MONOCYTES # BLD AUTO: 0.55 10*3/MM3 (ref 0.1–0.9)
MONOCYTES NFR BLD AUTO: 6.1 % (ref 5–12)
NEUTROPHILS # BLD AUTO: 5.91 10*3/MM3 (ref 1.7–7)
NEUTROPHILS NFR BLD AUTO: 65.6 % (ref 42.7–76)
NRBC BLD AUTO-RTO: 0 /100 WBC (ref 0–0.2)
PLATELET # BLD AUTO: 309 10*3/MM3 (ref 140–450)
POTASSIUM SERPL-SCNC: 3.7 MMOL/L (ref 3.5–5.2)
PROT SERPL-MCNC: 7.6 G/DL (ref 6–8.5)
RBC # BLD AUTO: 4.4 10*6/MM3 (ref 3.77–5.28)
SODIUM SERPL-SCNC: 137 MMOL/L (ref 136–145)
TRIGL SERPL-MCNC: 174 MG/DL (ref 0–150)
TSH SERPL DL<=0.005 MIU/L-ACNC: 2.91 UIU/ML (ref 0.27–4.2)
VLDLC SERPL CALC-MCNC: 34.8 MG/DL
WBC # BLD AUTO: 9.01 10*3/MM3 (ref 3.4–10.8)

## 2020-07-02 ENCOUNTER — TELEPHONE (OUTPATIENT)
Dept: FAMILY MEDICINE CLINIC | Facility: CLINIC | Age: 45
End: 2020-07-02

## 2020-07-02 NOTE — TELEPHONE ENCOUNTER
She canceled her appointment for July 2, 2020.  Please have her reschedule annual physical examination so we can talk about her lab work as well.

## 2020-07-15 RX ORDER — ALBUTEROL SULFATE 90 UG/1
AEROSOL, METERED RESPIRATORY (INHALATION)
Qty: 18 G | Refills: 0 | Status: SHIPPED | OUTPATIENT
Start: 2020-07-15 | End: 2020-08-10

## 2020-07-15 RX ORDER — SPIRONOLACTONE 100 MG/1
100 TABLET, FILM COATED ORAL DAILY
Qty: 90 TABLET | Refills: 0 | Status: SHIPPED | OUTPATIENT
Start: 2020-07-15 | End: 2020-10-14

## 2020-07-22 ENCOUNTER — OFFICE VISIT (OUTPATIENT)
Dept: FAMILY MEDICINE CLINIC | Facility: CLINIC | Age: 45
End: 2020-07-22

## 2020-07-22 VITALS
TEMPERATURE: 97.8 F | RESPIRATION RATE: 16 BRPM | SYSTOLIC BLOOD PRESSURE: 120 MMHG | OXYGEN SATURATION: 97 % | BODY MASS INDEX: 46.1 KG/M2 | HEIGHT: 64 IN | DIASTOLIC BLOOD PRESSURE: 82 MMHG | WEIGHT: 270 LBS | HEART RATE: 100 BPM

## 2020-07-22 DIAGNOSIS — Z00.00 ENCOUNTER FOR ANNUAL PHYSICAL EXAM: Primary | ICD-10-CM

## 2020-07-22 DIAGNOSIS — I10 BENIGN ESSENTIAL HYPERTENSION: ICD-10-CM

## 2020-07-22 DIAGNOSIS — E55.9 VITAMIN D INSUFFICIENCY: ICD-10-CM

## 2020-07-22 DIAGNOSIS — E66.01 MORBID OBESITY WITH BMI OF 45.0-49.9, ADULT (HCC): ICD-10-CM

## 2020-07-22 DIAGNOSIS — Z12.31 SCREENING MAMMOGRAM, ENCOUNTER FOR: ICD-10-CM

## 2020-07-22 DIAGNOSIS — E11.65 TYPE 2 DIABETES MELLITUS WITH HYPERGLYCEMIA, WITHOUT LONG-TERM CURRENT USE OF INSULIN (HCC): ICD-10-CM

## 2020-07-22 DIAGNOSIS — E78.00 HYPERCHOLESTEROLEMIA: ICD-10-CM

## 2020-07-22 PROCEDURE — 99396 PREV VISIT EST AGE 40-64: CPT | Performed by: PHYSICIAN ASSISTANT

## 2020-07-22 RX ORDER — LANCETS
EACH MISCELLANEOUS
Qty: 100 EACH | Refills: 12 | Status: SHIPPED | OUTPATIENT
Start: 2020-07-22 | End: 2021-06-07

## 2020-07-22 NOTE — PROGRESS NOTES
Subjective   Angie Patel is a 45 y.o. female presents for   Chief Complaint   Patient presents with   • Annual Exam   • Obesity   • Hypertension       History of Present Illness   Angie is a 45 year-old female who presents for annual physical examination and hypertension management she has lost 8 pounds since April 14, 2020.  Angie been seeing cardiology for her hypertension issues.  Doing well with the current medication.  States she would like to see a bariatric surgeon for her weight.  She would like to think about having the gastric sleeve surgery for weight loss.  She has been trying weight watchers and intermittent fasting without relief of weight loss.  She has been trying to be physically active by walking 3 miles daily and eating healthier.  She has had a history of PCOS and has been on metformin for many years.  Doing well with the metformin medication.  Denied any fevers, chills, upper respiratory symptoms, chest pain, shortness of air, dizziness, vision changes, abdominal pain, GI upset, urinary symptoms, or swelling of ankles.  Currently sees Dr. Deborah Alcazar for her gynecological needs.  She is past due for mammogram.  She tries to do breast examinations at home.  Denied any breast pain or breast discharge.  Bowel movements have been daily without dark black tarry stools.    The following portions of the patient's history were reviewed and updated as appropriate: allergies, current medications, past family history, past medical history, past social history, past surgical history and problem list.    Review of Systems   Constitutional: Negative.  Negative for chills, fatigue and fever.   HENT: Negative.    Eyes: Negative.    Respiratory: Negative.  Negative for cough, chest tightness, shortness of breath and wheezing.    Cardiovascular: Negative.    Gastrointestinal: Negative.    Endocrine: Negative.    Genitourinary: Negative.    Musculoskeletal: Negative.    Skin: Negative.   "  Allergic/Immunologic: Negative.    Neurological: Negative.  Negative for dizziness, light-headedness and headaches.   Hematological: Negative.    Psychiatric/Behavioral: Negative.  Negative for sleep disturbance.   All other systems reviewed and are negative.        Vitals:    07/22/20 1403   BP: 120/82   BP Location: Left arm   Patient Position: Sitting   Cuff Size: Large Adult   Pulse: 100   Resp: 16   Temp: 97.8 °F (36.6 °C)   SpO2: 97%   Weight: 122 kg (270 lb)   Height: 162.6 cm (64\")     Wt Readings from Last 3 Encounters:   07/22/20 122 kg (270 lb)   04/14/20 126 kg (278 lb)   03/02/20 126 kg (278 lb 4.8 oz)     BP Readings from Last 3 Encounters:   07/22/20 120/82   04/14/20 149/85   03/04/20 154/90     Social History     Socioeconomic History   • Marital status:      Spouse name: Not on file   • Number of children: Not on file   • Years of education: Not on file   • Highest education level: Not on file   Occupational History   • Occupation:      Employer: SELF-EMPLOYED     Comment: full time   Tobacco Use   • Smoking status: Former Smoker   • Smokeless tobacco: Never Used   Substance and Sexual Activity   • Alcohol use: Yes     Comment: occ'l   • Drug use: No   • Sexual activity: Defer     Partners: Male     Birth control/protection: Surgical     Comment: hysterectomy       Allergies   Allergen Reactions   • Iodine Shortness Of Breath     Swelling     • Shellfish Allergy Shortness Of Breath     swelling       Body mass index is 46.35 kg/m².    Objective   Physical Exam   Constitutional: She is oriented to person, place, and time. Vital signs are normal. She appears well-developed and well-nourished.   Morbid obese female sitting on exam table wearing facial mask   HENT:   Head: Normocephalic and atraumatic.   Right Ear: Hearing, tympanic membrane, external ear and ear canal normal.   Left Ear: Hearing, tympanic membrane, external ear and ear canal normal.   Nose: Nose normal. Right sinus " exhibits no maxillary sinus tenderness and no frontal sinus tenderness. Left sinus exhibits no maxillary sinus tenderness and no frontal sinus tenderness.   Mouth/Throat: Uvula is midline, oropharynx is clear and moist and mucous membranes are normal.   Eyes: Pupils are equal, round, and reactive to light. Conjunctivae, EOM and lids are normal.   Neck: Trachea normal and phonation normal. Neck supple. Normal carotid pulses, no hepatojugular reflux and no JVD present. No tracheal tenderness present. Carotid bruit is not present. No tracheal deviation and no edema present. No thyroid mass and no thyromegaly present.   Cardiovascular: Normal rate, regular rhythm, S1 normal, S2 normal, normal heart sounds, intact distal pulses and normal pulses.   No murmur heard.  Pulmonary/Chest: Effort normal and breath sounds normal.   Abdominal: Soft. Normal appearance, normal aorta and bowel sounds are normal. There is no hepatomegaly. There is no tenderness.    Angie had a diabetic foot exam performed today.   During the foot exam she had a monofilament test performed.    Neurological Sensory Findings -  Unaltered sharp/dull right ankle/foot discrimination and unaltered sharp/dull left ankle/foot discrimination.  Vascular Status -  Her right foot exhibits normal foot vasculature  and no edema. Her left foot exhibits no edema.  Skin Integrity  -  Her right foot skin is intact.Her left foot skin is intact..  Lymphadenopathy:     She has no cervical adenopathy.   Neurological: She is alert and oriented to person, place, and time. She has normal strength and normal reflexes. No sensory deficit.   Reflex Scores:       Patellar reflexes are 2+ on the right side and 2+ on the left side.  Skin: Skin is warm, dry and intact. Capillary refill takes less than 2 seconds.   Psychiatric: She has a normal mood and affect. Her speech is normal and behavior is normal. Judgment and thought content normal. Cognition and memory are normal.      Physical Exam     Feet: Diabetic foot exam performed during this visit.    Monofilament test performed  Right foot - number of sites tested - 10  Right foot - number of sites sensed - 10  Left foot number of sites tested 10  Left foot - number of sites sensed 10.  Right foot first MTP joint ROM is normal.  Left foot first MTP joint ROM is normal.  Vascular Status: right foot vasculature normal.  Left foot vasculature normal.  Right foot skin intact. Left foot skin intact.     I was wearing surgical mask during the entire office visit encounter.      Assessment/Plan   Angie was seen today for annual exam, obesity and hypertension.    Diagnoses and all orders for this visit:    Encounter for annual physical exam    Type 2 diabetes mellitus with hyperglycemia, without long-term current use of insulin (CMS/[facility])  -     Ambulatory Referral to Endocrinology  -     Ambulatory Referral to Bariatric Surgery  -     glucose blood (Accu-Chek Camila) test strip; Used to check blood sugars twice daily as needed for type 2 diabetes  -     Blood Glucose Monitoring Suppl (ACCU-CHEK CAMILA) device; Used to check blood sugars twice daily as needed for type 2 diabetes  -     Lancets (ACCU-CHEK MULTICLIX) lancets; Used to check blood sugars twice daily as needed for type 2 diabetes    Vitamin D insufficiency    Hypercholesterolemia  -     Ambulatory Referral to Bariatric Surgery    Benign essential hypertension  -     Ambulatory Referral to Bariatric Surgery    Morbid obesity with BMI of 45.0-49.9, adult (CMS/[facility])  -     Ambulatory Referral to Bariatric Surgery    Screening mammogram, encounter for  -     Mammo Screening Digital Tomosynthesis Bilateral With CAD; Future    1.  Annual physical examination with type 2 diabetes: I have reviewed her lab work that was collected on June 29, 2020 with her at office visit today.  Fasting blood sugar was 138, cholesterol 144, triglycerides 174, HDL 34, LDL 75 and A1c was 6.9.  A1c was increased.   She is now a type II diabetic.  She has been compliant with her metformin 750 mg medication twice daily.  I have sent glucometer and testing supplies to her local pharmacy.  She would like to see endocrinology.  Have placed a referral to Dr. Mckinney. Stressed the importance of decreasing carbohydrates and sugar in diet.  She voiced understanding.  Will keep appointment with her gynecologist, Dr. Deborah Alcazar.  2.  Need for screening mammogram: I placed orders for [facility]ogram at [facility].  She will be notified of test results when completed.  3.  Vitamin D insufficiency: Her vitamin D level was 29.8 with above blood work.  She will start over-the-counter vitamin D3 1,000 international units daily.  4.  Chronic and stable hypertension: Seeing cardiologist for this.  We will continue current medications at home as directed.  5.  Chronic and uncontrolled morbid obesity: She would like to talk with bariatric surgeon for possible laparoscopic gastric sleeve surgery.  Have placed orders for Dr. Katz.  Urged her to exercise regularly and continue to eat healthy by decreasing sugar and carbohydrates in diet.  She voiced understanding.      Patient Counseling:  --Nutrition: Stressed importance of moderation in sodium/caffeine intake, saturated fat and cholesterol.  Discussed caloric balance, sufficient intake of fresh fruits, vegetables, fiber,   calcium, iron.  --Discussed the new recommendation against daily use of baby aspirin for primary prevention in low risk patients.  --Exercise: Stressed the importance of regular exercise by incorporating into daily routine.    --Substance Abuse: Discussed cessation/primary prevention of tobacco, alcohol, or other drug use; driving or other dangerous activities under the influence.    --Dental health: Discussed importance of regular tooth brushing, flossing, and dental visits.  -- Suggested having eyes and vision checked if needed or past due.  --Immunizations  reviewed.  --Discussed benefits of screening colonoscopy.  Will be due for colonoscopy next year.      [facility]  [address] [place] [address]  Dept: [phone]  Dept Fax: [phone]  Loc: [phone]  Loc Fax: [phone]           No visits with results within 2 Week(s) from this visit.   Latest known visit with results is:   Orders Only on 06/26/2020   Component Date Value Ref Range Status   • WBC 06/29/2020 9.01  3.40 - 10.80 10*3/mm3 Final   • RBC 06/29/2020 4.40  3.77 - 5.28 10*6/mm3 Final   • Hemoglobin 06/29/2020 13.3  12.0 - 15.9 g/dL Final   • Hematocrit 06/29/2020 39.4  34.0 - 46.6 % Final   • MCV 06/29/2020 89.5  79.0 - 97.0 fL Final   • MCH 06/29/2020 30.2  26.6 - 33.0 pg Final   • MCHC 06/29/2020 33.8  31.5 - 35.7 g/dL Final   • RDW 06/29/2020 12.1* 12.3 - 15.4 % Final   • Platelets 06/29/2020 309  140 - 450 10*3/mm3 Final   • Neutrophil Rel % 06/29/2020 65.6  42.7 - 76.0 % Final   • Lymphocyte Rel % 06/29/2020 25.4  19.6 - 45.3 % Final   • Monocyte Rel % 06/29/2020 6.1  5.0 - 12.0 % Final   • Eosinophil Rel % 06/29/2020 1.9  0.3 - 6.2 % Final   • Basophil Rel % 06/29/2020 0.4  0.0 - 1.5 % Final   • Neutrophils Absolute 06/29/2020 5.91  1.70 - 7.00 10*3/mm3 Final   • Lymphocytes Absolute 06/29/2020 2.29  0.70 - 3.10 10*3/mm3 Final   • Monocytes Absolute 06/29/2020 0.55  0.10 - 0.90 10*3/mm3 Final   • Eosinophils Absolute 06/29/2020 0.17  0.00 - 0.40 10*3/mm3 Final   • Basophils Absolute 06/29/2020 0.04  0.00 - 0.20 10*3/mm3 Final   • Immature Granulocyte Rel % 06/29/2020 0.6* 0.0 - 0.5 % Final   • Immature Grans Absolute 06/29/2020 0.05  0.00 - 0.05 10*3/mm3 Final   • nRBC 06/29/2020 0.0  0.0 - 0.2 /100 WBC Final   • Glucose 06/29/2020 130* 65 - 99 mg/dL Final   • BUN 06/29/2020 16  6 - 20 mg/dL Final   • Creatinine 06/29/2020 0.85  0.57 - 1.00 mg/dL Final   • eGFR Non African Am 06/29/2020 73   >60 mL/min/1.73 Final   • eGFR African Am 06/29/2020 88  >60 mL/min/1.73 Final   • BUN/Creatinine Ratio 06/29/2020 18.8  7.0 - 25.0 Final   • Sodium 06/29/2020 137  136 - 145 mmol/L Final   • Potassium 06/29/2020 3.7  3.5 - 5.2 mmol/L Final   • Chloride 06/29/2020 96* 98 - 107 mmol/L Final   • Total CO2 06/29/2020 26.8  22.0 - 29.0 mmol/L Final   • Calcium 06/29/2020 9.7  8.6 - 10.5 mg/dL Final   • Total Protein 06/29/2020 7.6  6.0 - 8.5 g/dL Final   • Albumin 06/29/2020 4.40  3.50 - 5.20 g/dL Final   • Globulin 06/29/2020 3.2  gm/dL Final   • A/G Ratio 06/29/2020 1.4  g/dL Final   • Total Bilirubin 06/29/2020 0.4  0.2 - 1.2 mg/dL Final   • Alkaline Phosphatase 06/29/2020 45  39 - 117 U/L Final   • AST (SGOT) 06/29/2020 20  1 - 32 U/L Final   • ALT (SGPT) 06/29/2020 34* 1 - 33 U/L Final   • Total Cholesterol 06/29/2020 144  0 - 200 mg/dL Final   • Triglycerides 06/29/2020 174* 0 - 150 mg/dL Final   • HDL Cholesterol 06/29/2020 34* 40 - 60 mg/dL Final   • VLDL Cholesterol 06/29/2020 34.8  mg/dL Final   • LDL Cholesterol  06/29/2020 75  0 - 100 mg/dL Final   • LDL/HDL Ratio 06/29/2020 2.21   Final   • Hep C Virus Ab 06/29/2020 <0.1  0.0 - 0.9 s/co ratio Final    Comment:                                   Negative:     < 0.8                               Indeterminate: 0.8 - 0.9                                    Positive:     > 0.9   The CDC recommends that a positive HCV antibody result   be followed up with a HCV Nucleic Acid Amplification   test ([id number]).     • TSH 06/29/2020 2.910  0.270 - 4.200 uIU/mL Final   • Hemoglobin A1C 06/29/2020 6.90* 4.80 - 5.60 % Final    Comment: Hemoglobin A1C Ranges:  Increased Risk for Diabetes  5.7% to 6.4%  Diabetes                     >= 6.5%  Diabetic Goal                < 7.0%     • 25 Hydroxy, Vitamin D 06/29/2020 29.8* 30.0 - 100.0 ng/ml Final    Comment: Results may be falsely increased if patient taking Biotin.  Reference Range for Total Vitamin D 25(OH)  Deficiency <20.0  ng/mL  Insufficiency 21-29 ng/mL  Sufficiency  ng/mL  Toxicity >100 ng/ml

## 2020-07-23 NOTE — PROGRESS NOTES
I, Dr. Ayden Saravia, have reviewed the notes, assessments, and/or procedures performed by Kya PLUMMER, that occurred within our practice at [facility] location, I concur with her documentation of Angie Patel. I have a current collaborative medical agreement with Kya PLUMMER.

## 2020-08-04 ENCOUNTER — HOSPITAL ENCOUNTER (OUTPATIENT)
Dept: MAMMOGRAPHY | Facility: HOSPITAL | Age: 45
Discharge: HOME | End: 2020-08-04
Admitting: PHYSICIAN ASSISTANT

## 2020-08-04 DIAGNOSIS — Z12.31 SCREENING MAMMOGRAM, ENCOUNTER FOR: ICD-10-CM

## 2020-08-04 PROCEDURE — 77063 BREAST TOMOSYNTHESIS BI: CPT

## 2020-08-04 PROCEDURE — 77067 SCR MAMMO BI INCL CAD: CPT

## 2020-08-10 RX ORDER — ALBUTEROL SULFATE 90 UG/1
AEROSOL, METERED RESPIRATORY (INHALATION)
Qty: 18 G | Refills: 0 | Status: ON HOLD | OUTPATIENT
Start: 2020-08-10 | End: 2020-09-07

## 2020-09-02 ENCOUNTER — APPOINTMENT (OUTPATIENT)
Dept: GENERAL RADIOLOGY | Facility: HOSPITAL | Age: 45
End: 2020-09-02

## 2020-09-02 ENCOUNTER — HOSPITAL ENCOUNTER (INPATIENT)
Facility: HOSPITAL | Age: 45
LOS: 1 days | Discharge: ANOTHER HOSPITAL | End: 2020-09-03
Attending: EMERGENCY MEDICINE | Admitting: INTERNAL MEDICINE

## 2020-09-02 ENCOUNTER — APPOINTMENT (OUTPATIENT)
Dept: MRI IMAGING | Facility: HOSPITAL | Age: 45
End: 2020-09-02

## 2020-09-02 DIAGNOSIS — G61.0 ASCENDING PARALYSIS (HCC): Primary | ICD-10-CM

## 2020-09-02 PROBLEM — R29.898 WEAKNESS OF BOTH LOWER EXTREMITIES: Status: ACTIVE | Noted: 2020-09-02

## 2020-09-02 LAB
ALBUMIN SERPL-MCNC: 4.1 G/DL (ref 3.5–5.2)
ALBUMIN/GLOB SERPL: 1.2 G/DL
ALP SERPL-CCNC: 39 U/L (ref 39–117)
ALT SERPL W P-5'-P-CCNC: 25 U/L (ref 1–33)
ANION GAP SERPL CALCULATED.3IONS-SCNC: 14.6 MMOL/L (ref 5–15)
APPEARANCE CSF: CLEAR
APPEARANCE CSF: CLEAR
APTT PPP: 31.2 SECONDS (ref 24.3–38.1)
AST SERPL-CCNC: 17 U/L (ref 1–32)
BASOPHILS # BLD AUTO: 0.03 10*3/MM3 (ref 0–0.2)
BASOPHILS NFR BLD AUTO: 0.4 % (ref 0–1.5)
BILIRUB SERPL-MCNC: 0.2 MG/DL (ref 0–1.2)
BUN SERPL-MCNC: 12 MG/DL (ref 6–20)
BUN/CREAT SERPL: 18.2 (ref 7–25)
CALCIUM SPEC-SCNC: 9.3 MG/DL (ref 8.6–10.5)
CHLORIDE SERPL-SCNC: 101 MMOL/L (ref 98–107)
CK SERPL-CCNC: 69 U/L (ref 20–180)
CO2 SERPL-SCNC: 24.4 MMOL/L (ref 22–29)
COLOR CSF: COLORLESS
COLOR CSF: COLORLESS
CREAT SERPL-MCNC: 0.66 MG/DL (ref 0.57–1)
DEPRECATED RDW RBC AUTO: 41.7 FL (ref 37–54)
EOSINOPHIL # BLD AUTO: 0.11 10*3/MM3 (ref 0–0.4)
EOSINOPHIL NFR BLD AUTO: 1.3 % (ref 0.3–6.2)
ERYTHROCYTE [DISTWIDTH] IN BLOOD BY AUTOMATED COUNT: 12.5 % (ref 12.3–15.4)
GFR SERPL CREATININE-BSD FRML MDRD: 97 ML/MIN/1.73
GLOBULIN UR ELPH-MCNC: 3.3 GM/DL
GLUCOSE BLDC GLUCOMTR-MCNC: 137 MG/DL (ref 70–130)
GLUCOSE BLDC GLUCOMTR-MCNC: 141 MG/DL (ref 70–130)
GLUCOSE CSF-MCNC: 76 MG/DL (ref 40–70)
GLUCOSE SERPL-MCNC: 141 MG/DL (ref 65–99)
HCT VFR BLD AUTO: 41.9 % (ref 34–46.6)
HGB BLD-MCNC: 13.5 G/DL (ref 12–15.9)
IMM GRANULOCYTES # BLD AUTO: 0.04 10*3/MM3 (ref 0–0.05)
IMM GRANULOCYTES NFR BLD AUTO: 0.5 % (ref 0–0.5)
INR PPP: 0.98 (ref 0.9–1.1)
LYMPHOCYTES # BLD AUTO: 1.98 10*3/MM3 (ref 0.7–3.1)
LYMPHOCYTES NFR BLD AUTO: 24.2 % (ref 19.6–45.3)
MAGNESIUM SERPL-MCNC: 1.6 MG/DL (ref 1.6–2.6)
MCH RBC QN AUTO: 29.2 PG (ref 26.6–33)
MCHC RBC AUTO-ENTMCNC: 32.2 G/DL (ref 31.5–35.7)
MCV RBC AUTO: 90.7 FL (ref 79–97)
MONOCYTES # BLD AUTO: 0.54 10*3/MM3 (ref 0.1–0.9)
MONOCYTES NFR BLD AUTO: 6.6 % (ref 5–12)
NEUTROPHILS NFR BLD AUTO: 5.49 10*3/MM3 (ref 1.7–7)
NEUTROPHILS NFR BLD AUTO: 67 % (ref 42.7–76)
PHOSPHATE SERPL-MCNC: 3.3 MG/DL (ref 2.5–4.5)
PLATELET # BLD AUTO: 147 10*3/MM3 (ref 140–450)
PMV BLD AUTO: 11.9 FL (ref 6–12)
POTASSIUM SERPL-SCNC: 3.7 MMOL/L (ref 3.5–5.2)
PROT CSF-MCNC: 25 MG/DL (ref 15–45)
PROT SERPL-MCNC: 7.4 G/DL (ref 6–8.5)
PROTHROMBIN TIME: 12.7 SECONDS (ref 12.1–15)
RBC # BLD AUTO: 4.62 10*6/MM3 (ref 3.77–5.28)
RBC # CSF MANUAL: 0 /MM3 (ref 0–0)
RBC # CSF MANUAL: 0 /MM3 (ref 0–0)
SODIUM SERPL-SCNC: 140 MMOL/L (ref 136–145)
TUBE # CSF: 1
TUBE # CSF: 4
WBC # BLD AUTO: 8.19 10*3/MM3 (ref 3.4–10.8)
WBC # CSF MANUAL: 2 /MM3 (ref 0–5)
WBC # CSF MANUAL: 3 /MM3 (ref 0–5)

## 2020-09-02 PROCEDURE — 25010000002 LORAZEPAM PER 2 MG: Performed by: EMERGENCY MEDICINE

## 2020-09-02 PROCEDURE — 25010000003 LIDOCAINE 1 % SOLUTION: Performed by: INTERNAL MEDICINE

## 2020-09-02 PROCEDURE — 86617 LYME DISEASE ANTIBODY: CPT | Performed by: PSYCHIATRY & NEUROLOGY

## 2020-09-02 PROCEDURE — 82962 GLUCOSE BLOOD TEST: CPT

## 2020-09-02 PROCEDURE — B01BZZZ FLUOROSCOPY OF SPINAL CORD: ICD-10-PCS | Performed by: RADIOLOGY

## 2020-09-02 PROCEDURE — 85610 PROTHROMBIN TIME: CPT | Performed by: PSYCHIATRY & NEUROLOGY

## 2020-09-02 PROCEDURE — G0378 HOSPITAL OBSERVATION PER HR: HCPCS

## 2020-09-02 PROCEDURE — 85730 THROMBOPLASTIN TIME PARTIAL: CPT | Performed by: PSYCHIATRY & NEUROLOGY

## 2020-09-02 PROCEDURE — 86789 WEST NILE VIRUS ANTIBODY: CPT | Performed by: PSYCHIATRY & NEUROLOGY

## 2020-09-02 PROCEDURE — 80053 COMPREHEN METABOLIC PANEL: CPT | Performed by: EMERGENCY MEDICINE

## 2020-09-02 PROCEDURE — 82945 GLUCOSE OTHER FLUID: CPT | Performed by: PSYCHIATRY & NEUROLOGY

## 2020-09-02 PROCEDURE — 009U3ZX DRAINAGE OF SPINAL CANAL, PERCUTANEOUS APPROACH, DIAGNOSTIC: ICD-10-PCS | Performed by: RADIOLOGY

## 2020-09-02 PROCEDURE — 82784 ASSAY IGA/IGD/IGG/IGM EACH: CPT | Performed by: PSYCHIATRY & NEUROLOGY

## 2020-09-02 PROCEDURE — 89050 BODY FLUID CELL COUNT: CPT | Performed by: PSYCHIATRY & NEUROLOGY

## 2020-09-02 PROCEDURE — 83916 OLIGOCLONAL BANDS: CPT | Performed by: PSYCHIATRY & NEUROLOGY

## 2020-09-02 PROCEDURE — 85025 COMPLETE CBC W/AUTO DIFF WBC: CPT | Performed by: EMERGENCY MEDICINE

## 2020-09-02 PROCEDURE — 99284 EMERGENCY DEPT VISIT MOD MDM: CPT | Performed by: EMERGENCY MEDICINE

## 2020-09-02 PROCEDURE — 82042 OTHER SOURCE ALBUMIN QUAN EA: CPT | Performed by: PSYCHIATRY & NEUROLOGY

## 2020-09-02 PROCEDURE — 82040 ASSAY OF SERUM ALBUMIN: CPT | Performed by: PSYCHIATRY & NEUROLOGY

## 2020-09-02 PROCEDURE — 72148 MRI LUMBAR SPINE W/O DYE: CPT

## 2020-09-02 PROCEDURE — 99223 1ST HOSP IP/OBS HIGH 75: CPT | Performed by: INTERNAL MEDICINE

## 2020-09-02 PROCEDURE — 83735 ASSAY OF MAGNESIUM: CPT | Performed by: INTERNAL MEDICINE

## 2020-09-02 PROCEDURE — 86788 WEST NILE VIRUS AB IGM: CPT | Performed by: PSYCHIATRY & NEUROLOGY

## 2020-09-02 PROCEDURE — 84100 ASSAY OF PHOSPHORUS: CPT | Performed by: INTERNAL MEDICINE

## 2020-09-02 PROCEDURE — 82550 ASSAY OF CK (CPK): CPT | Performed by: INTERNAL MEDICINE

## 2020-09-02 PROCEDURE — 84157 ASSAY OF PROTEIN OTHER: CPT | Performed by: PSYCHIATRY & NEUROLOGY

## 2020-09-02 PROCEDURE — 83873 ASSAY OF CSF PROTEIN: CPT | Performed by: PSYCHIATRY & NEUROLOGY

## 2020-09-02 PROCEDURE — 99283 EMERGENCY DEPT VISIT LOW MDM: CPT

## 2020-09-02 RX ORDER — ACETAMINOPHEN 325 MG/1
650 TABLET ORAL EVERY 4 HOURS PRN
Status: DISCONTINUED | OUTPATIENT
Start: 2020-09-02 | End: 2020-09-03 | Stop reason: HOSPADM

## 2020-09-02 RX ORDER — BISACODYL 10 MG
10 SUPPOSITORY, RECTAL RECTAL DAILY PRN
Status: DISCONTINUED | OUTPATIENT
Start: 2020-09-02 | End: 2020-09-03 | Stop reason: HOSPADM

## 2020-09-02 RX ORDER — CHLORTHALIDONE 25 MG/1
25 TABLET ORAL DAILY
Status: DISCONTINUED | OUTPATIENT
Start: 2020-09-03 | End: 2020-09-03 | Stop reason: HOSPADM

## 2020-09-02 RX ORDER — ACETAMINOPHEN 650 MG/1
650 SUPPOSITORY RECTAL EVERY 4 HOURS PRN
Status: DISCONTINUED | OUTPATIENT
Start: 2020-09-02 | End: 2020-09-03 | Stop reason: HOSPADM

## 2020-09-02 RX ORDER — SODIUM CHLORIDE 0.9 % (FLUSH) 0.9 %
10 SYRINGE (ML) INJECTION AS NEEDED
Status: DISCONTINUED | OUTPATIENT
Start: 2020-09-02 | End: 2020-09-03 | Stop reason: HOSPADM

## 2020-09-02 RX ORDER — ONDANSETRON 4 MG/1
4 TABLET, FILM COATED ORAL EVERY 6 HOURS PRN
Status: DISCONTINUED | OUTPATIENT
Start: 2020-09-02 | End: 2020-09-03 | Stop reason: HOSPADM

## 2020-09-02 RX ORDER — ONDANSETRON 2 MG/ML
4 INJECTION INTRAMUSCULAR; INTRAVENOUS EVERY 6 HOURS PRN
Status: DISCONTINUED | OUTPATIENT
Start: 2020-09-02 | End: 2020-09-03 | Stop reason: HOSPADM

## 2020-09-02 RX ORDER — LORAZEPAM 2 MG/ML
1 INJECTION INTRAMUSCULAR ONCE
Status: COMPLETED | OUTPATIENT
Start: 2020-09-02 | End: 2020-09-02

## 2020-09-02 RX ORDER — ACETAMINOPHEN 160 MG/5ML
650 SOLUTION ORAL EVERY 4 HOURS PRN
Status: DISCONTINUED | OUTPATIENT
Start: 2020-09-02 | End: 2020-09-03 | Stop reason: HOSPADM

## 2020-09-02 RX ORDER — SODIUM CHLORIDE 9 MG/ML
40 INJECTION, SOLUTION INTRAVENOUS AS NEEDED
Status: DISCONTINUED | OUTPATIENT
Start: 2020-09-02 | End: 2020-09-03 | Stop reason: HOSPADM

## 2020-09-02 RX ORDER — SPIRONOLACTONE 100 MG/1
100 TABLET, FILM COATED ORAL DAILY
Status: DISCONTINUED | OUTPATIENT
Start: 2020-09-03 | End: 2020-09-03 | Stop reason: HOSPADM

## 2020-09-02 RX ORDER — LORAZEPAM 2 MG/ML
2 INJECTION INTRAMUSCULAR ONCE
Status: COMPLETED | OUTPATIENT
Start: 2020-09-03 | End: 2020-09-03

## 2020-09-02 RX ORDER — DEXTROSE MONOHYDRATE 25 G/50ML
25 INJECTION, SOLUTION INTRAVENOUS
Status: DISCONTINUED | OUTPATIENT
Start: 2020-09-02 | End: 2020-09-03 | Stop reason: HOSPADM

## 2020-09-02 RX ORDER — NICOTINE POLACRILEX 4 MG
15 LOZENGE BUCCAL
Status: DISCONTINUED | OUTPATIENT
Start: 2020-09-02 | End: 2020-09-03 | Stop reason: HOSPADM

## 2020-09-02 RX ORDER — BISACODYL 5 MG/1
5 TABLET, DELAYED RELEASE ORAL DAILY PRN
Status: DISCONTINUED | OUTPATIENT
Start: 2020-09-02 | End: 2020-09-03 | Stop reason: HOSPADM

## 2020-09-02 RX ORDER — CHOLECALCIFEROL (VITAMIN D3) 125 MCG
5 CAPSULE ORAL NIGHTLY PRN
Status: DISCONTINUED | OUTPATIENT
Start: 2020-09-02 | End: 2020-09-03 | Stop reason: HOSPADM

## 2020-09-02 RX ORDER — ALBUTEROL SULFATE 90 UG/1
1 AEROSOL, METERED RESPIRATORY (INHALATION) EVERY 4 HOURS PRN
Status: DISCONTINUED | OUTPATIENT
Start: 2020-09-02 | End: 2020-09-03 | Stop reason: HOSPADM

## 2020-09-02 RX ORDER — SODIUM CHLORIDE 0.9 % (FLUSH) 0.9 %
10 SYRINGE (ML) INJECTION EVERY 12 HOURS SCHEDULED
Status: DISCONTINUED | OUTPATIENT
Start: 2020-09-02 | End: 2020-09-03 | Stop reason: HOSPADM

## 2020-09-02 RX ORDER — LIDOCAINE HYDROCHLORIDE 10 MG/ML
3 INJECTION, SOLUTION INFILTRATION; PERINEURAL ONCE
Status: COMPLETED | OUTPATIENT
Start: 2020-09-02 | End: 2020-09-02

## 2020-09-02 RX ADMIN — LIDOCAINE HYDROCHLORIDE 3 ML: 10 INJECTION, SOLUTION INFILTRATION; PERINEURAL at 15:00

## 2020-09-02 RX ADMIN — SODIUM CHLORIDE, PRESERVATIVE FREE 10 ML: 5 INJECTION INTRAVENOUS at 16:27

## 2020-09-02 RX ADMIN — LORAZEPAM 1 MG: 2 INJECTION INTRAMUSCULAR; INTRAVENOUS at 09:30

## 2020-09-02 RX ADMIN — ACETAMINOPHEN 650 MG: 325 TABLET, FILM COATED ORAL at 18:39

## 2020-09-02 NOTE — H&P
"[facility] HOSPITALIST     Kya Kendrick PA-C    CHIEF COMPLAINT:     Bilateral lower extremity weakness and paresthesias    HISTORY OF PRESENT ILLNESS:    Pleasant 45-year-old female past medical history morbid obesity hypertension type 2 diabetes presented to the ER for 4-day history of worsening and progressive lower extremity weakness and paresthesias.  She reports 4 days ago she started to develop some tingling in her feet that then started to develop in her knees and then her hips.  It was a \"pins-and-needles type sensation\".  She had some numbness in her legs.  She is also experienced some numbness in her upper extremities.  The symptoms have become worse to where she is having problems ambulating without holding onto objects.  This concerned her she came to the ER for evaluation and and in the ER she had an MRI of her lumbar spine and decision was made for admission.  Of note 4 weeks ago she reports he had a viral type illness.      Past Medical History:   Diagnosis Date   • Anxiety    • Asthma    • Cardiomyopathy (CMS/HCC)     with pregnancy   • Diabetes mellitus (CMS/HCC)    • Fibromyalgia    • Fibromyalgia, primary    • Headache, tension-type    • Hypertension    • Insomnia    • Kidney calculi    • Memory loss    • Meningioma (CMS/HCC)     benign brain    • Migraine     ocular   • KACI (obstructive sleep apnea)    • Palpitations    • Polycystic ovaries    • Seizures (CMS/HCC)      Past Surgical History:   Procedure Laterality Date   • CYSTOSCOPY URETEROSCOPY LASER LITHOTRIPSY Right 3/7/2017    Procedure: CYSTOSCOPY RT URETEROSCOPY LASER LITHOTRIPSY W/ STENT, rerograde pyelogram ;  Surgeon: Rashid Malloy MD;  Location: [facility];  Service:    • FOOT SURGERY Left     tendon repair   • HYSTERECTOMY     • OOPHORECTOMY     • SHOULDER SURGERY Right 2009   • TUBAL ABDOMINAL LIGATION     • WISDOM TOOTH EXTRACTION       Family History   Problem Relation Age of Onset   • Heart " disease Paternal Aunt    • Diabetes Paternal Aunt    • Diabetes Maternal Grandmother    • Neuropathy Maternal Grandmother    • Thyroid disease Maternal Grandmother    • Heart disease Maternal Grandfather    • Cancer Maternal Grandfather    • Heart disease Paternal Grandfather    • Heart disease Paternal Uncle    • Arthritis Mother    • Migraines Mother    • Seizures Father    • Breast cancer Neg Hx    • Ovarian cancer Neg Hx    • Colon cancer Neg Hx    • Deep vein thrombosis Neg Hx    • Pulmonary embolism Neg Hx      Social History     Tobacco Use   • Smoking status: Former Smoker   • Smokeless tobacco: Never Used   Substance Use Topics   • Alcohol use: Not Currently     Comment: occ'l   • Drug use: No     Medications Prior to Admission   Medication Sig Dispense Refill Last Dose   • Blood Glucose Monitoring Suppl (ACCU-CHEK MANNY) device Used to check blood sugars twice daily as needed for type 2 diabetes 1 each 0 Past Month at Unknown time   • chlorthalidone (HYGROTON) 25 MG tablet Take 1 tablet by mouth Daily. 90 tablet 3 9/2/2020 at 0800   • furosemide (LASIX) 20 MG tablet TAKE 1 TABLET BY MOUTH DAILY AS NEEDED (Patient taking differently: 20 mg.) 90 tablet 0 Past Month at Unknown time   • glucose blood (Accu-Chek Manny) test strip Used to check blood sugars twice daily as needed for type 2 diabetes 100 each 12 Past Month at Unknown time   • ibuprofen (MOTRIN IB) 200 MG tablet Take 2 tablets by mouth Every 6 (Six) Hours As Needed.   Past Week at Unknown time   • Lancets (ACCU-CHEK MULTICLIX) lancets Used to check blood sugars twice daily as needed for type 2 diabetes 100 each 12 Past Month at Unknown time   • metFORMIN ER (GLUCOPHAGE-XR) 750 MG 24 hr tablet Take 2 tablets by mouth Daily With Dinner. 60 tablet 6 9/1/2020 at 1800   • spironolactone (ALDACTONE) 100 MG tablet TAKE 1 TABLET BY MOUTH DAILY 90 tablet 0 9/2/2020 at 0800   • VENTOLIN  (90 Base) MCG/ACT inhaler INHALE 2 PUFFS BY MOUTH FOUR TIMES  "DAILY AS NEEDED FOR WHEEZING OR SHORTNESS OF BREATH 18 g 0 9/1/2020 at 2200     Allergies:  Iodine and Shellfish allergy    Immunization History   Administered Date(s) Administered   • Flu Vaccine Quad PF >36MO 11/06/2017   • Flulaval/Fluarix Quad 11/06/2017   • Tdap 10/08/2019           REVIEW OF SYSTEMS:  Please see the above history of present illness for pertinent positives and negatives.  The remainder of the patient's systems have been reviewed and are negative.     Objective     Vital Signs  Temp:  [98.4 °F (36.9 °C)-98.5 °F (36.9 °C)] 98.4 °F (36.9 °C)  Heart Rate:  [] 96  Resp:  [16-18] 16  BP: (121-139)/(75-85) 121/79    Flowsheet Rows      First Filed Value   Admission Height  162.6 cm (64\") Documented at 09/02/2020 0803   Admission Weight  122 kg (270 lb) Documented at 09/02/2020 0803           Physical Exam:  Physical Exam   Vitals reviewed.    Gen: NAD  HEENT: EOMI, no icterus, PERRL  Neck: No JVD  Heart: RRR, no murmur  Lung: CTA b/l, adequate air movement  ABD: soft, NT, ND, no rebound or guarding  MSK: moves ext spontaneously  Neuro: AO x 3, CN II-XII intact, reflexes intact bilateral knees although slightly diminished on the right, strength appears intact sensation intact, gait not tested she is too weak, would lift legs bilaterally somewhat to gravity  Psych: no anxiety, no depression  Skin: warm, dry, intact  Extremities:  No edema    Results Review:    I reviewed the patient's new clinical results.  Lab Results (most recent)     Procedure Component Value Units Date/Time    Protime-INR [[id number]]  (Normal) Collected:  09/02/20 0925    Specimen:  Blood Updated:  09/02/20 1408     Protime 12.7 Seconds      INR 0.98    Narrative:       Therapeutic Ranges for INR: 2.0-3.0 (PT 20-30)                              2.5-3.5 (PT 25-34)    aPTT [[id number]]  (Normal) Collected:  09/02/20 0925    Specimen:  Blood Updated:  09/02/20 1408     PTT 31.2 seconds     Narrative:       PTT = The equivalent PTT " values for the therapeutic range of heparin levels at 0.1 to 0.7 U/ml are 53 to 110 seconds.      CK [[id number]]  (Normal) Collected:  09/02/20 0925    Specimen:  Blood Updated:  09/02/20 1258     Creatine Kinase 69 U/L     Phosphorus [[id number]]  (Normal) Collected:  09/02/20 0925    Specimen:  Blood Updated:  09/02/20 1258     Phosphorus 3.3 mg/dL     Magnesium [[id number]]  (Normal) Collected:  09/02/20 0925    Specimen:  Blood Updated:  09/02/20 1258     Magnesium 1.6 mg/dL     Comprehensive Metabolic Panel [[id number]]  (Abnormal) Collected:  09/02/20 0925    Specimen:  Blood Updated:  09/02/20 0946     Glucose 141 mg/dL      BUN 12 mg/dL      Creatinine 0.66 mg/dL      Sodium 140 mmol/L      Potassium 3.7 mmol/L      Chloride 101 mmol/L      CO2 24.4 mmol/L      Calcium 9.3 mg/dL      Total Protein 7.4 g/dL      Albumin 4.10 g/dL      ALT (SGPT) 25 U/L      AST (SGOT) 17 U/L      Alkaline Phosphatase 39 U/L      Total Bilirubin 0.2 mg/dL      eGFR Non African Amer 97 mL/min/1.73      Globulin 3.3 gm/dL      A/G Ratio 1.2 g/dL      BUN/Creatinine Ratio 18.2     Anion Gap 14.6 mmol/L     Narrative:       GFR Normal >60  Chronic Kidney Disease <60  Kidney Failure <15      CBC & Differential [[id number]] Collected:  09/02/20 0902    Specimen:  Blood Updated:  09/02/20 0910    Narrative:       The following orders were created for panel order CBC & Differential.  Procedure                               Abnormality         Status                     ---------                               -----------         ------                     CBC Auto Differential[[id number]]        Normal              Final result                 Please view results for these tests on the individual orders.    CBC Auto Differential [[id number]]  (Normal) Collected:  09/02/20 0902    Specimen:  Blood Updated:  09/02/20 0910     WBC 8.19 10*3/mm3      RBC 4.62 10*6/mm3      Hemoglobin 13.5 g/dL      Hematocrit 41.9 %      MCV 90.7 fL      MCH  29.2 pg      MCHC 32.2 g/dL      RDW 12.5 %      RDW-SD 41.7 fl      MPV 11.9 fL      Platelets 147 10*3/mm3      Neutrophil % 67.0 %      Lymphocyte % 24.2 %      Monocyte % 6.6 %      Eosinophil % 1.3 %      Basophil % 0.4 %      Immature Grans % 0.5 %      Neutrophils, Absolute 5.49 10*3/mm3      Lymphocytes, Absolute 1.98 10*3/mm3      Monocytes, Absolute 0.54 10*3/mm3      Eosinophils, Absolute 0.11 10*3/mm3      Basophils, Absolute 0.03 10*3/mm3      Immature Grans, Absolute 0.04 10*3/mm3           Imaging Results (Most Recent)     Procedure Component Value Units Date/Time    MRI Lumbar Spine Without Contrast [[id number]] Collected:  09/02/20 1016     Updated:  09/02/20 1018    Narrative:       MRI Spine Lumbar WO    HISTORY:  Lower back pain radiating into bilateral lower extremities with paresthesias, worsening the last 5 days    TECHNIQUE:  Multiplanar multisequence imaging of the lumbar spine acquired without IV contrast utilizing a standard protocol.    COMPARISON: None    FINDINGS:    For the purposes of this dictation there appears to be 5 lumbar-type vertebra with the last fully formed disc space representing L5-S1.    Lumbar alignment is anatomic. Vertebral body heights are maintained. No focal aggressive marrow lesion is identified. Disc heights are preserved. The conus terminates at an expected level and the visualized distal cord signal is within normal limits.    Level by level:    L1-2: No significant thecal sac or foraminal narrowing.    L2-3: No significant thecal sac or foraminal narrowing.    L3-4: No significant thecal sac or foraminal narrowing    L4-5: Moderate facet degenerative change. Minimal disc bulge, slightly eccentric to the left but no significant thecal sac or foraminal narrowing is present.    L5-S1: Moderate right greater than left facet degenerative change without significant thecal sac or foraminal narrowing.          Impression:       1.  Degenerative changes in the lower  lumbar spine, predominantly due to facet degeneration without high-grade spinal canal or foraminal narrowing.    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     Bilateral lower extremity weakness with ascending paralysis  -Possibly AIDP vs transverse myelitis vs other possible myopathy  -RI lumbar negative  -will check CK  -Discussed with neurology will get LP  -Depending on LP may need transfer to [facility] for plasmapheresis treatments per neurology  -PT if able    HTN  -chronic   -c/w chlorthalidone and Aldactone    Type 2 diabetes  -Chronic controlled  -Sliding scale insulin while here    High risk    DVT ppx-SCD    This patient has been examined wearing appropriate Personal Protective Equipment. 09/02/20      I discussed the patient's findings and my recommendations with patient.     Jose Antonio Ugalde,   09/02/20  15:02

## 2020-09-02 NOTE — SIGNIFICANT NOTE
09/02/20 1504   Rehab Time/Intention   Evaluation Not Performed other (see comments)  (Per neurologist, patient to have lumbar puncture today. Will await further medical work up pior to PT evaluation.)   Rehab Treatment   Discipline physical therapist

## 2020-09-02 NOTE — PLAN OF CARE
Pt vss. Lumbar puncture today. MRI of brain and cervical spine ordered for tomorrow. Pt complaint of headache, tylenol given. Pt resting in bed. HOB no higher rae 45 degree for 4 hours post LP.   Problem: Patient Care Overview  Goal: Plan of Care Review  Outcome: Ongoing (interventions implemented as appropriate)  Flowsheets (Taken 9/2/2020 0358)  Plan of Care Reviewed With: patient

## 2020-09-02 NOTE — ED PROVIDER NOTES
" EMERGENCY DEPARTMENT ENCOUNTER      Room Number: 6/06      HPI:    Chief complaint: Bilateral lower extremity numbness and weakness    Location: Both lower extremities    Quality/Severity: Moderate    Timing/Duration: Symptoms started with tingling 4 days ago and has been getting progressively worse    Modifying Factors: None    Associated Symptoms: Difficulty with walking    Narrative: Pt is a 45 y.o. female who presents complaining of bilateral lower extremity numbness and weakness as noted above.  Patient relates that 4 days ago she began to notice increased tingling in both feet.  The next day the tingling did seem to be worse and also seemed to be spreading proximally.  There is also possibly some associated vague numbness in the upper extremities and her face.  Over the past 2 days the patient has noticed a marked decrease in her ability to ambulate.  Patient states that she feels very unsteady and must hold onto solid objects to help her ambulate.  Patient denies dizziness and lightheadedness.  Patient has experienced some pain in the lower extremities and seems to be worse in the anterior thighs.  Patient denies lower back pain.  No significant history of lumbar problems in the past.  Patient denies any current illness but states that she did have \"a bout of food poisoning\" approximately 1 month ago.  Known meningioma discovered in 2014, but neurosurgery has dismissed the patient on this finding.      PMD: Kya Kendrick, KARL    REVIEW OF SYSTEMS  Review of Systems   Constitutional: Positive for activity change (Decreased due to current ambulation problems). Negative for appetite change, fatigue and fever.        Obesity   HENT: Negative for congestion.    Respiratory: Negative for cough, shortness of breath and wheezing.    Cardiovascular: Negative for chest pain, palpitations and leg swelling.   Gastrointestinal: Negative for abdominal pain, diarrhea, nausea and vomiting.   Endocrine: Negative for " polydipsia.   Genitourinary: Negative for difficulty urinating, dysuria, flank pain, frequency and urgency.   Musculoskeletal: Positive for arthralgias (Mild, chronic and diffuse) and gait problem (As previously noted). Negative for back pain.   Skin: Negative for rash.   Neurological: Positive for weakness (As noted) and numbness (As noted). Negative for dizziness and headaches.   Psychiatric/Behavioral: Negative for confusion.   All other systems reviewed and are negative.      PAST MEDICAL HISTORY  Active Ambulatory Problems     Diagnosis Date Noted   • Magnetic resonance imaging of brain abnormal 03/02/2016   • Benign essential hypertension 03/02/2016   • Blurring of visual image 03/02/2016   • Fatigue 03/02/2016   • Confusional state 03/02/2016   • Abnormal gait 03/02/2016   • Hypercholesterolemia 03/02/2016   • Headache 03/02/2016   • Meningioma (CMS/formerly [facility]) 03/02/2016   • Migraine 03/02/2016   • Morbid obesity (CMS/formerly [facility]) 03/02/2016   • Neck pain 03/02/2016   • Nausea 03/02/2016   • Pitting edema 03/02/2016   • Polycystic ovaries 03/02/2016   • Rib pain on left side 03/02/2016   • Pain of sternum 03/02/2016   • Vaginal irritation 03/02/2016   • Vitamin D insufficiency 03/02/2016   • Tobacco abuse counseling 05/16/2016   • Hypotensive episode 10/21/2016   • Acute bilateral thoracic back pain 10/21/2016   • Pendulous breast 10/21/2016   • Memory changes 10/21/2016   • Weight gain 12/15/2016   • PCOS (polycystic ovarian syndrome) 12/15/2016   • Menopausal symptoms 12/15/2016   • Insulin resistance syndrome 12/15/2016   • Morbid obesity with BMI of 40.0-44.9, adult (CMS/formerly [facility]) 12/15/2016   • Right ureteral stone 03/07/2017   • Hematuria 03/10/2017   • Constipation due to pain medication therapy 03/10/2017   • Vision changes 05/01/2017   • Blurred vision, bilateral 05/01/2017   • Obesity (BMI 35.0-39.9 without comorbidity) 05/01/2017   • Chest tightness or pressure 07/03/2017   • Anxiety 07/03/2017   • Need for influenza  vaccination 11/06/2017   • Acute left flank pain 11/06/2017   • History of kidney stones 11/06/2017   • Morbid obesity with BMI of 45.0-49.9, adult (CMS/Formerly [facility]) 01/08/2018   • Screening mammogram, encounter for 03/05/2018   • Meningioma (CMS/Formerly [facility])    • Fibromyalgia    • High risk medication use 06/04/2018   • Primary insomnia 07/05/2018   • Arthralgia 01/07/2019   • Chronic cough 01/07/2019   • Thyroid enlargement 01/07/2019   • Left-sided chest pain 10/31/2019   • Neurological abnormality 10/31/2019   • Foot injury, right, initial encounter 02/07/2020   • Acute pain of right foot 02/07/2020   • KACI (obstructive sleep apnea) 03/02/2020   • Type 2 diabetes mellitus with hyperglycemia, without long-term current use of insulin (CMS/HCC) 07/22/2020     Resolved Ambulatory Problems     Diagnosis Date Noted   • Acute bronchitis 03/02/2016   • Acute upper respiratory infection 03/02/2016   • Acute sinusitis 03/02/2016   • Impacted cerumen 03/02/2016   • Grief 03/02/2016   • Wheezing 03/02/2016     Past Medical History:   Diagnosis Date   • Asthma    • Cardiomyopathy (CMS/Formerly [facility])    • Fibromyalgia, primary    • Headache, tension-type    • Hypertension    • Insomnia    • Kidney calculi    • Memory loss    • Palpitations    • Seizures (CMS/Formerly [facility])        PAST SURGICAL HISTORY  Past Surgical History:   Procedure Laterality Date   • CYSTOSCOPY URETEROSCOPY LASER LITHOTRIPSY Right 3/7/2017    Procedure: CYSTOSCOPY RT URETEROSCOPY LASER LITHOTRIPSY W/ STENT, rerograde pyelogram ;  Surgeon: Rashid Malloy MD;  Location: [facility] OR;  Service:    • FOOT SURGERY Left     tendon repair   • HYSTERECTOMY     • OOPHORECTOMY     • SHOULDER SURGERY Right 2009   • TUBAL ABDOMINAL LIGATION     • WISDOM TOOTH EXTRACTION         FAMILY HISTORY  Family History   Problem Relation Age of Onset   • Heart disease Paternal Aunt    • Diabetes Paternal Aunt    • Diabetes Maternal Grandmother    • Neuropathy Maternal Grandmother    • Thyroid disease  Maternal Grandmother    • Heart disease Maternal Grandfather    • Cancer Maternal Grandfather    • Heart disease Paternal Grandfather    • Heart disease Paternal Uncle    • Arthritis Mother    • Migraines Mother    • Seizures Father    • Breast cancer Neg Hx    • Ovarian cancer Neg Hx    • Colon cancer Neg Hx    • Deep vein thrombosis Neg Hx    • Pulmonary embolism Neg Hx        SOCIAL HISTORY  Social History     Socioeconomic History   • Marital status:      Spouse name: Not on file   • Number of children: Not on file   • Years of education: Not on file   • Highest education level: Not on file   Occupational History   • Occupation:      Employer: SELF-EMPLOYED     Comment: full time   Tobacco Use   • Smoking status: Former Smoker   • Smokeless tobacco: Never Used   Substance and Sexual Activity   • Alcohol use: Yes     Comment: occ'l   • Drug use: No   • Sexual activity: Defer     Partners: Male     Birth control/protection: Surgical     Comment: hysterectomy       ALLERGIES  Iodine and Shellfish allergy    PHYSICAL EXAM  ED Triage Vitals   Temp Heart Rate Resp BP SpO2   09/02/20 0806 09/02/20 0803 09/02/20 0803 09/02/20 0803 09/02/20 0803   98.5 °F (36.9 °C) 106 16 130/85 99 %      Temp src Heart Rate Source Patient Position BP Location FiO2 (%)   09/02/20 0806 09/02/20 0803 -- -- --   Oral Monitor          Physical Exam   Constitutional: She is oriented to person, place, and time.   The patient is an obese, 45-year-old, female no acute distress.   HENT:   Head: Normocephalic and atraumatic.   Eyes: Conjunctivae and EOM are normal.   Neck: Normal range of motion. Neck supple.   Cardiovascular: Normal rate and normal heart sounds.   No murmur heard.  Pulmonary/Chest: Effort normal and breath sounds normal. No respiratory distress.   Abdominal: Soft. Bowel sounds are normal. There is no tenderness.   Musculoskeletal: Normal range of motion. She exhibits no edema.   Neurological: She is alert and  oriented to person, place, and time. No cranial nerve deficit.   Patient noted to have difficulty in lifting her lower extremities off of the bed.  Gross sensation intact in both lower extremities and distal pulses were good.  Patellar reflexes 2+ bilaterally and the Achilles reflexes were 1+ bilaterally.  Patient had to have 2 person assist for ambulation and patient was intentionally leaning forward slightly in an apparent effort to maintain her center of gravity.  Patient unable to tiptoe or step onto a 10 inch stool.   Skin: Skin is warm and dry.   Psychiatric: Affect and judgment normal.   Nursing note and vitals reviewed.      LAB RESULTS  Results for orders placed or performed during the hospital encounter of 09/02/20   Comprehensive Metabolic Panel   Result Value Ref Range    Glucose 141 (H) 65 - 99 mg/dL    BUN 12 6 - 20 mg/dL    Creatinine 0.66 0.57 - 1.00 mg/dL    Sodium 140 136 - 145 mmol/L    Potassium 3.7 3.5 - 5.2 mmol/L    Chloride 101 98 - 107 mmol/L    CO2 24.4 22.0 - 29.0 mmol/L    Calcium 9.3 8.6 - 10.5 mg/dL    Total Protein 7.4 6.0 - 8.5 g/dL    Albumin 4.10 3.50 - 5.20 g/dL    ALT (SGPT) 25 1 - 33 U/L    AST (SGOT) 17 1 - 32 U/L    Alkaline Phosphatase 39 39 - 117 U/L    Total Bilirubin 0.2 0.0 - 1.2 mg/dL    eGFR Non African Amer 97 >60 mL/min/1.73    Globulin 3.3 gm/dL    A/G Ratio 1.2 g/dL    BUN/Creatinine Ratio 18.2 7.0 - 25.0    Anion Gap 14.6 5.0 - 15.0 mmol/L   CBC Auto Differential   Result Value Ref Range    WBC 8.19 3.40 - 10.80 10*3/mm3    RBC 4.62 3.77 - 5.28 10*6/mm3    Hemoglobin 13.5 12.0 - 15.9 g/dL    Hematocrit 41.9 34.0 - 46.6 %    MCV 90.7 79.0 - 97.0 fL    MCH 29.2 26.6 - 33.0 pg    MCHC 32.2 31.5 - 35.7 g/dL    RDW 12.5 12.3 - 15.4 %    RDW-SD 41.7 37.0 - 54.0 fl    MPV 11.9 6.0 - 12.0 fL    Platelets 147 140 - 450 10*3/mm3    Neutrophil % 67.0 42.7 - 76.0 %    Lymphocyte % 24.2 19.6 - 45.3 %    Monocyte % 6.6 5.0 - 12.0 %    Eosinophil % 1.3 0.3 - 6.2 %    Basophil %  0.4 0.0 - 1.5 %    Immature Grans % 0.5 0.0 - 0.5 %    Neutrophils, Absolute 5.49 1.70 - 7.00 10*3/mm3    Lymphocytes, Absolute 1.98 0.70 - 3.10 10*3/mm3    Monocytes, Absolute 0.54 0.10 - 0.90 10*3/mm3    Eosinophils, Absolute 0.11 0.00 - 0.40 10*3/mm3    Basophils, Absolute 0.03 0.00 - 0.20 10*3/mm3    Immature Grans, Absolute 0.04 0.00 - 0.05 10*3/mm3         I ordered the above labs and reviewed the results    RADIOLOGY  Mri Lumbar Spine Without Contrast    Result Date: 9/2/2020  Narrative: MRI Spine Lumbar WO HISTORY: Lower back pain radiating into bilateral lower extremities with paresthesias, worsening the last 5 days TECHNIQUE: Multiplanar multisequence imaging of the lumbar spine acquired without IV contrast utilizing a standard protocol. COMPARISON: None FINDINGS: For the purposes of this dictation there appears to be 5 lumbar-type vertebra with the last fully formed disc space representing L5-S1. Lumbar alignment is anatomic. Vertebral body heights are maintained. No focal aggressive marrow lesion is identified. Disc heights are preserved. The conus terminates at an expected level and the visualized distal cord signal is within normal limits. Level by level: L1-2: No significant thecal sac or foraminal narrowing. L2-3: No significant thecal sac or foraminal narrowing. L3-4: No significant thecal sac or foraminal narrowing L4-5: Moderate facet degenerative change. Minimal disc bulge, slightly eccentric to the left but no significant thecal sac or foraminal narrowing is present. L5-S1: Moderate right greater than left facet degenerative change without significant thecal sac or foraminal narrowing.     Impression: 1.  Degenerative changes in the lower lumbar spine, predominantly due to facet degeneration without high-grade spinal canal or foraminal narrowing. Signer Name: MARLEN CALDERON MD  Signed: 9/2/2020 10:16 AM  Workstation Name: [id number]  [facility]    Mammo Screening Digital  Tomosynthesis Bilateral With Cad    Result Date: 8/4/2020  Narrative: EXAM, 08/04/2020: 1. Bilateral digital screening mammogram with CAD. 2. Bilateral digital screening breast tomosynthesis.  INDICATION: Routine screening.  TECHNIQUE: Bilateral digital screening mammogram images were obtained including digital breast tomosynthesis and CAD review.  COMPARISON: *  Screening mammogram, 03/09/2018.  FINDINGS: There are scattered areas of fibroglandular density. Stable benign diffuse mildly nodular parenchymal pattern.  No significant change when compared with prior images. No mammographic evidence of malignancy. Recommend repeat screening mammogram in one year.      Impression: Benign screening mammogram.  BI-RADS CATEGORY 2: Benign Findings.   Women over the age of 40 undergoing screening mammography are entered into a reminder system with target due date for the next mammogram.  This report was finalized on 8/4/2020 5:06 PM by Dr. Sony Casillas MD.        I ordered the above radiologic testing and reviewed the results    PROCEDURES  Procedures      PROGRESS AND CONSULTS  ED Course as of Sep 02 1104   Wed Sep 02, 2020   1055 Physical exam indicates that the patient may have an ascending paralysis.  MRI obtained to rule out possibility of central canal problem and results have been reviewed.  All findings discussed with patient and she is agreeable to admission.  Case and findings discussed with the on-call hospitalist, Dr. Ugalde, who felt that the patient's current condition warrants admission to the hospital on an observation basis for further evaluation/treatment.    [ML]      ED Course User Index  [ML] Ayden Dennis MD           MEDICAL DECISION MAKING  Results were reviewed/discussed with the patient and they were also made aware of online access. Pt also made aware that some labs, such as cultures, will not be resulted during ER visit and follow up with PMD is necessary.     DONG        DIAGNOSIS  Final diagnoses:   Ascending paralysis (CMS/HCC)       Latest Documented Vital Signs:  As of 11:04  BP- 139/75 HR- 106 Temp- 98.5 °F (36.9 °C) (Oral) O2 sat- 98%    DISPOSITION  Patient admitted to [facility] observation       Medication List      No changes were made to your prescriptions during this visit.              Ayden Dennis MD  09/02/20 8133

## 2020-09-02 NOTE — CONSULTS
Patient Identification:  NAME:  Angie Patel  Age:  45 y.o.   Sex:  female   :  1975   MRN:  [id number]       Chief complaint: My legs are tingly and weak, reason for consult ascending paralysis    History of present illness: Patient is a 45-year-old left-handed white female with history of anxiety fibromyalgia hypertension who comes to the hospital when Saturday she noted there was some tingling and numbness in her feet since then it is progressed up to the legs and now even involves the hands and coming up the arm she definitely feels she is progressing over the last 4 days she is now got significant weakness in the legs and has trouble lifting them also may be some weakness of  this all appears to be symmetrical in location and now involves all 4 extremities although she was in the shower and had some numbness of the face and a metallic feeling in her mouth when she got out of the shower that all cleared she does not have any cranial nerve symptoms at this time no loss of taste facial numbness or facial weakness.  No bowel bladder changes at all no hesitancy or inability to urinate.  So no associated symptoms modifying factors so far none quality as described.  She did get an MRI scan of the lumbosacral spine that looked normal.      Past medical history:  Past Medical History:   Diagnosis Date   • Anxiety    • Asthma    • Cardiomyopathy (CMS/HCC)     with pregnancy   • Diabetes mellitus (CMS/HCC)    • Fibromyalgia    • Fibromyalgia, primary    • Headache, tension-type    • Hypertension    • Insomnia    • Kidney calculi    • Memory loss    • Meningioma (CMS/HCC)     benign brain    • Migraine     ocular   • KACI (obstructive sleep apnea)    • Palpitations    • Polycystic ovaries    • Seizures (CMS/HCC)        Past surgical history:  Past Surgical History:   Procedure Laterality Date   • CYSTOSCOPY URETEROSCOPY LASER LITHOTRIPSY Right 3/7/2017    Procedure: CYSTOSCOPY RT URETEROSCOPY LASER  LITHOTRIPSY W/ STENT, rerograde pyelogram ;  Surgeon: Rashid Malloy MD;  Location: [facility] OR;  Service:    • FOOT SURGERY Left     tendon repair   • HYSTERECTOMY     • OOPHORECTOMY     • SHOULDER SURGERY Right 2009   • TUBAL ABDOMINAL LIGATION     • WISDOM TOOTH EXTRACTION         Allergies:  Iodine and Shellfish allergy    Home medications:  Medications Prior to Admission   Medication Sig Dispense Refill Last Dose   • Blood Glucose Monitoring Suppl (ACCU-CHEK MANNY) device Used to check blood sugars twice daily as needed for type 2 diabetes 1 each 0 Past Month at Unknown time   • chlorthalidone (HYGROTON) 25 MG tablet Take 1 tablet by mouth Daily. 90 tablet 3 9/2/2020 at 0800   • furosemide (LASIX) 20 MG tablet TAKE 1 TABLET BY MOUTH DAILY AS NEEDED (Patient taking differently: 20 mg.) 90 tablet 0 Past Month at Unknown time   • glucose blood (Accu-Chek Manny) test strip Used to check blood sugars twice daily as needed for type 2 diabetes 100 each 12 Past Month at Unknown time   • ibuprofen (MOTRIN IB) 200 MG tablet Take 2 tablets by mouth Every 6 (Six) Hours As Needed.   Past Week at Unknown time   • Lancets (ACCU-CHEK MULTICLIX) lancets Used to check blood sugars twice daily as needed for type 2 diabetes 100 each 12 Past Month at Unknown time   • metFORMIN ER (GLUCOPHAGE-XR) 750 MG 24 hr tablet Take 2 tablets by mouth Daily With Dinner. 60 tablet 6 9/1/2020 at 1800   • spironolactone (ALDACTONE) 100 MG tablet TAKE 1 TABLET BY MOUTH DAILY 90 tablet 0 9/2/2020 at 0800   • VENTOLIN  (90 Base) MCG/ACT inhaler INHALE 2 PUFFS BY MOUTH FOUR TIMES DAILY AS NEEDED FOR WHEEZING OR SHORTNESS OF BREATH 18 g 0 9/1/2020 at 2200        Hospital medications:    [START ON 9/3/2020] chlorthalidone 25 mg Oral Daily   sodium chloride 10 mL Intravenous Q12H   [START ON 9/3/2020] spironolactone 100 mg Oral Daily        •  acetaminophen **OR** acetaminophen **OR** acetaminophen  •  albuterol sulfate HFA  •   bisacodyl  •  bisacodyl  •  magnesium hydroxide  •  melatonin  •  ondansetron **OR** ondansetron  •  [COMPLETED] Insert peripheral IV **AND** sodium chloride  •  sodium chloride  •  sodium chloride    Family history:  Family History   Problem Relation Age of Onset   • Heart disease Paternal Aunt    • Diabetes Paternal Aunt    • Diabetes Maternal Grandmother    • Neuropathy Maternal Grandmother    • Thyroid disease Maternal Grandmother    • Heart disease Maternal Grandfather    • Cancer Maternal Grandfather    • Heart disease Paternal Grandfather    • Heart disease Paternal Uncle    • Arthritis Mother    • Migraines Mother    • Seizures Father    • Breast cancer Neg Hx    • Ovarian cancer Neg Hx    • Colon cancer Neg Hx    • Deep vein thrombosis Neg Hx    • Pulmonary embolism Neg Hx        Social history:  Social History     Tobacco Use   • Smoking status: Former Smoker   • Smokeless tobacco: Never Used   Substance Use Topics   • Alcohol use: Not Currently     Comment: occ'l   • Drug use: No       Review of systems:    No hair eyes ears nose throat skin bone joint  lymphatic hematologic oncologic complaints no neck pain chest pain abdominal pain bowel bladder incontinence fever chills or rash    Objective:  Vitals Ranges:   Temp:  [98.4 °F (36.9 °C)-98.5 °F (36.9 °C)] 98.4 °F (36.9 °C)  Heart Rate:  [] 96  Resp:  [16-18] 16  BP: (121-139)/(75-85) 121/79      Physical Exam:  She is awake alert oriented x3 in no distress fund of knowledge attention span concentration recent remote memory is all normal well-developed well-nourished moderately obese in no distress pupils 2 constricting to 1-1/2 unable to visualize disc retinas visual fields are full extraocular movements full without nystagmus nasolabial folds palate tongue symmetrical normal hearing facial sensation head turning shoulder shrug motor she has weakened  bilaterally I can overcome them and slide my fingers out proximal strength is probably 5-  out of 5 but  I would call 4+ out of 5 lower extremities 4 out of 5 proximally and distally reflexes trace in the upper extremities knee jerks present 1+ right ankle jerk 1+ left ankle jerk 1- but still present toes not upgoing on either side she has weakness of dorsiflexion that I can overcome in the feet.  Legs are both very heavy for her to lift and she strains.  No atrophy fasciculations resting tremor sensation normal light touch face both arms both legs she does not have a sensory level coordination normal in the upper extremity Station and gait was impossible she is too weak in the legs to walk heart is regular without murmur neck supple without bruits extremities no clubbing cyanosis edema visual acuity normal at 3 feet    Results review:   I reviewed the patient's new clinical results.    Data review:  Lab Results (last 24 hours)     Procedure Component Value Units Date/Time    CK [[id number]] Collected:  09/02/20 0925    Specimen:  Blood Updated:  09/02/20 1245    Magnesium [[id number]] Collected:  09/02/20 0925    Specimen:  Blood Updated:  09/02/20 1245    Phosphorus [[id number]] Collected:  09/02/20 0925    Specimen:  Blood Updated:  09/02/20 1245    Comprehensive Metabolic Panel [[id number]]  (Abnormal) Collected:  09/02/20 0925    Specimen:  Blood Updated:  09/02/20 0946     Glucose 141 mg/dL      BUN 12 mg/dL      Creatinine 0.66 mg/dL      Sodium 140 mmol/L      Potassium 3.7 mmol/L      Chloride 101 mmol/L      CO2 24.4 mmol/L      Calcium 9.3 mg/dL      Total Protein 7.4 g/dL      Albumin 4.10 g/dL      ALT (SGPT) 25 U/L      AST (SGOT) 17 U/L      Alkaline Phosphatase 39 U/L      Total Bilirubin 0.2 mg/dL      eGFR Non African Amer 97 mL/min/1.73      Globulin 3.3 gm/dL      A/G Ratio 1.2 g/dL      BUN/Creatinine Ratio 18.2     Anion Gap 14.6 mmol/L     Narrative:       GFR Normal >60  Chronic Kidney Disease <60  Kidney Failure <15      CBC & Differential [[id number]] Collected:  09/02/20 0902     Specimen:  Blood Updated:  09/02/20 0910    Narrative:       The following orders were created for panel order CBC & Differential.  Procedure                               Abnormality         Status                     ---------                               -----------         ------                     CBC Auto Differential[[id number]]        Normal              Final result                 Please view results for these tests on the individual orders.    CBC Auto Differential [[id number]]  (Normal) Collected:  09/02/20 0902    Specimen:  Blood Updated:  09/02/20 0910     WBC 8.19 10*3/mm3      RBC 4.62 10*6/mm3      Hemoglobin 13.5 g/dL      Hematocrit 41.9 %      MCV 90.7 fL      MCH 29.2 pg      MCHC 32.2 g/dL      RDW 12.5 %      RDW-SD 41.7 fl      MPV 11.9 fL      Platelets 147 10*3/mm3      Neutrophil % 67.0 %      Lymphocyte % 24.2 %      Monocyte % 6.6 %      Eosinophil % 1.3 %      Basophil % 0.4 %      Immature Grans % 0.5 %      Neutrophils, Absolute 5.49 10*3/mm3      Lymphocytes, Absolute 1.98 10*3/mm3      Monocytes, Absolute 0.54 10*3/mm3      Eosinophils, Absolute 0.11 10*3/mm3      Basophils, Absolute 0.03 10*3/mm3      Immature Grans, Absolute 0.04 10*3/mm3            Imaging:  Imaging Results (Last 24 Hours)     Procedure Component Value Units Date/Time    MRI Lumbar Spine Without Contrast [[id number]] Collected:  09/02/20 1016     Updated:  09/02/20 1018    Narrative:       MRI Spine Lumbar WO    HISTORY:  Lower back pain radiating into bilateral lower extremities with paresthesias, worsening the last 5 days    TECHNIQUE:  Multiplanar multisequence imaging of the lumbar spine acquired without IV contrast utilizing a standard protocol.    COMPARISON: None    FINDINGS:    For the purposes of this dictation there appears to be 5 lumbar-type vertebra with the last fully formed disc space representing L5-S1.    Lumbar alignment is anatomic. Vertebral body heights are maintained. No focal aggressive  marrow lesion is identified. Disc heights are preserved. The conus terminates at an expected level and the visualized distal cord signal is within normal limits.    Level by level:    L1-2: No significant thecal sac or foraminal narrowing.    L2-3: No significant thecal sac or foraminal narrowing.    L3-4: No significant thecal sac or foraminal narrowing    L4-5: Moderate facet degenerative change. Minimal disc bulge, slightly eccentric to the left but no significant thecal sac or foraminal narrowing is present.    L5-S1: Moderate right greater than left facet degenerative change without significant thecal sac or foraminal narrowing.          Impression:       1.  Degenerative changes in the lower lumbar spine, predominantly due to facet degeneration without high-grade spinal canal or foraminal narrowing.    Signer Name: MARLEN CALDERON MD   Signed: 9/2/2020 10:16 AM   Workstation Name: [id number]    [facility]      PPE was warned by me and the patient at all times I was never within 6 feet of her for more than a few minutes during my exam washed up for putting on washed up after I took it off disposed of everything properly no aerosols used      Assessment and Plan:     This patient has an ascending paralysis, first with numbness in the feet slowly coming up the legs and now in the hands coming up the arms.  Although she still has some reflexes distally in the legs I do not believe we are dealing with an acute cervical myelopathy (she does not have hyperreflexia toes are downgoing and she has absolutely no bowel or bladder symptoms at all)  The first step should be a lumbar puncture which I will schedule under fluoroscopy in the radiology department.  We are looking for elevated protein and no cells.  If this is found, he is either going to need IVIG or plasma pheresis.  Based upon the rather rapid progression of her symptoms over the last 4 days I would be more in favor of transferring her to  [facility] for the plasmapheresis treatments.  First step, is the lumbar puncture and we will go from there thank you      Prasanna Armijo MD  09/02/20  12:56

## 2020-09-03 ENCOUNTER — APPOINTMENT (OUTPATIENT)
Dept: MRI IMAGING | Facility: HOSPITAL | Age: 45
End: 2020-09-03

## 2020-09-03 ENCOUNTER — HOSPITAL ENCOUNTER (INPATIENT)
Facility: HOSPITAL | Age: 45
LOS: 4 days | Discharge: HOME | End: 2020-09-07
Attending: INTERNAL MEDICINE | Admitting: HOSPITALIST

## 2020-09-03 VITALS
DIASTOLIC BLOOD PRESSURE: 53 MMHG | OXYGEN SATURATION: 94 % | BODY MASS INDEX: 46.3 KG/M2 | HEIGHT: 64 IN | HEART RATE: 90 BPM | WEIGHT: 271.2 LBS | RESPIRATION RATE: 16 BRPM | SYSTOLIC BLOOD PRESSURE: 102 MMHG | TEMPERATURE: 98.6 F

## 2020-09-03 DIAGNOSIS — M79.7 FIBROMYALGIA: Primary | ICD-10-CM

## 2020-09-03 LAB
ALBUMIN SERPL-MCNC: 3.8 G/DL (ref 3.5–5.2)
ALBUMIN/GLOB SERPL: 1.2 G/DL
ALP SERPL-CCNC: 42 U/L (ref 39–117)
ALT SERPL W P-5'-P-CCNC: 23 U/L (ref 1–33)
ANION GAP SERPL CALCULATED.3IONS-SCNC: 15.2 MMOL/L (ref 5–15)
AST SERPL-CCNC: 15 U/L (ref 1–32)
BASOPHILS # BLD AUTO: 0.04 10*3/MM3 (ref 0–0.2)
BASOPHILS NFR BLD AUTO: 0.4 % (ref 0–1.5)
BILIRUB SERPL-MCNC: 0.5 MG/DL (ref 0–1.2)
BUN SERPL-MCNC: 15 MG/DL (ref 6–20)
BUN/CREAT SERPL: 20.3 (ref 7–25)
CALCIUM SPEC-SCNC: 9.3 MG/DL (ref 8.6–10.5)
CHLORIDE SERPL-SCNC: 97 MMOL/L (ref 98–107)
CO2 SERPL-SCNC: 25.8 MMOL/L (ref 22–29)
CREAT SERPL-MCNC: 0.74 MG/DL (ref 0.57–1)
DEPRECATED RDW RBC AUTO: 41.1 FL (ref 37–54)
EOSINOPHIL # BLD AUTO: 0.15 10*3/MM3 (ref 0–0.4)
EOSINOPHIL NFR BLD AUTO: 1.4 % (ref 0.3–6.2)
ERYTHROCYTE [DISTWIDTH] IN BLOOD BY AUTOMATED COUNT: 12.4 % (ref 12.3–15.4)
ERYTHROCYTE [SEDIMENTATION RATE] IN BLOOD: 37 MM/HR (ref 0–20)
GFR SERPL CREATININE-BSD FRML MDRD: 85 ML/MIN/1.73
GLOBULIN UR ELPH-MCNC: 3.3 GM/DL
GLUCOSE BLDC GLUCOMTR-MCNC: 120 MG/DL (ref 70–130)
GLUCOSE BLDC GLUCOMTR-MCNC: 123 MG/DL (ref 70–130)
GLUCOSE BLDC GLUCOMTR-MCNC: 131 MG/DL (ref 70–130)
GLUCOSE BLDC GLUCOMTR-MCNC: 157 MG/DL (ref 70–130)
GLUCOSE SERPL-MCNC: 150 MG/DL (ref 65–99)
HCT VFR BLD AUTO: 38.4 % (ref 34–46.6)
HGB BLD-MCNC: 12.6 G/DL (ref 12–15.9)
IMM GRANULOCYTES # BLD AUTO: 0.1 10*3/MM3 (ref 0–0.05)
IMM GRANULOCYTES NFR BLD AUTO: 1 % (ref 0–0.5)
LYMPHOCYTES # BLD AUTO: 2.45 10*3/MM3 (ref 0.7–3.1)
LYMPHOCYTES NFR BLD AUTO: 23.4 % (ref 19.6–45.3)
MCH RBC QN AUTO: 29.8 PG (ref 26.6–33)
MCHC RBC AUTO-ENTMCNC: 32.8 G/DL (ref 31.5–35.7)
MCV RBC AUTO: 90.8 FL (ref 79–97)
MONOCYTES # BLD AUTO: 0.68 10*3/MM3 (ref 0.1–0.9)
MONOCYTES NFR BLD AUTO: 6.5 % (ref 5–12)
NEUTROPHILS NFR BLD AUTO: 67.3 % (ref 42.7–76)
NEUTROPHILS NFR BLD AUTO: 7.07 10*3/MM3 (ref 1.7–7)
NRBC BLD AUTO-RTO: 0 /100 WBC (ref 0–0.2)
PLATELET # BLD AUTO: 251 10*3/MM3 (ref 140–450)
PMV BLD AUTO: 11.9 FL (ref 6–12)
POTASSIUM SERPL-SCNC: 3.3 MMOL/L (ref 3.5–5.2)
PROT SERPL-MCNC: 7.1 G/DL (ref 6–8.5)
RBC # BLD AUTO: 4.23 10*6/MM3 (ref 3.77–5.28)
SODIUM SERPL-SCNC: 138 MMOL/L (ref 136–145)
WBC # BLD AUTO: 10.49 10*3/MM3 (ref 3.4–10.8)

## 2020-09-03 PROCEDURE — 85025 COMPLETE CBC W/AUTO DIFF WBC: CPT | Performed by: INTERNAL MEDICINE

## 2020-09-03 PROCEDURE — 99239 HOSP IP/OBS DSCHRG MGMT >30: CPT | Performed by: INTERNAL MEDICINE

## 2020-09-03 PROCEDURE — U0004 COV-19 TEST NON-CDC HGH THRU: HCPCS | Performed by: INTERNAL MEDICINE

## 2020-09-03 PROCEDURE — 94799 UNLISTED PULMONARY SVC/PX: CPT

## 2020-09-03 PROCEDURE — 97163 PT EVAL HIGH COMPLEX 45 MIN: CPT

## 2020-09-03 PROCEDURE — 85652 RBC SED RATE AUTOMATED: CPT | Performed by: INTERNAL MEDICINE

## 2020-09-03 PROCEDURE — 82962 GLUCOSE BLOOD TEST: CPT

## 2020-09-03 PROCEDURE — 80053 COMPREHEN METABOLIC PANEL: CPT | Performed by: INTERNAL MEDICINE

## 2020-09-03 PROCEDURE — 25010000002 LORAZEPAM PER 2 MG: Performed by: PSYCHIATRY & NEUROLOGY

## 2020-09-03 RX ORDER — NICOTINE POLACRILEX 4 MG
15 LOZENGE BUCCAL
Status: CANCELLED | OUTPATIENT
Start: 2020-09-03

## 2020-09-03 RX ORDER — SODIUM CHLORIDE 0.9 % (FLUSH) 0.9 %
10 SYRINGE (ML) INJECTION AS NEEDED
Status: CANCELLED | OUTPATIENT
Start: 2020-09-03

## 2020-09-03 RX ORDER — ALBUTEROL SULFATE 90 UG/1
1 AEROSOL, METERED RESPIRATORY (INHALATION) EVERY 6 HOURS PRN
Status: CANCELLED | OUTPATIENT
Start: 2020-09-03

## 2020-09-03 RX ORDER — SPIRONOLACTONE 50 MG/1
100 TABLET, FILM COATED ORAL DAILY
Status: DISCONTINUED | OUTPATIENT
Start: 2020-09-04 | End: 2020-09-07 | Stop reason: HOSPADM

## 2020-09-03 RX ORDER — ONDANSETRON 4 MG/1
4 TABLET, FILM COATED ORAL EVERY 6 HOURS PRN
Status: DISCONTINUED | OUTPATIENT
Start: 2020-09-03 | End: 2020-09-07 | Stop reason: HOSPADM

## 2020-09-03 RX ORDER — ACETAMINOPHEN 160 MG/5ML
650 SOLUTION ORAL EVERY 4 HOURS PRN
Status: CANCELLED | OUTPATIENT
Start: 2020-09-03

## 2020-09-03 RX ORDER — SODIUM CHLORIDE 0.9 % (FLUSH) 0.9 %
10 SYRINGE (ML) INJECTION EVERY 12 HOURS SCHEDULED
Status: CANCELLED | OUTPATIENT
Start: 2020-09-03

## 2020-09-03 RX ORDER — BISACODYL 10 MG
10 SUPPOSITORY, RECTAL RECTAL DAILY PRN
Status: CANCELLED | OUTPATIENT
Start: 2020-09-03

## 2020-09-03 RX ORDER — CHLORTHALIDONE 25 MG/1
25 TABLET ORAL DAILY
Status: CANCELLED | OUTPATIENT
Start: 2020-09-04

## 2020-09-03 RX ORDER — CHOLECALCIFEROL (VITAMIN D3) 125 MCG
5 CAPSULE ORAL NIGHTLY PRN
Status: CANCELLED | OUTPATIENT
Start: 2020-09-03

## 2020-09-03 RX ORDER — DEXTROSE MONOHYDRATE 25 G/50ML
25 INJECTION, SOLUTION INTRAVENOUS
Status: DISCONTINUED | OUTPATIENT
Start: 2020-09-03 | End: 2020-09-07 | Stop reason: HOSPADM

## 2020-09-03 RX ORDER — LIDOCAINE 50 MG/G
1 PATCH TOPICAL
Status: DISCONTINUED | OUTPATIENT
Start: 2020-09-03 | End: 2020-09-07 | Stop reason: HOSPADM

## 2020-09-03 RX ORDER — CHLORTHALIDONE 25 MG/1
25 TABLET ORAL DAILY
Status: DISCONTINUED | OUTPATIENT
Start: 2020-09-04 | End: 2020-09-07 | Stop reason: HOSPADM

## 2020-09-03 RX ORDER — SODIUM CHLORIDE 0.9 % (FLUSH) 0.9 %
10 SYRINGE (ML) INJECTION EVERY 12 HOURS SCHEDULED
Status: DISCONTINUED | OUTPATIENT
Start: 2020-09-03 | End: 2020-09-07 | Stop reason: HOSPADM

## 2020-09-03 RX ORDER — ACETAMINOPHEN 650 MG/1
650 SUPPOSITORY RECTAL EVERY 4 HOURS PRN
Status: CANCELLED | OUTPATIENT
Start: 2020-09-03

## 2020-09-03 RX ORDER — ACETAMINOPHEN 160 MG/5ML
650 SOLUTION ORAL EVERY 4 HOURS PRN
Status: DISCONTINUED | OUTPATIENT
Start: 2020-09-03 | End: 2020-09-07 | Stop reason: HOSPADM

## 2020-09-03 RX ORDER — SODIUM CHLORIDE 9 MG/ML
40 INJECTION, SOLUTION INTRAVENOUS AS NEEDED
Status: CANCELLED | OUTPATIENT
Start: 2020-09-03

## 2020-09-03 RX ORDER — SODIUM CHLORIDE 0.9 % (FLUSH) 0.9 %
10 SYRINGE (ML) INJECTION AS NEEDED
Status: DISCONTINUED | OUTPATIENT
Start: 2020-09-03 | End: 2020-09-07 | Stop reason: HOSPADM

## 2020-09-03 RX ORDER — ALBUTEROL SULFATE 90 UG/1
1 AEROSOL, METERED RESPIRATORY (INHALATION) EVERY 6 HOURS PRN
Status: DISCONTINUED | OUTPATIENT
Start: 2020-09-03 | End: 2020-09-04

## 2020-09-03 RX ORDER — ACETAMINOPHEN 325 MG/1
650 TABLET ORAL EVERY 4 HOURS PRN
Status: DISCONTINUED | OUTPATIENT
Start: 2020-09-03 | End: 2020-09-07 | Stop reason: HOSPADM

## 2020-09-03 RX ORDER — BISACODYL 5 MG/1
5 TABLET, DELAYED RELEASE ORAL DAILY PRN
Status: CANCELLED | OUTPATIENT
Start: 2020-09-03

## 2020-09-03 RX ORDER — CHOLECALCIFEROL (VITAMIN D3) 125 MCG
5 CAPSULE ORAL NIGHTLY PRN
Status: DISCONTINUED | OUTPATIENT
Start: 2020-09-03 | End: 2020-09-07 | Stop reason: HOSPADM

## 2020-09-03 RX ORDER — ACETAMINOPHEN 650 MG/1
650 SUPPOSITORY RECTAL EVERY 4 HOURS PRN
Status: DISCONTINUED | OUTPATIENT
Start: 2020-09-03 | End: 2020-09-07 | Stop reason: HOSPADM

## 2020-09-03 RX ORDER — DEXTROSE MONOHYDRATE 25 G/50ML
25 INJECTION, SOLUTION INTRAVENOUS
Status: CANCELLED | OUTPATIENT
Start: 2020-09-03

## 2020-09-03 RX ORDER — ACETAMINOPHEN 325 MG/1
650 TABLET ORAL EVERY 4 HOURS PRN
Status: CANCELLED | OUTPATIENT
Start: 2020-09-03

## 2020-09-03 RX ORDER — BISACODYL 10 MG
10 SUPPOSITORY, RECTAL RECTAL DAILY PRN
Status: DISCONTINUED | OUTPATIENT
Start: 2020-09-03 | End: 2020-09-07 | Stop reason: HOSPADM

## 2020-09-03 RX ORDER — ONDANSETRON 4 MG/1
4 TABLET, FILM COATED ORAL EVERY 6 HOURS PRN
Status: CANCELLED | OUTPATIENT
Start: 2020-09-03

## 2020-09-03 RX ORDER — BISACODYL 5 MG/1
5 TABLET, DELAYED RELEASE ORAL DAILY PRN
Status: DISCONTINUED | OUTPATIENT
Start: 2020-09-03 | End: 2020-09-07 | Stop reason: HOSPADM

## 2020-09-03 RX ORDER — NICOTINE POLACRILEX 4 MG
15 LOZENGE BUCCAL
Status: DISCONTINUED | OUTPATIENT
Start: 2020-09-03 | End: 2020-09-07 | Stop reason: HOSPADM

## 2020-09-03 RX ORDER — ONDANSETRON 2 MG/ML
4 INJECTION INTRAMUSCULAR; INTRAVENOUS EVERY 6 HOURS PRN
Status: CANCELLED | OUTPATIENT
Start: 2020-09-03

## 2020-09-03 RX ORDER — ONDANSETRON 2 MG/ML
4 INJECTION INTRAMUSCULAR; INTRAVENOUS EVERY 6 HOURS PRN
Status: DISCONTINUED | OUTPATIENT
Start: 2020-09-03 | End: 2020-09-07 | Stop reason: HOSPADM

## 2020-09-03 RX ORDER — SPIRONOLACTONE 100 MG/1
100 TABLET, FILM COATED ORAL DAILY
Status: CANCELLED | OUTPATIENT
Start: 2020-09-04

## 2020-09-03 RX ORDER — SODIUM CHLORIDE 9 MG/ML
40 INJECTION, SOLUTION INTRAVENOUS AS NEEDED
Status: DISCONTINUED | OUTPATIENT
Start: 2020-09-03 | End: 2020-09-03

## 2020-09-03 RX ADMIN — ACETAMINOPHEN 650 MG: 325 TABLET, FILM COATED ORAL at 09:09

## 2020-09-03 RX ADMIN — SPIRONOLACTONE 100 MG: 100 TABLET ORAL at 09:12

## 2020-09-03 RX ADMIN — SODIUM CHLORIDE, PRESERVATIVE FREE 10 ML: 5 INJECTION INTRAVENOUS at 20:45

## 2020-09-03 RX ADMIN — ACETAMINOPHEN 650 MG: 325 TABLET, FILM COATED ORAL at 04:10

## 2020-09-03 RX ADMIN — ACETAMINOPHEN 650 MG: 325 TABLET, FILM COATED ORAL at 20:44

## 2020-09-03 RX ADMIN — SODIUM CHLORIDE, PRESERVATIVE FREE 10 ML: 5 INJECTION INTRAVENOUS at 04:11

## 2020-09-03 RX ADMIN — ACETAMINOPHEN 650 MG: 325 TABLET, FILM COATED ORAL at 15:01

## 2020-09-03 RX ADMIN — LORAZEPAM 2 MG: 2 INJECTION INTRAMUSCULAR; INTRAVENOUS at 11:24

## 2020-09-03 RX ADMIN — LIDOCAINE 1 PATCH: 50 PATCH TOPICAL at 20:44

## 2020-09-03 RX ADMIN — CHLORTHALIDONE 25 MG: 25 TABLET ORAL at 09:10

## 2020-09-03 NOTE — PAYOR COMM NOTE
"Angie Marte (45 y.o. Female)   ATTN: NURSE REVIEWER  RE: INPATIENT AUTHE REQUEST  REF#[id number]  PLS REPLY TO DEDE BERMUDEZ [phone] FAX# [phone]      Date of Birth Social Security Number Address Home Phone MRN    1975  [id number] [phone] [id number]    Denominational Marital Status          Yazdanism        Admission Date Admission Type Admitting Provider Attending Provider Department, Room/Bed    9/2/20 Emergency Jose Antonio Ugalde DO  [facility] MED SURG, 1410/1    Discharge Date Discharge Disposition Discharge Destination        9/3/2020  ([facility])              Attending Provider:  (none)   Allergies:  Iodine, Shellfish Allergy    Isolation:  None   Infection:  None   Code Status:  CPR    Ht:  162.6 cm (64\")   Wt:  123 kg (271 lb 3.2 oz)    Admission Cmt:  None   Principal Problem:  Weakness of both lower extremities [R29.898]                 Active Insurance as of 9/2/2020     Primary Coverage     Payor Plan Insurance Group Employer/Plan Group    [employer]HE[employer] BLUE CROSS AN[employer]M BLUE CROSS BLUE SHIELD PPO [id number]     Payor Plan Address Payor Plan Phone Number Payor Plan Fax Number Effective Dates    [address] [phone]  1/1/2018 - None Entered    [place] [address]       Subscriber Name Subscriber Birth Date Member ID       JENS MARTE 7[birth year]/1957 [id number]                 Emergency Contacts      (Rel.) Home Phone Work Phone Mobile Phone    Jens Marte (Spouse) [phone] -- [phone]               History & Physical      Jose Antonio Ugalde DO at 09/02/20 1502          [facility] HOSPITALIST     Kya Kendrick PA-C    CHIEF COMPLAINT:     Bilateral lower extremity weakness and paresthesias    HISTORY OF PRESENT ILLNESS:    Pleasant 45-year-old female past medical history morbid obesity hypertension type 2 diabetes presented to the ER for 4-day history of " "worsening and progressive lower extremity weakness and paresthesias.  She reports 4 days ago she started to develop some tingling in her feet that then started to develop in her knees and then her hips.  It was a \"pins-and-needles type sensation\".  She had some numbness in her legs.  She is also experienced some numbness in her upper extremities.  The symptoms have become worse to where she is having problems ambulating without holding onto objects.  This concerned her she came to the ER for evaluation and and in the ER she had an MRI of her lumbar spine and decision was made for admission.  Of note 4 weeks ago she reports he had a viral type illness.      Past Medical History:   Diagnosis Date   • Anxiety    • Asthma    • Cardiomyopathy (CMS/HCC)     with pregnancy   • Diabetes mellitus (CMS/HCC)    • Fibromyalgia    • Fibromyalgia, primary    • Headache, tension-type    • Hypertension    • Insomnia    • Kidney calculi    • Memory loss    • Meningioma (CMS/HCC)     benign brain    • Migraine     ocular   • KACI (obstructive sleep apnea)    • Palpitations    • Polycystic ovaries    • Seizures (CMS/HCC)      Past Surgical History:   Procedure Laterality Date   • CYSTOSCOPY URETEROSCOPY LASER LITHOTRIPSY Right 3/7/2017    Procedure: CYSTOSCOPY RT URETEROSCOPY LASER LITHOTRIPSY W/ STENT, rerograde pyelogram ;  Surgeon: Rashid Malloy MD;  Location: [facility];  Service:    • FOOT SURGERY Left     tendon repair   • HYSTERECTOMY     • OOPHORECTOMY     • SHOULDER SURGERY Right 2009   • TUBAL ABDOMINAL LIGATION     • WISDOM TOOTH EXTRACTION       Family History   Problem Relation Age of Onset   • Heart disease Paternal Aunt    • Diabetes Paternal Aunt    • Diabetes Maternal Grandmother    • Neuropathy Maternal Grandmother    • Thyroid disease Maternal Grandmother    • Heart disease Maternal Grandfather    • Cancer Maternal Grandfather    • Heart disease Paternal Grandfather    • Heart disease Paternal Uncle    • " Arthritis Mother    • Migraines Mother    • Seizures Father    • Breast cancer Neg Hx    • Ovarian cancer Neg Hx    • Colon cancer Neg Hx    • Deep vein thrombosis Neg Hx    • Pulmonary embolism Neg Hx      Social History     Tobacco Use   • Smoking status: Former Smoker   • Smokeless tobacco: Never Used   Substance Use Topics   • Alcohol use: Not Currently     Comment: occ'l   • Drug use: No     Medications Prior to Admission   Medication Sig Dispense Refill Last Dose   • Blood Glucose Monitoring Suppl (ACCU-CHEK MANNY) device Used to check blood sugars twice daily as needed for type 2 diabetes 1 each 0 Past Month at Unknown time   • chlorthalidone (HYGROTON) 25 MG tablet Take 1 tablet by mouth Daily. 90 tablet 3 9/2/2020 at 0800   • furosemide (LASIX) 20 MG tablet TAKE 1 TABLET BY MOUTH DAILY AS NEEDED (Patient taking differently: 20 mg.) 90 tablet 0 Past Month at Unknown time   • glucose blood (Accu-Chek Manny) test strip Used to check blood sugars twice daily as needed for type 2 diabetes 100 each 12 Past Month at Unknown time   • ibuprofen (MOTRIN IB) 200 MG tablet Take 2 tablets by mouth Every 6 (Six) Hours As Needed.   Past Week at Unknown time   • Lancets (ACCU-CHEK MULTICLIX) lancets Used to check blood sugars twice daily as needed for type 2 diabetes 100 each 12 Past Month at Unknown time   • metFORMIN ER (GLUCOPHAGE-XR) 750 MG 24 hr tablet Take 2 tablets by mouth Daily With Dinner. 60 tablet 6 9/1/2020 at 1800   • spironolactone (ALDACTONE) 100 MG tablet TAKE 1 TABLET BY MOUTH DAILY 90 tablet 0 9/2/2020 at 0800   • VENTOLIN  (90 Base) MCG/ACT inhaler INHALE 2 PUFFS BY MOUTH FOUR TIMES DAILY AS NEEDED FOR WHEEZING OR SHORTNESS OF BREATH 18 g 0 9/1/2020 at 2200     Allergies:  Iodine and Shellfish allergy    Immunization History   Administered Date(s) Administered   • Flu Vaccine Quad PF >36MO 11/06/2017   • Flulaval/Fluarix Quad 11/06/2017   • Tdap 10/08/2019           REVIEW OF SYSTEMS:  Please see  "the above history of present illness for pertinent positives and negatives.  The remainder of the patient's systems have been reviewed and are negative.     Objective     Vital Signs  Temp:  [98.4 °F (36.9 °C)-98.5 °F (36.9 °C)] 98.4 °F (36.9 °C)  Heart Rate:  [] 96  Resp:  [16-18] 16  BP: (121-139)/(75-85) 121/79    Flowsheet Rows      First Filed Value   Admission Height  162.6 cm (64\") Documented at 09/02/2020 0803   Admission Weight  122 kg (270 lb) Documented at 09/02/2020 0803           Physical Exam:  Physical Exam   Vitals reviewed.    Gen: NAD  HEENT: EOMI, no icterus, PERRL  Neck: No JVD  Heart: RRR, no murmur  Lung: CTA b/l, adequate air movement  ABD: soft, NT, ND, no rebound or guarding  MSK: moves ext spontaneously  Neuro: AO x 3, CN II-XII intact, reflexes intact bilateral knees although slightly diminished on the right, strength appears intact sensation intact, gait not tested she is too weak, would lift legs bilaterally somewhat to gravity  Psych: no anxiety, no depression  Skin: warm, dry, intact  Extremities:  No edema    Results Review:    I reviewed the patient's new clinical results.  Lab Results (most recent)     Procedure Component Value Units Date/Time    Protime-INR [[id number]]  (Normal) Collected:  09/02/20 0925    Specimen:  Blood Updated:  09/02/20 1408     Protime 12.7 Seconds      INR 0.98    Narrative:       Therapeutic Ranges for INR: 2.0-3.0 (PT 20-30)                              2.5-3.5 (PT 25-34)    aPTT [[id number]]  (Normal) Collected:  09/02/20 0925    Specimen:  Blood Updated:  09/02/20 1408     PTT 31.2 seconds     Narrative:       PTT = The equivalent PTT values for the therapeutic range of heparin levels at 0.1 to 0.7 U/ml are 53 to 110 seconds.      CK [[id number]]  (Normal) Collected:  09/02/20 0925    Specimen:  Blood Updated:  09/02/20 1258     Creatine Kinase 69 U/L     Phosphorus [[id number]]  (Normal) Collected:  09/02/20 0925    Specimen:  Blood Updated:  " 09/02/20 1258     Phosphorus 3.3 mg/dL     Magnesium [[id number]]  (Normal) Collected:  09/02/20 0925    Specimen:  Blood Updated:  09/02/20 1258     Magnesium 1.6 mg/dL     Comprehensive Metabolic Panel [[id number]]  (Abnormal) Collected:  09/02/20 0925    Specimen:  Blood Updated:  09/02/20 0946     Glucose 141 mg/dL      BUN 12 mg/dL      Creatinine 0.66 mg/dL      Sodium 140 mmol/L      Potassium 3.7 mmol/L      Chloride 101 mmol/L      CO2 24.4 mmol/L      Calcium 9.3 mg/dL      Total Protein 7.4 g/dL      Albumin 4.10 g/dL      ALT (SGPT) 25 U/L      AST (SGOT) 17 U/L      Alkaline Phosphatase 39 U/L      Total Bilirubin 0.2 mg/dL      eGFR Non African Amer 97 mL/min/1.73      Globulin 3.3 gm/dL      A/G Ratio 1.2 g/dL      BUN/Creatinine Ratio 18.2     Anion Gap 14.6 mmol/L     Narrative:       GFR Normal >60  Chronic Kidney Disease <60  Kidney Failure <15      CBC & Differential [[id number]] Collected:  09/02/20 0902    Specimen:  Blood Updated:  09/02/20 0910    Narrative:       The following orders were created for panel order CBC & Differential.  Procedure                               Abnormality         Status                     ---------                               -----------         ------                     CBC Auto Differential[[id number]]        Normal              Final result                 Please view results for these tests on the individual orders.    CBC Auto Differential [[id number]]  (Normal) Collected:  09/02/20 0902    Specimen:  Blood Updated:  09/02/20 0910     WBC 8.19 10*3/mm3      RBC 4.62 10*6/mm3      Hemoglobin 13.5 g/dL      Hematocrit 41.9 %      MCV 90.7 fL      MCH 29.2 pg      MCHC 32.2 g/dL      RDW 12.5 %      RDW-SD 41.7 fl      MPV 11.9 fL      Platelets 147 10*3/mm3      Neutrophil % 67.0 %      Lymphocyte % 24.2 %      Monocyte % 6.6 %      Eosinophil % 1.3 %      Basophil % 0.4 %      Immature Grans % 0.5 %      Neutrophils, Absolute 5.49 10*3/mm3      Lymphocytes,  Absolute 1.98 10*3/mm3      Monocytes, Absolute 0.54 10*3/mm3      Eosinophils, Absolute 0.11 10*3/mm3      Basophils, Absolute 0.03 10*3/mm3      Immature Grans, Absolute 0.04 10*3/mm3           Imaging Results (Most Recent)     Procedure Component Value Units Date/Time    MRI Lumbar Spine Without Contrast [[id number]] Collected:  09/02/20 1016     Updated:  09/02/20 1018    Narrative:       MRI Spine Lumbar WO    HISTORY:  Lower back pain radiating into bilateral lower extremities with paresthesias, worsening the last 5 days    TECHNIQUE:  Multiplanar multisequence imaging of the lumbar spine acquired without IV contrast utilizing a standard protocol.    COMPARISON: None    FINDINGS:    For the purposes of this dictation there appears to be 5 lumbar-type vertebra with the last fully formed disc space representing L5-S1.    Lumbar alignment is anatomic. Vertebral body heights are maintained. No focal aggressive marrow lesion is identified. Disc heights are preserved. The conus terminates at an expected level and the visualized distal cord signal is within normal limits.    Level by level:    L1-2: No significant thecal sac or foraminal narrowing.    L2-3: No significant thecal sac or foraminal narrowing.    L3-4: No significant thecal sac or foraminal narrowing    L4-5: Moderate facet degenerative change. Minimal disc bulge, slightly eccentric to the left but no significant thecal sac or foraminal narrowing is present.    L5-S1: Moderate right greater than left facet degenerative change without significant thecal sac or foraminal narrowing.          Impression:       1.  Degenerative changes in the lower lumbar spine, predominantly due to facet degeneration without high-grade spinal canal or foraminal narrowing.    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     Bilateral lower extremity weakness with ascending paralysis  -Possibly AIDP vs transverse myelitis vs other possible myopathy  -RI lumbar negative  -will check CK  -Discussed with neurology will get LP  -Depending on LP may need transfer to [facility] for plasmapheresis treatments per neurology  -PT if able    HTN  -chronic   -c/w chlorthalidone and Aldactone    Type 2 diabetes  -Chronic controlled  -Sliding scale insulin while here    High risk    DVT ppx-SCD    This patient has been examined wearing appropriate Personal Protective Equipment. 09/02/20      I discussed the patient's findings and my recommendations with patient.     Jose Antonio Ugalde DO  09/02/20  15:02              Electronically signed by Jose Antonio Ugalde DO at 09/02/20 1508       Vital Signs (last day) before discharge     Date/Time   Temp   Temp src   Pulse   Resp   BP   Patient Position   SpO2    09/03/20 1445   98.6 (37)   Oral   90   16   102/53   Lying   94    09/03/20 1335   --   --   --   --   --   --   95    09/03/20 0611   98.2 (36.8)   Oral   94   16   116/73   Lying   98    09/03/20 0022   98.3 (36.8)   Oral   99   16   104/68   Lying   98    09/02/20 2004   98.7 (37.1)   Oral   96   16   137/84   Lying   95    09/02/20 1645   --   --   96   16   126/79   Lying   96    09/02/20 1545   99.1 (37.3)   Oral   103   18   128/83   Lying   97    09/02/20 1215   98.4 (36.9)   Oral   96   16   121/79   Lying   97    09/02/20 10:37:58   --   --   106   18   139/75   Lying   98    09/02/20 0806   98.5 (36.9)   Oral   --   --   --   --   --    09/02/20 0803   --   --   106   16   130/85   --   99              Lines, Drains & Airways     Active LDAs     Name:   Placement date:   Placement time:   Site:   Days:    Peripheral IV 09/02/20 1608 Anterior;Left Forearm   09/02/20    1608    Forearm   1         Inactive LDAs     Name:   Placement date:   Placement time:   Removal date:   Removal time:   Site:   Days:    [REMOVED] Peripheral IV 09/02/20 0900 Left Antecubital   09/02/20    0900    09/02/20    1607    Antecubital   less than 1                  Facility-Administered Medications as of 9/3/2020   Medication Dose Route Frequency Provider Last Rate Last Dose   • acetaminophen (TYLENOL) tablet 650 mg  650 mg Oral Q4H PRN Jose Antonio Ugalde DO   650 mg at 09/03/20 1501    Or   • acetaminophen (TYLENOL) 160 MG/5ML solution 650 mg  650 mg Oral Q4H PRN Jose Antonio Ugalde DO        Or   • acetaminophen (TYLENOL) suppository 650 mg  650 mg Rectal Q4H PRN Jose Antonio Ugalde DO       • albuterol sulfate HFA (PROVENTIL HFA;VENTOLIN HFA;PROAIR HFA) inhaler 1 puff  1 puff Inhalation Q4H PRN Jose Antonio Ugalde DO       • bisacodyl (DULCOLAX) EC tablet 5 mg  5 mg Oral Daily PRN Jose Antonio Ugalde DO       • bisacodyl (DULCOLAX) suppository 10 mg  10 mg Rectal Daily PRN Jose Antonio Ugalde DO       • chlorthalidone (HYGROTON) tablet 25 mg  25 mg Oral Daily Jose Antonio Ugalde DO   25 mg at 09/03/20 0910   • dextrose (D50W) 25 g/ 50mL Intravenous Solution 25 g  25 g Intravenous Q15 Min PRN Jose Antonio Ugalde DO       • dextrose (GLUTOSE) oral gel 15 g  15 g Oral Q15 Min PRN Jose Antonio Ugalde, DO       • glucagon (GLUCAGEN) injection 1 mg  1 mg Subcutaneous Q15 Min PRN Jose Antonio Ugalde DO       • Hold medication  1 each Does not apply Continuous PRN Prasanna Armijo MD       • insulin aspart (novoLOG) injection 0-9 Units  0-9 Units Subcutaneous TID AC Jose Antonio Ugalde, DO       • [COMPLETED] lidocaine (XYLOCAINE) 1 % injection 3 mL  3 mL Subcutaneous Once Jose Antonio Ugalde DO   3 mL at 09/02/20 1500   • [COMPLETED] LORazepam (ATIVAN) injection 1 mg  1 mg Intravenous Once Ayden Dennis MD   1 mg at  09/02/20 0930   • [COMPLETED] LORazepam (ATIVAN) injection 2 mg  2 mg Intravenous Once Prasanna Armijo MD   2 mg at 09/03/20 1124   • magnesium hydroxide (MILK OF MAGNESIA) suspension 2400 mg/10mL 10 mL  10 mL Oral Daily PRN [place], DO       • melatonin tablet 5 mg  5 mg Oral Nightly PRN [place], DO       • ondansetron (ZOFRAN) tablet 4 mg  4 mg Oral Q6H PRN Jose Antonio Ugalde, DO        Or   • ondansetron (ZOFRAN) injection 4 mg  4 mg Intravenous Q6H PRN [place], Jose Antonio ELLSWORTH, DO       • sodium chloride 0.9 % flush 10 mL  10 mL Intravenous PRN Jose Antonio Ugalde, DO       • sodium chloride 0.9 % flush 10 mL  10 mL Intravenous PRN Jose Antonio Ugalde, DO       • sodium chloride 0.9 % flush 10 mL  10 mL Intravenous Q12H Jose Antonio Ugalde, DO   10 mL at 09/03/20 0411   • sodium chloride 0.9 % infusion 40 mL  40 mL Intravenous PRN Jose Antonio Ugalde, DO       • spironolactone (ALDACTONE) tablet 100 mg  100 mg Oral Daily Jose Antonio Ugalde, DO   100 mg at 09/03/20 0912     Blood Administration Record (From admission, onward)    None          Lab Results (last 24 hours)     Procedure Component Value Units Date/Time    POC Glucose Once [[id number]]  (Abnormal) Collected:  09/03/20 1242    Specimen:  Blood Updated:  09/03/20 1248     Glucose 131 mg/dL     POC Glucose Once [[id number]]  (Normal) Collected:  09/03/20 0741    Specimen:  Blood Updated:  09/03/20 0752     Glucose 123 mg/dL     Comprehensive Metabolic Panel [[id number]]  (Abnormal) Collected:  09/03/20 0442    Specimen:  Blood Updated:  09/03/20 0531     Glucose 150 mg/dL      BUN 15 mg/dL      Creatinine 0.74 mg/dL      Sodium 138 mmol/L      Potassium 3.3 mmol/L      Chloride 97 mmol/L      CO2 25.8 mmol/L      Calcium 9.3 mg/dL      Total Protein 7.1 g/dL      Albumin 3.80 g/dL      ALT (SGPT) 23 U/L      AST (SGOT) 15 U/L      Alkaline Phosphatase 42 U/L      Total Bilirubin 0.5 mg/dL      eGFR Non African Amer 85 mL/min/1.73      Globulin 3.3 gm/dL      A/G Ratio 1.2 g/dL       BUN/Creatinine Ratio 20.3     Anion Gap 15.2 mmol/L     Narrative:       GFR Normal >60  Chronic Kidney Disease <60  Kidney Failure <15      CBC Auto Differential [[id number]]  (Abnormal) Collected:  09/03/20 0442    Specimen:  Blood Updated:  09/03/20 0517     WBC 10.49 10*3/mm3      RBC 4.23 10*6/mm3      Hemoglobin 12.6 g/dL      Hematocrit 38.4 %      MCV 90.8 fL      MCH 29.8 pg      MCHC 32.8 g/dL      RDW 12.4 %      RDW-SD 41.1 fl      MPV 11.9 fL      Platelets 251 10*3/mm3      Neutrophil % 67.3 %      Lymphocyte % 23.4 %      Monocyte % 6.5 %      Eosinophil % 1.4 %      Basophil % 0.4 %      Immature Grans % 1.0 %      Neutrophils, Absolute 7.07 10*3/mm3      Lymphocytes, Absolute 2.45 10*3/mm3      Monocytes, Absolute 0.68 10*3/mm3      Eosinophils, Absolute 0.15 10*3/mm3      Basophils, Absolute 0.04 10*3/mm3      Immature Grans, Absolute 0.10 10*3/mm3      nRBC 0.0 /100 WBC     Sedimentation Rate [[id number]]  (Abnormal) Collected:  09/03/20 0442    Specimen:  Blood Updated:  09/03/20 0514     Sed Rate 37 mm/hr     POC Glucose Once [[id number]]  (Abnormal) Collected:  09/02/20 2004    Specimen:  Blood Updated:  09/02/20 2014     Glucose 141 mg/dL     POC Glucose Once [[id number]]  (Abnormal) Collected:  09/02/20 1644    Specimen:  Blood Updated:  09/02/20 1657     Glucose 137 mg/dL     Cell Count With Differential, CSF Use CSF Tube: 1 [[id number]] Collected:  09/02/20 1515    Specimen:  Cerebrospinal Fluid from Lumbar Puncture Updated:  09/02/20 1622    Narrative:       The following orders were created for panel order Cell Count With Differential, CSF Use CSF Tube: 1.  Procedure                               Abnormality         Status                     ---------                               -----------         ------                     Cell Count, CSF - Cerebr...[[id number]]                      Final result                 Please view results for these tests on the individual orders.    Cell Count,  CSF - Cerebrospinal Fluid, Lumbar Puncture [[id number]] Collected:  09/02/20 1515    Specimen:  Cerebrospinal Fluid from Lumbar Puncture Updated:  09/02/20 1622     WBC, CSF 3 /mm3      RBC, CSF 0 /mm3      Color, CSF Colorless     Appearance, CSF Clear     Tube Number, CSF 1    Narrative:       Differential not indicated.    Glucose, CSF - Cerebrospinal Fluid, Lumbar Puncture [[id number]]  (Abnormal) Collected:  09/02/20 1515    Specimen:  Cerebrospinal Fluid from Lumbar Puncture Updated:  09/02/20 1620     Glucose, CSF 76 mg/dL     Protein, CSF - Cerebrospinal Fluid, Lumbar Puncture [[id number]]  (Normal) Collected:  09/02/20 1515    Specimen:  Cerebrospinal Fluid from Lumbar Puncture Updated:  09/02/20 1620     Protein, Total (CSF) 25.0 mg/dL     Cell Count With Differential, CSF [[id number]] Collected:  09/02/20 1515    Specimen:  Cerebrospinal Fluid from Lumbar Puncture Updated:  09/02/20 1616    Narrative:       The following orders were created for panel order Cell Count With Differential, CSF.  Procedure                               Abnormality         Status                     ---------                               -----------         ------                     Cell Count, CSF - Cerebr...[[id number]]                      Final result                 Please view results for these tests on the individual orders.    Cell Count, CSF - Cerebrospinal Fluid, Lumbar Puncture [[id number]] Collected:  09/02/20 1515    Specimen:  Cerebrospinal Fluid from Lumbar Puncture Updated:  09/02/20 1616     WBC, CSF 2 /mm3      RBC, CSF 0 /mm3      Color, CSF Colorless     Appearance, CSF Clear     Tube Number, CSF 4    Narrative:       Differential not indicated.    MS Profile & MBP, CSF - Cerebrospinal Fluid, Lumbar Puncture [[id number]] Collected:  09/02/20 1515    Specimen:  Cerebrospinal Fluid from Lumbar Puncture Updated:  09/02/20 1600    Lyme, Western Blot, CSF - Cerebrospinal Fluid, Lumbar Puncture [[id number]] Collected:   09/02/20 1515    Specimen:  Cerebrospinal Fluid from Lumbar Puncture Updated:  09/02/20 1600    West Nile Virus, CSF [[id number]] Collected:  09/02/20 1515    Specimen:  Cerebrospinal Fluid from Lumbar Puncture Updated:  09/02/20 1600        Imaging Results (Last 24 Hours)     Procedure Component Value Units Date/Time    SCANNED - IMAGING [[id number]] Resulted:  09/02/20      Updated:  09/03/20 1118    IR Lumbar Puncture Diag/Thera [[id number]] Collected:  09/02/20 1554     Updated:  09/02/20 1601    Narrative:       DIAGNOSTIC LUMBAR PUNCTURE, 09/02/2020     HISTORY:   45-year-old female admitted to the hospital today with 4 day history of  ascending paralysis. Unrevealing lumbar spine MRI. Diagnostic lumbar  puncture under fluoroscopy as requested.     CONSENT:   The procedure, risks and alternatives were discussed in detail with the  patient, emphasizing the risks of persistent spinal headache, CSF leak,  bleeding, infection, and neurologic injury. I discussed post procedure  activity restrictions. Questions were entertained, and written informed  consent was obtained. Timeout was observed in the procedure room for  patient identification and procedure site verification, and the  procedure site was marked by me.  *  Fluoroscopy time, 1.0 minutes.  *  2 images were recorded.     PROCEDURE:   Using sterile technique, 1% lidocaine local anesthetic and fluoroscopic  guidance, a 22-gauge spinal needle was placed into the lumbar thecal sac  at the L3 level below the L2 lamina without difficulty. A total of 11 cc  clear cerebral spinal fluid was collected in 4 separate vials and sent  to the laboratory for requested studies. No apparent immediate  complication.       Impression:       Technically successful diagnostic lumbar puncture using fluoroscopic  guidance.     This report was finalized on 9/2/2020 3:59 PM by Dr. Sony Casillas MD.           ECG/EMG Results (last 24 hours)     ** No results found for the last 24  hours. **        Ventilator/Non-Invasive Ventilation Settings (From admission, onward)    None        Operative/Procedure Notes (last 24 hours) (Notes from 20 161 through 20 161)    No notes of this type exist for this encounter.            Physician Progress Notes (last 24 hours) (Notes from 20 161 through 20 161)      Prasanna Armijo MD at 20 1206            Patient Identification:  NAME:  Angie Patel  Age:  45 y.o.   Sex:  female   :  1975   MRN:  [id number]       Chief complaint: GBS    History of present illness: She feels like she is a little worse today than yesterday and her spinal fluid is not consistent with Guillai Barré syndrome protein was only 25.  Also white cells were not elevated which typically goes against a diagnosis of transverse myelitis.      Past medical history:  Past Medical History:   Diagnosis Date   • Anxiety    • Asthma    • Cardiomyopathy (CMS/HCC)     with pregnancy   • Diabetes mellitus (CMS/HCC)    • Fibromyalgia    • Fibromyalgia, primary    • Headache, tension-type    • Hypertension    • Insomnia    • Kidney calculi    • Memory loss    • Meningioma (CMS/HCC)     benign brain    • Migraine     ocular   • KACI (obstructive sleep apnea)    • Palpitations    • Polycystic ovaries    • Seizures (CMS/HCC)        Allergies:  Iodine and Shellfish allergy    Home medications:  Medications Prior to Admission   Medication Sig Dispense Refill Last Dose   • Blood Glucose Monitoring Suppl (ACCU-CHEK CAMILA) device Used to check blood sugars twice daily as needed for type 2 diabetes 1 each 0 Past Month at Unknown time   • chlorthalidone (HYGROTON) 25 MG tablet Take 1 tablet by mouth Daily. 90 tablet 3 2020 at 0800   • furosemide (LASIX) 20 MG tablet TAKE 1 TABLET BY MOUTH DAILY AS NEEDED (Patient taking differently: 20 mg.) 90 tablet 0 Past Month at Unknown time   • glucose blood (Accu-Chek Camila) test strip Used to check blood sugars twice daily as  needed for type 2 diabetes 100 each 12 Past Month at Unknown time   • ibuprofen (MOTRIN IB) 200 MG tablet Take 2 tablets by mouth Every 6 (Six) Hours As Needed.   Past Week at Unknown time   • Lancets (ACCU-CHEK MULTICLIX) lancets Used to check blood sugars twice daily as needed for type 2 diabetes 100 each 12 Past Month at Unknown time   • metFORMIN ER (GLUCOPHAGE-XR) 750 MG 24 hr tablet Take 2 tablets by mouth Daily With Dinner. 60 tablet 6 9/1/2020 at 1800   • spironolactone (ALDACTONE) 100 MG tablet TAKE 1 TABLET BY MOUTH DAILY 90 tablet 0 9/2/2020 at 0800   • VENTOLIN  (90 Base) MCG/ACT inhaler INHALE 2 PUFFS BY MOUTH FOUR TIMES DAILY AS NEEDED FOR WHEEZING OR SHORTNESS OF BREATH 18 g 0 9/1/2020 at 2200        Hospital medications:    chlorthalidone 25 mg Oral Daily   insulin aspart 0-9 Units Subcutaneous TID AC   sodium chloride 10 mL Intravenous Q12H   spironolactone 100 mg Oral Daily       hold 1 each     •  acetaminophen **OR** acetaminophen **OR** acetaminophen  •  albuterol sulfate HFA  •  bisacodyl  •  bisacodyl  •  dextrose  •  dextrose  •  glucagon (human recombinant)  •  hold  •  magnesium hydroxide  •  melatonin  •  ondansetron **OR** ondansetron  •  [COMPLETED] Insert peripheral IV **AND** sodium chloride  •  sodium chloride  •  sodium chloride      Objective:  Vitals Ranges:   Temp:  [98.2 °F (36.8 °C)-99.1 °F (37.3 °C)] 98.2 °F (36.8 °C)  Heart Rate:  [] 94  Resp:  [16-18] 16  BP: (104-137)/(68-84) 116/73      Physical Exam:  Awake alert oriented x3 in no distress normal cranial nerves II through VII facial strength eyelid closure is all normal motor I can overcome her  strength her legs are heavy on both sides 4 out of 5 I can overcome her dorsi flexion.  Reflexes still at least 2+ proximally and distally in the upper extremities excellent knee jerks right ankle jerk 2+ left ankle jerk trace but still present toes mute bilaterally neither upgoing or downgoing  Results review:   I  reviewed the patient's new clinical results.    Data review:  Lab Results (last 24 hours)     Procedure Component Value Units Date/Time    POC Glucose Once [[id number]]  (Normal) Collected:  09/03/20 0741    Specimen:  Blood Updated:  09/03/20 0752     Glucose 123 mg/dL     Comprehensive Metabolic Panel [[id number]]  (Abnormal) Collected:  09/03/20 0442    Specimen:  Blood Updated:  09/03/20 0531     Glucose 150 mg/dL      BUN 15 mg/dL      Creatinine 0.74 mg/dL      Sodium 138 mmol/L      Potassium 3.3 mmol/L      Chloride 97 mmol/L      CO2 25.8 mmol/L      Calcium 9.3 mg/dL      Total Protein 7.1 g/dL      Albumin 3.80 g/dL      ALT (SGPT) 23 U/L      AST (SGOT) 15 U/L      Alkaline Phosphatase 42 U/L      Total Bilirubin 0.5 mg/dL      eGFR Non African Amer 85 mL/min/1.73      Globulin 3.3 gm/dL      A/G Ratio 1.2 g/dL      BUN/Creatinine Ratio 20.3     Anion Gap 15.2 mmol/L     Narrative:       GFR Normal >60  Chronic Kidney Disease <60  Kidney Failure <15      CBC Auto Differential [[id number]]  (Abnormal) Collected:  09/03/20 0442    Specimen:  Blood Updated:  09/03/20 0517     WBC 10.49 10*3/mm3      RBC 4.23 10*6/mm3      Hemoglobin 12.6 g/dL      Hematocrit 38.4 %      MCV 90.8 fL      MCH 29.8 pg      MCHC 32.8 g/dL      RDW 12.4 %      RDW-SD 41.1 fl      MPV 11.9 fL      Platelets 251 10*3/mm3      Neutrophil % 67.3 %      Lymphocyte % 23.4 %      Monocyte % 6.5 %      Eosinophil % 1.4 %      Basophil % 0.4 %      Immature Grans % 1.0 %      Neutrophils, Absolute 7.07 10*3/mm3      Lymphocytes, Absolute 2.45 10*3/mm3      Monocytes, Absolute 0.68 10*3/mm3      Eosinophils, Absolute 0.15 10*3/mm3      Basophils, Absolute 0.04 10*3/mm3      Immature Grans, Absolute 0.10 10*3/mm3      nRBC 0.0 /100 WBC     Sedimentation Rate [[id number]]  (Abnormal) Collected:  09/03/20 0442    Specimen:  Blood Updated:  09/03/20 0514     Sed Rate 37 mm/hr     POC Glucose Once [[id number]]  (Abnormal) Collected:  09/02/20 2004     Specimen:  Blood Updated:  09/02/20 2014     Glucose 141 mg/dL     POC Glucose Once [[id number]]  (Abnormal) Collected:  09/02/20 1644    Specimen:  Blood Updated:  09/02/20 1657     Glucose 137 mg/dL     Cell Count With Differential, CSF Use CSF Tube: 1 [[id number]] Collected:  09/02/20 1515    Specimen:  Cerebrospinal Fluid from Lumbar Puncture Updated:  09/02/20 1622    Narrative:       The following orders were created for panel order Cell Count With Differential, CSF Use CSF Tube: 1.  Procedure                               Abnormality         Status                     ---------                               -----------         ------                     Cell Count, CSF - Cerebr...[[id number]]                      Final result                 Please view results for these tests on the individual orders.    Cell Count, CSF - Cerebrospinal Fluid, Lumbar Puncture [[id number]] Collected:  09/02/20 1515    Specimen:  Cerebrospinal Fluid from Lumbar Puncture Updated:  09/02/20 1622     WBC, CSF 3 /mm3      RBC, CSF 0 /mm3      Color, CSF Colorless     Appearance, CSF Clear     Tube Number, CSF 1    Narrative:       Differential not indicated.    Glucose, CSF - Cerebrospinal Fluid, Lumbar Puncture [[id number]]  (Abnormal) Collected:  09/02/20 1515    Specimen:  Cerebrospinal Fluid from Lumbar Puncture Updated:  09/02/20 1620     Glucose, CSF 76 mg/dL     Protein, CSF - Cerebrospinal Fluid, Lumbar Puncture [[id number]]  (Normal) Collected:  09/02/20 1515    Specimen:  Cerebrospinal Fluid from Lumbar Puncture Updated:  09/02/20 1620     Protein, Total (CSF) 25.0 mg/dL     Cell Count With Differential, CSF [[id number]] Collected:  09/02/20 1515    Specimen:  Cerebrospinal Fluid from Lumbar Puncture Updated:  09/02/20 1616    Narrative:       The following orders were created for panel order Cell Count With Differential, CSF.  Procedure                               Abnormality         Status                     ---------                                -----------         ------                     Cell Count, CSF - Cerebr...[[id number]]                      Final result                 Please view results for these tests on the individual orders.    Cell Count, CSF - Cerebrospinal Fluid, Lumbar Puncture [[id number]] Collected:  09/02/20 1515    Specimen:  Cerebrospinal Fluid from Lumbar Puncture Updated:  09/02/20 1616     WBC, CSF 2 /mm3      RBC, CSF 0 /mm3      Color, CSF Colorless     Appearance, CSF Clear     Tube Number, CSF 4    Narrative:       Differential not indicated.    MS Profile & MBP, CSF - Cerebrospinal Fluid, Lumbar Puncture [[id number]] Collected:  09/02/20 1515    Specimen:  Cerebrospinal Fluid from Lumbar Puncture Updated:  09/02/20 1600    Lyme, Western Blot, CSF - Cerebrospinal Fluid, Lumbar Puncture [[id number]] Collected:  09/02/20 1515    Specimen:  Cerebrospinal Fluid from Lumbar Puncture Updated:  09/02/20 1600    West Nile Virus, CSF [[id number]] Collected:  09/02/20 1515    Specimen:  Cerebrospinal Fluid from Lumbar Puncture Updated:  09/02/20 1600    Protime-INR [[id number]]  (Normal) Collected:  09/02/20 0925    Specimen:  Blood Updated:  09/02/20 1408     Protime 12.7 Seconds      INR 0.98    Narrative:       Therapeutic Ranges for INR: 2.0-3.0 (PT 20-30)                              2.5-3.5 (PT 25-34)    aPTT [[id number]]  (Normal) Collected:  09/02/20 0925    Specimen:  Blood Updated:  09/02/20 1408     PTT 31.2 seconds     Narrative:       PTT = The equivalent PTT values for the therapeutic range of heparin levels at 0.1 to 0.7 U/ml are 53 to 110 seconds.      CK [[id number]]  (Normal) Collected:  09/02/20 0925    Specimen:  Blood Updated:  09/02/20 1258     Creatine Kinase 69 U/L     Phosphorus [[id number]]  (Normal) Collected:  09/02/20 0925    Specimen:  Blood Updated:  09/02/20 1258     Phosphorus 3.3 mg/dL     Magnesium [[id number]]  (Normal) Collected:  09/02/20 0925    Specimen:  Blood Updated:  09/02/20  1258     Magnesium 1.6 mg/dL            Imaging:  Imaging Results (Last 24 Hours)     Procedure Component Value Units Date/Time    SCANNED - IMAGING [[id number]] Resulted:  09/02/20      Updated:  09/03/20 1118    IR Lumbar Puncture Diag/Thera [[id number]] Collected:  09/02/20 1554     Updated:  09/02/20 1601    Narrative:       DIAGNOSTIC LUMBAR PUNCTURE, 09/02/2020     HISTORY:   45-year-old female admitted to the hospital today with 4 day history of  ascending paralysis. Unrevealing lumbar spine MRI. Diagnostic lumbar  puncture under fluoroscopy as requested.     CONSENT:   The procedure, risks and alternatives were discussed in detail with the  patient, emphasizing the risks of persistent spinal headache, CSF leak,  bleeding, infection, and neurologic injury. I discussed post procedure  activity restrictions. Questions were entertained, and written informed  consent was obtained. Timeout was observed in the procedure room for  patient identification and procedure site verification, and the  procedure site was marked by me.  *  Fluoroscopy time, 1.0 minutes.  *  2 images were recorded.     PROCEDURE:   Using sterile technique, 1% lidocaine local anesthetic and fluoroscopic  guidance, a 22-gauge spinal needle was placed into the lumbar thecal sac  at the L3 level below the L2 lamina without difficulty. A total of 11 cc  clear cerebral spinal fluid was collected in 4 separate vials and sent  to the laboratory for requested studies. No apparent immediate  complication.       Impression:       Technically successful diagnostic lumbar puncture using fluoroscopic  guidance.     This report was finalized on 9/2/2020 3:59 PM by Dr. Sony Casillas MD.         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  This patient has had her lumbar puncture and there was a protein of 25 and no increase in cells.  She feels like she may be a little weaker today in all 4 extremities.  Nonetheless her reflexes are still intact I can still get a trace reflex left lower extremity ankle jerk and good reflexe distal right lower extremity ankle jerks.  Her knee jerks are still at least 2+ bilaterally.  In short, I still believe this patient likely has early Guillain Barré syndrome but her weakness  is progressing at a point now that I think she should be transferred to [facility].  At [facility] she would be able to get a nerve conduction study which would tell us whether or not she has any slowing of nerve conduction velocities in the legs which would go with a diagnosis of GBS.  Her shoulders were too big for her to fit into the [facility] MRI scanner and she might be able to get an MRI scan of the cervical spine at [facility] although the fact that there were no increased cells in the spinal fluid seems to go against a diagnosis of transverse myelitis.  In short, she needs to go to [facility] she can have a nerve conduction velocity study they can try another MRI of the cervical spine and if it is concluded that this probably is GBS, she may receive plasmapheresis treatment as she has progressed rather rapidly over the last 5 days only.  I am not convinced giving her IVIG now is going to reverse the road she is going down now.        Prasanna Armijo MD  09/03/20  12:06    Electronically signed by Prasanna Armijo MD at 09/03/20 1213       Consult Notes (last 24 hours) (Notes from 09/02/20 1617 through 09/03/20 1617)    No notes of this type exist for this encounter.

## 2020-09-03 NOTE — SIGNIFICANT NOTE
Patient request for her metformin, she takes for her polycytic ovaries, Dr maher notified, said no she is holding her med because of the test she will be going in the morning, after the test she will get her medicine.

## 2020-09-03 NOTE — PLAN OF CARE
Problem: Patient Care Overview  Goal: Plan of Care Review  Flowsheets (Taken 9/3/2020 1005)  Outcome Summary: PT:  evaluation completed.  Patient performed supine to/from sit without assistance. SHe requires CGA for sit to stand and pivot transfer to chair using rolling walker.  She was able to ambulate 3 feet forward and backward with rolling walker and CGA however she is very unsteady on her feet, maintaining a wide base of support, heavy reliance on her upper body on the walker, and minimal foot clearance to take small steps forward.  Patient feaful of knees buckling and c/o bilateral shoulder pain with use of walker.  Patient would benefit from skilled PT services to maintain/progress safety with functional mobility as able.  Possible transfer to [facility] noted in MD note - if patient does not transfer, recommend Occupational Therapy consult for evaluation/treatment of upper body weakness/difficulty with ADL's.

## 2020-09-03 NOTE — PROGRESS NOTES
Patient Identification:  NAME:  Angie Patel  Age:  45 y.o.   Sex:  female   :  1975   MRN:  [id number]       Chief complaint: GBS    History of present illness: She feels like she is a little worse today than yesterday and her spinal fluid is not consistent with Guillai Barré syndrome protein was only 25.  Also white cells were not elevated which typically goes against a diagnosis of transverse myelitis.      Past medical history:  Past Medical History:   Diagnosis Date   • Anxiety    • Asthma    • Cardiomyopathy (CMS/HCC)     with pregnancy   • Diabetes mellitus (CMS/HCC)    • Fibromyalgia    • Fibromyalgia, primary    • Headache, tension-type    • Hypertension    • Insomnia    • Kidney calculi    • Memory loss    • Meningioma (CMS/HCC)     benign brain    • Migraine     ocular   • KACI (obstructive sleep apnea)    • Palpitations    • Polycystic ovaries    • Seizures (CMS/HCC)        Allergies:  Iodine and Shellfish allergy    Home medications:  Medications Prior to Admission   Medication Sig Dispense Refill Last Dose   • Blood Glucose Monitoring Suppl (ACCU-CHEK MANNY) device Used to check blood sugars twice daily as needed for type 2 diabetes 1 each 0 Past Month at Unknown time   • chlorthalidone (HYGROTON) 25 MG tablet Take 1 tablet by mouth Daily. 90 tablet 3 2020 at 0800   • furosemide (LASIX) 20 MG tablet TAKE 1 TABLET BY MOUTH DAILY AS NEEDED (Patient taking differently: 20 mg.) 90 tablet 0 Past Month at Unknown time   • glucose blood (Accu-Chek Manny) test strip Used to check blood sugars twice daily as needed for type 2 diabetes 100 each 12 Past Month at Unknown time   • ibuprofen (MOTRIN IB) 200 MG tablet Take 2 tablets by mouth Every 6 (Six) Hours As Needed.   Past Week at Unknown time   • Lancets (ACCU-CHEK MULTICLIX) lancets Used to check blood sugars twice daily as needed for type 2 diabetes 100 each 12 Past Month at Unknown time   • metFORMIN ER (GLUCOPHAGE-XR) 750 MG 24 hr tablet  Take 2 tablets by mouth Daily With Dinner. 60 tablet 6 9/1/2020 at 1800   • spironolactone (ALDACTONE) 100 MG tablet TAKE 1 TABLET BY MOUTH DAILY 90 tablet 0 9/2/2020 at 0800   • VENTOLIN  (90 Base) MCG/ACT inhaler INHALE 2 PUFFS BY MOUTH FOUR TIMES DAILY AS NEEDED FOR WHEEZING OR SHORTNESS OF BREATH 18 g 0 9/1/2020 at 2200        Hospital medications:    chlorthalidone 25 mg Oral Daily   insulin aspart 0-9 Units Subcutaneous TID AC   sodium chloride 10 mL Intravenous Q12H   spironolactone 100 mg Oral Daily       hold 1 each     •  acetaminophen **OR** acetaminophen **OR** acetaminophen  •  albuterol sulfate HFA  •  bisacodyl  •  bisacodyl  •  dextrose  •  dextrose  •  glucagon (human recombinant)  •  hold  •  magnesium hydroxide  •  melatonin  •  ondansetron **OR** ondansetron  •  [COMPLETED] Insert peripheral IV **AND** sodium chloride  •  sodium chloride  •  sodium chloride      Objective:  Vitals Ranges:   Temp:  [98.2 °F (36.8 °C)-99.1 °F (37.3 °C)] 98.2 °F (36.8 °C)  Heart Rate:  [] 94  Resp:  [16-18] 16  BP: (104-137)/(68-84) 116/73      Physical Exam:  Awake alert oriented x3 in no distress normal cranial nerves II through VII facial strength eyelid closure is all normal motor I can overcome her  strength her legs are heavy on both sides 4 out of 5 I can overcome her dorsi flexion.  Reflexes still at least 2+ proximally and distally in the upper extremities excellent knee jerks right ankle jerk 2+ left ankle jerk trace but still present toes mute bilaterally neither upgoing or downgoing  Results review:   I reviewed the patient's new clinical results.    Data review:  Lab Results (last 24 hours)     Procedure Component Value Units Date/Time    POC Glucose Once [[id number]]  (Normal) Collected:  09/03/20 0741    Specimen:  Blood Updated:  09/03/20 0752     Glucose 123 mg/dL     Comprehensive Metabolic Panel [[id number]]  (Abnormal) Collected:  09/03/20 0442    Specimen:  Blood Updated:   09/03/20 0531     Glucose 150 mg/dL      BUN 15 mg/dL      Creatinine 0.74 mg/dL      Sodium 138 mmol/L      Potassium 3.3 mmol/L      Chloride 97 mmol/L      CO2 25.8 mmol/L      Calcium 9.3 mg/dL      Total Protein 7.1 g/dL      Albumin 3.80 g/dL      ALT (SGPT) 23 U/L      AST (SGOT) 15 U/L      Alkaline Phosphatase 42 U/L      Total Bilirubin 0.5 mg/dL      eGFR Non African Amer 85 mL/min/1.73      Globulin 3.3 gm/dL      A/G Ratio 1.2 g/dL      BUN/Creatinine Ratio 20.3     Anion Gap 15.2 mmol/L     Narrative:       GFR Normal >60  Chronic Kidney Disease <60  Kidney Failure <15      CBC Auto Differential [[id number]]  (Abnormal) Collected:  09/03/20 0442    Specimen:  Blood Updated:  09/03/20 0517     WBC 10.49 10*3/mm3      RBC 4.23 10*6/mm3      Hemoglobin 12.6 g/dL      Hematocrit 38.4 %      MCV 90.8 fL      MCH 29.8 pg      MCHC 32.8 g/dL      RDW 12.4 %      RDW-SD 41.1 fl      MPV 11.9 fL      Platelets 251 10*3/mm3      Neutrophil % 67.3 %      Lymphocyte % 23.4 %      Monocyte % 6.5 %      Eosinophil % 1.4 %      Basophil % 0.4 %      Immature Grans % 1.0 %      Neutrophils, Absolute 7.07 10*3/mm3      Lymphocytes, Absolute 2.45 10*3/mm3      Monocytes, Absolute 0.68 10*3/mm3      Eosinophils, Absolute 0.15 10*3/mm3      Basophils, Absolute 0.04 10*3/mm3      Immature Grans, Absolute 0.10 10*3/mm3      nRBC 0.0 /100 WBC     Sedimentation Rate [[id number]]  (Abnormal) Collected:  09/03/20 0442    Specimen:  Blood Updated:  09/03/20 0514     Sed Rate 37 mm/hr     POC Glucose Once [[id number]]  (Abnormal) Collected:  09/02/20 2004    Specimen:  Blood Updated:  09/02/20 2014     Glucose 141 mg/dL     POC Glucose Once [[id number]]  (Abnormal) Collected:  09/02/20 1644    Specimen:  Blood Updated:  09/02/20 1657     Glucose 137 mg/dL     Cell Count With Differential, CSF Use CSF Tube: 1 [[id number]] Collected:  09/02/20 1515    Specimen:  Cerebrospinal Fluid from Lumbar Puncture Updated:  09/02/20 1628     Narrative:       The following orders were created for panel order Cell Count With Differential, CSF Use CSF Tube: 1.  Procedure                               Abnormality         Status                     ---------                               -----------         ------                     Cell Count, CSF - Cerebr...[[id number]]                      Final result                 Please view results for these tests on the individual orders.    Cell Count, CSF - Cerebrospinal Fluid, Lumbar Puncture [[id number]] Collected:  09/02/20 1515    Specimen:  Cerebrospinal Fluid from Lumbar Puncture Updated:  09/02/20 1622     WBC, CSF 3 /mm3      RBC, CSF 0 /mm3      Color, CSF Colorless     Appearance, CSF Clear     Tube Number, CSF 1    Narrative:       Differential not indicated.    Glucose, CSF - Cerebrospinal Fluid, Lumbar Puncture [[id number]]  (Abnormal) Collected:  09/02/20 1515    Specimen:  Cerebrospinal Fluid from Lumbar Puncture Updated:  09/02/20 1620     Glucose, CSF 76 mg/dL     Protein, CSF - Cerebrospinal Fluid, Lumbar Puncture [[id number]]  (Normal) Collected:  09/02/20 1515    Specimen:  Cerebrospinal Fluid from Lumbar Puncture Updated:  09/02/20 1620     Protein, Total (CSF) 25.0 mg/dL     Cell Count With Differential, CSF [[id number]] Collected:  09/02/20 1515    Specimen:  Cerebrospinal Fluid from Lumbar Puncture Updated:  09/02/20 1616    Narrative:       The following orders were created for panel order Cell Count With Differential, CSF.  Procedure                               Abnormality         Status                     ---------                               -----------         ------                     Cell Count, CSF - Cerebr...[[id number]]                      Final result                 Please view results for these tests on the individual orders.    Cell Count, CSF - Cerebrospinal Fluid, Lumbar Puncture [[id number]] Collected:  09/02/20 1515    Specimen:  Cerebrospinal Fluid from Lumbar Puncture  Updated:  09/02/20 1616     WBC, CSF 2 /mm3      RBC, CSF 0 /mm3      Color, CSF Colorless     Appearance, CSF Clear     Tube Number, CSF 4    Narrative:       Differential not indicated.    MS Profile & MBP, CSF - Cerebrospinal Fluid, Lumbar Puncture [[id number]] Collected:  09/02/20 1515    Specimen:  Cerebrospinal Fluid from Lumbar Puncture Updated:  09/02/20 1600    Lyme, Western Blot, CSF - Cerebrospinal Fluid, Lumbar Puncture [[id number]] Collected:  09/02/20 1515    Specimen:  Cerebrospinal Fluid from Lumbar Puncture Updated:  09/02/20 1600    West Nile Virus, CSF [[id number]] Collected:  09/02/20 1515    Specimen:  Cerebrospinal Fluid from Lumbar Puncture Updated:  09/02/20 1600    Protime-INR [[id number]]  (Normal) Collected:  09/02/20 0925    Specimen:  Blood Updated:  09/02/20 1408     Protime 12.7 Seconds      INR 0.98    Narrative:       Therapeutic Ranges for INR: 2.0-3.0 (PT 20-30)                              2.5-3.5 (PT 25-34)    aPTT [[id number]]  (Normal) Collected:  09/02/20 0925    Specimen:  Blood Updated:  09/02/20 1408     PTT 31.2 seconds     Narrative:       PTT = The equivalent PTT values for the therapeutic range of heparin levels at 0.1 to 0.7 U/ml are 53 to 110 seconds.      CK [[id number]]  (Normal) Collected:  09/02/20 0925    Specimen:  Blood Updated:  09/02/20 1258     Creatine Kinase 69 U/L     Phosphorus [[id number]]  (Normal) Collected:  09/02/20 0925    Specimen:  Blood Updated:  09/02/20 1258     Phosphorus 3.3 mg/dL     Magnesium [[id number]]  (Normal) Collected:  09/02/20 0925    Specimen:  Blood Updated:  09/02/20 1258     Magnesium 1.6 mg/dL            Imaging:  Imaging Results (Last 24 Hours)     Procedure Component Value Units Date/Time    SCANNED - IMAGING [[id number]] Resulted:  09/02/20      Updated:  09/03/20 1118    IR Lumbar Puncture Diag/Thera [[id number]] Collected:  09/02/20 1554     Updated:  09/02/20 1601    Narrative:       DIAGNOSTIC LUMBAR PUNCTURE, 09/02/2020      HISTORY:   45-year-old female admitted to the hospital today with 4 day history of  ascending paralysis. Unrevealing lumbar spine MRI. Diagnostic lumbar  puncture under fluoroscopy as requested.     CONSENT:   The procedure, risks and alternatives were discussed in detail with the  patient, emphasizing the risks of persistent spinal headache, CSF leak,  bleeding, infection, and neurologic injury. I discussed post procedure  activity restrictions. Questions were entertained, and written informed  consent was obtained. Timeout was observed in the procedure room for  patient identification and procedure site verification, and the  procedure site was marked by me.  *  Fluoroscopy time, 1.0 minutes.  *  2 images were recorded.     PROCEDURE:   Using sterile technique, 1% lidocaine local anesthetic and fluoroscopic  guidance, a 22-gauge spinal needle was placed into the lumbar thecal sac  at the L3 level below the L2 lamina without difficulty. A total of 11 cc  clear cerebral spinal fluid was collected in 4 separate vials and sent  to the laboratory for requested studies. No apparent immediate  complication.       Impression:       Technically successful diagnostic lumbar puncture using fluoroscopic  guidance.     This report was finalized on 9/2/2020 3:59 PM by Dr. Sony Casillas MD.         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  This patient has had her lumbar puncture and there was a protein of 25 and no increase in cells.  She feels like she may be a little weaker today in all 4 extremities.  Nonetheless her reflexes are still intact I can still get a trace reflex left lower extremity ankle jerk and good  reflexe distal right lower extremity ankle jerks.  Her knee jerks are still at least 2+ bilaterally.  In short, I still believe this patient likely has early Guillain Barré syndrome but her weakness  is progressing at a point now that I think she should be transferred to [facility].  At [facility] she would be able to get a nerve conduction study which would tell us whether or not she has any slowing of nerve conduction velocities in the legs which would go with a diagnosis of GBS.  Her shoulders were too big for her to fit into the [place] MRI scanner and she might be able to get an MRI scan of the cervical spine at [facility] although the fact that there were no increased cells in the spinal fluid seems to go against a diagnosis of transverse myelitis.  In short, she needs to go to [facility] she can have a nerve conduction velocity study they can try another MRI of the cervical spine and if it is concluded that this probably is GBS, she may receive plasmapheresis treatment as she has progressed rather rapidly over the last 5 days only.  I am not convinced giving her IVIG now is going to reverse the road she is going down now.        Prasanna Armijo MD  09/03/20  12:06

## 2020-09-03 NOTE — H&P
HISTORY AND PHYSICAL   [facility]        Patient Identification:  Name: Angie Patel  Age: 45 y.o.  Sex: female  :  1975  MRN: [id number]                     Primary Care Physician: Kya Kendrick PA-C    Chief Complaint:  45 year old female who was transferred from [place]; she has had worsening weakness of her legs; she has also had numbness; symptoms started a few days ago and she has become progressively worse; she now needs assistance with walking; she had a recent gi illness which she thought was food poisoning; denies nausea, vomiting or diarrhea now; no fever or chills; she was seen by dr lopez in [place] and transfer to [facility] for further evaluation and treatment was requested;     History of Present Illness:   As above    Past Medical History:  Past Medical History:   Diagnosis Date   • Anxiety    • Asthma    • Cardiomyopathy (CMS/HCC)     with pregnancy   • Diabetes mellitus (CMS/HCC)    • Fibromyalgia    • Fibromyalgia, primary    • Headache, tension-type    • Hypertension    • Insomnia    • Kidney calculi    • Memory loss    • Meningioma (CMS/HCC)     benign brain    • Migraine     ocular   • KACI (obstructive sleep apnea)    • Palpitations    • Polycystic ovaries    • Seizures (CMS/HCC)      Past Surgical History:  Past Surgical History:   Procedure Laterality Date   • CYSTOSCOPY URETEROSCOPY LASER LITHOTRIPSY Right 3/7/2017    Procedure: CYSTOSCOPY RT URETEROSCOPY LASER LITHOTRIPSY W/ STENT, rerograde pyelogram ;  Surgeon: Rashid Malloy MD;  Location: [facility];  Service:    • FOOT SURGERY Left     tendon repair   • HYSTERECTOMY     • OOPHORECTOMY     • SHOULDER SURGERY Right    • TUBAL ABDOMINAL LIGATION     • WISDOM TOOTH EXTRACTION        Home Meds:  Medications Prior to Admission   Medication Sig Dispense Refill Last Dose   • chlorthalidone (HYGROTON) 25 MG tablet Take 1 tablet by mouth Daily. 90 tablet 3 9/3/2020 at Unknown time   •  spironolactone (ALDACTONE) 100 MG tablet TAKE 1 TABLET BY MOUTH DAILY 90 tablet 0 9/3/2020 at Unknown time   • VENTOLIN  (90 Base) MCG/ACT inhaler INHALE 2 PUFFS BY MOUTH FOUR TIMES DAILY AS NEEDED FOR WHEEZING OR SHORTNESS OF BREATH 18 g 0 9/3/2020 at Unknown time   • Blood Glucose Monitoring Suppl (ACCU-CHEK MANNY) device Used to check blood sugars twice daily as needed for type 2 diabetes 1 each 0 Past Month at Unknown time   • furosemide (LASIX) 20 MG tablet TAKE 1 TABLET BY MOUTH DAILY AS NEEDED (Patient taking differently: 20 mg.) 90 tablet 0 Past Month at Unknown time   • glucose blood (Accu-Chek Manny) test strip Used to check blood sugars twice daily as needed for type 2 diabetes 100 each 12 Past Month at Unknown time   • ibuprofen (MOTRIN IB) 200 MG tablet Take 2 tablets by mouth Every 6 (Six) Hours As Needed.   Past Week at Unknown time   • Lancets (ACCU-CHEK MULTICLIX) lancets Used to check blood sugars twice daily as needed for type 2 diabetes 100 each 12 Past Month at Unknown time   • metFORMIN ER (GLUCOPHAGE-XR) 750 MG 24 hr tablet Take 2 tablets by mouth Daily With Dinner. 60 tablet 6 9/1/2020 at 1800       Allergies:  Allergies   Allergen Reactions   • Iodine Shortness Of Breath     Swelling     • Shellfish Allergy Shortness Of Breath     swelling     Immunizations:  Immunization History   Administered Date(s) Administered   • Flu Vaccine Quad PF >36MO 11/06/2017   • Flulaval/Fluarix Quad 11/06/2017   • Tdap 10/08/2019     Social History:   Social History     Social History Narrative   • Not on file     Social History     Socioeconomic History   • Marital status:      Spouse name: Not on file   • Number of children: Not on file   • Years of education: Not on file   • Highest education level: Not on file   Occupational History   • Occupation:      Employer: SELF-EMPLOYED     Comment: full time   Tobacco Use   • Smoking status: Former Smoker   • Smokeless tobacco: Never Used    Substance and Sexual Activity   • Alcohol use: Not Currently     Comment: occ'l   • Drug use: No   • Sexual activity: Defer     Partners: Male     Birth control/protection: Surgical     Comment: hysterectomy       Family History:  Family History   Problem Relation Age of Onset   • Heart disease Paternal Aunt    • Diabetes Paternal Aunt    • Diabetes Maternal Grandmother    • Neuropathy Maternal Grandmother    • Thyroid disease Maternal Grandmother    • Heart disease Maternal Grandfather    • Cancer Maternal Grandfather    • Heart disease Paternal Grandfather    • Heart disease Paternal Uncle    • Arthritis Mother    • Migraines Mother    • Seizures Father    • Breast cancer Neg Hx    • Ovarian cancer Neg Hx    • Colon cancer Neg Hx    • Deep vein thrombosis Neg Hx    • Pulmonary embolism Neg Hx         Review of Systems  See history of present illness and past medical history.  Patient denies headache, dizziness, syncope, falls, trauma, change in vision, change in hearing, change in taste, changes in weight, changes in appetite, focal weakness, numbness, or paresthesia.  Patient denies chest pain, palpitations, dyspnea, orthopnea, PND, cough, sinus pressure, rhinorrhea, epistaxis, hemoptysis, nausea, vomiting,hematemesis, diarrhea, constipation or hematchezia.  Denies cold or heat intolerance, polydipsia, polyuria, polyphagia. Denies hematuria, pyuria, dysuria, hesitancy, frequency or urgency. Denies consumption of raw and under cooked meats foods or change in water source.  Denies fever, chills, sweats, night sweats.  Denies missing any routine medications. Remainder of ROS is negative.    Objective:  T Max 24 hrs: Temp (24hrs), Av.3 °F (36.8 °C), Min:97.8 °F (36.6 °C), Max:98.7 °F (37.1 °C)    Vitals Ranges:   Temp:  [97.8 °F (36.6 °C)-98.7 °F (37.1 °C)] 97.8 °F (36.6 °C)  Heart Rate:  [] 106  Resp:  [16] 16  BP: (102-137)/(53-84) 125/76      Exam:  /76 (BP Location: Left arm, Patient Position:  Sitting)   Pulse 106   Temp 97.8 °F (36.6 °C) (Oral)   Resp 16   SpO2 97%     General Appearance:    Alert, cooperative, no distress, appears stated age   Head:    Normocephalic, without obvious abnormality, atraumatic   Eyes:    PERRL, conjunctivae/corneas clear, EOM's intact, both eyes   Ears:    Normal external ear canals, both ears   Nose:   Nares normal, septum midline, mucosa normal, no drainage    or sinus tenderness   Throat:   Lips, mucosa, and tongue normal   Neck:   Supple, symmetrical, trachea midline, no adenopathy;     thyroid:  no enlargement/tenderness/nodules; no carotid    bruit or JVD   Back:     Symmetric, no curvature, ROM normal, no CVA tenderness   Lungs:     Clear to auscultation bilaterally, respirations unlabored   Chest Wall:    No tenderness or deformity    Heart:    Regular rate and rhythm, S1 and S2 normal, no murmur, rub   or gallop   Abdomen:     Soft, nontender, bowel sounds active all four quadrants,     no masses, no hepatomegaly, no splenomegaly   Extremities:   Extremities normal, atraumatic, no cyanosis or edema   Pulses:   2+ and symmetric all extremities   Skin:   Skin color, texture, turgor normal, no rashes or lesions   Lymph nodes:   Cervical, supraclavicular, and axillary nodes normal   Neurologic:   CNII-XII intact, normal strength, sensation intact throughout      .    Data Review:  Labs in chart were reviewed.  WBC   Date Value Ref Range Status   09/03/2020 10.49 3.40 - 10.80 10*3/mm3 Final     Hemoglobin   Date Value Ref Range Status   09/03/2020 12.6 12.0 - 15.9 g/dL Final     Hematocrit   Date Value Ref Range Status   09/03/2020 38.4 34.0 - 46.6 % Final     Platelets   Date Value Ref Range Status   09/03/2020 251 140 - 450 10*3/mm3 Final     Sodium   Date Value Ref Range Status   09/03/2020 138 136 - 145 mmol/L Final     Potassium   Date Value Ref Range Status   09/03/2020 3.3 (L) 3.5 - 5.2 mmol/L Final     Chloride   Date Value Ref Range Status   09/03/2020 97  (L) 98 - 107 mmol/L Final     CO2   Date Value Ref Range Status   09/03/2020 25.8 22.0 - 29.0 mmol/L Final     BUN   Date Value Ref Range Status   09/03/2020 15 6 - 20 mg/dL Final     Creatinine   Date Value Ref Range Status   09/03/2020 0.74 0.57 - 1.00 mg/dL Final     Glucose   Date Value Ref Range Status   09/03/2020 150 (H) 65 - 99 mg/dL Final     Calcium   Date Value Ref Range Status   09/03/2020 9.3 8.6 - 10.5 mg/dL Final     Magnesium   Date Value Ref Range Status   09/02/2020 1.6 1.6 - 2.6 mg/dL Final     Phosphorus   Date Value Ref Range Status   09/02/2020 3.3 2.5 - 4.5 mg/dL Final     AST (SGOT)   Date Value Ref Range Status   09/03/2020 15 1 - 32 U/L Final     ALT (SGPT)   Date Value Ref Range Status   09/03/2020 23 1 - 33 U/L Final     Alkaline Phosphatase   Date Value Ref Range Status   09/03/2020 42 39 - 117 U/L Final                Imaging Results (All)     None        Patient Active Problem List   Diagnosis Code   • Magnetic resonance imaging of brain abnormal R90.89   • Benign essential hypertension I10   • Blurring of visual image H53.8   • Fatigue R53.83   • Confusional state F44.89   • Abnormal gait R26.9   • Hypercholesterolemia E78.00   • Headache R51   • Meningioma (CMS/HCC) D32.9   • Migraine G43.909   • Morbid obesity (CMS/HCC) E66.01   • Neck pain M54.2   • Nausea R11.0   • Pitting edema R60.9   • Polycystic ovaries E28.2   • Rib pain on left side R07.81   • Pain of sternum R07.89   • Vaginal irritation N89.8   • Vitamin D insufficiency E55.9   • Tobacco abuse counseling Z71.6   • Hypotensive episode I95.9   • Acute bilateral thoracic back pain M54.6   • Pendulous breast N64.89   • Memory changes R41.3   • Weight gain R63.5   • PCOS (polycystic ovarian syndrome) E28.2   • Menopausal symptoms N95.1   • Insulin resistance syndrome E88.81   • Morbid obesity with BMI of 40.0-44.9, adult (CMS/HCC) E66.01, Z68.41   • Right ureteral stone N20.1   • Hematuria R31.9   • Constipation due to pain  medication therapy K59.03   • Vision changes H53.9   • Blurred vision, bilateral H53.8   • Obesity (BMI 35.0-39.9 without comorbidity) E66.9   • Chest tightness or pressure R07.89   • Anxiety F41.9   • Need for influenza vaccination Z23   • Acute left flank pain R10.9   • History of kidney stones Z87.442   • Morbid obesity with BMI of 45.0-49.9, adult (CMS/[facility]) E66.01, Z68.42   • Screening mammogram, encounter for Z12.31   • Meningioma (CMS/[facility]) D32.9   • Fibromyalgia M79.7   • High risk medication use Z79.899   • Primary insomnia F51.01   • Arthralgia M25.50   • Chronic cough R05   • Thyroid enlargement E04.9   • Left-sided chest pain R07.9   • Neurological abnormality R29.818   • Foot injury, right, initial encounter S99.921A   • Acute pain of right foot M79.671   • KACI (obstructive sleep apnea) G47.33   • Type 2 diabetes mellitus with hyperglycemia, without long-term current use of insulin (CMS/[facility]) E11.65   • 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

## 2020-09-03 NOTE — NURSING NOTE
Discharge Planning Assessment   [facility]     Patient Name: Angie Patel  MRN: [id number]  Today's Date: 9/3/2020    Admit Date: 9/2/2020    Discharge Needs Assessment     Row Name 09/03/20 1245       Living Environment    Lives With  spouse    Current Living Arrangements  home/apartment/condo    Primary Care Provided by  self    Provides Primary Care For  no one    Family Caregiver if Needed  spouse    Quality of Family Relationships  unable to assess    Able to Return to Prior Arrangements  yes       Resource/Environmental Concerns    Resource/Environmental Concerns  none    Transportation Concerns  car, none       Transition Planning    Patient/Family Anticipates Transition to  home with family    Patient/Family Anticipated Services at Transition  none    Transportation Anticipated  family or friend will provide       Discharge Needs Assessment    Readmission Within the Last 30 Days  no previous admission in last 30 days    Concerns to be Addressed  no discharge needs identified    Equipment Currently Used at Home  none    Anticipated Changes Related to Illness  other (see comments)    Equipment Needed After Discharge  -- continue to assess    Provided Post Acute Provider List?  N/A        Discharge Plan     Row Name 09/03/20 1249       Plan    Plan  plan transfer to [facility]    Patient/Family in Agreement with Plan  yes    Plan Comments  Dr Armijo at bedside, discussing plan to transfer to [facility], patient agreeable. Spoke with patient at bedside, face sheet verified. Patient states she lives in a home in [place] with her  and son - who recently moved to college. She is independent of ADLs including driving. She denies use of any DME, home 02, cpap/bipap. She was recently diagnosed with diabetes and says she has a script for a glucometer, but was waiting a follow up appt with endocrinologist before picking up. She has not used HH or inpt rehab services previously. She does not have a living will and  declines information about creating one. She uses [facility] pharmacy and denies issues obtaining medications. Informed she will have a CM once she arrives at [facility] who will follow up on any needs she may have at [place].         Destination      Coordination has not been started for this encounter.      Durable Medical Equipment      Coordination has not been started for this encounter.      Dialysis/Infusion      Coordination has not been started for this encounter.      Home Medical Care      Coordination has not been started for this encounter.      Therapy      Coordination has not been started for this encounter.      Community Resources      Coordination has not been started for this encounter.        Expected Discharge Date and Time     Expected Discharge Date Expected Discharge Time    Sep 3, 2020         Demographic Summary     Row Name 09/03/20 1245       General Information    Admission Type  inpatient    Arrived From  home    Referral Source  admission list    Reason for Consult  discharge planning    Preferred Language  English     Used During This Interaction  no       Contact Information    Permission Granted to Share Info With          Functional Status    No documentation.       Psychosocial    No documentation.       Abuse/Neglect    No documentation.       Legal    No documentation.       Substance Abuse    No documentation.       Patient Forms    No documentation.           Chapincito Sequeira RN

## 2020-09-03 NOTE — PLAN OF CARE
Continued Stay Note  [facility]     Patient Name: Angie Patel  MRN: [id number]  Today's Date: 9/3/2020    Admit Date: 9/2/2020    Discharge Plan       Row Name 09/03/20 1249       Plan    Plan  plan transfer to [facility]    Patient/Family in Agreement with Plan  yes    Plan Comments  Dr Armijo at bedside, discussing plan to transfer to [facility], patient agreeable. Spoke with patient at bedside, face sheet verified. Patient states she lives in a home in [place] with her  and son - who recently moved to college. She is independent of ADLs including driving. SHe denies use of any DME, home 02, cpap/bipap. She was recently diagnosed with diabetes and says she has a script for a glucometer, but was waiting a follow up appt with endocrinoligist before picking up. She has not used  or inpt rehab services previously. She does not have a living will and declines information about creating one. She uses [facility] [place] pharmacy and denies issues obtaining medications. Informed she will have a CM once she arrives at [facility] who will follow up on any needs she may have at [place].             Discharge Codes    No documentation.         Expected Discharge Date and Time       Expected Discharge Date Expected Discharge Time    Sep 3, 2020                 Chapincito Sequeira RN

## 2020-09-03 NOTE — PLAN OF CARE
Problem: Patient Care Overview  Goal: Plan of Care Review  Outcome: Ongoing (interventions implemented as appropriate)  Flowsheets (Taken 9/3/2020 0313)  Progress: no change  Plan of Care Reviewed With: patient  Note:   Patient resting at this time, denies pain, ambulate with assist, vital signs stable, no fall noted, patient going for test in the morning cont to monitror

## 2020-09-03 NOTE — THERAPY EVALUATION
Acute Care - Physical Therapy Initial Evaluation   Crystal Patrick     Patient Name: Angie Patel  : 1975  MRN: [id number]  Today's Date: 9/3/2020   Onset of Illness/Injury or Date of Surgery: 20  Date of Referral to PT: 20  Referring Physician: Dr. Ugalde      Admit Date: 2020    Visit Dx:     ICD-10-CM ICD-9-CM   1. Ascending paralysis (CMS/HCC) G61.0 357.0     Patient Active Problem List   Diagnosis   • Magnetic resonance imaging of brain abnormal   • Benign essential hypertension   • Blurring of visual image   • Fatigue   • Confusional state   • Abnormal gait   • Hypercholesterolemia   • Headache   • Meningioma (CMS/HCC)   • Migraine   • Morbid obesity (CMS/HCC)   • Neck pain   • Nausea   • Pitting edema   • Polycystic ovaries   • Rib pain on left side   • Pain of sternum   • Vaginal irritation   • Vitamin D insufficiency   • Tobacco abuse counseling   • Hypotensive episode   • Acute bilateral thoracic back pain   • Pendulous breast   • Memory changes   • Weight gain   • PCOS (polycystic ovarian syndrome)   • Menopausal symptoms   • Insulin resistance syndrome   • Morbid obesity with BMI of 40.0-44.9, adult (CMS/HCC)   • Right ureteral stone   • Hematuria   • Constipation due to pain medication therapy   • Vision changes   • Blurred vision, bilateral   • Obesity (BMI 35.0-39.9 without comorbidity)   • Chest tightness or pressure   • Anxiety   • Need for influenza vaccination   • Acute left flank pain   • History of kidney stones   • Morbid obesity with BMI of 45.0-49.9, adult (CMS/HCC)   • Screening mammogram, encounter for   • Meningioma (CMS/HCC)   • Fibromyalgia   • High risk medication use   • Primary insomnia   • Arthralgia   • Chronic cough   • Thyroid enlargement   • Left-sided chest pain   • Neurological abnormality   • Foot injury, right, initial encounter   • Acute pain of right foot   • KACI (obstructive sleep apnea)   • Type 2 diabetes mellitus with hyperglycemia, without  long-term current use of insulin (CMS/HCC)   • GBS (Guillain-Armbrust syndrome) (CMS/HCC)   • Weakness of both lower extremities     Past Medical History:   Diagnosis Date   • Anxiety    • Asthma    • Cardiomyopathy (CMS/HCC)     with pregnancy   • Diabetes mellitus (CMS/HCC)    • Fibromyalgia    • Fibromyalgia, primary    • Headache, tension-type    • Hypertension    • Insomnia    • Kidney calculi    • Memory loss    • Meningioma (CMS/HCC)     benign brain    • Migraine     ocular   • KACI (obstructive sleep apnea)    • Palpitations    • Polycystic ovaries    • Seizures (CMS/HCC)      Past Surgical History:   Procedure Laterality Date   • CYSTOSCOPY URETEROSCOPY LASER LITHOTRIPSY Right 3/7/2017    Procedure: CYSTOSCOPY RT URETEROSCOPY LASER LITHOTRIPSY W/ STENT, rerograde pyelogram ;  Surgeon: Rashid Malloy MD;  Location: [facility];  Service:    • FOOT SURGERY Left     tendon repair   • HYSTERECTOMY     • OOPHORECTOMY     • SHOULDER SURGERY Right 2009   • TUBAL ABDOMINAL LIGATION     • WISDOM TOOTH EXTRACTION          PT ASSESSMENT (last 12 hours)      Physical Therapy Evaluation     Row Name 09/03/20 1005          PT Evaluation Time/Intention    Subjective Information  complains of;weakness;fatigue;pain  -     Document Type  evaluation  -     Mode of Treatment  physical therapy  -     Patient Effort  good  -     Symptoms Noted During/After Treatment  fatigue;increased pain  -     Row Name 09/03/20 1005          General Information    Patient Profile Reviewed?  yes  -     Onset of Illness/Injury or Date of Surgery  09/02/20  -     Referring Physician  Dr. Ugalde  -     Patient Observations  alert;cooperative;agree to therapy  -     Patient/Family Observations   present,  Patient agrees to therapy.  Supine in bed upon arrival.  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  Patient reports worsened to point she was unable to get out of bed on Tuesday and  has had to assist her with all mobility and ADL's.  Patient had lumbar pucture yesterday, awaiting MRI of head and cspine today.  MRI lumbar spine normal.   -     Existing Precautions/Restrictions  fall  -     Risks Reviewed  patient and family:;LOB;increased discomfort  -     Benefits Reviewed  patient and family:;improve function;increase strength  -     Barriers to Rehab  medically complex  -     Row Name 09/03/20 1005          Living Environment    Living Environment Comment  Patient lives with  in one level home with 4 steps to enter.  Patient reports she can enter through front door which has no steps if needed.  -     Row Name 09/03/20 1005          Cognitive Assessment/Intervention- PT/OT    Orientation Status (Cognition)  oriented x 4  -     Follows Commands (Cognition)  WNL  -     Row Name 09/03/20 1005          Bed Mobility Assessment/Treatment    Bed Mobility Assessment/Treatment  supine-sit;sit-supine  -     Supine-Sit Dewey (Bed Mobility)  conditional independence  -     Sit-Supine [place] (Bed Mobility)  conditional independence  -     Assistive Device (Bed Mobility)  bed rails;head of bed elevated  -     Row Name 09/03/20 1005          Transfer Assessment/Treatment    Transfer Assessment/Treatment  sit-stand transfer;stand-sit transfer;stand pivot/stand step transfer  -     Comment (Transfers)  Patient requires increased time for transfers.  Utilizes compensatory strategies to transfer sit to stand with increased reliance on trunk rotation and upper extremities.  -     Sit-Stand Dewey (Transfers)  contact guard  -     Stand-Sit [place]wey (Transfers)  contact guard  -      Row Name 09/03/20 1005          Sit-Stand Transfer    Assistive Device (Sit-Stand Transfers)  walker, front-wheeled  -     Row Name 09/03/20 1005          Stand-Sit Transfer    Assistive Device (Stand-Sit Transfers)  walker, front-wheeled  [facility]     Row Name 09/03/20 1005          Stand Pivot/Stand Step Transfer    Stand Pivot/Stand Step [place]  contact guard  -     Assistive Device (Stand Pivot Stand Step Transfer)  walker, front-wheeled  [facility]     Row Name 09/03/20 1005          Gait/Stairs Assessment/Training    [place] Level (Gait)  contact guard  -     Assistive Device (Gait)  walker, front-wheeled  -LH     Distance in Feet (Gait)  3 feet forward/backward  -     Deviations/Abnormal Patterns (Gait)  base of support, wide;jeremy decreased;gait speed decreased;stride length decreased  -     Bilateral Gait Deviations  foot drop/toe drag;forward flexed posture;heel strike decreased;hip circumduction;weight shift ability decreased  -     Comment (Gait/Stairs)  Patient took several steps forward with very wide base of support, heavy reliance on upper extremity support on walker, minimal foot clearance bilaterally with minimal lateral weight shift.  Patient fearful of knees buckling, c/o shoulder pain with use of walker.    -[facility] Name 09/03/20 1005          General ROM    GENERAL ROM COMMENTS  AROM B UE and B LE WFL  -[facility] Name 09/03/20 1005          MMT (Manual Muscle Testing)    General MMT Comments  B UE 4/5, B LE 4/5 with patient limited by c/o hip  pain  -[facility] Name 09/03/20 1005          Sensory Assessment/Intervention    Sensory General Assessment  light touch sensation deficits identified  -     Additional Documentation  Sensory Testing Results (Group)  -[facility] Name 09/03/20 1005          Light Touch Sensation Assessment    Comment, Right Lower Extremity: Light Touch Sensation Assessment  Patient c/o intermittent tingling in bilateral legs and hands, reports she is  able to feel PT touching her tingling increases with movement and shooting nerve pain in feet and legs with weightbearing.  -[facility] Name 09/03/20 1005          Pain Assessment    Additional Documentation  Pain Scale: Numbers Pre/Post-Treatment (Group)  -[facility] Name 09/03/20 1005          Pain Scale: Numbers Pre/Post-Treatment    Pain Scale: Numbers, Pretreatment  6/10  -     Pain Scale: Numbers, Post-Treatment  7/10  -     Pain Location - Side  Bilateral hips, back, shoulders, head  -     Pain Intervention(s)  Repositioned  -[facility] Name 09/03/20 1005          Plan of Care Review    Plan of Care Reviewed With  patient  -     Outcome Summary  PT:  evaluation completed.  Patient performed supine to/from sit without assistance. SHe requires CGA for sit to stand and pivot transfer to chair using rolling walker.  She was able to ambulate 3 feet forward and backward with rolling walker and CGA however she is very unsteady on her feet, maintaining a wide base of support, heavy reliance on her upper body on the walker, and minimal foot clearance to take small steps forward.  Patient feaful of knees buckling and c/o bilateral shoulder pain with use of walker.  Patient would benefit from skilled PT services to maintain/progress safety with functional mobility as able.  Possible transfer to [facility] noted in MD note - if patient does not transfer, recommend Occupational Therapy consult for evaluation/treatment of upper body weakness/difficulty with ADL's.  -[facility] Name 09/03/20 1005          Physical Therapy Clinical Impression    Date of Referral to PT  09/02/20  -     PT Diagnosis (PT Clinical Impression)  Impaired functional mobility, falls risk  -     Patient/Family Goals Statement (PT Clinical Impression)  improve strength, return to prior level of function  -     Criteria for Skilled Interventions Met (PT Clinical Impression)  yes;treatment indicated  -     Impairments Found (describe  specific impairments)  gait, locomotion, and balance;muscle performance;motor function;sensory Integrity  -     Functional Limitations in Following Categories (Describe Specific Limitations)  self-care;home management;work;community/leisure  -     Disability: Inability to Perform Actions/Activities of Required Roles (describe specific disability)  work;community/leisure  -     Rehab Potential (PT Clinical Summary)  fair, will monitor progress closely  -     Predicted Duration of Therapy (PT)  3 days  -     Care Plan Review (PT)  evaluation/treatment results reviewed;risks/benefits reviewed;current/potential barriers reviewed;patient/other agree to care plan  -     Row Name 09/03/20 1005          Physical Therapy Goals    Transfer Goal Selection (PT)  transfer, PT goal 1  -     Gait Training Goal Selection (PT)  gait training, PT goal 1  -[facility] Name 09/03/20 1005          Transfer Goal 1 (PT)    Activity/Assistive Device (Transfer Goal 1, PT)  transfers, all;walker, rolling  -     Gardner Level/Cues Needed (Transfer Goal 1, PT)  standby assist  -     Time Frame (Transfer Goal 1, PT)  3 days  -     Progress/Outcome (Transfer Goal 1, PT)  goal ongoing  -[facility] Name 09/03/20 1005          Gait Training Goal 1 (PT)    Activity/Assistive Device (Gait Training Goal 1, PT)  gait (walking locomotion);assistive device use;walker, rolling  -     [place]ardner Level (Gait Training Goal 1, PT)  contact guard assist  -     Distance (Gait Goal 1, PT)  50 feet  -     Time Frame (Gait Training Goal 1, PT)  3 days  -     Progress/Outcome (Gait Training Goal 1, PT)  goal ongoing  -[facility] Name 09/03/20 1005          Positioning and Restraints    Pre-Treatment Position  in bed  -     Post Treatment Position  chair  -     In Chair  reclined;notified nsg;call light within reach;encouraged to call for assist;with family/caregiver  -       User Key  (r) = Recorded By, (t) = Taken By, (c) =  Cosigned By    Initials Name Provider Type     Princess Valenzuela, PT Physical Therapist        Physical Therapy Education                 Title: PT OT SLP Therapies (Done)     Topic: Physical Therapy (Done)     Point: Mobility training (Done)     Description:   Instruct learner(s) on safety and technique for assisting patient out of bed, chair or wheelchair.  Instruct in the proper use of assistive devices, such as walker, crutches, cane or brace.              Patient Friendly Description:   It's important to get you on your feet again, but we need to do so in a way that is safe for you. Falling has serious consequences, and your personal safety is the most important thing of all.        When it's time to get out of bed, one of us or a family member will sit next to you on the bed to give you support.     If your doctor or nurse tells you to use a walker, crutches, a cane, or a brace, be sure you use it every time you get out of bed, even if you think you don't need it.    Learning Progress Summary           Patient Acceptance, E,TB, VU by  at 9/3/2020 1153                   Point: Precautions (Done)     Description:   Instruct learner(s) on prescribed precautions during mobility and gait tasks              Learning Progress Summary           Patient Acceptance, E,TB, VU by  at 9/3/2020 1153                               User Key     Initials Effective Dates Name Provider Type Discipline     06/08/18 -  Princess Valenzuela, PT Physical Therapist PT              PT Recommendation and Plan  Anticipated Discharge Disposition (PT): unknown, other (see comments)(will continue to monitor)  Planned Therapy Interventions (PT Eval): balance training, gait training, patient/family education, strengthening, transfer training  Therapy Frequency (PT Clinical Impression): daily  Outcome Summary/Treatment Plan (PT)  Anticipated Equipment Needs at Discharge (PT): bedside commode, front wheeled walker,  wheelchair  Anticipated Discharge Disposition (PT): unknown, other (see comments)(will continue to monitor)  Plan of Care Reviewed With: patient  Outcome Summary: PT:  evaluation completed.  Patient performed supine to/from sit without assistance. SHe requires CGA for sit to stand and pivot transfer to chair using rolling walker.  She was able to ambulate 3 feet forward and backward with rolling walker and CGA however she is very unsteady on her feet, maintaining a wide base of support, heavy reliance on her upper body on the walker, and minimal foot clearance to take small steps forward.  Patient feaful of knees buckling and c/o bilateral shoulder pain with use of walker.  Patient would benefit from skilled PT services to maintain/progress safety with functional mobility as able.  Possible transfer to [facility] noted in MD note - if patient does not transfer, recommend Occupational Therapy consult for evaluation/treatment of upper body weakness/difficulty with ADL's.  Outcome Measures     Row Name 09/03/20 1100             How much help from another person do you currently need...    Turning from your back to your side while in flat bed without using bedrails?  4  -LH      Moving from lying on back to sitting on the side of a flat bed without bedrails?  4  -LH      Moving to and from a bed to a chair (including a wheelchair)?  3  -LH      Standing up from a chair using your arms (e.g., wheelchair, bedside chair)?  3  -LH      Climbing 3-5 steps with a railing?  1  -LH      To walk in hospital room?  2  -      AM-PAC 6 Clicks Score (PT)  17  -         Functional Assessment    Outcome Measure Options  AM-PAC 6 Clicks Basic Mobility (PT)  -        User Key  (r) = Recorded By, (t) = Taken By, (c) = Cosigned By    Initials Name Provider Type     Princess Valenzuela, PT Physical Therapist         Time Calculation:   PT Charges     Row Name 09/03/20 7594             Time Calculation    Start Time  1005  -       Stop Time  1053  -      Time Calculation (min)  48 min  -        User Key  (r) = Recorded By, (t) = Taken By, (c) = Cosigned By    Initials Name Provider Type     Princess Valenzuela, PT Physical Therapist        Therapy Charges for Today     Code Description Service Date Service Provider Modifiers Qty    [id number] HC PT EVAL HIGH COMPLEXITY 3 9/3/2020 Princess Valenzuela, PT GP 1          PT G-Codes  Outcome Measure Options: AM-PAC 6 Clicks Basic Mobility (PT)  AM-PAC 6 Clicks Score (PT): 17      Princess Valenzuela, PT  9/3/2020

## 2020-09-03 NOTE — DISCHARGE SUMMARY
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 female presented with of an acute a sending paralysis.  She was admitted here MRI of the lumbar spine was negative.  She was seen by neurology had an LP.  OP was not overly indicative of GBS, however given the rapid progression of the patient's symptoms neurology felt the patient would need to be transferred to a higher level facility with the capability of doing plasmapheresis if indicated.  Was discussed and she was accepted at [facility] and she will be transferred there to be followed by neurology and will have further work-up while there.      Procedures Performed:as noted above         Consults:   Consults     Date and Time Order Name Status Description    9/2/2020 1158 Inpatient Neurology Consult General Completed           Pertinent Test Results:     Lab Results (last 72 hours)     Procedure Component Value Units Date/Time    POC Glucose Once [[id number]]  (Abnormal)  Collected:  09/03/20 1242    Specimen:  Blood Updated:  09/03/20 1248     Glucose 131 mg/dL     POC Glucose Once [[id number]]  (Normal) Collected:  09/03/20 0741    Specimen:  Blood Updated:  09/03/20 0752     Glucose 123 mg/dL     Comprehensive Metabolic Panel [[id number]]  (Abnormal) Collected:  09/03/20 0442    Specimen:  Blood Updated:  09/03/20 0531     Glucose 150 mg/dL      BUN 15 mg/dL      Creatinine 0.74 mg/dL      Sodium 138 mmol/L      Potassium 3.3 mmol/L      Chloride 97 mmol/L      CO2 25.8 mmol/L      Calcium 9.3 mg/dL      Total Protein 7.1 g/dL      Albumin 3.80 g/dL      ALT (SGPT) 23 U/L      AST (SGOT) 15 U/L      Alkaline Phosphatase 42 U/L      Total Bilirubin 0.5 mg/dL      eGFR Non African Amer 85 mL/min/1.73      Globulin 3.3 gm/dL      A/G Ratio 1.2 g/dL      BUN/Creatinine Ratio 20.3     Anion Gap 15.2 mmol/L     Narrative:       GFR Normal >60  Chronic Kidney Disease <60  Kidney Failure <15      CBC Auto Differential [[id number]]  (Abnormal) Collected:  09/03/20 0442    Specimen:  Blood Updated:  09/03/20 0517     WBC 10.49 10*3/mm3      RBC 4.23 10*6/mm3      Hemoglobin 12.6 g/dL      Hematocrit 38.4 %      MCV 90.8 fL      MCH 29.8 pg      MCHC 32.8 g/dL      RDW 12.4 %      RDW-SD 41.1 fl      MPV 11.9 fL      Platelets 251 10*3/mm3      Neutrophil % 67.3 %      Lymphocyte % 23.4 %      Monocyte % 6.5 %      Eosinophil % 1.4 %      Basophil % 0.4 %      Immature Grans % 1.0 %      Neutrophils, Absolute 7.07 10*3/mm3      Lymphocytes, Absolute 2.45 10*3/mm3      Monocytes, Absolute 0.68 10*3/mm3      Eosinophils, Absolute 0.15 10*3/mm3      Basophils, Absolute 0.04 10*3/mm3      Immature Grans, Absolute 0.10 10*3/mm3      nRBC 0.0 /100 WBC     Sedimentation Rate [[id number]]  (Abnormal) Collected:  09/03/20 0442    Specimen:  Blood Updated:  09/03/20 0514     Sed Rate 37 mm/hr     POC Glucose Once [[id number]]  (Abnormal) Collected:  09/02/20 2004    Specimen:  Blood Updated:  09/02/20 2014      Glucose 141 mg/dL     POC Glucose Once [[id number]]  (Abnormal) Collected:  09/02/20 1644    Specimen:  Blood Updated:  09/02/20 1657     Glucose 137 mg/dL     Cell Count With Differential, CSF Use CSF Tube: 1 [[id number]] Collected:  09/02/20 1515    Specimen:  Cerebrospinal Fluid from Lumbar Puncture Updated:  09/02/20 1622    Narrative:       The following orders were created for panel order Cell Count With Differential, CSF Use CSF Tube: 1.  Procedure                               Abnormality         Status                     ---------                               -----------         ------                     Cell Count, CSF - Cerebr...[[id number]]                      Final result                 Please view results for these tests on the individual orders.    Cell Count, CSF - Cerebrospinal Fluid, Lumbar Puncture [[id number]] Collected:  09/02/20 1515    Specimen:  Cerebrospinal Fluid from Lumbar Puncture Updated:  09/02/20 1622     WBC, CSF 3 /mm3      RBC, CSF 0 /mm3      Color, CSF Colorless     Appearance, CSF Clear     Tube Number, CSF 1    Narrative:       Differential not indicated.    Glucose, CSF - Cerebrospinal Fluid, Lumbar Puncture [[id number]]  (Abnormal) Collected:  09/02/20 1515    Specimen:  Cerebrospinal Fluid from Lumbar Puncture Updated:  09/02/20 1620     Glucose, CSF 76 mg/dL     Protein, CSF - Cerebrospinal Fluid, Lumbar Puncture [[id number]]  (Normal) Collected:  09/02/20 1515    Specimen:  Cerebrospinal Fluid from Lumbar Puncture Updated:  09/02/20 1620     Protein, Total (CSF) 25.0 mg/dL     Cell Count With Differential, CSF [[id number]] Collected:  09/02/20 1515    Specimen:  Cerebrospinal Fluid from Lumbar Puncture Updated:  09/02/20 1616    Narrative:       The following orders were created for panel order Cell Count With Differential, CSF.  Procedure                               Abnormality         Status                     ---------                               -----------          ------                     Cell Count, CSF - Cerebr...[[id number]]                      Final result                 Please view results for these tests on the individual orders.    Cell Count, CSF - Cerebrospinal Fluid, Lumbar Puncture [[id number]] Collected:  09/02/20 1515    Specimen:  Cerebrospinal Fluid from Lumbar Puncture Updated:  09/02/20 1616     WBC, CSF 2 /mm3      RBC, CSF 0 /mm3      Color, CSF Colorless     Appearance, CSF Clear     Tube Number, CSF 4    Narrative:       Differential not indicated.    MS Profile & MBP, CSF - Cerebrospinal Fluid, Lumbar Puncture [[id number]] Collected:  09/02/20 1515    Specimen:  Cerebrospinal Fluid from Lumbar Puncture Updated:  09/02/20 1600    Lyme, Western Blot, CSF - Cerebrospinal Fluid, Lumbar Puncture [[id number]] Collected:  09/02/20 1515    Specimen:  Cerebrospinal Fluid from Lumbar Puncture Updated:  09/02/20 1600    West Nile Virus, CSF [[id number]] Collected:  09/02/20 1515    Specimen:  Cerebrospinal Fluid from Lumbar Puncture Updated:  09/02/20 1600    Protime-INR [[id number]]  (Normal) Collected:  09/02/20 0925    Specimen:  Blood Updated:  09/02/20 1408     Protime 12.7 Seconds      INR 0.98    Narrative:       Therapeutic Ranges for INR: 2.0-3.0 (PT 20-30)                              2.5-3.5 (PT 25-34)    aPTT [[id number]]  (Normal) Collected:  09/02/20 0925    Specimen:  Blood Updated:  09/02/20 1408     PTT 31.2 seconds     Narrative:       PTT = The equivalent PTT values for the therapeutic range of heparin levels at 0.1 to 0.7 U/ml are 53 to 110 seconds.      CK [[id number]]  (Normal) Collected:  09/02/20 0925    Specimen:  Blood Updated:  09/02/20 1258     Creatine Kinase 69 U/L     Phosphorus [[id number]]  (Normal) Collected:  09/02/20 0925    Specimen:  Blood Updated:  09/02/20 1258     Phosphorus 3.3 mg/dL     Magnesium [[id number]]  (Normal) Collected:  09/02/20 0925    Specimen:  Blood Updated:  09/02/20 1258     Magnesium 1.6 mg/dL      Comprehensive Metabolic Panel [[id number]]  (Abnormal) Collected:  09/02/20 0925    Specimen:  Blood Updated:  09/02/20 0946     Glucose 141 mg/dL      BUN 12 mg/dL      Creatinine 0.66 mg/dL      Sodium 140 mmol/L      Potassium 3.7 mmol/L      Chloride 101 mmol/L      CO2 24.4 mmol/L      Calcium 9.3 mg/dL      Total Protein 7.4 g/dL      Albumin 4.10 g/dL      ALT (SGPT) 25 U/L      AST (SGOT) 17 U/L      Alkaline Phosphatase 39 U/L      Total Bilirubin 0.2 mg/dL      eGFR Non African Amer 97 mL/min/1.73      Globulin 3.3 gm/dL      A/G Ratio 1.2 g/dL      BUN/Creatinine Ratio 18.2     Anion Gap 14.6 mmol/L     Narrative:       GFR Normal >60  Chronic Kidney Disease <60  Kidney Failure <15      CBC & Differential [[id number]] Collected:  09/02/20 0902    Specimen:  Blood Updated:  09/02/20 0910    Narrative:       The following orders were created for panel order CBC & Differential.  Procedure                               Abnormality         Status                     ---------                               -----------         ------                     CBC Auto Differential[[id number]]        Normal              Final result                 Please view results for these tests on the individual orders.    CBC Auto Differential [[id number]]  (Normal) Collected:  09/02/20 0902    Specimen:  Blood Updated:  09/02/20 0910     WBC 8.19 10*3/mm3      RBC 4.62 10*6/mm3      Hemoglobin 13.5 g/dL      Hematocrit 41.9 %      MCV 90.7 fL      MCH 29.2 pg      MCHC 32.2 g/dL      RDW 12.5 %      RDW-SD 41.7 fl      MPV 11.9 fL      Platelets 147 10*3/mm3      Neutrophil % 67.0 %      Lymphocyte % 24.2 %      Monocyte % 6.6 %      Eosinophil % 1.3 %      Basophil % 0.4 %      Immature Grans % 0.5 %      Neutrophils, Absolute 5.49 10*3/mm3      Lymphocytes, Absolute 1.98 10*3/mm3      Monocytes, Absolute 0.54 10*3/mm3      Eosinophils, Absolute 0.11 10*3/mm3      Basophils, Absolute 0.03 10*3/mm3      Immature Grans, Absolute  0.04 10*3/mm3                Microbiology Results (last 10 days)     ** No results found for the last 240 hours. **            Results for orders placed during the hospital encounter of 03/04/20   Adult Transthoracic Echo Complete W/ Cont if Necessary Per Protocol    Narrative · Left ventricular systolic function is normal.  · Calculated EF = 62%.  · Essentially normal echo          Imaging Results (All)     Procedure Component Value Units Date/Time    SCANNED - IMAGING [[id number]] Resulted:  09/02/20      Updated:  09/03/20 1118    IR Lumbar Puncture Diag/Thera [[id number]] Collected:  09/02/20 1554     Updated:  09/02/20 1601    Narrative:       DIAGNOSTIC LUMBAR PUNCTURE, 09/02/2020     HISTORY:   45-year-old female admitted to the hospital today with 4 day history of  ascending paralysis. Unrevealing lumbar spine MRI. Diagnostic lumbar  puncture under fluoroscopy as requested.     CONSENT:   The procedure, risks and alternatives were discussed in detail with the  patient, emphasizing the risks of persistent spinal headache, CSF leak,  bleeding, infection, and neurologic injury. I discussed post procedure  activity restrictions. Questions were entertained, and written informed  consent was obtained. Timeout was observed in the procedure room for  patient identification and procedure site verification, and the  procedure site was marked by me.  *  Fluoroscopy time, 1.0 minutes.  *  2 images were recorded.     PROCEDURE:   Using sterile technique, 1% lidocaine local anesthetic and fluoroscopic  guidance, a 22-gauge spinal needle was placed into the lumbar thecal sac  at the L3 level below the L2 lamina without difficulty. A total of 11 cc  clear cerebral spinal fluid was collected in 4 separate vials and sent  to the laboratory for requested studies. No apparent immediate  complication.       Impression:       Technically successful diagnostic lumbar puncture using fluoroscopic  guidance.     This report was  finalized on 9/2/2020 3:59 PM by Dr. Sony Casillas MD.       MRI Lumbar Spine Without Contrast [[id number]] Collected:  09/02/20 1016     Updated:  09/02/20 1018    Narrative:       MRI Spine Lumbar WO    HISTORY:  Lower back pain radiating into bilateral lower extremities with paresthesias, worsening the last 5 days    TECHNIQUE:  Multiplanar multisequence imaging of the lumbar spine acquired without IV contrast utilizing a standard protocol.    COMPARISON: None    FINDINGS:    For the purposes of this dictation there appears to be 5 lumbar-type vertebra with the last fully formed disc space representing L5-S1.    Lumbar alignment is anatomic. Vertebral body heights are maintained. No focal aggressive marrow lesion is identified. Disc heights are preserved. The conus terminates at an expected level and the visualized distal cord signal is within normal limits.    Level by level:    L1-2: No significant thecal sac or foraminal narrowing.    L2-3: No significant thecal sac or foraminal narrowing.    L3-4: No significant thecal sac or foraminal narrowing    L4-5: Moderate facet degenerative change. Minimal disc bulge, slightly eccentric to the left but no significant thecal sac or foraminal narrowing is present.    L5-S1: Moderate right greater than left facet degenerative change without significant thecal sac or foraminal narrowing.          Impression:       1.  Degenerative changes in the lower lumbar spine, predominantly due to facet degeneration without high-grade spinal canal or foraminal narrowing.    Signer Name: MARLEN CALDERON MD   Signed: 9/2/2020 10:16 AM   Workstation Name: [id number]    [facility]            Condition on Discharge:  Stable medically     Vital Signs  Temp:  [98.2 °F (36.8 °C)-99.1 °F (37.3 °C)] 98.2 °F (36.8 °C)  Heart Rate:  [] 94  Resp:  [16-18] 16  BP: (104-137)/(68-84) 116/73    Physical Exam:  Physical Exam   Constitutional: She is oriented to person,  place, and time. No distress.   Pulmonary/Chest: Effort normal. No respiratory distress.   Abdominal: Soft. She exhibits no distension.   Musculoskeletal: She exhibits no tenderness.   Neurological: She is alert and oriented to person, place, and time.   Reflexes still at least 2+ proximally and distally in the upper extremities excellent knee jerks right ankle jerk 2+   Vitals reviewed.      Discharge Disposition  Short Term Hospital ([facility])    Discharge Medications     Discharge Medications      Continue These Medications      Instructions Start Date   Accu-Chek Camila device   Used to check blood sugars twice daily as needed for type 2 diabetes      accu-chek multiclix lancets   Used to check blood sugars twice daily as needed for type 2 diabetes         ASK your doctor about these medications      Instructions Start Date   chlorthalidone 25 MG tablet  Commonly known as:  HYGROTON   25 mg, Oral, Daily      furosemide 20 MG tablet  Commonly known as:  LASIX   TAKE 1 TABLET BY MOUTH DAILY AS NEEDED      glucose blood test strip  Commonly known as:  Accu-Chek Camila   Used to check blood sugars twice daily as needed for type 2 diabetes      metFORMIN  MG 24 hr tablet  Commonly known as:  GLUCOPHAGE-XR   1,500 mg, Oral, Daily With Dinner      Motrin  MG tablet  Generic drug:  ibuprofen   2 tablets, Oral, Every 6 Hours PRN      spironolactone 100 MG tablet  Commonly known as:  ALDACTONE   100 mg, Oral, Daily      Ventolin  (90 Base) MCG/ACT inhaler  Generic drug:  albuterol sulfate HFA   INHALE 2 PUFFS BY MOUTH FOUR TIMES DAILY AS NEEDED FOR WHEEZING OR SHORTNESS OF BREATH             Discharge Diet:   Diet Instructions     Drink Plenty of Fluid Over Next 24 Hours            Activity at Discharge:   Activity Instructions     Elevate Head of Bed 30 Degrees for 24 Hours      No Strenuous Activity for 24 Hours            Follow-up Appointments  Future Appointments   Date Time Provider  Department Center   9/23/2020 12:00 PM Anatoly Mckinney MD [facility] None   10/13/2020  8:20 AM [facility] None   10/21/2020  3:15 PM Kya Kendrick PA-C [facility] None         Test Results Pending at Discharge   Order Current Status    Lyme, Western Blot, CSF - Cerebrospinal Fluid, Lumbar Puncture In process    MS Profile & MBP, CSF - Cerebrospinal Fluid, Lumbar Puncture In process    West Nile Virus, CSF In process           Risk for Readmission (LACE) Score: 2 (9/3/2020  6:00 AM)      This patient has been examined wearing appropriate Personal Protective Equipment . 09/03/20      Time: Discharge 38 min with face-to-face history exam, writing of prescriptions, and documenting discharge data including care coordination with the nursing staff.      Jose Antonio Ugalde DO  09/03/20  13:00

## 2020-09-04 ENCOUNTER — APPOINTMENT (OUTPATIENT)
Dept: PAIN MEDICINE | Facility: HOSPITAL | Age: 45
End: 2020-09-04

## 2020-09-04 ENCOUNTER — ANESTHESIA EVENT (OUTPATIENT)
Dept: PAIN MEDICINE | Facility: HOSPITAL | Age: 45
End: 2020-09-04

## 2020-09-04 ENCOUNTER — APPOINTMENT (OUTPATIENT)
Dept: GENERAL RADIOLOGY | Facility: HOSPITAL | Age: 45
End: 2020-09-04

## 2020-09-04 ENCOUNTER — ANESTHESIA (OUTPATIENT)
Dept: PAIN MEDICINE | Facility: HOSPITAL | Age: 45
End: 2020-09-04

## 2020-09-04 PROBLEM — E87.6 HYPOKALEMIA: Status: ACTIVE | Noted: 2020-09-04

## 2020-09-04 PROBLEM — G97.1 HEADACHE, POST-LUMBAR PUNCTURE: Status: ACTIVE | Noted: 2020-09-04

## 2020-09-04 LAB
ALB CSF/SERPL: 3 {RATIO} (ref 0–8)
ALBUMIN CSF-MCNC: 13 MG/DL (ref 11–48)
ALBUMIN SERPL-MCNC: 4.1 G/DL (ref 3.8–4.8)
ANION GAP SERPL CALCULATED.3IONS-SCNC: 14 MMOL/L (ref 5–15)
BUN SERPL-MCNC: 14 MG/DL (ref 6–20)
BUN/CREAT SERPL: 16.5 (ref 7–25)
CALCIUM SPEC-SCNC: 9.8 MG/DL (ref 8.6–10.5)
CHLORIDE SERPL-SCNC: 95 MMOL/L (ref 98–107)
CO2 SERPL-SCNC: 25 MMOL/L (ref 22–29)
CREAT SERPL-MCNC: 0.85 MG/DL (ref 0.57–1)
DEPRECATED RDW RBC AUTO: 38.5 FL (ref 37–54)
ERYTHROCYTE [DISTWIDTH] IN BLOOD BY AUTOMATED COUNT: 11.9 % (ref 12.3–15.4)
GFR SERPL CREATININE-BSD FRML MDRD: 73 ML/MIN/1.73
GLUCOSE BLDC GLUCOMTR-MCNC: 126 MG/DL (ref 70–130)
GLUCOSE BLDC GLUCOMTR-MCNC: 133 MG/DL (ref 70–130)
GLUCOSE BLDC GLUCOMTR-MCNC: 148 MG/DL (ref 70–130)
GLUCOSE BLDC GLUCOMTR-MCNC: 192 MG/DL (ref 70–130)
GLUCOSE SERPL-MCNC: 202 MG/DL (ref 65–99)
HCT VFR BLD AUTO: 38.9 % (ref 34–46.6)
HGB BLD-MCNC: 13.2 G/DL (ref 12–15.9)
IGG CSF-MCNC: 2.2 MG/DL (ref 0–8.6)
IGG SERPL-MCNC: 1201 MG/DL (ref 586–1602)
IGG SYNTH RATE SER+CSF CALC-MRATE: -2.2 MG/DAY
IGG/ALB CLEAR SER+CSF-RTO: 0.6 (ref 0–0.7)
IGG/ALB CSF: 0.17 {RATIO} (ref 0–0.25)
MAGNESIUM SERPL-MCNC: 2 MG/DL (ref 1.6–2.6)
MBP CSF-MCNC: 4 NG/ML (ref 0–3.7)
MCH RBC QN AUTO: 29.9 PG (ref 26.6–33)
MCHC RBC AUTO-ENTMCNC: 33.9 G/DL (ref 31.5–35.7)
MCV RBC AUTO: 88 FL (ref 79–97)
OLIGOCLONAL BANDS.IT SER+CSF QL: ABNORMAL
PLATELET # BLD AUTO: 281 10*3/MM3 (ref 140–450)
PMV BLD AUTO: 11.5 FL (ref 6–12)
POTASSIUM SERPL-SCNC: 3.3 MMOL/L (ref 3.5–5.2)
RBC # BLD AUTO: 4.42 10*6/MM3 (ref 3.77–5.28)
SARS-COV-2 RNA RESP QL NAA+PROBE: NOT DETECTED
SODIUM SERPL-SCNC: 134 MMOL/L (ref 136–145)
WBC # BLD AUTO: 9.58 10*3/MM3 (ref 3.4–10.8)
WNV IGG SPEC QL IA: NEGATIVE
WNV IGM CSF QL IA: NEGATIVE

## 2020-09-04 PROCEDURE — 85027 COMPLETE CBC AUTOMATED: CPT | Performed by: HOSPITALIST

## 2020-09-04 PROCEDURE — 97110 THERAPEUTIC EXERCISES: CPT

## 2020-09-04 PROCEDURE — 63710000001 INSULIN LISPRO (HUMAN) PER 5 UNITS: Performed by: INTERNAL MEDICINE

## 2020-09-04 PROCEDURE — C1755 CATHETER, INTRASPINAL: HCPCS

## 2020-09-04 PROCEDURE — 25010000002 FENTANYL CITRATE (PF) 100 MCG/2ML SOLUTION: Performed by: ANESTHESIOLOGY

## 2020-09-04 PROCEDURE — 83735 ASSAY OF MAGNESIUM: CPT | Performed by: HOSPITALIST

## 2020-09-04 PROCEDURE — 3E0R3GC INTRODUCTION OF OTHER THERAPEUTIC SUBSTANCE INTO SPINAL CANAL, PERCUTANEOUS APPROACH: ICD-10-PCS | Performed by: ANESTHESIOLOGY

## 2020-09-04 PROCEDURE — 97162 PT EVAL MOD COMPLEX 30 MIN: CPT

## 2020-09-04 PROCEDURE — 99254 IP/OBS CNSLTJ NEW/EST MOD 60: CPT | Performed by: PSYCHIATRY & NEUROLOGY

## 2020-09-04 PROCEDURE — 82962 GLUCOSE BLOOD TEST: CPT

## 2020-09-04 PROCEDURE — 80048 BASIC METABOLIC PNL TOTAL CA: CPT | Performed by: HOSPITALIST

## 2020-09-04 PROCEDURE — 77003 FLUOROGUIDE FOR SPINE INJECT: CPT

## 2020-09-04 RX ORDER — POTASSIUM CHLORIDE 1.5 G/1.77G
40 POWDER, FOR SOLUTION ORAL AS NEEDED
Status: DISCONTINUED | OUTPATIENT
Start: 2020-09-04 | End: 2020-09-07 | Stop reason: HOSPADM

## 2020-09-04 RX ORDER — SODIUM CHLORIDE 0.9 % (FLUSH) 0.9 %
1-10 SYRINGE (ML) INJECTION AS NEEDED
Status: DISCONTINUED | OUTPATIENT
Start: 2020-09-04 | End: 2020-09-07 | Stop reason: HOSPADM

## 2020-09-04 RX ORDER — LORAZEPAM 1 MG/1
1 TABLET ORAL ONCE
Status: COMPLETED | OUTPATIENT
Start: 2020-09-04 | End: 2020-09-05

## 2020-09-04 RX ORDER — FENTANYL CITRATE 50 UG/ML
50 INJECTION, SOLUTION INTRAMUSCULAR; INTRAVENOUS AS NEEDED
Status: DISCONTINUED | OUTPATIENT
Start: 2020-09-04 | End: 2020-09-07 | Stop reason: HOSPADM

## 2020-09-04 RX ORDER — LIDOCAINE HYDROCHLORIDE 10 MG/ML
1 INJECTION, SOLUTION INFILTRATION; PERINEURAL ONCE AS NEEDED
Status: DISCONTINUED | OUTPATIENT
Start: 2020-09-04 | End: 2020-09-07 | Stop reason: HOSPADM

## 2020-09-04 RX ORDER — SODIUM CHLORIDE 0.9 % (FLUSH) 0.9 %
3-10 SYRINGE (ML) INJECTION AS NEEDED
Status: DISCONTINUED | OUTPATIENT
Start: 2020-09-04 | End: 2020-09-07 | Stop reason: HOSPADM

## 2020-09-04 RX ORDER — SODIUM CHLORIDE 0.9 % (FLUSH) 0.9 %
3 SYRINGE (ML) INJECTION EVERY 12 HOURS SCHEDULED
Status: DISCONTINUED | OUTPATIENT
Start: 2020-09-04 | End: 2020-09-07

## 2020-09-04 RX ORDER — POTASSIUM CHLORIDE 750 MG/1
40 CAPSULE, EXTENDED RELEASE ORAL AS NEEDED
Status: DISCONTINUED | OUTPATIENT
Start: 2020-09-04 | End: 2020-09-07 | Stop reason: HOSPADM

## 2020-09-04 RX ORDER — HYDROCODONE BITARTRATE AND ACETAMINOPHEN 5; 325 MG/1; MG/1
1 TABLET ORAL EVERY 6 HOURS PRN
Status: DISCONTINUED | OUTPATIENT
Start: 2020-09-04 | End: 2020-09-07 | Stop reason: HOSPADM

## 2020-09-04 RX ORDER — MIDAZOLAM HYDROCHLORIDE 1 MG/ML
1 INJECTION INTRAMUSCULAR; INTRAVENOUS AS NEEDED
Status: DISCONTINUED | OUTPATIENT
Start: 2020-09-04 | End: 2020-09-07 | Stop reason: HOSPADM

## 2020-09-04 RX ORDER — ALBUTEROL SULFATE 2.5 MG/3ML
2.5 SOLUTION RESPIRATORY (INHALATION) EVERY 6 HOURS PRN
Status: DISCONTINUED | OUTPATIENT
Start: 2020-09-04 | End: 2020-09-07 | Stop reason: HOSPADM

## 2020-09-04 RX ADMIN — LIDOCAINE 1 PATCH: 50 PATCH TOPICAL at 08:21

## 2020-09-04 RX ADMIN — SODIUM CHLORIDE, PRESERVATIVE FREE 10 ML: 5 INJECTION INTRAVENOUS at 21:30

## 2020-09-04 RX ADMIN — SODIUM CHLORIDE, PRESERVATIVE FREE 3 ML: 5 INJECTION INTRAVENOUS at 21:30

## 2020-09-04 RX ADMIN — SODIUM CHLORIDE, PRESERVATIVE FREE 10 ML: 5 INJECTION INTRAVENOUS at 08:22

## 2020-09-04 RX ADMIN — FENTANYL CITRATE 50 MCG: 50 INJECTION, SOLUTION INTRAMUSCULAR; INTRAVENOUS at 13:31

## 2020-09-04 RX ADMIN — SODIUM CHLORIDE, PRESERVATIVE FREE 3 ML: 5 INJECTION INTRAVENOUS at 15:24

## 2020-09-04 RX ADMIN — CHLORTHALIDONE 25 MG: 25 TABLET ORAL at 08:17

## 2020-09-04 RX ADMIN — ACETAMINOPHEN 650 MG: 325 TABLET, FILM COATED ORAL at 08:17

## 2020-09-04 RX ADMIN — POTASSIUM CHLORIDE 40 MEQ: 10 CAPSULE, COATED, EXTENDED RELEASE ORAL at 11:18

## 2020-09-04 RX ADMIN — INSULIN LISPRO 2 UNITS: 100 INJECTION, SOLUTION INTRAVENOUS; SUBCUTANEOUS at 12:07

## 2020-09-04 RX ADMIN — ACETAMINOPHEN 650 MG: 325 TABLET, FILM COATED ORAL at 04:18

## 2020-09-04 RX ADMIN — HYDROCODONE BITARTRATE AND ACETAMINOPHEN 1 TABLET: 5; 325 TABLET ORAL at 21:29

## 2020-09-04 NOTE — PROGRESS NOTES
Continued Stay Note  [facility]     Patient Name: Angie Patel  MRN: [id number]  Today's Date: 9/4/2020    Admit Date: 9/3/2020    Discharge Plan     Row Name 09/04/20 0943       Plan    Plan  Pt unsure of DC needs at this time.  Referral to [facility] in case needed.     Plan Comments  S/W pt at bedside.  She is normally IADLs, so she hopes to be able to return home upon DC.  CCP discussed possible DC options including acute rehab, subacute rehab and HH.  If acute rehab needed, she would like [facility].  Referral called to [facility]/ [facility].  Provider info given for SNF and HH.  Will followup after therapies.         Discharge Codes    No documentation.             Nichole Banks RN

## 2020-09-04 NOTE — PLAN OF CARE
Pt had blood patch done today due to headache from Spinal lumbar punch.  Pt tolerated well and has some relief.  Pt waiting on MRI has ativan for when MRI comes gets Patient.  Pt ST on heart monitor.  Pt has K+ replacement and due for another dose.  Will continue to monitor.  Problem: Patient Care Overview  Goal: Individualization and Mutuality  Outcome: Ongoing (interventions implemented as appropriate)  Goal: Discharge Needs Assessment  Outcome: Ongoing (interventions implemented as appropriate)  Goal: Interprofessional Rounds/Family Conf  Outcome: Ongoing (interventions implemented as appropriate)  Flowsheets (Taken 9/4/2020 1811)  Participants: ; physician; nursing     Problem: Pain, Acute (Adult)  Goal: Identify Related Risk Factors and Signs and Symptoms  Outcome: Ongoing (interventions implemented as appropriate)  Flowsheets  Taken 9/4/2020 1811 by Leelee Moody RN  Related Risk Factors (Acute Pain): procedure/treatment  Taken 9/4/2020 0413 by Carolina Patricia RN  Signs and Symptoms (Acute Pain): verbalization of pain descriptors  Goal: Acceptable Pain Control/Comfort Level  Outcome: Ongoing (interventions implemented as appropriate)  Flowsheets (Taken 9/4/2020 1811)  Acceptable Pain Control/Comfort Level: making progress toward outcome     Problem: Fall Risk (Adult)  Goal: Identify Related Risk Factors and Signs and Symptoms  Outcome: Ongoing (interventions implemented as appropriate)  Flowsheets (Taken 9/4/2020 0413 by Carolina Patricia RN)  Related Risk Factors (Fall Risk): gait/mobility problems;neuro disease/injury;environment unfamiliar  Signs and Symptoms (Fall Risk): presence of risk factors  Goal: Absence of Fall  Outcome: Ongoing (interventions implemented as appropriate)  Flowsheets (Taken 9/4/2020 1811)  Absence of Fall: making progress toward outcome

## 2020-09-04 NOTE — CONSULTS
Patient Identification:  NAME:  Angie Patel  Age:  45 y.o.   Sex:  female   :  1975   MRN:  [id number]     Referring physician:Ronnie Sarkar MD  Reason for consult: GBS      Chief complaint: Bilateral lower extremity pain over the last few days.    History of present illness: Patient stated that her condition literally started last Saturday.  She went with her  to see a movie.  Upon coming home that night she felt her feet were numb.  She described the numbness as feeling tingling sensation in her feet, hands and face.  She told her  about it.  She even told him that she did not have a hot shower to have such a feeling.  The face and hand tingling subsided but the the feet one turned into numbness as as if it is cold and did not feel them right.  She had such a chronic problem and she did not make that much out of it.  On  she started to have pain in her feet and legs on both sides.  She describes the pain as a sharp knifelike in her calf muscles.  On Monday patient went to work.  She works as a  in a school.  Is 4 hours job and it is not demanding.  Her coworkers noticed that she was moving slowly between her desk and the cabinets.  The condition kept deteriorating as per her story She started feeling weak all over her body.  Finally she seek medical help.  Patient had lumbar spine MRI which did not show any significant abnormality.  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    Patient mentions that she already had some problem with her feet happening on and off for a while.  She had been recently diagnosed with diabetes but she was using metformin 1500 mg nightly over the last 10 years for polycystic kidney and possible insulin resistant diabetes as per her story.  Patient also reported migraine, memory problem before.  She had brain MRI with incidental finding of meningioma on the right frontoparietal area as per her story.  She has been evaluated by  neurosurgery which suggested not doing anything since it was an incidental finding and there is no pressure effect or any symptoms secondary to it.  Patient denied having any previous history of depression, and anxiety.  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    Past medical history:  Past Medical History:   Diagnosis Date   • Anxiety    • Asthma    • Cardiomyopathy (CMS/HCC)     with pregnancy   • Diabetes mellitus (CMS/HCC)    • Fibromyalgia    • Fibromyalgia, primary    • Headache, tension-type    • Hypertension    • Insomnia    • Kidney calculi    • Memory loss    • Meningioma (CMS/HCC)     benign brain    • Migraine     ocular   • KACI (obstructive sleep apnea)    • Palpitations    • Polycystic ovaries    • Seizures (CMS/HCC)        Past surgical history:  Past Surgical History:   Procedure Laterality Date   • CYSTOSCOPY URETEROSCOPY LASER LITHOTRIPSY Right 3/7/2017    Procedure: CYSTOSCOPY RT URETEROSCOPY LASER LITHOTRIPSY W/ STENT, rerograde pyelogram ;  Surgeon: Rashid Malloy MD;  Location: [facility];  Service:    • FOOT SURGERY Left     tendon repair   • HYSTERECTOMY     • OOPHORECTOMY     • SHOULDER SURGERY Right 2009   • TUBAL ABDOMINAL LIGATION     • WISDOM TOOTH EXTRACTION         Allergies:  Iodine and Shellfish allergy    Home medications:  Medications Prior to Admission   Medication Sig Dispense Refill Last Dose   • chlorthalidone (HYGROTON) 25 MG tablet Take 1 tablet by mouth Daily. 90 tablet 3 9/3/2020 at Unknown time   • spironolactone (ALDACTONE) 100 MG tablet TAKE 1 TABLET BY MOUTH DAILY 90 tablet 0 9/3/2020 at Unknown time   • VENTOLIN  (90 Base) MCG/ACT inhaler INHALE 2 PUFFS BY MOUTH FOUR TIMES DAILY AS NEEDED FOR WHEEZING OR SHORTNESS OF BREATH 18 g 0 9/3/2020 at Unknown time   • Blood Glucose Monitoring Suppl (ACCU-CHEK MANNY) device Used to check blood sugars twice daily as needed for type 2 diabetes 1 each 0 Past Month at Unknown time   •  furosemide (LASIX) 20 MG tablet TAKE 1 TABLET BY MOUTH DAILY AS NEEDED (Patient taking differently: 20 mg.) 90 tablet 0 Past Month at Unknown time   • glucose blood (Accu-Chek Camila) test strip Used to check blood sugars twice daily as needed for type 2 diabetes 100 each 12 Past Month at Unknown time   • ibuprofen (MOTRIN IB) 200 MG tablet Take 2 tablets by mouth Every 6 (Six) Hours As Needed.   Past Week at Unknown time   • Lancets (ACCU-CHEK MULTICLIX) lancets Used to check blood sugars twice daily as needed for type 2 diabetes 100 each 12 Past Month at Unknown time   • metFORMIN ER (GLUCOPHAGE-XR) 750 MG 24 hr tablet Take 2 tablets by mouth Daily With Dinner. 60 tablet 6 9/1/2020 at 1800        [facility] medications:    chlorthalidone 25 mg Oral Daily   insulin lispro 0-9 Units Subcutaneous TID AC   lidocaine 1 patch Transdermal Q24H   sodium chloride 10 mL Intravenous Q12H   spironolactone 100 mg Oral Daily       hold 1 each     •  acetaminophen **OR** acetaminophen **OR** acetaminophen  •  albuterol sulfate HFA  •  bisacodyl  •  bisacodyl  •  dextrose  •  dextrose  •  glucagon (human recombinant)  •  hold  •  magnesium hydroxide  •  melatonin  •  ondansetron **OR** ondansetron  •  potassium chloride  •  potassium chloride  •  sodium chloride  •  sodium chloride    Family history:  Family History   Problem Relation Age of Onset   • Heart disease Paternal Aunt    • Diabetes Paternal Aunt    • Diabetes Maternal Grandmother    • Neuropathy Maternal Grandmother    • Thyroid disease Maternal Grandmother    • Heart disease Maternal Grandfather    • Cancer Maternal Grandfather    • Heart disease Paternal Grandfather    • Heart disease Paternal Uncle    • Arthritis Mother    • Migraines Mother    • Seizures Father    • Breast cancer Neg Hx    • Ovarian cancer Neg Hx    • Colon cancer Neg Hx    • Deep vein thrombosis Neg Hx    • Pulmonary embolism Neg Hx        Social history:  Social History     Tobacco Use   • Smoking  status: Former Smoker   • Smokeless tobacco: Never Used   Substance Use Topics   • Alcohol use: Not Currently     Comment: occ'l   • Drug use: No       Review of systems:    All system has been checked and nothing pertinent to her current condition.    Physical Exam:    Vitals Ranges:   Temp:  [97.8 °F (36.6 °C)-98.6 °F (37 °C)] 97.8 °F (36.6 °C)  Heart Rate:  [] 95  Resp:  [16-18] 18  BP: (102-128)/(53-76) 128/73      General Appearance: Well developed, well nourished, well groomed, not in pain or distress.  Neck and Spine:Normal range of motion.  Normal alignment. No mass or tenderness.   Cardiovascular: Normal heart sound  Respiratory: Bilateral air entry  Abdomen: Soft nontender    Neuro exam:  Mental Status:  Alert, oriented, fluent and cooperative. He has good grasp of both recent and remote memory.   CN : Grossly intact from 2-12.  Visual field: Mole on both eyes.  Pupils: Normal in size equal, round and reactive to light.  Motor: Patient had generalized weakness.  Her handgrip weakness has been improved with encouragement to reach  age-appropriate power and strength.  Her deep tendon reflexes were intact allover in fact it is Brisky on her knees.  Plantar reflexes were equivocal on both sides.    Sensory: Felt all sensory modalities in terms of touch, temperature and vibration sensation all over her body including her big toes and fingers.  Gait:  Managed to stand up slowly, with help.  Patient was walking on her heels and the big toe as if she is avoiding a wet ground.  She was rocking forward and backward but maintained herself in upright position and walk backward to her bed.                  Coordination: Normal on both upper and lower extremity.  NIHSS: Not applicable          Results review:   I reviewed the patient's new clinical results.    Data review:  Lab Results (last 24 hours)     Procedure Component Value Units Date/Time    POC Glucose Once [[id number]]  (Abnormal) Collected:  09/04/20  0624    Specimen:  Blood Updated:  09/04/20 0627     Glucose 133 mg/dL     POC Glucose Once [[id number]]  (Abnormal) Collected:  09/03/20 2051    Specimen:  Blood Updated:  09/03/20 2052     Glucose 157 mg/dL     POC Glucose Once [[id number]]  (Normal) Collected:  09/03/20 1755    Specimen:  Blood Updated:  09/03/20 1757     Glucose 120 mg/dL     COVID PRE-OP / PRE-PROCEDURE SCREENING ORDER (NO ISOLATION) - Swab, Nasopharynx [[id number]] Collected:  09/03/20 1628    Specimen:  Swab from Nasopharynx Updated:  09/03/20 1642    Narrative:       The following orders were created for panel order COVID PRE-OP / PRE-PROCEDURE SCREENING ORDER (NO ISOLATION) - Swab, Nasopharynx.  Procedure                               Abnormality         Status                     ---------                               -----------         ------                     COVID-19,BIOTAP, NP/OP S...[[id number]]                      In process                   Please view results for these tests on the individual orders.    COVID-19,BIOTAP, NP/OP SWAB IN TRANSPORT MEDIA OR SALINE 24-36 HR TAT - Swab, Nasopharynx [[id number]] Collected:  09/03/20 1628    Specimen:  Swab from Nasopharynx Updated:  09/03/20 1642           Imaging:  Imaging Results (Last 24 Hours)     ** No results found for the last 24 hours. **             Assessment :     Given the above history, physical examination and lab test; the  patient has no symptoms/signs of GBS.  She is complaining of sharp pain in her thigh and buttock rather than numbness in her feet at this point.  She felt all sensory modalities on her toes including vibration.  Her deep tendon reflexes were intact, in fact Brisky on her knees.  Her CSF was normal.  Altogether indicate nonorganic/functional symptoms.  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  Give the patient another day with reassurance, physical therapy.  - Patient may benefit from starting her on Lyrica for fibromyalgia symptoms.  - She may benefit from MRI of the brain for further evaluation of her meningioma as well as ruling out any pathology which could not be detected by clinical examination.    I will follow-up on the case and keep you updated.  Thank you for the consult please do not hesitate to call me if you have any further question or concern.      Justin Cancino MD  09/04/20  09:26

## 2020-09-04 NOTE — H&P
[facility]    History and Physical    Patient Name: Angie Patel  :  1975  MRN:  [id number]  Date of Admission: 9/3/2020    Subjective     Patient is a 45 y.o. female presents with chief complaint of acute headache pain.  Onset of symptoms was abrupt starting 2 days ago.  Symptoms are associated/aggravated by sitting or standing. Symptoms improve with lying down    The following portions of the patients history were reviewed and updated as appropriate: current medications, allergies, past medical history, past surgical history, past family history, past social history and problem list     She had a recent dural puncture for CSF work-up of neurologic changes and is developed classic dural puncture headache symptoms with positional headache.                Objective     Past Medical History:   Past Medical History:   Diagnosis Date   • Anxiety    • Asthma    • Cardiomyopathy (CMS/HCC)     with pregnancy   • Diabetes mellitus (CMS/HCC)    • Fibromyalgia    • Fibromyalgia, primary    • Headache, post-lumbar puncture 2020   • Headache, tension-type    • Hypertension    • Insomnia    • Kidney calculi    • Memory loss    • Meningioma (CMS/HCC)     benign brain    • Migraine     ocular   • KACI (obstructive sleep apnea)    • Palpitations    • Polycystic ovaries    • Seizures (CMS/HCC)      Past Surgical History:   Past Surgical History:   Procedure Laterality Date   • CYSTOSCOPY URETEROSCOPY LASER LITHOTRIPSY Right 3/7/2017    Procedure: CYSTOSCOPY RT URETEROSCOPY LASER LITHOTRIPSY W/ STENT, rerograde pyelogram ;  Surgeon: Rashid Malloy MD;  Location: [facility];  Service:    • FOOT SURGERY Left     tendon repair   • HYSTERECTOMY     • OOPHORECTOMY     • SHOULDER SURGERY Right    • TUBAL ABDOMINAL LIGATION     • WISDOM TOOTH EXTRACTION       Family History:   Family History   Problem Relation Age of Onset   • Heart disease Paternal Aunt    • Diabetes Paternal Aunt    • Diabetes Maternal  "Grandmother    • Neuropathy Maternal Grandmother    • Thyroid disease Maternal Grandmother    • Heart disease Maternal Grandfather    • Cancer Maternal Grandfather    • Heart disease Paternal Grandfather    • Heart disease Paternal Uncle    • Arthritis Mother    • Migraines Mother    • Seizures Father    • Breast cancer Neg Hx    • Ovarian cancer Neg Hx    • Colon cancer Neg Hx    • Deep vein thrombosis Neg Hx    • Pulmonary embolism Neg Hx      Social History:   Social History     Tobacco Use   • Smoking status: Former Smoker   • Smokeless tobacco: Never Used   Substance Use Topics   • Alcohol use: Not Currently     Comment: occ'l   • Drug use: No       Vital Signs Range for the last 24 hours  Temperature: Temp:  [36.6 °C (97.8 °F)-37 °C (98.6 °F)] 36.8 °C (98.2 °F)   Temp Source: Temp src: Infrared   BP: BP: (102-128)/(53-76) 121/56   Pulse: Heart Rate:  [] 93   Respirations: Resp:  [14-18] 14   SPO2: SpO2:  [94 %-100 %] 97 %   O2 Amount (l/min):     O2 Devices Device (Oxygen Therapy): room air   Weight: Weight:  [120 kg (263 lb 11.2 oz)-123 kg (271 lb 2.7 oz)] 120 kg (264 lb 8.8 oz)     Flowsheet Rows      First Filed Value   Admission Height  162.6 cm (64.02\") Documented at 09/03/2020 2100   Admission Weight  123 kg (271 lb 2.7 oz) Documented at 09/03/2020 2100          --------------------------------------------------------------------------------    Current Facility-Administered Medications   Medication Dose Route Frequency Provider Last Rate Last Dose   • acetaminophen (TYLENOL) tablet 650 mg  650 mg Oral Q4H PRN Jose Antonio Ugalde DO   650 mg at 09/04/20 0817    Or   • acetaminophen (TYLENOL) 160 MG/5ML solution 650 mg  650 mg Oral Q4H PRN Jose Antonio Ugalde DO        Or   • acetaminophen (TYLENOL) suppository 650 mg  650 mg Rectal Q4H PRN Jose Antonio Ugalde, DO       • albuterol (PROVENTIL) nebulizer solution 0.083% 2.5 mg/3mL  2.5 mg Nebulization Q6H PRN Prasanna So MD       • bisacodyl (DULCOLAX) EC tablet " 5 mg  5 mg Oral Daily PRN Kendleton, Jose Antonio ELLSWORTH, DO       • bisacodyl (DULCOLAX) suppository 10 mg  10 mg Rectal Daily PRN Aftab, Jose Antonio ELLSWORTH, DO       • chlorthalidone (HYGROTON) tablet 25 mg  25 mg Oral Daily Jose Antonio Ugalde, DO   25 mg at 09/04/20 0817   • dextrose (D50W) 25 g/ 50mL Intravenous Solution 25 g  25 g Intravenous Q15 Min PRN Kendleton, Jsoe Antonio ELLSWORTH, DO       • dextrose (GLUTOSE) oral gel 15 g  15 g Oral Q15 Min PRN Kendleton, Jose Antonio M, DO       • fentaNYL citrate (PF) (SUBLIMAZE) injection 50 mcg  50 mcg Intravenous PRN Render, Russ Cohen MD       • glucagon (human recombinant) (GLUCAGEN DIAGNOSTIC) injection 1 mg  1 mg Subcutaneous Q15 Min PRN Aftab, Jose Antonio ELLSWORTH, DO       • Hold medication  1 each Does not apply Continuous PRN Aftab, Jose Antonio ELLSWORTH, DO       • HYDROcodone-acetaminophen (NORCO) 5-325 MG per tablet 1 tablet  1 tablet Oral Q6H PRN Prasanna So MD       • insulin lispro (humaLOG) injection 0-9 Units  0-9 Units Subcutaneous TID AC Jose Antonio Ugalde, DO   2 Units at 09/04/20 1207   • iopamidol (ISOVUE-M 200) injection 41%  12 mL Epidural Once in imaging Render, Russ Cohen MD       • lidocaine (LIDODERM) 5 % 1 patch  1 patch Transdermal Q24H Stingl, Nicolette Avila MD   1 patch at 09/04/20 0821   • lidocaine (XYLOCAINE) 1 % injection 1 mL  1 mL Intradermal Once PRN Render, Russ Cohen MD       • LORazepam (ATIVAN) tablet 1 mg  1 mg Oral Once Prasanna So MD       • magnesium hydroxide (MILK OF MAGNESIA) suspension 2400 mg/10mL 10 mL  10 mL Oral Daily PRN Aftab, Jose Antonio ELLSWORTH, DO       • melatonin tablet 5 mg  5 mg Oral Nightly PRN Aftab, Jose Antonio ELLSWORTH, DO       • midazolam (VERSED) injection 1 mg  1 mg Intravenous PRN Render, Russ Cohen MD       • ondansetron (ZOFRAN) tablet 4 mg  4 mg Oral Q6H PRN Aftab, Jose Antonio ELLSWORTH, DO        Or   • ondansetron (ZOFRAN) injection 4 mg  4 mg Intravenous Q6H PRN Jose Antonio Ugalde, DO       • potassium chloride (KLOR-CON) packet 40 mEq  40 mEq Oral PRN Prasanna So MD       • potassium chloride (MICRO-K) CR capsule 40  mEq  40 mEq Oral PRN Prasanna So MD   40 mEq at 09/04/20 1118   • sodium chloride 0.9 % flush 1-10 mL  1-10 mL Intravenous PRN Russ Mishra MD       • sodium chloride 0.9 % flush 10 mL  10 mL Intravenous PRN Jose Antonio Ugalde DO       • sodium chloride 0.9 % flush 10 mL  10 mL Intravenous Q12H Jose Antonio Ugalde DO   10 mL at 09/04/20 0822   • sodium chloride 0.9 % flush 10 mL  10 mL Intravenous PRN Jose Antonio Ugalde DO       • sodium chloride 0.9 % flush 3 mL  3 mL Intravenous Q12H Prasanna So MD       • sodium chloride 0.9 % flush 3-10 mL  3-10 mL Intravenous PRN Prasanna So MD       • spironolactone (ALDACTONE) tablet 100 mg  100 mg Oral Daily Jose Antonio Ugalde DO           --------------------------------------------------------------------------------  Assessment/Plan      Anesthesia Evaluation     history of anesthetic complications:               Airway   Mallampati: II  TM distance: >3 FB  Neck ROM: full  No difficulty expected  Dental - normal exam     Pulmonary - normal exam   (+) asthma,sleep apnea,   Cardiovascular - normal exam    (+) hypertension, hyperlipidemia,       Neuro/Psych  (+) seizures, headaches,       PE Comment: Positional headache  GI/Hepatic/Renal/Endo    (+) obesity,   renal disease stones, diabetes mellitus,     Musculoskeletal         PE comment: She has diffuse weakness especially lower extremities and is almost unable to ambulate due to her acute onset of neurologic symptoms which are currently being worked up  Abdominal  - normal exam   Substance History      OB/GYN          Other                   Diagnosis and Plan    Treatment Plan  ASA 3      Procedures: Epidural blood patch, With fluoroscopy,               Diagnosis     * Post-dural puncture headache [G97.1]

## 2020-09-04 NOTE — PROGRESS NOTES
"   LOS: 1 day   Patient Care Team:  Kya Kendrick PA-C as PCP - General  Kya Kendrick PA-C as PCP - Family Medicine  Humberto, HAN Moses as Nurse Practitioner (Family Medicine)    Chief Complaint: BLE weakness    Subjective     Feels the same as yesterday. Still c/o weakness in legs. Also c/o headache if she sits up--has to lay flat in bed. Started after LP at [facility] on 9/2. Denies any SOA.      Subjective:  Symptoms:  Stable.  She reports weakness and headache.  No shortness of breath, malaise, cough, chest pain, chest pressure, anorexia, diarrhea or anxiety.    Diet:  Adequate intake.  No nausea or vomiting.    Activity level: Impaired due to weakness.    Pain:  She complains of pain that is mild.  She reports pain is unchanged.  Pain is well controlled and requiring pain medication.        History taken from: patient chart family RN    Objective     Vital Signs  Temp:  [97.8 °F (36.6 °C)-98.6 °F (37 °C)] 97.8 °F (36.6 °C)  Heart Rate:  [] 95  Resp:  [16-18] 18  BP: (102-128)/(53-76) 128/73    Objective:  General Appearance:  Comfortable and in no acute distress.    Vital signs: (most recent): Blood pressure 128/73, pulse 95, temperature 97.8 °F (36.6 °C), temperature source Oral, resp. rate 18, height 162.6 cm (64.02\"), weight 120 kg (263 lb 11.2 oz), SpO2 97 %, not currently breastfeeding.  Vital signs are normal.  No fever.    Output: Producing urine.    HEENT: Normal HEENT exam.    Lungs:  Normal effort and normal respiratory rate.  Breath sounds clear to auscultation.  She is not in respiratory distress.    Heart: Normal rate.  Regular rhythm.    Abdomen: Abdomen is soft.  (Morbidly obese).  Bowel sounds are normal.   There is no abdominal tenderness.     Extremities: There is no dependent edema.    Pulses: Distal pulses are intact.    Neurological: Patient is alert and oriented to person, place and time.  Patient has abnormal reflexes (no plantar reflexes present).  Patient has " normal muscle tone.  (Weak in BLEs).    Pupils:  Pupils are equal, round, and reactive to light.    Skin:  Warm and dry.  No rash.             Results Review:     I reviewed the patient's new clinical results.  I reviewed the patient's other test results and agree with the interpretation  I personally viewed and interpreted the patient's EKG/Telemetry data  Discussed with pt, , RN, CCP, Dr. Armijo, and Dr. Boyer    Results from last 7 days   Lab Units 09/04/20  0948 09/03/20  0442 09/02/20  0902   WBC 10*3/mm3 9.58 10.49 8.19   HEMOGLOBIN g/dL 13.2 12.6 13.5   PLATELETS 10*3/mm3 281 251 147     Results from last 7 days   Lab Units 09/04/20  0948 09/03/20 0442 09/02/20  0925   SODIUM mmol/L 134* 138 140   POTASSIUM mmol/L 3.3* 3.3* 3.7   CHLORIDE mmol/L 95* 97* 101   CO2 mmol/L 25.0 25.8 24.4   BUN mg/dL 14 15 12   CREATININE mg/dL 0.85 0.74 0.66   CALCIUM mg/dL 9.8 9.3 9.3   MAGNESIUM mg/dL 2.0  --  1.6   PHOSPHORUS mg/dL  --   --  3.3   Estimated Creatinine Clearance: 107.7 mL/min (by C-G formula based on SCr of 0.85 mg/dL).    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 Armijo though might also benefit from NCV study here  -Neuro here recommends MRI brain  -have ordered both MRI brain and C-spine here  -no evidence of resp compromise  -PT ordered  -pt has pretty severe fibromyalgia so could also consider just Lyrica and PT and reassurance    Post LP HA  -anesthesia to see re:  blood patch    PCOS/hyperglycemia  -covering with SSI here  -can restart Metformin at dc    Hypokalemia  -replace orally, Mg++ fine    Hyponatremia  -monitor    Full code  SCDs for DVT ppx  Further orders to follow as suggested by evolving hospital course).       Prasanna So MD  09/04/20  12:56    Time: 30min

## 2020-09-04 NOTE — PAYOR COMM NOTE
"Angie Marte JODEE (45 y.o. Female)   ATTN: NURSE REVIEWER  RE:  DISCHARGE NOTIFICATION  REF#[id number]  PLS REPLY TO DEDE BERMUDEZ [phone] FAX# [phone]      Date of Birth Social Security Number Address Home Phone MRN    [id number]  [id number]  [place] [address] [phone] [id number]    Mormon Marital Status          Religion        Admission Date Admission Type Admitting Provider Attending Provider Department, Room/Bed    9/2/20 Emergency Jose Antonio Ugalde DO  [facility] MED SURG, 1410/1    Discharge Date Discharge Disposition Discharge Destination        9/3/2020 [facility] ([facility])              Attending Provider:  (none)   Allergies:  Iodine, Shellfish Allergy    Isolation:  None   Infection:  COVID Screen (preop/placement) (09/03/20)   Code Status:  CPR    Ht:  162.6 cm (64\")   Wt:  123 kg (271 lb 3.2 oz)    Admission Cmt:  None   Principal Problem:  Weakness of both lower extremities [R29.898]                 Active Insurance as of 9/2/2020     Primary Coverage     Payor Plan Insurance Group Employer/Plan Group    [employer] BLUE CROSS [employer] BLUE CROSS BLUE SHIELD PPO [id number]     Payor Plan Address Payor Plan Phone Number Payor Plan Fax Number Effective Dates    [address] [phone]  1/1/2018 - None Entered    [place] [address]       Subscriber Name Subscriber Birth Date Member ID       JENS MARTE [birth year]1957 [id number]                 Emergency Contacts      (Rel.) Home Phone Work Phone Mobile Phone    Jens Marte (Spouse) [phone] -- [phone]               Discharge Summary      Jose Antonio Ugalde DO at 09/03/20 1300            Date of Admission: 9/2/2020    Date of Discharge:  9/3/2020    Length of stay:  LOS: 0 days     Presenting Problem:   Ascending paralysis (CMS/HCC) [G61.0]  Ascending paralysis (CMS/HCC) [G61.0]      Principal and Active Diagnosis During Hospital Stay:     Active Hospital " Problems    Diagnosis  POA   • **Weakness of both lower extremities [R29.898]  Unknown   • 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 female presented with of an acute a sending paralysis.  She was admitted here MRI of the lumbar spine was negative.  She was seen by neurology had an LP.  OP was not overly indicative of GBS, however given the rapid progression of the patient's symptoms neurology felt the patient would need to be transferred to a higher level facility with the capability of doing plasmapheresis if indicated.  Was discussed and she was accepted at [facility] and she will be transferred there to be followed by neurology and will have further work-up while there.      Procedures Performed:as noted above         Consults:   Consults     Date and Time Order Name Status Description    9/2/2020 1158 Inpatient Neurology Consult General Completed           Pertinent Test Results:     Lab Results (last 72 hours)     Procedure Component Value Units Date/Time    POC Glucose Once [[id number]]  (Abnormal) Collected:  09/03/20 1242    Specimen:  Blood Updated:  09/03/20 1248     Glucose 131 mg/dL     POC Glucose Once [[id number]]  (Normal) Collected:  09/03/20 0741    Specimen:  Blood Updated:  09/03/20 0752     Glucose 123 mg/dL     Comprehensive Metabolic Panel [[id number]]   (Abnormal) Collected:  09/03/20 0442    Specimen:  Blood Updated:  09/03/20 0531     Glucose 150 mg/dL      BUN 15 mg/dL      Creatinine 0.74 mg/dL      Sodium 138 mmol/L      Potassium 3.3 mmol/L      Chloride 97 mmol/L      CO2 25.8 mmol/L      Calcium 9.3 mg/dL      Total Protein 7.1 g/dL      Albumin 3.80 g/dL      ALT (SGPT) 23 U/L      AST (SGOT) 15 U/L      Alkaline Phosphatase 42 U/L      Total Bilirubin 0.5 mg/dL      eGFR Non African Amer 85 mL/min/1.73      Globulin 3.3 gm/dL      A/G Ratio 1.2 g/dL      BUN/Creatinine Ratio 20.3     Anion Gap 15.2 mmol/L     Narrative:       GFR Normal >60  Chronic Kidney Disease <60  Kidney Failure <15      CBC Auto Differential [[id number]]  (Abnormal) Collected:  09/03/20 0442    Specimen:  Blood Updated:  09/03/20 0517     WBC 10.49 10*3/mm3      RBC 4.23 10*6/mm3      Hemoglobin 12.6 g/dL      Hematocrit 38.4 %      MCV 90.8 fL      MCH 29.8 pg      MCHC 32.8 g/dL      RDW 12.4 %      RDW-SD 41.1 fl      MPV 11.9 fL      Platelets 251 10*3/mm3      Neutrophil % 67.3 %      Lymphocyte % 23.4 %      Monocyte % 6.5 %      Eosinophil % 1.4 %      Basophil % 0.4 %      Immature Grans % 1.0 %      Neutrophils, Absolute 7.07 10*3/mm3      Lymphocytes, Absolute 2.45 10*3/mm3      Monocytes, Absolute 0.68 10*3/mm3      Eosinophils, Absolute 0.15 10*3/mm3      Basophils, Absolute 0.04 10*3/mm3      Immature Grans, Absolute 0.10 10*3/mm3      nRBC 0.0 /100 WBC     Sedimentation Rate [[id number]]  (Abnormal) Collected:  09/03/20 0442    Specimen:  Blood Updated:  09/03/20 0514     Sed Rate 37 mm/hr     POC Glucose Once [[id number]]  (Abnormal) Collected:  09/02/20 2004    Specimen:  Blood Updated:  09/02/20 2014     Glucose 141 mg/dL     POC Glucose Once [[id number]]  (Abnormal) Collected:  09/02/20 1644    Specimen:  Blood Updated:  09/02/20 1657     Glucose 137 mg/dL     Cell Count With Differential, CSF Use CSF Tube: 1 [[id number]] Collected:  09/02/20 1515    Specimen:   Cerebrospinal Fluid from Lumbar Puncture Updated:  09/02/20 1622    Narrative:       The following orders were created for panel order Cell Count With Differential, CSF Use CSF Tube: 1.  Procedure                               Abnormality         Status                     ---------                               -----------         ------                     Cell Count, CSF - Cerebr...[[id number]]                      Final result                 Please view results for these tests on the individual orders.    Cell Count, CSF - Cerebrospinal Fluid, Lumbar Puncture [[id number]] Collected:  09/02/20 1515    Specimen:  Cerebrospinal Fluid from Lumbar Puncture Updated:  09/02/20 1622     WBC, CSF 3 /mm3      RBC, CSF 0 /mm3      Color, CSF Colorless     Appearance, CSF Clear     Tube Number, CSF 1    Narrative:       Differential not indicated.    Glucose, CSF - Cerebrospinal Fluid, Lumbar Puncture [[id number]]  (Abnormal) Collected:  09/02/20 1515    Specimen:  Cerebrospinal Fluid from Lumbar Puncture Updated:  09/02/20 1620     Glucose, CSF 76 mg/dL     Protein, CSF - Cerebrospinal Fluid, Lumbar Puncture [[id number]]  (Normal) Collected:  09/02/20 1515    Specimen:  Cerebrospinal Fluid from Lumbar Puncture Updated:  09/02/20 1620     Protein, Total (CSF) 25.0 mg/dL     Cell Count With Differential, CSF [[id number]] Collected:  09/02/20 1515    Specimen:  Cerebrospinal Fluid from Lumbar Puncture Updated:  09/02/20 1616    Narrative:       The following orders were created for panel order Cell Count With Differential, CSF.  Procedure                               Abnormality         Status                     ---------                               -----------         ------                     Cell Count, CSF - Cerebr...[[id number]]                      Final result                 Please view results for these tests on the individual orders.    Cell Count, CSF - Cerebrospinal Fluid, Lumbar Puncture [[id number]] Collected:   09/02/20 1515    Specimen:  Cerebrospinal Fluid from Lumbar Puncture Updated:  09/02/20 1616     WBC, CSF 2 /mm3      RBC, CSF 0 /mm3      Color, CSF Colorless     Appearance, CSF Clear     Tube Number, CSF 4    Narrative:       Differential not indicated.    MS Profile & MBP, CSF - Cerebrospinal Fluid, Lumbar Puncture [[id number]] Collected:  09/02/20 1515    Specimen:  Cerebrospinal Fluid from Lumbar Puncture Updated:  09/02/20 1600    Lyme, Western Blot, CSF - Cerebrospinal Fluid, Lumbar Puncture [[id number]] Collected:  09/02/20 1515    Specimen:  Cerebrospinal Fluid from Lumbar Puncture Updated:  09/02/20 1600    West Nile Virus, CSF [[id number]] Collected:  09/02/20 1515    Specimen:  Cerebrospinal Fluid from Lumbar Puncture Updated:  09/02/20 1600    Protime-INR [[id number]]  (Normal) Collected:  09/02/20 0925    Specimen:  Blood Updated:  09/02/20 1408     Protime 12.7 Seconds      INR 0.98    Narrative:       Therapeutic Ranges for INR: 2.0-3.0 (PT 20-30)                              2.5-3.5 (PT 25-34)    aPTT [[id number]]  (Normal) Collected:  09/02/20 0925    Specimen:  Blood Updated:  09/02/20 1408     PTT 31.2 seconds     Narrative:       PTT = The equivalent PTT values for the therapeutic range of heparin levels at 0.1 to 0.7 U/ml are 53 to 110 seconds.      CK [[id number]]  (Normal) Collected:  09/02/20 0925    Specimen:  Blood Updated:  09/02/20 1258     Creatine Kinase 69 U/L     Phosphorus [[id number]]  (Normal) Collected:  09/02/20 0925    Specimen:  Blood Updated:  09/02/20 1258     Phosphorus 3.3 mg/dL     Magnesium [[id number]]  (Normal) Collected:  09/02/20 0925    Specimen:  Blood Updated:  09/02/20 1258     Magnesium 1.6 mg/dL     Comprehensive Metabolic Panel [[id number]]  (Abnormal) Collected:  09/02/20 0925    Specimen:  Blood Updated:  09/02/20 0946     Glucose 141 mg/dL      BUN 12 mg/dL      Creatinine 0.66 mg/dL      Sodium 140 mmol/L      Potassium 3.7 mmol/L      Chloride 101 mmol/L      CO2  24.4 mmol/L      Calcium 9.3 mg/dL      Total Protein 7.4 g/dL      Albumin 4.10 g/dL      ALT (SGPT) 25 U/L      AST (SGOT) 17 U/L      Alkaline Phosphatase 39 U/L      Total Bilirubin 0.2 mg/dL      eGFR Non African Amer 97 mL/min/1.73      Globulin 3.3 gm/dL      A/G Ratio 1.2 g/dL      BUN/Creatinine Ratio 18.2     Anion Gap 14.6 mmol/L     Narrative:       GFR Normal >60  Chronic Kidney Disease <60  Kidney Failure <15      CBC & Differential [[id number]] Collected:  09/02/20 0902    Specimen:  Blood Updated:  09/02/20 0910    Narrative:       The following orders were created for panel order CBC & Differential.  Procedure                               Abnormality         Status                     ---------                               -----------         ------                     CBC Auto Differential[[id number]]        Normal              Final result                 Please view results for these tests on the individual orders.    CBC Auto Differential [[id number]]  (Normal) Collected:  09/02/20 0902    Specimen:  Blood Updated:  09/02/20 0910     WBC 8.19 10*3/mm3      RBC 4.62 10*6/mm3      Hemoglobin 13.5 g/dL      Hematocrit 41.9 %      MCV 90.7 fL      MCH 29.2 pg      MCHC 32.2 g/dL      RDW 12.5 %      RDW-SD 41.7 fl      MPV 11.9 fL      Platelets 147 10*3/mm3      Neutrophil % 67.0 %      Lymphocyte % 24.2 %      Monocyte % 6.6 %      Eosinophil % 1.3 %      Basophil % 0.4 %      Immature Grans % 0.5 %      Neutrophils, Absolute 5.49 10*3/mm3      Lymphocytes, Absolute 1.98 10*3/mm3      Monocytes, Absolute 0.54 10*3/mm3      Eosinophils, Absolute 0.11 10*3/mm3      Basophils, Absolute 0.03 10*3/mm3      Immature Grans, Absolute 0.04 10*3/mm3                Microbiology Results (last 10 days)     ** No results found for the last 240 hours. **            Results for orders placed during the hospital encounter of 03/04/20   Adult Transthoracic Echo Complete W/ Cont if Necessary Per Protocol    Narrative  · Left ventricular systolic function is normal.  · Calculated EF = 62%.  · Essentially normal echo          Imaging Results (All)     Procedure Component Value Units Date/Time    SCANNED - IMAGING [[id number]] Resulted:  09/02/20      Updated:  09/03/20 1118    IR Lumbar Puncture Diag/Thera [[id number]] Collected:  09/02/20 1554     Updated:  09/02/20 1601    Narrative:       DIAGNOSTIC LUMBAR PUNCTURE, 09/02/2020     HISTORY:   45-year-old female admitted to the hospital today with 4 day history of  ascending paralysis. Unrevealing lumbar spine MRI. Diagnostic lumbar  puncture under fluoroscopy as requested.     CONSENT:   The procedure, risks and alternatives were discussed in detail with the  patient, emphasizing the risks of persistent spinal headache, CSF leak,  bleeding, infection, and neurologic injury. I discussed post procedure  activity restrictions. Questions were entertained, and written informed  consent was obtained. Timeout was observed in the procedure room for  patient identification and procedure site verification, and the  procedure site was marked by me.  *  Fluoroscopy time, 1.0 minutes.  *  2 images were recorded.     PROCEDURE:   Using sterile technique, 1% lidocaine local anesthetic and fluoroscopic  guidance, a 22-gauge spinal needle was placed into the lumbar thecal sac  at the L3 level below the L2 lamina without difficulty. A total of 11 cc  clear cerebral spinal fluid was collected in 4 separate vials and sent  to the laboratory for requested studies. No apparent immediate  complication.       Impression:       Technically successful diagnostic lumbar puncture using fluoroscopic  guidance.     This report was finalized on 9/2/2020 3:59 PM by Dr. Sony Casillas MD.       MRI Lumbar Spine Without Contrast [[id number]] Collected:  09/02/20 1016     Updated:  09/02/20 1018    Narrative:       MRI Spine Lumbar WO    HISTORY:  Lower back pain radiating into bilateral lower extremities with  paresthesias, worsening the last 5 days    TECHNIQUE:  Multiplanar multisequence imaging of the lumbar spine acquired without IV contrast utilizing a standard protocol.    COMPARISON: None    FINDINGS:    For the purposes of this dictation there appears to be 5 lumbar-type vertebra with the last fully formed disc space representing L5-S1.    Lumbar alignment is anatomic. Vertebral body heights are maintained. No focal aggressive marrow lesion is identified. Disc heights are preserved. The conus terminates at an expected level and the visualized distal cord signal is within normal limits.    Level by level:    L1-2: No significant thecal sac or foraminal narrowing.    L2-3: No significant thecal sac or foraminal narrowing.    L3-4: No significant thecal sac or foraminal narrowing    L4-5: Moderate facet degenerative change. Minimal disc bulge, slightly eccentric to the left but no significant thecal sac or foraminal narrowing is present.    L5-S1: Moderate right greater than left facet degenerative change without significant thecal sac or foraminal narrowing.          Impression:       1.  Degenerative changes in the lower lumbar spine, predominantly due to facet degeneration without high-grade spinal canal or foraminal narrowing.    Signer Name: MARLEN CALDERON MD   Signed: 9/2/2020 10:16 AM   Workstation Name: [id number]    [facility]            Condition on Discharge:  Stable medically     Vital Signs  Temp:  [98.2 °F (36.8 °C)-99.1 °F (37.3 °C)] 98.2 °F (36.8 °C)  Heart Rate:  [] 94  Resp:  [16-18] 16  BP: (104-137)/(68-84) 116/73    Physical Exam:  Physical Exam   Constitutional: She is oriented to person, place, and time. No distress.   Pulmonary/Chest: Effort normal. No respiratory distress.   Abdominal: Soft. She exhibits no distension.   Musculoskeletal: She exhibits no tenderness.   Neurological: She is alert and oriented to person, place, and time.   Reflexes still at least 2+  proximally and distally in the upper extremities excellent knee jerks right ankle jerk 2+   Vitals reviewed.      Discharge Disposition  Short Term Hospital ([facility])    Discharge Medications     Discharge Medications      Continue These Medications      Instructions Start Date   Accu-Chek Camila device   Used to check blood sugars twice daily as needed for type 2 diabetes      accu-chek multiclix lancets   Used to check blood sugars twice daily as needed for type 2 diabetes         ASK your doctor about these medications      Instructions Start Date   chlorthalidone 25 MG tablet  Commonly known as:  HYGROTON   25 mg, Oral, Daily      furosemide 20 MG tablet  Commonly known as:  LASIX   TAKE 1 TABLET BY MOUTH DAILY AS NEEDED      glucose blood test strip  Commonly known as:  Accu-Chek Camila   Used to check blood sugars twice daily as needed for type 2 diabetes      metFORMIN  MG 24 hr tablet  Commonly known as:  GLUCOPHAGE-XR   1,500 mg, Oral, Daily With Dinner      Motrin  MG tablet  Generic drug:  ibuprofen   2 tablets, Oral, Every 6 Hours PRN      spironolactone 100 MG tablet  Commonly known as:  ALDACTONE   100 mg, Oral, Daily      Ventolin  (90 Base) MCG/ACT inhaler  Generic drug:  albuterol sulfate HFA   INHALE 2 PUFFS BY MOUTH FOUR TIMES DAILY AS NEEDED FOR WHEEZING OR SHORTNESS OF BREATH             Discharge Diet:   Diet Instructions     Drink Plenty of Fluid Over Next 24 Hours            Activity at Discharge:   Activity Instructions     Elevate Head of Bed 30 Degrees for 24 Hours      No Strenuous Activity for 24 Hours            Follow-up Appointments  Future Appointments   Date Time Provider Department Center   9/23/2020 12:00 PM Anatoly Mckinney MD [facility] None   10/13/2020  8:20 AM [facility] None   10/21/2020  3:15 PM Kya Kendrick PA-C [facility] None         Test Results Pending at Discharge   Order Current Status    Lyme, Western  Blot, CSF - Cerebrospinal Fluid, Lumbar Puncture In process    MS Profile & MBP, CSF - Cerebrospinal Fluid, Lumbar Puncture In process    West Nile Virus, CSF In process           Risk for Readmission (LACE) Score: 2 (9/3/2020  6:00 AM)      This patient has been examined wearing appropriate Personal Protective Equipment . 09/03/20      Time: Discharge 38 min with face-to-face history exam, writing of prescriptions, and documenting discharge data including care coordination with the nursing staff.      Jose Antonio Ugalde DO  09/03/20  13:00              Electronically signed by Jose Antonio Ugalde DO at 09/03/20 9085

## 2020-09-04 NOTE — NURSING NOTE
Acute Rehab   Request received for evaluation. Spoke with pt. Workup for diagnosis still in progress. Will follow along with therapies along with medical work up to help determine the most appropriate level of rehab needed at the time of dc    Eleanor Kelly RN  Acute Rehab Admission Nurse

## 2020-09-04 NOTE — ANESTHESIA PROCEDURE NOTES
Blood Patch    Pre-sedation assessment completed: 9/4/2020 1:21 PM    Patient location during procedure: pain clinic  Blood patch placed: 9/4/2020 1:21 PM  Stop Time:9/4/2020 1:38 PM    Staff  Anesthesiologist: Russ Mishra MD  Preanesthetic Checklist  Completed: patient identified, site marked, surgical consent, pre-op evaluation, timeout performed, IV checked, risks and benefits discussed and monitors and equipment checked  Blood Patch Prep  Patient position: prone  Sterile Tech: gloves, cap, mask and sterile barrier.  Prep: ChloraPrep  Patient monitoring: blood pressure monitoring, continuous pulse ox and EKG  Location: L2-L3  Blood Patch Procedure  Sedation:yes    Approach: midline   Guidance: fluoroscopy  Needle Gauge 20 G  Needle Type: Hustead  Solution used: autologous blood  Blood Patch Source:venous    Amount injected: 20 mL

## 2020-09-04 NOTE — THERAPY EVALUATION
Patient Name: Angie Patel  : 1975    MRN: [id number]                              Today's Date: 2020       Admit Date: 9/3/2020    Visit Dx: No diagnosis found.  Patient Active Problem List   Diagnosis   • Magnetic resonance imaging of brain abnormal   • Benign essential hypertension   • Blurring of visual image   • Fatigue   • Confusional state   • Abnormal gait   • Hypercholesterolemia   • Headache   • Meningioma (CMS/HCC)   • Migraine   • Morbid obesity (CMS/HCC)   • Neck pain   • Nausea   • Pitting edema   • Polycystic ovaries   • Rib pain on left side   • Pain of sternum   • Vaginal irritation   • Vitamin D insufficiency   • Tobacco abuse counseling   • Hypotensive episode   • Acute bilateral thoracic back pain   • Pendulous breast   • Memory changes   • Weight gain   • PCOS (polycystic ovarian syndrome)   • Menopausal symptoms   • Insulin resistance syndrome   • Morbid obesity with BMI of 40.0-44.9, adult (CMS/[facility])   • Right ureteral stone   • Hematuria   • Constipation due to pain medication therapy   • Vision changes   • Blurred vision, bilateral   • Obesity (BMI 35.0-39.9 without comorbidity)   • Chest tightness or pressure   • Anxiety   • Need for influenza vaccination   • Acute left flank pain   • History of kidney stones   • Morbid obesity with BMI of 45.0-49.9, adult (CMS/HCC)   • Screening mammogram, encounter for   • Meningioma (CMS/HCC)   • Fibromyalgia   • High risk medication use   • Primary insomnia   • Arthralgia   • Chronic cough   • Thyroid enlargement   • Left-sided chest pain   • Neurological abnormality   • Foot injury, right, initial encounter   • Acute pain of right foot   • KACI (obstructive sleep apnea)   • Type 2 diabetes mellitus with hyperglycemia, without long-term current use of insulin (CMS/[facility])   • 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[facility])   • Weakness of both lower extremities   • Hypokalemia   • Headache, post-lumbar puncture     Past Medical History:   Diagnosis  Date   • Anxiety    • Asthma    • Cardiomyopathy (CMS/HCC)     with pregnancy   • Diabetes mellitus (CMS/HCC)    • Fibromyalgia    • Fibromyalgia, primary    • Headache, post-lumbar puncture 9/4/2020   • Headache, tension-type    • Hypertension    • Insomnia    • Kidney calculi    • Memory loss    • Meningioma (CMS/HCC)     benign brain    • Migraine     ocular   • KACI (obstructive sleep apnea)    • Palpitations    • Polycystic ovaries    • Seizures (CMS/HCC)      Past Surgical History:   Procedure Laterality Date   • CYSTOSCOPY URETEROSCOPY LASER LITHOTRIPSY Right 3/7/2017    Procedure: CYSTOSCOPY RT URETEROSCOPY LASER LITHOTRIPSY W/ STENT, rerograde pyelogram ;  Surgeon: Rashid Malloy MD;  Location[facility] OR;  Service:    • FOOT SURGERY Left     tendon repair   • HYSTERECTOMY     • OOPHORECTOMY     • SHOULDER SURGERY Right 2009   • TUBAL ABDOMINAL LIGATION     • WISDOM TOOTH EXTRACTION       General Information     Row Name 09/04/20 1328          PT Evaluation Time/Intention    Document Type  evaluation  -EE     Mode of Treatment  individual therapy;physical therapy  -EE     Row Name 09/04/20 1328          General Information    Prior Level of Function  independent:;all household mobility;community mobility;work  -EE     Existing Precautions/Restrictions  fall  -EE     Barriers to Rehab  medically complex  -EE     Row Name 09/04/20 1328          Relationship/Environment    Lives With  spouse  -EE     Row Name 09/04/20 1328          Resource/Environmental Concerns    Current Living Arrangements  home/apartment/condo  -EE     Row Name 09/04/20 1328          Cognitive Assessment/Intervention- PT/OT    Orientation Status (Cognition)  oriented x 4  -EE     Row Name 09/04/20 1328          Safety Issues, Functional Mobility    Impairments Affecting Function (Mobility)  balance;coordination;endurance/activity tolerance;strength;postural/trunk control;pain;sensation/sensory awareness  -EE       User Key  (r) =  Recorded By, (t) = Taken By, (c) = Cosigned By    Initials Name Provider Type    Carol Amato PT Physical Therapist        Mobility     Row Name 09/04/20 1329          Bed Mobility Assessment/Treatment    Bed Mobility Assessment/Treatment  supine-sit;sit-supine  -EE     Supine-Sit [place] (Bed Mobility)  conditional independence  -EE     Sit-Supine [place] (Bed Mobility)  conditional independence  -EE     Assistive Device (Bed Mobility)  bed rails;head of bed elevated  -EE     Row Name 09/04/20 1329          Transfer Assessment/Treatment    Comment (Transfers)  vc's for hand placement with sit to stand transfer  -EE     Row Name 09/04/20 1329          Sit-Stand Transfer    Sit-Stand [place] (Transfers)  contact guard;verbal cues  -EE     Assistive Device (Sit-Stand Transfers)  walker, front-wheeled  -EE     Row Name 09/04/20 1329          Gait/Stairs Assessment/Training    [place] Level (Gait)  minimum assist (75% patient effort);contact guard;verbal cues  -EE     Assistive Device (Gait)  walker, front-wheeled  -EE     Distance in Feet (Gait)  5' forward then back to EOB. Also 5' sidestepping along length of bed.   -EE     Deviations/Abnormal Patterns (Gait)  base of support, wide;ataxic;jeremy decreased;stride length decreased  -EE     Bilateral Gait Deviations  heel strike decreased;hip circumduction;weight shift ability decreased;forward flexed posture decreased B knee flexion with swing phase  -EE     Comment (Gait/Stairs)  Heavy reliance on B UEs while ambulating with walker. Occasional LOB posteriorly; requires min A to recover.   -EE       User Key  (r) = Recorded By, (t) = Taken By, (c) = Cosigned By    Initials Name Provider Type    Carol Amato PT Physical Therapist        Obj/Interventions     Row Name 09/04/20 1332          General ROM    GENERAL ROM COMMENTS  B LE AROM WFL  -EE     Row Name 09/04/20 1332          MMT (Manual Muscle Testing)    General MMT Comments  B LEs  grossly 4-/5  -EE     Row Name 09/04/20 1332          Therapeutic Exercise    Lower Extremity (Therapeutic Exercise)  LAQ (long arc quad), bilateral  -EE     Lower Extremity Range of Motion (Therapeutic Exercise)  ankle dorsiflexion/plantar flexion, bilateral  -EE     Exercise Type (Therapeutic Exercise)  AROM (active range of motion)  -EE     Position (Therapeutic Exercise)  seated  -EE     Sets/Reps (Therapeutic Exercise)  1/10  -EE     Comment (Therapeutic Exercise)  Also instructed in supine quad sets, glute sets, hip abd/add, SLR, and heel slides. Pt verbalized understanding; handout provided for HEP.   -EE     Row Name 09/04/20 1332          Static Sitting Balance    Level of [place] (Unsupported Sitting, Static Balance)  independent  -EE     Sitting Position (Unsupported Sitting, Static Balance)  sitting on edge of bed  -EE     Time Able to Maintain Position (Unsupported Sitting, Static Balance)  more than 5 minutes  -EE     Row Name 09/04/20 1332          Static Standing Balance    Level of [place] (Supported Standing, Static Balance)  minimal assist, 75% patient effort;contact guard assist  -EE     Time Able to Maintain Position (Supported Standing, Static Balance)  2 to 3 minutes  -EE     Assistive Device Utilized (Supported Standing, Static Balance)  walker, rolling  -EE     Comment (Supported Standing, Static Balance)  Occasional posterior lean; vc's to correct  -EE     Row Name 09/04/20 1332          Sensory Assessment/Intervention    Sensory General Assessment  -- reports tingling B LE but able to correct identify B LE light touch  -EE       User Key  (r) = Recorded By, (t) = Taken By, (c) = Cosigned By    Initials Name Provider Type    EE Carol Aquino, JUSTIN Physical Therapist        Goals/Plan     Row Name 09/04/20 1337          Transfer Goal 1 (PT)    Activity/Assistive Device (Transfer Goal 1, PT)  sit-to-stand/stand-to-sit;bed-to-chair/chair-to-bed  -EE     [place] Level/Cues Needed  (Transfer Goal 1, PT)  supervision required  -EE     Time Frame (Transfer Goal 1, PT)  1 week  -EE     Progress/Outcome (Transfer Goal 1, PT)  goal ongoing  -EE     Row Name 09/04/20 8743          Gait Training Goal 1 (PT)    Activity/Assistive Device (Gait Training Goal 1, PT)  gait (walking locomotion);assistive device use  -EE     [place] Level (Gait Training Goal 1, PT)  contact guard assist  -EE     Distance (Gait Goal 1, PT)  75  -EE     Time Frame (Gait Training Goal 1, PT)  1 week  -EE     Progress/Outcome (Gait Training Goal 1, PT)  goal ongoing  -EE       User Key  (r) = Recorded By, (t) = Taken By, (c) = Cosigned By    Initials Name Provider Type    EE Caorl Aquino, PT Physical Therapist        Clinical Impression     Row Name 09/04/20 6629          Pain Assessment    Additional Documentation  Pain Scale: Numbers Pre/Post-Treatment (Group)  -EE     Row Name 09/04/20 4550          Pain Scale: Numbers Pre/Post-Treatment    Pain Scale: Numbers, Pretreatment  6/10  -EE     Pain Scale: Numbers, Post-Treatment  6/10  -EE     Pain Location  head  -EE     Pre/Post Treatment Pain Comment  pt c/o spinal headache since lumbar puncture several days ago  -EE     Pain Intervention(s)  Repositioned;Rest  -EE     Row Name 09/04/20 6480          Plan of Care Review    Plan of Care Reviewed With  patient  -EE     Outcome Summary  Pt is a 46 yo female transferred from [place] with 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 PMH of fibromyalgia. Upon exam, pt demonstrates B LE weakness, impaired balance, impaired trunk control, impaired sensation, impaired coordination, and decreased endurance limiting independence with mobility. Pt currently requiring assist x1 and use of walker for transfers and ambulation; only able to tolerate short distance ambulation at EOB. Pt is independent with all mobility and works full time at baseline. Pt will continue to benefit from PT to address impairments and increase independence  with functional mobility.   -EE     Row Name 09/04/20 1333          Physical Therapy Clinical Impression    Criteria for Skilled Interventions Met (PT Clinical Impression)  yes;treatment indicated  -EE     Rehab Potential (PT Clinical Summary)  fair, will monitor progress closely  -EE     Row Name 09/04/20 1333          Positioning and Restraints    Pre-Treatment Position  in bed  -EE     Post Treatment Position  bed  -EE     In Bed  fowlers;notified nsg;call light within reach;encouraged to call for assist  -EE       User Key  (r) = Recorded By, (t) = Taken By, (c) = Cosigned By    Initials Name Provider Type    Carol Amato PT Physical Therapist        Outcome Measures     Row Name 09/04/20 1337          How much help from another person do you currently need...    Turning from your back to your side while in flat bed without using bedrails?  4  -EE     Moving from lying on back to sitting on the side of a flat bed without bedrails?  4  -EE     Moving to and from a bed to a chair (including a wheelchair)?  3  -EE     Standing up from a chair using your arms (e.g., wheelchair, bedside chair)?  3  -EE     Climbing 3-5 steps with a railing?  1  -EE     To walk in hospital room?  2  -EE     AM-PAC 6 Clicks Score (PT)  17  -EE     Row Name 09/04/20 1338          Functional Assessment    Outcome Measure Options  AM-PAC 6 Clicks Basic Mobility (PT)  -EE       User Key  (r) = Recorded By, (t) = Taken By, (c) = Cosigned By    Initials Name Provider Type    Carol Amato PT Physical Therapist        Physical Therapy Education                 Title: PT OT SLP Therapies (Done)     Topic: Physical Therapy (Done)     Point: Mobility training (Done)     Description:   Instruct learner(s) on safety and technique for assisting patient out of bed, chair or wheelchair.  Instruct in the proper use of assistive devices, such as walker, crutches, cane or brace.              Patient Friendly Description:   It's important to get  you on your feet again, but we need to do so in a way that is safe for you. Falling has serious consequences, and your personal safety is the most important thing of all.        When it's time to get out of bed, one of us or a family member will sit next to you on the bed to give you support.     If your doctor or nurse tells you to use a walker, crutches, a cane, or a brace, be sure you use it every time you get out of bed, even if you think you don't need it.    Learning Progress Summary           Patient Acceptance, E,TB,D,H, VU,NR by EE at 9/4/2020 1337                   Point: Home exercise program (Done)     Description:   Instruct learner(s) on appropriate technique for monitoring, assisting and/or progressing patient with therapeutic exercises and activities.              Learning Progress Summary           Patient Acceptance, E,TB,D,H, VU,NR by EE at 9/4/2020 1337                   Point: Body mechanics (Done)     Description:   Instruct learner(s) on proper positioning and spine alignment for patient and/or caregiver during mobility tasks and/or exercises.              Learning Progress Summary           Patient Acceptance, E,TB,D,H, VU,NR by EE at 9/4/2020 1337                   Point: Precautions (Done)     Description:   Instruct learner(s) on prescribed precautions during mobility and gait tasks              Learning Progress Summary           Patient Acceptance, E,TB,D,H, VU,NR by EE at 9/4/2020 1337                               User Key     Initials Effective Dates Name Provider Type Discipline     04/03/18 -  Carol Aquino, PT Physical Therapist PT              PT Recommendation and Plan  Planned Therapy Interventions (PT Eval): balance training, bed mobility training, gait training, home exercise program, patient/family education, neuromuscular re-education, postural re-education, ROM (range of motion), stair training, strengthening, transfer training  Outcome Summary/Treatment Plan  (PT)  Anticipated Equipment Needs at Discharge (PT): front wheeled walker, wheelchair, bedside commode, shower chair(pending progress)  Anticipated Discharge Disposition (PT): inpatient rehabilitation facility, home with assist, home with home health, home with OP services(TBD pending progress)  Plan of Care Reviewed With: patient  Outcome Summary: Pt is a 44 yo female transferred from [place] with 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 PMH of fibromyalgia. Upon exam, pt demonstrates B LE weakness, impaired balance, impaired trunk control, impaired sensation, impaired coordination, and decreased endurance limiting independence with mobility. Pt currently requiring assist x1 and use of walker for transfers and ambulation; only able to tolerate short distance ambulation at EOB. Pt is independent with all mobility and works full time at baseline. Pt will continue to benefit from PT to address impairments and increase independence with functional mobility.      Time Calculation:   PT Charges     Row Name 09/04/20 1142             Time Calculation    Start Time  1114  -EE      Stop Time  1134  -EE      Time Calculation (min)  20 min  -EE      PT Received On  09/04/20  -EE      PT - Next Appointment  09/05/20  -EE      PT Goal Re-Cert Due Date  09/11/20  -EE         Time Calculation- PT    Total Timed Code Minutes- PT  9 minute(s)  -EE        User Key  (r) = Recorded By, (t) = Taken By, (c) = Cosigned By    Initials Name Provider Type    EE Carol Aquino, PT Physical Therapist        Therapy Charges for Today     Code Description Service Date Service Provider Modifiers Qty    68530360814 HC PT EVAL MOD COMPLEXITY 2 9/4/2020 Carol Aquino, PT GP 1    [id number]  PT THER PROC EA 15 MIN 9/4/2020 Carol Aquino, PT GP 1          PT G-Codes  Outcome Measure Options: AM-PAC 6 Clicks Basic Mobility (PT)  AM-PAC 6 Clicks Score (PT): 17      Patient was wearing a face mask during this therapy encounter. Therapist used  appropriate personal protective equipment including eye protection, mask, and gloves.  Mask used was standard procedure mask. Appropriate PPE was worn during the entire therapy session. Hand hygiene was completed before and after therapy session. Patient is not in enhanced droplet precautions.     Carol Aquino, PT  9/4/2020

## 2020-09-04 NOTE — PLAN OF CARE
Problem: Patient Care Overview  Goal: Plan of Care Review  Outcome: Ongoing (interventions implemented as appropriate)  Flowsheets  Taken 9/4/2020 0417  Progress: no change  Outcome Summary: VSS, A&Ox4, c/o headache, tylenol given and lying flat seem to help, up with assist, c/o numbness/tingling in hands and feet, gait is very unsteady, patient states feet feel like their asleep, neurology consulted, will continue to monitor.  Taken 9/3/2020 2044  Plan of Care Reviewed With: patient

## 2020-09-04 NOTE — PLAN OF CARE
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 PMH of fibromyalgia. Upon exam, pt demonstrates B LE weakness, impaired balance, impaired trunk control, impaired sensation, impaired coordination, and decreased endurance limiting independence with mobility. Pt currently requiring assist x1 and use of walker for transfers and ambulation; only able to tolerate short distance ambulation at EOB. Pt is independent with all mobility and works full time at baseline. Pt will continue to benefit from PT to address impairments and increase independence with functional mobility.

## 2020-09-04 NOTE — NURSING NOTE
Case Management Discharge Note      Final Note: dc to [facility]    Provided Post Acute Provider List?: N/A    Destination - Selection Complete      Service Provider Request Status Selected Services Address Phone Number Fax Number    [address] [address] [phone] --      Durable Medical Equipment      No service has been selected for the patient.      Dialysis/Infusion      No service has been selected for the patient.      Home Medical Care      No service has been selected for the patient.      Therapy      No service has been selected for the patient.      Community Resources      No service has been selected for the patient.             Final Discharge Disposition Code: 02 - short term \[facility]\"" for [facility]

## 2020-09-04 NOTE — PAYOR COMM NOTE
"P: [phone]  F: [phone]    INITIAL CLINICAL FOR INPATIENT REVIEW    REF #[id number]    ID #[id number]        Angie Marte (45 y.o. Female)     Date of Birth Social Security Number Address Home Phone MRN    1975  [address] [facility] [address] [phone] [id number]    Buddhist Marital Status          Advent        Admission Date Admission Type Admitting Provider Attending Provider Department, Room/Bed    9/3/20 Urgent Nicolette Christensen MD Benton, John B, MD [address], [address]/    Discharge Date Discharge Disposition Discharge Destination                       Attending Provider:  Prasanna So MD    Allergies:  Iodine, Shellfish Allergy    Isolation:  None   Infection:  None   Code Status:  CPR    Ht:  162.6 cm (64.02\")   Wt:  120 kg (264 lb 8.8 oz)    Admission Cmt:  None   Principal Problem:  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) (CMS/[facility]) [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.0]                 Active Insurance as of 9/3/2020     Primary Coverage     Payor Plan Insurance Group Employer/Plan Group    [employer] BLUE CROSS [employer] BLUE CROSS BLUE SHIELD PPO [id number]     Payor Plan Address Payor Plan Phone Number Payor Plan Fax Number Effective Dates    [address] [phone]  2018 - None Entered    [place] [address]       Subscriber Name Subscriber Birth Date Member ID       JENS MARTE 1956 [id number]                 Emergency Contacts      (Rel.) Home Phone Work Phone Mobile Phone    Jens Marte (Spouse) [phone] -- [phone]               History & Physical      Nicolette Christensen MD at 20 1845          HISTORY AND PHYSICAL   [facility]        Patient Identification:  Name: Angie Marte  Age: 45 y.o.  Sex: female  :  1975  MRN: [id number]                     Primary Care Physician: Kya Kendrick PA-C    Chief Complaint:  45 year old female who was transferred from [place]; she has had " worsening weakness of her legs; she has also had numbness; symptoms started a few days ago and she has become progressively worse; she now needs assistance with walking; she had a recent gi illness which she thought was food poisoning; denies nausea, vomiting or diarrhea now; no fever or chills; she was seen by dr lopez in [place] and transfer to [facility] for further evaluation and treatment was requested;     History of Present Illness:   As above    Past Medical History:  Past Medical History:   Diagnosis Date   • Anxiety    • Asthma    • Cardiomyopathy (CMS/HCC)     with pregnancy   • Diabetes mellitus (CMS/HCC)    • Fibromyalgia    • Fibromyalgia, primary    • Headache, tension-type    • Hypertension    • Insomnia    • Kidney calculi    • Memory loss    • Meningioma (CMS/HCC)     benign brain    • Migraine     ocular   • KACI (obstructive sleep apnea)    • Palpitations    • Polycystic ovaries    • Seizures (CMS/HCC)      Past Surgical History:  Past Surgical History:   Procedure Laterality Date   • CYSTOSCOPY URETEROSCOPY LASER LITHOTRIPSY Right 3/7/2017    Procedure: CYSTOSCOPY RT URETEROSCOPY LASER LITHOTRIPSY W/ STENT, rerograde pyelogram ;  Surgeon: Rashid Malloy MD;  Location: [facility];  Service:    • FOOT SURGERY Left     tendon repair   • HYSTERECTOMY     • OOPHORECTOMY     • SHOULDER SURGERY Right 2009   • TUBAL ABDOMINAL LIGATION     • WISDOM TOOTH EXTRACTION        Home Meds:  Medications Prior to Admission   Medication Sig Dispense Refill Last Dose   • chlorthalidone (HYGROTON) 25 MG tablet Take 1 tablet by mouth Daily. 90 tablet 3 9/3/2020 at Unknown time   • spironolactone (ALDACTONE) 100 MG tablet TAKE 1 TABLET BY MOUTH DAILY 90 tablet 0 9/3/2020 at Unknown time   • VENTOLIN  (90 Base) MCG/ACT inhaler INHALE 2 PUFFS BY MOUTH FOUR TIMES DAILY AS NEEDED FOR WHEEZING OR SHORTNESS OF BREATH 18 g 0 9/3/2020 at Unknown time   • Blood Glucose Monitoring Suppl (ACCU-CHEK MANNY)  device Used to check blood sugars twice daily as needed for type 2 diabetes 1 each 0 Past Month at Unknown time   • furosemide (LASIX) 20 MG tablet TAKE 1 TABLET BY MOUTH DAILY AS NEEDED (Patient taking differently: 20 mg.) 90 tablet 0 Past Month at Unknown time   • glucose blood (Accu-Chek Camila) test strip Used to check blood sugars twice daily as needed for type 2 diabetes 100 each 12 Past Month at Unknown time   • ibuprofen (MOTRIN IB) 200 MG tablet Take 2 tablets by mouth Every 6 (Six) Hours As Needed.   Past Week at Unknown time   • Lancets (ACCU-CHEK MULTICLIX) lancets Used to check blood sugars twice daily as needed for type 2 diabetes 100 each 12 Past Month at Unknown time   • metFORMIN ER (GLUCOPHAGE-XR) 750 MG 24 hr tablet Take 2 tablets by mouth Daily With Dinner. 60 tablet 6 9/1/2020 at 1800       Allergies:  Allergies   Allergen Reactions   • Iodine Shortness Of Breath     Swelling     • Shellfish Allergy Shortness Of Breath     swelling     Immunizations:  Immunization History   Administered Date(s) Administered   • Flu Vaccine Quad PF >36MO 11/06/2017   • Flulaval/Fluarix Quad 11/06/2017   • Tdap 10/08/2019     Social History:   Social History     Social History Narrative   • Not on file     Social History     Socioeconomic History   • Marital status:      Spouse name: Not on file   • Number of children: Not on file   • Years of education: Not on file   • Highest education level: Not on file   Occupational History   • Occupation:      Employer: SELF-EMPLOYED     Comment: full time   Tobacco Use   • Smoking status: Former Smoker   • Smokeless tobacco: Never Used   Substance and Sexual Activity   • Alcohol use: Not Currently     Comment: occ'l   • Drug use: No   • Sexual activity: Defer     Partners: Male     Birth control/protection: Surgical     Comment: hysterectomy       Family History:  Family History   Problem Relation Age of Onset   • Heart disease Paternal Aunt    • Diabetes  Paternal Aunt    • Diabetes Maternal Grandmother    • Neuropathy Maternal Grandmother    • Thyroid disease Maternal Grandmother    • Heart disease Maternal Grandfather    • Cancer Maternal Grandfather    • Heart disease Paternal Grandfather    • Heart disease Paternal Uncle    • Arthritis Mother    • Migraines Mother    • Seizures Father    • Breast cancer Neg Hx    • Ovarian cancer Neg Hx    • Colon cancer Neg Hx    • Deep vein thrombosis Neg Hx    • Pulmonary embolism Neg Hx         Review of Systems  See history of present illness and past medical history.  Patient denies headache, dizziness, syncope, falls, trauma, change in vision, change in hearing, change in taste, changes in weight, changes in appetite, focal weakness, numbness, or paresthesia.  Patient denies chest pain, palpitations, dyspnea, orthopnea, PND, cough, sinus pressure, rhinorrhea, epistaxis, hemoptysis, nausea, vomiting,hematemesis, diarrhea, constipation or hematchezia.  Denies cold or heat intolerance, polydipsia, polyuria, polyphagia. Denies hematuria, pyuria, dysuria, hesitancy, frequency or urgency. Denies consumption of raw and under cooked meats foods or change in water source.  Denies fever, chills, sweats, night sweats.  Denies missing any routine medications. Remainder of ROS is negative.    Objective:  T Max 24 hrs: Temp (24hrs), Av.3 °F (36.8 °C), Min:97.8 °F (36.6 °C), Max:98.7 °F (37.1 °C)    Vitals Ranges:   Temp:  [97.8 °F (36.6 °C)-98.7 °F (37.1 °C)] 97.8 °F (36.6 °C)  Heart Rate:  [] 106  Resp:  [16] 16  BP: (102-137)/(53-84) 125/76      Exam:  /76 (BP Location: Left arm, Patient Position: Sitting)   Pulse 106   Temp 97.8 °F (36.6 °C) (Oral)   Resp 16   SpO2 97%     General Appearance:    Alert, cooperative, no distress, appears stated age   Head:    Normocephalic, without obvious abnormality, atraumatic   Eyes:    PERRL, conjunctivae/corneas clear, EOM's intact, both eyes   Ears:    Normal external ear  canals, both ears   Nose:   Nares normal, septum midline, mucosa normal, no drainage    or sinus tenderness   Throat:   Lips, mucosa, and tongue normal   Neck:   Supple, symmetrical, trachea midline, no adenopathy;     thyroid:  no enlargement/tenderness/nodules; no carotid    bruit or JVD   Back:     Symmetric, no curvature, ROM normal, no CVA tenderness   Lungs:     Clear to auscultation bilaterally, respirations unlabored   Chest Wall:    No tenderness or deformity    Heart:    Regular rate and rhythm, S1 and S2 normal, no murmur, rub   or gallop   Abdomen:     Soft, nontender, bowel sounds active all four quadrants,     no masses, no hepatomegaly, no splenomegaly   Extremities:   Extremities normal, atraumatic, no cyanosis or edema   Pulses:   2+ and symmetric all extremities   Skin:   Skin color, texture, turgor normal, no rashes or lesions   Lymph nodes:   Cervical, supraclavicular, and axillary nodes normal   Neurologic:   CNII-XII intact, normal strength, sensation intact throughout      .    Data Review:  Labs in chart were reviewed.  WBC   Date Value Ref Range Status   09/03/2020 10.49 3.40 - 10.80 10*3/mm3 Final     Hemoglobin   Date Value Ref Range Status   09/03/2020 12.6 12.0 - 15.9 g/dL Final     Hematocrit   Date Value Ref Range Status   09/03/2020 38.4 34.0 - 46.6 % Final     Platelets   Date Value Ref Range Status   09/03/2020 251 140 - 450 10*3/mm3 Final     Sodium   Date Value Ref Range Status   09/03/2020 138 136 - 145 mmol/L Final     Potassium   Date Value Ref Range Status   09/03/2020 3.3 (L) 3.5 - 5.2 mmol/L Final     Chloride   Date Value Ref Range Status   09/03/2020 97 (L) 98 - 107 mmol/L Final     CO2   Date Value Ref Range Status   09/03/2020 25.8 22.0 - 29.0 mmol/L Final     BUN   Date Value Ref Range Status   09/03/2020 15 6 - 20 mg/dL Final     Creatinine   Date Value Ref Range Status   09/03/2020 0.74 0.57 - 1.00 mg/dL Final     Glucose   Date Value Ref Range Status   09/03/2020 150  (H) 65 - 99 mg/dL Final     Calcium   Date Value Ref Range Status   09/03/2020 9.3 8.6 - 10.5 mg/dL Final     Magnesium   Date Value Ref Range Status   09/02/2020 1.6 1.6 - 2.6 mg/dL Final     Phosphorus   Date Value Ref Range Status   09/02/2020 3.3 2.5 - 4.5 mg/dL Final     AST (SGOT)   Date Value Ref Range Status   09/03/2020 15 1 - 32 U/L Final     ALT (SGPT)   Date Value Ref Range Status   09/03/2020 23 1 - 33 U/L Final     Alkaline Phosphatase   Date Value Ref Range Status   09/03/2020 42 39 - 117 U/L Final                Imaging Results (All)     None        Patient Active Problem List   Diagnosis Code   • Magnetic resonance imaging of brain abnormal R90.89   • Benign essential hypertension I10   • Blurring of visual image H53.8   • Fatigue R53.83   • Confusional state F44.89   • Abnormal gait R26.9   • Hypercholesterolemia E78.00   • Headache R51   • Meningioma (CMS/HCC) D32.9   • Migraine G43.909   • Morbid obesity (CMS/HCC) E66.01   • Neck pain M54.2   • Nausea R11.0   • Pitting edema R60.9   • Polycystic ovaries E28.2   • Rib pain on left side R07.81   • Pain of sternum R07.89   • Vaginal irritation N89.8   • Vitamin D insufficiency E55.9   • Tobacco abuse counseling Z71.6   • Hypotensive episode I95.9   • Acute bilateral thoracic back pain M54.6   • Pendulous breast N64.89   • Memory changes R41.3   • Weight gain R63.5   • PCOS (polycystic ovarian syndrome) E28.2   • Menopausal symptoms N95.1   • Insulin resistance syndrome E88.81   • Morbid obesity with BMI of 40.0-44.9, adult (CMS/HCC) E66.01, Z68.41   • Right ureteral stone N20.1   • Hematuria R31.9   • Constipation due to pain medication therapy K59.03   • Vision changes H53.9   • Blurred vision, bilateral H53.8   • Obesity (BMI 35.0-39.9 without comorbidity) E66.9   • Chest tightness or pressure R07.89   • Anxiety F41.9   • Need for influenza vaccination Z23   • Acute left flank pain R10.9   • History of kidney stones Z87.442   • Morbid obesity with  BMI of 45.0-49.9, adult (CMS/HCC) E66.01, Z68.42   • Screening mammogram, encounter for Z12.31   • Meningioma (CMS/[facility]) D32.9   • Fibromyalgia M79.7   • High risk medication use Z79.899   • Primary insomnia F51.01   • Arthralgia M25.50   • Chronic cough R05   • Thyroid enlargement E04.9   • Left-sided chest pain R07.9   • Neurological abnormality R29.818   • Foot injury, right, initial encounter S99.921A   • Acute pain of right foot M79.671   • KACI (obstructive sleep apnea) G47.33   • Type 2 diabetes mellitus with hyperglycemia, without long-term current use of insulin (CMS/[facility]) E11.65   • 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) (CMS/[facility]) G61.0   • Weakness of both lower extremities R29.898       Assessment:  Weakness  Diabetes  obesity  Polycystic ovary syndrome  Fibromyalgia  Cardiomyopathy  Sleep apnea    Plan:  Will ask neurology to see her  Gale [place] briefly  No need for emergency intiation of plasmapheresis tonight since protein in csf was normal   Reflexes are also present and symmetric  Control blood sugar  Add lidocaine patch for neck pain  Continue tylenol for pain following her lumbar puncture yesterday  [facility]ale patient and family    Nicolette Christensen MD  9/3/2020  18:45      Electronically signed by Nicolette Christensen MD at 09/03/20 1851           {Outbreak/Travel/Exposure Documentation......;  Question Available Choices Patient Response   Outbreak Screen: Do you currently have a new onset of the following symptoms?        Fever/Chils, Cough, Shortness of air, Loss of taste or smell, No, Unknown  No (09/04/20 1238)   Outbreak Screen: In the last 14 days, have you had contact with anyone who is ill, has show any of the symptoms listed above and/or has been diagnosis with the 2019 Novel Coronavirus? This includes any immediate household members but excludes any patients with whom you have been in contact within your normal work duties wearing proper PPE, if you are a healthcare worker.  Yes, No, Unknown               No (09/04/20 1238)   Outbreak Screen: Who was notified?    Free text  (not recorded)   Travel Screen: Have you traveled in the last month? If so, to what country have you traveled? If [place] what state? Yes, No, Unknown  List of all countries  List of all States No (09/03/20 2114)  (not recorded)  (not recorded)   Infection Risk: Do you currently have the following symptoms?  (If cough is selected, the Tuberculosis Screen is performed.) Cough, Fever, Rash, No No (09/03/20 2114)   Tuberculosis Screen: Do you have any of the following Tuberculosis Risks?  · Have you lived or spent time with anyone who had or may have TB?  · Have you lived in or visited any of the following areas for more than one month: [place], [place], [place], [place] or [place], the [place] or [place]?  · Do you have HIV/AIDS?  · Have you lived in or worked in a nursing home, homeless shelter, correctional facility, or substance abuse treatment facility?   · No    If Yes do you have any of the following symptoms? Yes responses display to the right    If Yes, symptoms listed are:  Cough greater than or equal to 3 weeks, Loss of appetite, Unexplained weight loss, Night sweats, Bloody sputum or hemoptysis, Hoarseness, Fever, Fatigue, Chest pain, No (not recorded)  (not recorded)   Exposure Screen: Have you been exposed to any of these contagious diseases in the last month? Measles, Chickenpox, Meningitis, Pertussis, Whooping Cough, No No (09/03/20 2114)         Lines, Drains & Airways    Active LDAs     Name:   Placement date:   Placement time:   Site:   Days:    Peripheral IV 09/02/20 1608 Anterior;Left Forearm   09/02/20    1608    Forearm   1                  Current Facility-Administered Medications   Medication Dose Route Frequency Provider Last Rate Last Dose   • acetaminophen (TYLENOL) tablet 650 mg  650 mg Oral Q4H PRN Jose Antonio Ugalde DO   650 mg at 09/04/20 0817    Or   • acetaminophen (TYLENOL) 160 MG/5ML solution 650 mg  650  mg Oral Q4H PRN Jose Antonio Ugalde, DO        Or   • acetaminophen (TYLENOL) suppository 650 mg  650 mg Rectal Q4H PRN Jose Antonio Ugalde, DO       • albuterol (PROVENTIL) nebulizer solution 0.083% 2.5 mg/3mL  2.5 mg Nebulization Q6H PRN Prasanna So MD       • bisacodyl (DULCOLAX) EC tablet 5 mg  5 mg Oral Daily PRN Jose Antonio Ugalde, DO       • bisacodyl (DULCOLAX) suppository 10 mg  10 mg Rectal Daily PRN Jose Antonio Ugalde, DO       • chlorthalidone (HYGROTON) tablet 25 mg  25 mg Oral Daily Jose Antonio Ugalde, DO   25 mg at 09/04/20 0817   • dextrose (D50W) 25 g/ 50mL Intravenous Solution 25 g  25 g Intravenous Q15 Min PRN Jose Antonio Ugalde, DO       • dextrose (GLUTOSE) oral gel 15 g  15 g Oral Q15 Min PRN Jose Antonio Ugalde, DO       • fentaNYL citrate (PF) (SUBLIMAZE) injection 50 mcg  50 mcg Intravenous PRN Render, Russ Cohen MD       • glucagon (human recombinant) (GLUCAGEN DIAGNOSTIC) injection 1 mg  1 mg Subcutaneous Q15 Min PRN Jose Antonio Ugalde, DO       • Hold medication  1 each Does not apply Continuous PRN Jose Antonio Ugalde, DO       • HYDROcodone-acetaminophen (NORCO) 5-325 MG per tablet 1 tablet  1 tablet Oral Q6H PRN Prasanna So MD       • insulin lispro (humaLOG) injection 0-9 Units  0-9 Units Subcutaneous TID AC Jose Antonio Ugalde, DO   2 Units at 09/04/20 1207   • iopamidol (ISOVUE-M 200) injection 41%  12 mL Epidural Once in imaging Render, Russ Cohen MD       • lidocaine (LIDODERM) 5 % 1 patch  1 patch Transdermal Q24H StingNicolette causey MD   1 patch at 09/04/20 0821   • lidocaine (XYLOCAINE) 1 % injection 1 mL  1 mL Intradermal Once PRN Render, Russ Cohen MD       • LORazepam (ATIVAN) tablet 1 mg  1 mg Oral Once Prasanna So MD       • magnesium hydroxide (MILK OF MAGNESIA) suspension 2400 mg/10mL 10 mL  10 mL Oral Daily PRN Jose Antonio Ugalde, DO       • melatonin tablet 5 mg  5 mg Oral Nightly PRN Jose Antonio Ugalde, DO       • midazolam (VERSED) injection 1 mg  1 mg Intravenous PRN Russ Mishra MD       • ondansetron  (ZOFRAN) tablet 4 mg  4 mg Oral Q6H PRN Jose Antonio Ugalde DO        Or   • ondansetron (ZOFRAN) injection 4 mg  4 mg Intravenous Q6H PRN Jose Antonio Ugalde DO       • potassium chloride (KLOR-CON) packet 40 mEq  40 mEq Oral PRN Prasanna So MD       • potassium chloride (MICRO-K) CR capsule 40 mEq  40 mEq Oral PRN Prasanna So MD   40 mEq at 09/04/20 1118   • sodium chloride 0.9 % flush 1-10 mL  1-10 mL Intravenous PRN Russ Mishra MD       • sodium chloride 0.9 % flush 10 mL  10 mL Intravenous PRN Jose Antonio Ugalde DO       • sodium chloride 0.9 % flush 10 mL  10 mL Intravenous Q12H Jose Antonio Ugalde DO   10 mL at 09/04/20 0822   • sodium chloride 0.9 % flush 10 mL  10 mL Intravenous PRN Jose Antonio Ugalde DO       • spironolactone (ALDACTONE) tablet 100 mg  100 mg Oral Daily Jose Antonio Ugalde DO           Orders (active)      Start     Ordered    09/05/20 0600  Basic Metabolic Panel  Morning Draw      09/04/20 1300    09/05/20 0600  CBC (No Diff)  Morning Draw      09/04/20 1300    09/04/20 1400  iopamidol (ISOVUE-M 200) injection 41%  Once in Imaging      09/04/20 1304    09/04/20 1305  Inpatient Rehab Admission Consult  Once     Provider:  (Not yet assigned)    09/04/20 1304    09/04/20 1305  Obtain Informed Consent  Once      09/04/20 1304    09/04/20 1305  POC Urine Pregnancy  Once      09/04/20 1304    09/04/20 1304  lidocaine (XYLOCAINE) 1 % injection 1 mL  Once As Needed      09/04/20 1304    09/04/20 1304  midazolam (VERSED) injection 1 mg  As Needed      09/04/20 1304    09/04/20 1304  sodium chloride 0.9 % flush 1-10 mL  As Needed      09/04/20 1304    09/04/20 1304  fentaNYL citrate (PF) (SUBLIMAZE) injection 50 mcg  As Needed      09/04/20 1304    09/04/20 1232  albuterol (PROVENTIL) nebulizer solution 0.083% 2.5 mg/3mL  Every 6 Hours PRN      09/04/20 1232    09/04/20 1221  Inpatient [facility]est[facility] Consult  Once     Specialty:  Pain Medicine  Provider:  (Not yet assigned)    09/04/20 1221       09/04/20 1215  LORazepam (ATIVAN) tablet 1 mg  Once      09/04/20 1120    09/04/20 1126  HYDROcodone-acetaminophen (NORCO) 5-325 MG per tablet 1 tablet  Every 6 Hours PRN      09/04/20 1126    09/04/20 1122  MRI Cervical Spine With & Without Contrast  1 Time Imaging      09/04/20 1121    09/04/20 1106  MRI Brain With & Without Contrast  1 Time Imaging      09/04/20 1105    09/04/20 0900  chlorthalidone (HYGROTON) tablet 25 mg  Daily      09/03/20 1738    09/04/20 0900  spironolactone (ALDACTONE) tablet 100 mg  Daily      09/03/20 1738    09/04/20 0756  Patient Currently On Electrolyte Replacement Protocol - Please Refer to MAR for Protocol Details  Misc Nursing Order (Specify)  Daily     Comments:  Patient Currently On Electrolyte Replacement Protocol - Please Refer to MAR for Protocol Details    09/04/20 0755    09/04/20 0755  potassium chloride (MICRO-K) CR capsule 40 mEq  As Needed      09/04/20 0755    09/04/20 0755  potassium chloride (KLOR-CON) packet 40 mEq  As Needed      09/04/20 0755    09/04/20 0755  Maintain Sequential Compression Device  Continuous      09/04/20 0754    09/04/20 0755  Cardiac Monitoring  Continuous      09/04/20 0754    09/04/20 0600  Weigh Patient  Daily      09/03/20 1740    09/03/20 2200  POC Glucose 4x Daily AC & at Bedtime  4 Times Daily Before Meals & at Bedtime     Comments:  If bedtime blood glucose is greater than 350 mg/dl, call MD.      09/03/20 1738    09/03/20 2100  sodium chloride 0.9 % flush 10 mL  Every 12 Hours Scheduled      09/03/20 1738    09/03/20 2030  lidocaine (LIDODERM) 5 % 1 patch  Every 24 Hours Scheduled      09/03/20 1851    09/03/20 1830  insulin lispro (humaLOG) injection 0-9 Units  3 Times Daily Before Meals      09/03/20 1738    09/03/20 1741  Vital Signs  Per Hospital Policy      09/03/20 1740    09/03/20 1739  Code Status and Medical Interventions:  Continuous      09/03/20 1738    09/03/20 1739  Diet Regular; Consistent Carbohydrate  Diet Effective  Now      09/03/20 1738 09/03/20 1739  Intake & Output  Every Shift      09/03/20 1738 09/03/20 1739  Oxygen Therapy- Nasal Cannula; Titrate for SPO2: 90% - 95%  Continuous      09/03/20 1738 09/03/20 1739  Saline Lock & Maintain IV Access  Continuous      09/03/20 1738 09/03/20 1739  Do NOT Hold Basal or Correction Scale Insulin When Patient is NPO, Hold Scheduled Mealtime (Bolus) Insulin if NPO  Continuous      09/03/20 1738 09/03/20 1739  Follow [facility] Hypoglycemia Standing Orders For Blood Glucose Less Than 70 mg/dL  Until Discontinued     Comments:  ALERT PATIENT - NOT NPO & CAN SAFELY SWALLOW  Administer 4 oz Fruit Juice OR 4 oz Regular Soda OR 8 oz Milk OR 15-30 grams (1 tube) of Glucose Gel.  Recheck Blood Glucose Approximately 15 Minutes After Ingestion, Repeat Treatment & Continue to Recheck Blood Sugar Approximately Every 15 Minutes Until Blood Glucose is 70 or Higher.  Once Blood Glucose is 70 or Higher & if It Will Be More Than 60 Minutes Until Next Meal, Provide Appropriate Snack (Including Carbohydrate Food) Based on Meal Plan Order. Give Meal Tray As Soon As Possible.    PATIENT HAS IV ACCESS - UNRESPONSIVE, NPO OR UNABLE TO SAFELY SWALLOW  Administer 25g (50ml) D50W IV Push.  Recheck Blood Glucose Approximately 15 Minutes After Administration, if Blood Glucose Remains Less Than 70, Repeat Treatment   Recheck Blood Glucose Approximately 15 Minutes After 2nd Administration, if Blood Glucose Remains Less Than 70 After 2nd Dose of D50W, Contact Provider for Further Treatment Orders & Consider Adding IVF With D5W for Maintenance    PATIENT WITHOUT IV ACCESS - UNRESPONSIVE, NPO OR UNABLE TO SAFELY SWALLOW  Administer 1mg Glucagon SQ & Establish IV Access.  Turn Patient on Side - Nausea / Vomiting May Occur.  Recheck Blood Glucose Approximately 15 Minutes After Administration.  If Blood Glucose Remains Less Than 70, Administer 25g D50W IV Push (50ml).  Recheck Blood Glucose Approximately 15  Minutes After Administration of D50W, if Blood Glucose Remains Less Than 70, Contact Provider for Further Treatment Orders & Consider Adding IVF With D5 for Maintenance    Document Event & Patient Response to Interventions in EMR, Document Medications on MAR  Notify Provider if Hypoglycemia Treatment Needed    09/03/20 1738    09/03/20 1739  Maintain Sequential Compression Device  Continuous     Comments:  Bilateral lower extremities    09/03/20 1738    09/03/20 1739  PT Consult: Eval & Treat Functional Mobility Below Baseline  Once      09/03/20 1738    09/03/20 1738  sodium chloride 0.9 % flush 10 mL  As Needed      09/03/20 1738    09/03/20 1738  acetaminophen (TYLENOL) tablet 650 mg  Every 4 Hours PRN      09/03/20 1738    09/03/20 1738  acetaminophen (TYLENOL) 160 MG/5ML solution 650 mg  Every 4 Hours PRN      09/03/20 1738    09/03/20 1738  acetaminophen (TYLENOL) suppository 650 mg  Every 4 Hours PRN      09/03/20 1738    09/03/20 1738  bisacodyl (DULCOLAX) EC tablet 5 mg  Daily PRN      09/03/20 1738    09/03/20 1738  bisacodyl (DULCOLAX) suppository 10 mg  Daily PRN      09/03/20 1738    09/03/20 1738  magnesium hydroxide (MILK OF MAGNESIA) suspension 2400 mg/10mL 10 mL  Daily PRN      09/03/20 1738    09/03/20 1738  melatonin tablet 5 mg  Nightly PRN      09/03/20 1738    09/03/20 1738  ondansetron (ZOFRAN) tablet 4 mg  Every 6 Hours PRN      09/03/20 1738    09/03/20 1738  ondansetron (ZOFRAN) injection 4 mg  Every 6 Hours PRN      09/03/20 1738    09/03/20 1738  dextrose (D50W) 25 g/ 50mL Intravenous Solution 25 g  Every 15 Minutes PRN      09/03/20 1738    09/03/20 1738  dextrose (GLUTOSE) oral gel 15 g  Every 15 Minutes PRN      09/03/20 1738    09/03/20 1738  glucagon (human recombinant) (GLUCAGEN DIAGNOSTIC) injection 1 mg  Every 15 Minutes PRN      09/03/20 1738    09/03/20 1738  Hold medication  Continuous PRN      09/03/20 1738    09/03/20 1738  sodium chloride 0.9 % flush 10 mL  As Needed       "09/03/20 1738    Unscheduled  Magnesium  As Needed      09/04/20 0755    Unscheduled  Potassium  As Needed      09/04/20 0755    Unscheduled  Protime-INR  As Needed     Comments:  On any patient taking Coumadin and whom stopped it less than 5 days prior      09/04/20 1304    Unscheduled  POC Glucose PRN  As Needed     Comments:  On all diabetic patients unless checked recently at home and within acceptable range      09/04/20 1304                     Physician Progress Notes       Prasanna So MD at 09/04/20 1246             LOS: 1 day   Patient Care Team:  Kya Kendrick PA-C as PCP - General  Kya Kendrick PA-C as PCP - Family Medicine  Humberto, HAN Moses as Nurse Practitioner (Family Medicine)    Chief Complaint: BLE weakness    Subjective     Feels the same as yesterday. Still c/o weakness in legs. Also c/o headache if she sits up--has to lay flat in bed. Started after LP at [facility] on 9/2. Denies any SOA.      Subjective:  Symptoms:  Stable.  She reports weakness and headache.  No shortness of breath, malaise, cough, chest pain, chest pressure, anorexia, diarrhea or anxiety.    Diet:  Adequate intake.  No nausea or vomiting.    Activity level: Impaired due to weakness.    Pain:  She complains of pain that is mild.  She reports pain is unchanged.  Pain is well controlled and requiring pain medication.        History taken from: patient chart family RN    Objective     Vital Signs  Temp:  [97.8 °F (36.6 °C)-98.6 °F (37 °C)] 97.8 °F (36.6 °C)  Heart Rate:  [] 95  Resp:  [16-18] 18  BP: (102-128)/(53-76) 128/73    Objective:  General Appearance:  Comfortable and in no acute distress.    Vital signs: (most recent): Blood pressure 128/73, pulse 95, temperature 97.8 °F (36.6 °C), temperature source Oral, resp. rate 18, height 162.6 cm (64.02\"), weight 120 kg (263 lb 11.2 oz), SpO2 97 %, not currently breastfeeding.  Vital signs are normal.  No fever.    Output: Producing urine.    HEENT: " Normal HEENT exam.    Lungs:  Normal effort and normal respiratory rate.  Breath sounds clear to auscultation.  She is not in respiratory distress.    Heart: Normal rate.  Regular rhythm.    Abdomen: Abdomen is soft.  (Morbidly obese).  Bowel sounds are normal.   There is no abdominal tenderness.     Extremities: There is no dependent edema.    Pulses: Distal pulses are intact.    Neurological: Patient is alert and oriented to person, place and time.  Patient has abnormal reflexes (no plantar reflexes present).  Patient has normal muscle tone.  (Weak in BLEs).    Pupils:  Pupils are equal, round, and reactive to light.    Skin:  Warm and dry.  No rash.             Results Review:     I reviewed the patient's new clinical results.  I reviewed the patient's other test results and agree with the interpretation  I personally viewed and interpreted the patient's EKG/Telemetry data  Discussed with pt, , RN, CCP, Dr. Armijo, and Dr. Boyer    Results from last 7 days   Lab Units 09/04/20  0948 09/03/20  0442 09/02/20  0902   WBC 10*3/mm3 9.58 10.49 8.19   HEMOGLOBIN g/dL 13.2 12.6 13.5   PLATELETS 10*3/mm3 281 251 147     Results from last 7 days   Lab Units 09/04/20  0948 09/03/20  0442 09/02/20  0925   SODIUM mmol/L 134* 138 140   POTASSIUM mmol/L 3.3* 3.3* 3.7   CHLORIDE mmol/L 95* 97* 101   CO2 mmol/L 25.0 25.8 24.4   BUN mg/dL 14 15 12   CREATININE mg/dL 0.85 0.74 0.66   CALCIUM mg/dL 9.8 9.3 9.3   MAGNESIUM mg/dL 2.0  --  1.6   PHOSPHORUS mg/dL  --   --  3.3   Estimated Creatinine Clearance: 107.7 mL/min (by C-G formula based on SCr of 0.85 mg/dL).    Medication Review: reviewed and adjusted    Assessment/Plan       GBS (Guillain-Glasgow syndrome) (CMS/formerly [facility])    Benign essential hypertension    Hypercholesterolemia    PCOS (polycystic ovarian syndrome)    Insulin resistance syndrome    Morbid obesity with BMI of 45.0-49.9, adult (CMS/formerly [facility])    Fibromyalgia    Weakness of both lower extremities    Hypokalemia     Headache, post-lumbar puncture          Plan:   (?GBS  -sent here from [place] due to concern over possible GBS even though CSF studies not c/w it, and reflexes still present  -unable to get MR C-spine there and Dr. Armijo though might also benefit from NCV study here  -Neuro here recommends MRI brain  -have ordered both MRI brain and C-spine here  -no evidence of resp compromise  -PT ordered  -pt has pretty severe fibromyalgia so could also consider just Lyrica and PT and reassurance    Post LP HA  -anesthesia to see re: blood patch    PCOS/hyperglycemia  -covering with SSI here  -can restart Metformin at dc    Hypokalemia  -replace orally, Mg++ fine    Hyponatremia  -monitor    Full code  SCDs for DVT ppx  Further orders to follow as suggested by evolving hospital course).       Prasanna So MD  20  12:56    Time: 30min        Electronically signed by Prasanna So MD at 20 1300          Consult Notes       Justin Cancino MD at 20 0926      Consult Orders    1. Inpatient Case Management  Consult [[id number]] ordered by Nicolette Christensen MD at 20 6095                  Patient Identification:  NAME:  Angie Patel  Age:  45 y.o.   Sex:  female   :  1975   MRN:  [id number]     Referring physician:Ronnie Sarkar MD  Reason for consult: GBS      Chief complaint: Bilateral lower extremity pain over the last few days.    History of present illness: Patient stated that her condition literally started last Saturday.  She went with her  to see a movie and the same amount.  Upon coming home and at night she felt her feet were numb.  Then started feeling tingling sensation in her feet, hands and face.  She told her  about it.  She even told him that she did not have a hot shower to have such a feeling.  The face and hand tingling subsided but the the feet one turned into numbness . she describes the numbness as as if it is cold and did not feel  them right.  She has such a chronic problem and she did not make that much out of it.  On Sunday she started to have pain in her feet and legs on both sides.  She describes the pain as a sharp knifelike in her calf muscles.  On Monday patient went to work.  She works as a  in a school.  Is 4 hours job and it is not demanding.  Her coworkers noticed that she was moving slowly between her desk and the cabinets.  The condition kept deteriorating as per her story She started feeling weak all over her body.  Finally she seek medical help.  Patient had lumbar spine MRI which did not show any significant abnormality.  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    Patient mentions that she already had some problem with her feet happening on and off for a while.  She had been recently diagnosed with diabetes but she was using metformin 1500 mg nightly over the last 10 years for polycystic kidney and possible insulin resistant diabetes as per her story.  Patient also reported migraine, memory problem before.  She had brain MRI with incidental finding of meningioma on the right frontoparietal area as per her story.  She has been evaluated by neurosurgery which suggested not doing anything since it was an incidental finding and there is no pressure effect or any symptoms secondary to it.  Patient denied having any previous history of depression, and anxiety.  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    Allergies:  Iodine and Shellfish allergy      Hospital medications:    chlorthalidone 25 mg Oral Daily   insulin lispro 0-9 Units Subcutaneous TID AC   lidocaine 1 patch Transdermal Q24H   sodium chloride 10 mL Intravenous Q12H   spironolactone 100 mg Oral Daily       hold 1 each     •  acetaminophen **OR** acetaminophen **OR** acetaminophen  •  albuterol sulfate HFA  •  bisacodyl  •  bisacodyl  •  dextrose  •  dextrose  •  glucagon (human recombinant)  •  hold  •  magnesium  hydroxide  •  melatonin  •  ondansetron **OR** ondansetron  •  potassium chloride  •  potassium chloride  •  sodium chloride  •  sodium chloride      Review of systems:    All system has been checked and nothing pertinent to her current condition.    Physical Exam:    Vitals Ranges:   Temp:  [97.8 °F (36.6 °C)-98.6 °F (37 °C)] 97.8 °F (36.6 °C)  Heart Rate:  [] 95  Resp:  [16-18] 18  BP: (102-128)/(53-76) 128/73      General Appearance: Well developed, well nourished, well groomed, not in pain or distress.  Neck and Spine:Normal range of motion.  Normal alignment. No mass or tenderness.   Cardiovascular: Normal heart sound  Respiratory: Bilateral air entry  Abdomen: Soft nontender    Neuro exam:  Mental Status:  Alert, oriented, fluent and cooperative. He has good grasp of both recent and remote memory.   CN : Grossly intact from 2-12.  Visual field: Mole on both eyes.  Pupils: Normal in size equal, round and reactive to light.  Motor: Patient had generalized weakness.  Her handgrip weakness has been improved with encouragement to reach  age-appropriate power and strength.  Her deep tendon reflexes were intact allover in fact it is Brisky on her knees.  Plantar reflexes were equivocal on both sides.    Sensory: Felt all sensory modalities in terms of touch, temperature and vibration sensation all over her body including her big toes and fingers.  Gait:   Managed to stand up slowly, with help.  Patient was walking on her heels and the big toe as if she is avoiding a wet ground.  She was rocking forward and backward but maintained herself in upright position and walk backward to her bed.                  Coordination: Normal on both upper and lower extremity.  NIHSS: Not applicable          Results review:   I reviewed the patient's new clinical results.    Data review:  Lab Results (last 24 hours)     Procedure Component Value Units Date/Time    POC Glucose Once [[id number]]  (Abnormal) Collected:  09/04/20 0618     Specimen:  Blood Updated:  09/04/20 0627     Glucose 133 mg/dL     POC Glucose Once [[id number]]  (Abnormal) Collected:  09/03/20 2051    Specimen:  Blood Updated:  09/03/20 2052     Glucose 157 mg/dL     POC Glucose Once [[id number]]  (Normal) Collected:  09/03/20 1755    Specimen:  Blood Updated:  09/03/20 1757     Glucose 120 mg/dL     COVID PRE-OP / PRE-PROCEDURE SCREENING ORDER (NO ISOLATION) - Swab, Nasopharynx [[id number]] Collected:  09/03/20 1628    Specimen:  Swab from Nasopharynx Updated:  09/03/20 1642    Narrative:       The following orders were created for panel order COVID PRE-OP / PRE-PROCEDURE SCREENING ORDER (NO ISOLATION) - Swab, Nasopharynx.  Procedure                               Abnormality         Status                     ---------                               -----------         ------                     COVID-19,BIOTAP, NP/OP S...[[id number]]                      In process                   Please view results for these tests on the individual orders.    COVID-19,BIOTAP, NP/OP SWAB IN TRANSPORT MEDIA OR SALINE 24-36 HR TAT - Swab, Nasopharynx [[id number]] Collected:  09/03/20 1628    Specimen:  Swab from Nasopharynx Updated:  09/03/20 1642           Imaging:  Imaging Results (Last 24 Hours)     ** No results found for the last 24 hours. **             Assessment :     Given the above history, physical examination and lab test; the  patient has no symptoms/signs of GBS.  She is complaining of sharp pain in her thigh and buttock rather than numbness in her feet at this point.  She felt all sensory modalities on her toes including vibration.  Her deep tendon reflexes were intact, in fact Brisky on her knees.  Her CSF was normal.  Altogether indicate nonorganic/functional symptoms.  She is carrying the following diagnoses:      GBS (Guillain-[place]in syndrome) (CMS/HCC)    Benign essential hypertension    Hypercholesterolemia    PCOS (polycystic ovarian syndrome)    Insulin resistance  syndrome    Morbid obesity with BMI of 45.0-49.9, adult (CMS/HCC)    Fibromyalgia    Weakness of both lower extremities    Hypokalemia        Plan:    - I will hold on any intervention or treatment at this point in time.  Give the patient another day with reassurance, physical therapy.  - Patient may benefit from starting her on Lyrica for fibromyalgia symptoms.  - She may benefit from MRI of the brain for further evaluation of her meningioma as well as ruling out any pathology which could not be detected by clinical examination.    I will follow-up on the case and keep you updated.  Thank you for the consult please do not hesitate to call me if you have any further question or concern.      Justin Cancino MD  09/04/20  09:26    Electronically signed by Justin Cancino MD at 09/04/20 1010           Nichole Banks, RN      Case Management   Progress Notes   Signed   Date of Service:  09/04/20 0951   Creation Time:  09/04/20 0951            Signed             Continued Stay Note  [facility]     Patient Name: Angie Patel                    MRN: [id number]  Today's Date: 9/4/2020                       Admit Date: 9/3/2020          Discharge Plan      Row Name 09/04/20 0943           Plan     Plan  Pt unsure of DC needs at this time.  Referral to [facility] in case needed.      Plan Comments  S/W pt at bedside.  She is normally IADLs, so she hopes to be able to return home upon DC.  CCP discussed possible DC options including acute rehab, subacute rehab and HH.  If acute rehab needed, she would like [facility].  Referral called to [facility].  Provider info given for SNF and HH.  Will followup after therapies.

## 2020-09-05 ENCOUNTER — APPOINTMENT (OUTPATIENT)
Dept: MRI IMAGING | Facility: HOSPITAL | Age: 45
End: 2020-09-05

## 2020-09-05 LAB
ANION GAP SERPL CALCULATED.3IONS-SCNC: 11.7 MMOL/L (ref 5–15)
BUN SERPL-MCNC: 13 MG/DL (ref 6–20)
BUN/CREAT SERPL: 16 (ref 7–25)
CALCIUM SPEC-SCNC: 9.3 MG/DL (ref 8.6–10.5)
CHLORIDE SERPL-SCNC: 99 MMOL/L (ref 98–107)
CO2 SERPL-SCNC: 23.3 MMOL/L (ref 22–29)
CREAT SERPL-MCNC: 0.81 MG/DL (ref 0.57–1)
DEPRECATED RDW RBC AUTO: 39.4 FL (ref 37–54)
ERYTHROCYTE [DISTWIDTH] IN BLOOD BY AUTOMATED COUNT: 12.2 % (ref 12.3–15.4)
GFR SERPL CREATININE-BSD FRML MDRD: 77 ML/MIN/1.73
GLUCOSE BLDC GLUCOMTR-MCNC: 126 MG/DL (ref 70–130)
GLUCOSE BLDC GLUCOMTR-MCNC: 131 MG/DL (ref 70–130)
GLUCOSE BLDC GLUCOMTR-MCNC: 149 MG/DL (ref 70–130)
GLUCOSE BLDC GLUCOMTR-MCNC: 170 MG/DL (ref 70–130)
GLUCOSE SERPL-MCNC: 128 MG/DL (ref 65–99)
HCT VFR BLD AUTO: 37.4 % (ref 34–46.6)
HGB BLD-MCNC: 12.9 G/DL (ref 12–15.9)
MCH RBC QN AUTO: 30.5 PG (ref 26.6–33)
MCHC RBC AUTO-ENTMCNC: 34.5 G/DL (ref 31.5–35.7)
MCV RBC AUTO: 88.4 FL (ref 79–97)
PLATELET # BLD AUTO: 261 10*3/MM3 (ref 140–450)
PMV BLD AUTO: 11.6 FL (ref 6–12)
POTASSIUM SERPL-SCNC: 3.3 MMOL/L (ref 3.5–5.2)
POTASSIUM SERPL-SCNC: 3.7 MMOL/L (ref 3.5–5.2)
RBC # BLD AUTO: 4.23 10*6/MM3 (ref 3.77–5.28)
SODIUM SERPL-SCNC: 134 MMOL/L (ref 136–145)
WBC # BLD AUTO: 11.43 10*3/MM3 (ref 3.4–10.8)

## 2020-09-05 PROCEDURE — 97116 GAIT TRAINING THERAPY: CPT

## 2020-09-05 PROCEDURE — 97165 OT EVAL LOW COMPLEX 30 MIN: CPT

## 2020-09-05 PROCEDURE — 99233 SBSQ HOSP IP/OBS HIGH 50: CPT | Performed by: NURSE PRACTITIONER

## 2020-09-05 PROCEDURE — 80048 BASIC METABOLIC PNL TOTAL CA: CPT | Performed by: HOSPITALIST

## 2020-09-05 PROCEDURE — 82962 GLUCOSE BLOOD TEST: CPT

## 2020-09-05 PROCEDURE — 97110 THERAPEUTIC EXERCISES: CPT

## 2020-09-05 PROCEDURE — 84132 ASSAY OF SERUM POTASSIUM: CPT | Performed by: HOSPITALIST

## 2020-09-05 PROCEDURE — 97535 SELF CARE MNGMENT TRAINING: CPT

## 2020-09-05 PROCEDURE — 72156 MRI NECK SPINE W/O & W/DYE: CPT

## 2020-09-05 PROCEDURE — 85027 COMPLETE CBC AUTOMATED: CPT | Performed by: HOSPITALIST

## 2020-09-05 PROCEDURE — 0 GADOBENATE DIMEGLUMINE 529 MG/ML SOLUTION: Performed by: HOSPITALIST

## 2020-09-05 PROCEDURE — A9577 INJ MULTIHANCE: HCPCS | Performed by: HOSPITALIST

## 2020-09-05 PROCEDURE — 70553 MRI BRAIN STEM W/O & W/DYE: CPT

## 2020-09-05 RX ORDER — PREGABALIN 25 MG/1
50 CAPSULE ORAL EVERY 12 HOURS SCHEDULED
Status: DISCONTINUED | OUTPATIENT
Start: 2020-09-05 | End: 2020-09-07 | Stop reason: HOSPADM

## 2020-09-05 RX ADMIN — SODIUM CHLORIDE, PRESERVATIVE FREE 10 ML: 5 INJECTION INTRAVENOUS at 22:18

## 2020-09-05 RX ADMIN — SPIRONOLACTONE 100 MG: 50 TABLET, FILM COATED ORAL at 08:14

## 2020-09-05 RX ADMIN — HYDROCODONE BITARTRATE AND ACETAMINOPHEN 1 TABLET: 5; 325 TABLET ORAL at 23:37

## 2020-09-05 RX ADMIN — PREGABALIN 50 MG: 25 CAPSULE ORAL at 22:17

## 2020-09-05 RX ADMIN — PREGABALIN 50 MG: 25 CAPSULE ORAL at 12:43

## 2020-09-05 RX ADMIN — CHLORTHALIDONE 25 MG: 25 TABLET ORAL at 08:13

## 2020-09-05 RX ADMIN — POTASSIUM CHLORIDE 40 MEQ: 10 CAPSULE, COATED, EXTENDED RELEASE ORAL at 12:01

## 2020-09-05 RX ADMIN — ACETAMINOPHEN 650 MG: 325 TABLET, FILM COATED ORAL at 08:14

## 2020-09-05 RX ADMIN — POTASSIUM CHLORIDE 40 MEQ: 10 CAPSULE, COATED, EXTENDED RELEASE ORAL at 08:14

## 2020-09-05 RX ADMIN — GADOBENATE DIMEGLUMINE 20 ML: 529 INJECTION, SOLUTION INTRAVENOUS at 09:38

## 2020-09-05 RX ADMIN — LORAZEPAM 1 MG: 1 TABLET ORAL at 07:20

## 2020-09-05 NOTE — PROGRESS NOTES
"   LOS: 2 days   Patient Care Team:  Kya Kendrick PA-C as PCP - General  Kya Kendrick PA-C as PCP - Family Medicine  Humberto, HAN Moses as Nurse Practitioner (Family Medicine)    Chief Complaint: weakness    Subjective     HA is resolved since blood patch. Worked a little better with PT today.      Subjective:  Symptoms:  Improved.  She reports weakness.  No shortness of breath, malaise, cough, chest pain, headache, chest pressure, anorexia, diarrhea or anxiety.    Diet:  Adequate intake.  No nausea or vomiting.    Activity level: Impaired due to weakness.    Pain:  She reports no pain.        History taken from: patient chart family    Objective     Vital Signs  Temp:  [97.7 °F (36.5 °C)-98.6 °F (37 °C)] 97.7 °F (36.5 °C)  Heart Rate:  [] 89  Resp:  [16-20] 18  BP: (116-136)/(69-96) 136/76    Objective:  General Appearance:  Comfortable and in no acute distress.    Vital signs: (most recent): Blood pressure 136/76, pulse 89, temperature 97.7 °F (36.5 °C), temperature source Oral, resp. rate 18, height 162.6 cm (64.02\"), weight 120 kg (264 lb 8.8 oz), SpO2 96 %, not currently breastfeeding.  Vital signs are normal.  No fever.    Output: Producing urine.    HEENT: Normal HEENT exam.    Lungs:  Normal effort and normal respiratory rate.  Breath sounds clear to auscultation.  She is not in respiratory distress.    Heart: Normal rate.  Regular rhythm.    Abdomen: Abdomen is soft.  (Morbidly obese).  Bowel sounds are normal.   There is no abdominal tenderness.     Extremities: There is no dependent edema.    Pulses: Distal pulses are intact.    Neurological: Patient is alert and oriented to person, place and time.  Patient has normal reflexes and normal muscle tone.  (Weak in BLEs).    Pupils:  Pupils are equal, round, and reactive to light.    Skin:  Warm and dry.  No rash.             Results Review:     I reviewed the patient's new clinical results.  I reviewed the patient's new imaging " results and agree with the interpretation.  I reviewed the patient's other test results and agree with the interpretation  I personally viewed and interpreted the patient's EKG/Telemetry data  Discussed with pt and     Results from last 7 days   Lab Units 09/05/20 0422 09/04/20  0948 09/03/20 0442 09/02/20  0902   WBC 10*3/mm3 11.43* 9.58 10.49 8.19   HEMOGLOBIN g/dL 12.9 13.2 12.6 13.5   PLATELETS 10*3/mm3 261 281 251 147     Results from last 7 days   Lab Units 09/05/20  0422 09/04/20  0948 09/03/20  0442 09/02/20  0925   SODIUM mmol/L 134* 134* 138 140   POTASSIUM mmol/L 3.3* 3.3* 3.3* 3.7   CHLORIDE mmol/L 99 95* 97* 101   CO2 mmol/L 23.3 25.0 25.8 24.4   BUN mg/dL 13 14 15 12   CREATININE mg/dL 0.81 0.85 0.74 0.66   CALCIUM mg/dL 9.3 9.8 9.3 9.3   MAGNESIUM mg/dL  --  2.0  --  1.6   PHOSPHORUS mg/dL  --   --   --  3.3   Estimated Creatinine Clearance: 113.1 mL/min (by C-G formula based on SCr of 0.81 mg/dL).    Medication Review: reviewed and adjusted    Assessment/Plan       GBS (Guillain-[place] syndrome) (CMS/[facility])    Benign essential hypertension    Hypercholesterolemia    PCOS (polycystic ovarian syndrome)    Insulin resistance syndrome    Morbid obesity with BMI of 45.0-49.9, adult (CMS/[facility])    Fibromyalgia    Weakness of both lower extremities    Hypokalemia    Headache, post-lumbar puncture          Plan:   (BLE weakness, ?GBS  -sent here from [place] due to concern over possible GBS even though CSF studies not c/w it, and reflexes still present  -unable to get MR C-spine there and Dr. Armijo though might also benefit from NCV study here  -MRI brain and MRI C-spine both are fine here  -no evidence of respiratory compromise  -pt has pretty severe fibromyalgia so Neuro has started BID Lyrica and recommends continued PT/OT, possibly [facility]  -Neuro feels pt's presenting symptoms are most likely non-organic, but certainly her fibromyalgia could be playing a role    Post LP HA  -s/p blood  patch    PCOS/hyperglycemia  -covering with SSI here  -can restart Metformin at dc    Hypokalemia  -continue replacing orally, Mg++ fine    Hyponatremia  -stable today  -monitor    Full code  SCDs for DVT ppx  Further orders to follow as suggested by evolving hospital course).       Prasanna So MD  09/05/20  17:03    Time: More than 50% of time spent in counseling and coordination of care:  Total face-to-face/floor time 40 min.  Time spent in counseling 30 min. Counseling included the following topics: pathophysiology and treatment of fibromyalgia

## 2020-09-05 NOTE — THERAPY EVALUATION
Acute Care - Occupational Therapy Initial Evaluation  [facility]     Patient Name: Angie Patel  : 1975  MRN: [id number]  Today's Date: 2020  Onset of Illness/Injury or Date of Surgery: 20          Admit Date: 9/3/2020     No diagnosis found.  Patient Active Problem List   Diagnosis   • Magnetic resonance imaging of brain abnormal   • Benign essential hypertension   • Blurring of visual image   • Fatigue   • Confusional state   • Abnormal gait   • Hypercholesterolemia   • Headache   • Meningioma (CMS/HCC)   • Migraine   • Morbid obesity (CMS/HCC)   • Neck pain   • Nausea   • Pitting edema   • Polycystic ovaries   • Rib pain on left side   • Pain of sternum   • Vaginal irritation   • Vitamin D insufficiency   • Tobacco abuse counseling   • Hypotensive episode   • Acute bilateral thoracic back pain   • Pendulous breast   • Memory changes   • Weight gain   • PCOS (polycystic ovarian syndrome)   • Menopausal symptoms   • Insulin resistance syndrome   • Morbid obesity with BMI of 40.0-44.9, adult (CMS/HCC)   • Right ureteral stone   • Hematuria   • Constipation due to pain medication therapy   • Vision changes   • Blurred vision, bilateral   • Obesity (BMI 35.0-39.9 without comorbidity)   • Chest tightness or pressure   • Anxiety   • Need for influenza vaccination   • Acute left flank pain   • History of kidney stones   • Morbid obesity with BMI of 45.0-49.9, adult (CMS/HCC)   • Screening mammogram, encounter for   • Meningioma (CMS/HCC)   • Fibromyalgia   • High risk medication use   • Primary insomnia   • Arthralgia   • Chronic cough   • Thyroid enlargement   • Left-sided chest pain   • Neurological abnormality   • Foot injury, right, initial encounter   • Acute pain of right foot   • KACI (obstructive sleep apnea)   • Type 2 diabetes mellitus with hyperglycemia, without long-term current use of insulin (CMS/HCC)   • GBS (Guillain-Preston syndrome) (CMS/HCC)   • Weakness of both lower extremities    • Hypokalemia   • Headache, post-lumbar puncture     Past Medical History:   Diagnosis Date   • Anxiety    • Asthma    • Cardiomyopathy (CMS/HCC)     with pregnancy   • Diabetes mellitus (CMS/HCC)    • Fibromyalgia    • Fibromyalgia, primary    • Headache, post-lumbar puncture 9/4/2020   • Headache, tension-type    • Hypertension    • Insomnia    • Kidney calculi    • Memory loss    • Meningioma (CMS/HCC)     benign brain    • Migraine     ocular   • KACI (obstructive sleep apnea)    • Palpitations    • Polycystic ovaries    • Seizures (CMS/HCC)      Past Surgical History:   Procedure Laterality Date   • CYSTOSCOPY URETEROSCOPY LASER LITHOTRIPSY Right 3/7/2017    Procedure: CYSTOSCOPY RT URETEROSCOPY LASER LITHOTRIPSY W/ STENT, rerograde pyelogram ;  Surgeon: Rashid Malloy MD;  Location: [facility];  Service:    • FOOT SURGERY Left     tendon repair   • HYSTERECTOMY     • OOPHORECTOMY     • SHOULDER SURGERY Right 2009   • TUBAL ABDOMINAL LIGATION     • WISDOM TOOTH EXTRACTION            OT ASSESSMENT FLOWSHEET (last 12 hours)      Occupational Therapy Evaluation     Row Name 09/05/20 4610                   OT Evaluation Time/Intention    Subjective Information  complains of;weakness;pain  -CW        Document Type  evaluation  -CW        Mode of Treatment  individual therapy;occupational therapy  -CW        Patient Effort  good  -CW        Symptoms Noted During/After Treatment  fatigue  -CW           General Information    Patient Profile Reviewed?  yes  -CW        Onset of Illness/Injury or Date of Surgery  09/02/20  -CW        Patient Observations  alert;cooperative;agree to therapy  -CW        Patient/Family Observations  Spouse present.   -CW        General Observations of Patient  Pt. sitting EOB. Just finished with PT.   -CW        Prior Level of Function  independent:;ADL's  -CW        Equipment Currently Used at Home  none  -CW        Existing Precautions/Restrictions  fall  -CW           Cognitive  Assessment/Intervention- PT/OT    Orientation Status (Cognition)  oriented x 4  -CW        Follows Commands (Cognition)  WNL  -CW           Sit-Stand Transfer    Sit-Stand Nash (Transfers)  contact guard Close CGA, Balance impaired.   -CW           Stand-Sit Transfer    Stand-Sit [place] (Transfers)  contact guard  -CW           ADL Assessment/Intervention    BADL Assessment/Intervention  lower body dressing;grooming;feeding  -CW           Lower Body Dressing Assessment/Training    Lower Body Dressing Nash Level  lower body dressing skills;doff;don;pants/bottoms;socks;dependent (less than 25% patient effort)  -CW        Lower Body Dressing Position  edge of bed sitting;supported sitting  -CW        Comment (Lower Body Dressing)  Pt. unable to complete due to pain B thighs and hips.   -CW           Grooming Assessment/Training    Comment (Grooming)  No difficulty per pt., but reports her shoulders get stiff and painful at times  -CW           Self-Feeding Assessment/Training    Comment (Feeding)  Pt. denies any difficulty.   -CW           General ROM    GENERAL ROM COMMENTS  BUE AROM WFL's.   -CW           MMT (Manual Muscle Testing)    General MMT Comments  UE's 4/5 general  -CW           Motor Assessment/Interventions    Additional Documentation  Functional Endurance Training (Group)  -CW           Static Sitting Balance    Level of Nash (Unsupported Sitting, Static Balance)  independent  -CW        Sitting Position (Unsupported Sitting, Static Balance)  sitting on edge of bed  -CW        Time Able to Maintain Position (Unsupported Sitting, Static Balance)  more than 5 minutes  -CW           Static Standing Balance    Level of Nash (Supported Standing, Static Balance)  minimal assist, 75% patient effort  -CW        Time Able to Maintain Position (Supported Standing, Static Balance)  45 to 60 seconds  -CW        Comment (Supported Standing, Static Balance)  Impaired standing  balance. Is able to adjust to midline with cues.   -CW           Functional Endurance Training    Comment, Functional Endurance  fair  -CW           Sensory Assessment/Intervention    Sensory General Assessment  -- Light touch intact B hands/UE's  -CW           Positioning and Restraints    Pre-Treatment Position  in bed  -CW        Post Treatment Position  bed  -CW        In Bed  sitting EOB;call light within reach;with family/caregiver  -CW           Pain Scale: Numbers Pre/Post-Treatment    Pain Scale: Numbers, Pretreatment  4/10  -CW        Pain Scale: Numbers, Post-Treatment  4/10  -CW        Pain Location  hip B hip and thighs  -CW        Pain Intervention(s)  Repositioned  -CW           Plan of Care Review    Plan of Care Reviewed With  patient;spouse  -CW           Clinical Impression (OT)    Therapy Frequency (OT Eval)  5 times/wk  -CW        Anticipated Equipment Needs at Discharge (OT)  -- TBA  -CW        Anticipated Discharge Disposition (OT)  inpatient rehabilitation facility  -CW        Patient/Family Concerns, Anticipated Discharge Disposition (OT)  Pt. concerned about her general mobility at home  -CW           Planned OT Interventions    Planned Therapy Interventions (OT Eval)  adaptive equipment training;BADL retraining;transfer/mobility retraining  -CW           OT Goals    Transfer Goal Selection (OT)  transfer, OT goal 1  -CW        Dressing Goal Selection (OT)  dressing, OT goal 1  -CW           Transfer Goal 1 (OT)    Activity/Assistive Device (Transfer Goal 1, OT)  sit-to-stand/stand-to-sit;bed-to-chair/chair-to-bed;toilet;commode, bedside without drop arms;walker, rolling  -CW        [place] Level/Cues Needed (Transfer Goal 1, OT)  contact guard assist  -CW        Time Frame (Transfer Goal 1, OT)  2 weeks  -CW           Dressing Goal 1 (OT)    Activity/Assistive Device (Dressing Goal 1, OT)  upper body dressing;lower body dressing  -CW        [place]/Cues Needed (Dressing Goal 1,  "OT)  set-up required;moderate assist (50-74% patient effort)  -CW        Time Frame (Dressing Goal 1, OT)  2 weeks  -CW          User Key  (r) = Recorded By, (t) = Taken By, (c) = Cosigned By    Initials Name Effective Dates    Nickie Smith OTR 06/08/18 -          Occupational Therapy Education                 Title: PT OT SLP Therapies (Done)     Topic: Occupational Therapy (Done)     Point: ADL training (Done)     Description:   Instruct learner(s) on proper safety adaptation and remediation techniques during self care or transfers.   Instruct in proper use of assistive devices.              Learning Progress Summary           Patient Acceptance, E, VU by CW at 9/5/2020 1235    Comment:  Discussed role of OT and poc.   Significant Other Acceptance, E, VU by CW at 9/5/2020 1235    Comment:  Discussed role of OT and poc.                               User Key     Initials Effective Dates Name Provider Type Discipline     06/08/18 -  Nickie Masterson OTDEX Occupational Therapist OT                  OT Recommendation and Plan  Outcome Summary/Treatment Plan (OT)  Anticipated Equipment Needs at Discharge (OT): (TBA)  Anticipated Discharge Disposition (OT): inpatient rehabilitation facility  Patient/Family Concerns, Anticipated Discharge Disposition (OT): Pt. concerned about her general mobility at home  Planned Therapy Interventions (OT Eval): adaptive equipment training, BADL retraining, transfer/mobility retraining  Therapy Frequency (OT Eval): 5 times/wk  Plan of Care Review  Plan of Care Reviewed With: patient, spouse  Plan of Care Reviewed With: patient, spouse  Outcome Summary: OT eval completed. Pt. c/o pain B hips/thighs \"4\" on pain scale. Pt. admitted for suspected guillain [place]-hx. fibromyalgia. Lives with spouse and PLOF was indep with self care. No DME at home. LE dressing dependent due to LE pain seated EOB. Pt. reports no difficulty with grooming. Endurance is fair. Just finished with PT and is " fatigued. Impaired standing. balance    Outcome Measures     Row Name 09/05/20 1200             How much help from another is currently needed...    Putting on and taking off regular lower body clothing?  1  -CW      Bathing (including washing, rinsing, and drying)  2  -CW      Toileting (which includes using toilet bed pan or urinal)  1  -CW      Putting on and taking off regular upper body clothing  3  -CW      Taking care of personal grooming (such as brushing teeth)  3  -CW      Eating meals  4  -CW      AM-PAC 6 Clicks Score (OT)  14  -CW         Functional Assessment    Outcome Measure Options  AM-PAC 6 Clicks Daily Activity (OT)  -CW        User Key  (r) = Recorded By, (t) = Taken By, (c) = Cosigned By    Initials Name Provider Type    Nickie Smith OTR Occupational Therapist          Time Calculation:   Time Calculation- OT     Row Name 09/05/20 1245             Time Calculation- OT    OT Start Time  1200  -CW      OT Stop Time  1218  -CW      OT Time Calculation (min)  18 min  -CW      Total Timed Code Minutes- OT  8 minute(s)  -CW        User Key  (r) = Recorded By, (t) = Taken By, (c) = Cosigned By    Initials Name Provider Type    Nickie Smith OTR Occupational Therapist        Therapy Charges for Today     Code Description Service Date Service Provider Modifiers Qty    [id number]  OT SELF CARE/MGMT/TRAIN EA 15 MIN 9/5/2020 Nickie Masterson OTR GO 1    [id number]  OT EVAL LOW COMPLEXITY 2 9/5/2020 Nickie Masterson OTR GO 1               MEGAN Cortés  9/5/2020

## 2020-09-05 NOTE — PROGRESS NOTES
"DOS: 2020  NAME: Angie Patel   : 1975  PCP: Kya Kendrick PA-C  Chief Complaint   Patient presents with   • Headache     Patient seen in follow-up today; new to me          Subjective: No events overnight.  Patient reports she continues to require assistance to bathroom and feels very unsteady on her feet.     No family at bedside.     Objective:  Vital signs: /76 (BP Location: Left arm, Patient Position: Sitting)   Pulse 99   Temp 97.7 °F (36.5 °C) (Oral)   Resp 18   Ht 162.6 cm (64.02\")   Wt 120 kg (264 lb 8.8 oz)   SpO2 98%   BMI 45.39 kg/m²       HEENT: Normocephalic, atraumatic   COR: RRR  Resp: Even and unlabored  Extremities: Equal pulses, nondistal embolization    Neurological:   MS: AO. Language normal. No neglect. Higher integrative function normal  CN: II-XII normal  Motor: 5/5, normal tone  Reflexes: Toes downgoing  Sensory: Intact to light touch and temperature  Coordination: Normal     Laboratory results:  Lab Results   Component Value Date    GLUCOSE 128 (H) 2020    CALCIUM 9.3 2020     (L) 2020    K 3.3 (L) 2020    CO2 23.3 2020    CL 99 2020    BUN 13 2020    CREATININE 0.81 2020    EGFRIFAFRI 88 2020    EGFRIFNONA 77 2020    BCR 16.0 2020    ANIONGAP 11.7 2020     Lab Results   Component Value Date    WBC 11.43 (H) 2020    HGB 12.9 2020    HCT 37.4 2020    MCV 88.4 2020     2020     Lab Results   Component Value Date    CHOL 167 2016     Lab Results   Component Value Date    HDL 34 (L) 2020    HDL 42 10/08/2019    HDL 39 (L) 2017     Lab Results   Component Value Date    LDL 75 2020    LDL 92 10/08/2019    LDL 99 2017     Lab Results   Component Value Date    TRIG 174 (H) 2020    TRIG 109 10/08/2019    TRIG 116 2017     Lab Results   Component Value Date    TSH 2.910 2020     Lab Results   Component Value " Date    POZWGRDT33 287 10/21/2016     Lab Results   Component Value Date    HGBA1C 6.90 (H) 06/29/2020     Lab Results   Component Value Date    CHOL 167 05/16/2016    CHLPL 144 06/29/2020    CHLPL 156 10/08/2019    CHLPL 161 06/05/2017     Lab Results   Component Value Date    TRIG 174 (H) 06/29/2020    TRIG 109 10/08/2019    TRIG 116 06/05/2017     Lab Results   Component Value Date    HDL 34 (L) 06/29/2020    HDL 42 10/08/2019    HDL 39 (L) 06/05/2017     Lab Results   Component Value Date    LDL 75 06/29/2020    LDL 92 10/08/2019    LDL 99 06/05/2017     West Nile virus negative        Review and interpretation of imaging:  MR SCAN OF THE CERVICAL SPINE WITHOUT AND WITH INTRAVENOUS CONTRAST     HISTORY: Facial numbness, and numbness and tingling in both arms and  legs. Leg weakness.     The MR scan was performed with sagittal and axial images and includes T1  images without and with intravenous contrast. The following findings are  present:  1. The cervicomedullary junction is normal in position. There is no  intrinsic interbody cervical spinal cord. There is no abnormal  enhancement.  2. The foramen magnum and C1-2 levels are normal. The vertebral height  and disc spaces are well-maintained throughout the cervical spine and  there is no disc abnormality or central or foraminal encroachment.  3. The paraspinal soft tissues are unremarkable.     This report was finalized on 9/5/2020 12:06 PM by Dr. Adrian Antonio M.D.     MR SCAN OF THE BRAIN WITHOUT AND WITH INTRAVENOUS CONTRAST     HISTORY: Numbness and tingling in face and both arms and legs. Leg  weakness.     The MR scan was performed with sagittal and axial and coronal images  without and with intravenous contrast and compared to a previous MRI  scan dated 10/15/2019. The ventricles are normal in size and midline.  There is no evidence of intracranial hemorrhage or mass effect. The  diffusion sequence shows no evidence of acute infarct.     There is a very  small enhancing meningioma high in the right posterior  frontal region measuring 10 mm and this shows no change from 10/15/2019.  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 There are normal  flow voids in the major vessels. The sinuses and mastoid air cells are  clear.     CONCLUSION: Small meningioma high in the right posterior frontal region  unchanged from 10/15/2019. 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 The exam is otherwise unremarkable.     This report was finalized on 9/5/2020 10:58 AM by Dr. Adrian Antonio M.D.     MRI Spine Lumbar WO     HISTORY:  Lower back pain radiating into bilateral lower extremities with paresthesias, worsening the last 5 days     TECHNIQUE:  Multiplanar multisequence imaging of the lumbar spine acquired without IV contrast utilizing a standard protocol.     COMPARISON: None     FINDINGS:     For the purposes of this dictation there appears to be 5 lumbar-type vertebra with the last fully formed disc space representing L5-S1.     Lumbar alignment is anatomic. Vertebral body heights are maintained. No focal aggressive marrow lesion is identified. Disc heights are preserved. The conus terminates at an expected level and the visualized distal cord signal is within normal limits.     Level by level:     L1-2: No significant thecal sac or foraminal narrowing.     L2-3: No significant thecal sac or foraminal narrowing.     L3-4: No significant thecal sac or foraminal narrowing     L4-5: Moderate facet degenerative change. Minimal disc bulge, slightly eccentric to the left but no significant thecal sac or foraminal narrowing is present.     L5-S1: Moderate right greater than left facet degenerative change without significant thecal sac or foraminal narrowing.           IMPRESSION:  1.  Degenerative  changes in the lower lumbar spine, predominantly due to facet degeneration without high-grade spinal canal or foraminal narrowing.     Signer Name: MARLEN CALDERON MD   Signed: 9/2/2020 10:16 AM   Workstation Name: [id number]    [facility]    Impression: This is a 45-year-old female with history of asthma, cardiomyopathy, diabetes mellitus (recently diagnosed), fibromyalgia, headaches/ocular migraines, hypertension, known meningioma, KACI, obesity (BMI 45.39) and seizure who was transferred from [facility] due to worsening lower extremity leg weakness and numbness which started several days prior to arrival.  Chart review reflects that patient was unable to ambulate without assistance of another person and walking aid.  Neurology concern at [place] was for GBS.  Evaluation by attending here on 9/4 revealed no signs and symptoms of [place] Barré syndrome paired with normal CSF findings.  Etiology of symptoms thought to be nonorganic/functional.  MRI of the lumbar spine was completed at [facility] which showed no evidence of high-grade spinal canal and/or foraminal narrowing; incidental degenerative changes noted in lower lumbar spine.  MRI of the cervical spine since completed here which was unremarkable.  MRI of the brain completed which showed small meningioma high in right posterior frontal region; unchanged from 10/19 imaging known to patient.  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    Neurologically patient reports some mild improvement in symptoms although she still is requiring assist x1 to ambulate; no motor deficits seen on exam.  No further neurological work-up indicated will initiate Lyrica due to diagnosis of fibromyalgia and recent diagnosis of diabetes with concern for diabetic peripheral neuropathy.  I recommend that patient follow-up in outpatient clinic with Dr. Rony Stacy and/or Óscar Wade to further  evaluate need for EMG and/or further diagnostic testing.  Given mobility limitations would recommend outpatient physical therapy at minimum versus inpatient rehab stay.No further neurology workup indicated. We will sign off and see again upon request.          Plan:  Lyrica 50 mg every 12 hours  Follow-up with neurology in the next 3 months; Dr. Stacy or Dr. Wade-consider EMG of bilateral lower extremity    Case reviewed with  and he agrees with treatment plan above.     Alyssa Hammond, APRN

## 2020-09-05 NOTE — PLAN OF CARE
"  Problem: Patient Care Overview  Goal: Plan of Care Review  Flowsheets (Taken 9/5/2020 6421)  Plan of Care Reviewed With: patient; spouse  Outcome Summary: OT eval completed. Pt. c/o pain B hips/thighs \"4\" on pain scale. Pt. admitted for suspected guillain [place]-hx. fibromyalgia. Lives with spouse and PLOF was indep with self care. No DME at home. LE dressing dependent due to LE pain seated EOB. Pt. reports no difficulty with grooming. Endurance is fair. Just finished with PT and is fatigued. Impaired standing balance, but is able to adjust to midline with cues. Pt may benefit from OT to Maximize indep with self care, balance, endurance, and functional mobility. Pt is concerned about going home too early with generalized decreased indep/mobility. Anticipate inpt. Rehab upon D/c.     Patient was not wearing a face mask during this therapy encounter. Therapist used appropriate personal protective equipment including mask and gloves.  Mask used was standard procedure mask. Appropriate PPE was worn during the entire therapy session. Hand hygiene was completed before and after therapy session. Patient is not in enhanced droplet precautions.          "

## 2020-09-05 NOTE — PLAN OF CARE
Pt had MRI this am. Pt had K+ replacement today will redraw labs tonight to see if came back up.  Pt had no c/o soa and no pain since this am.  Will continue to monitor  Problem: Patient Care Overview  Goal: Plan of Care Review  Outcome: Ongoing (interventions implemented as appropriate)  Flowsheets  Taken 9/5/2020 1822 by Leelee Moody RN  Progress: improving  Taken 9/5/2020 1442 by Leelee Moody, RN  Plan of Care Reviewed With: patient;spouse  Taken 9/5/2020 1308 by Terrie Cho PTA  Outcome Summary: Pt tolerated treatment well this date. Increased gait distance to 10ft x2, seated rest break between d/t fatigue. Pt occasionally unsteady, losing balance backwards. Pt stated she was concerned about going home d/t inability to navigate stairs at the moment d/t weakness. Hopeful for d/c to [facility] for continued skilled PT for balance and strengthening. Patient was intermittently wearing a face mask during this therapy encounter. Therapist used appropriate personal protective equipment including eye protection, mask, and gloves.  Mask used was standard procedure mask. Appropriate PPE was worn during the entire therapy session. Hand hygiene was completed before and after therapy session. Patient is not in enhanced droplet precautions.  Goal: Individualization and Mutuality  Outcome: Ongoing (interventions implemented as appropriate)  Goal: Discharge Needs Assessment  Outcome: Ongoing (interventions implemented as appropriate)  Flowsheets (Taken 9/5/2020 1822)  Concerns to be Addressed: discharge planning  Readmission Within the Last 30 Days: no previous admission in last 30 days  Goal: Interprofessional Rounds/Family Conf  Outcome: Ongoing (interventions implemented as appropriate)  Flowsheets (Taken 9/5/2020 1822)  Participants: physician; nursing     Problem: Pain, Acute (Adult)  Goal: Identify Related Risk Factors and Signs and Symptoms  Outcome: Ongoing (interventions implemented as  appropriate)  Flowsheets  Taken 9/4/2020 1811 by Leelee Moody RN  Related Risk Factors (Acute Pain): procedure/treatment  Taken 9/4/2020 0413 by Carolina Patricia RN  Signs and Symptoms (Acute Pain): verbalization of pain descriptors  Goal: Acceptable Pain Control/Comfort Level  Outcome: Ongoing (interventions implemented as appropriate)  Flowsheets (Taken 9/5/2020 1822)  Acceptable Pain Control/Comfort Level: making progress toward outcome     Problem: Fall Risk (Adult)  Goal: Identify Related Risk Factors and Signs and Symptoms  Outcome: Ongoing (interventions implemented as appropriate)  Flowsheets (Taken 9/4/2020 0413 by Carolina Patricia RN)  Related Risk Factors (Fall Risk): gait/mobility problems;neuro disease/injury;environment unfamiliar  Signs and Symptoms (Fall Risk): presence of risk factors  Goal: Absence of Fall  Outcome: Ongoing (interventions implemented as appropriate)  Flowsheets (Taken 9/5/2020 1822)  Absence of Fall: making progress toward outcome

## 2020-09-05 NOTE — THERAPY TREATMENT NOTE
Patient Name: Angie Patel  : 1975    MRN: [id number]                              Today's Date: 2020       Admit Date: 9/3/2020    Visit Dx: No diagnosis found.  Patient Active Problem List   Diagnosis   • Magnetic resonance imaging of brain abnormal   • Benign essential hypertension   • Blurring of visual image   • Fatigue   • Confusional state   • Abnormal gait   • Hypercholesterolemia   • Headache   • Meningioma (CMS/HCC)   • Migraine   • Morbid obesity (CMS/HCC)   • Neck pain   • Nausea   • Pitting edema   • Polycystic ovaries   • Rib pain on left side   • Pain of sternum   • Vaginal irritation   • Vitamin D insufficiency   • Tobacco abuse counseling   • Hypotensive episode   • Acute bilateral thoracic back pain   • Pendulous breast   • Memory changes   • Weight gain   • PCOS (polycystic ovarian syndrome)   • Menopausal symptoms   • Insulin resistance syndrome   • Morbid obesity with BMI of 40.0-44.9, adult (CMS/[facility])   • Right ureteral stone   • Hematuria   • Constipation due to pain medication therapy   • Vision changes   • Blurred vision, bilateral   • Obesity (BMI 35.0-39.9 without comorbidity)   • Chest tightness or pressure   • Anxiety   • Need for influenza vaccination   • Acute left flank pain   • History of kidney stones   • Morbid obesity with BMI of 45.0-49.9, adult (CMS/HCC)   • Screening mammogram, encounter for   • Meningioma (CMS/HCC)   • Fibromyalgia   • High risk medication use   • Primary insomnia   • Arthralgia   • Chronic cough   • Thyroid enlargement   • Left-sided chest pain   • Neurological abnormality   • Foot injury, right, initial encounter   • Acute pain of right foot   • KACI (obstructive sleep apnea)   • Type 2 diabetes mellitus with hyperglycemia, without long-term current use of insulin (CMS/[facility])   • 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) (CMS/[facility])   • Weakness of both lower extremities   • Hypokalemia   • Headache, post-lumbar puncture     Past Medical History:   Diagnosis  Date   • Anxiety    • Asthma    • Cardiomyopathy (CMS/HCC)     with pregnancy   • Diabetes mellitus (CMS/HCC)    • Fibromyalgia    • Fibromyalgia, primary    • Headache, post-lumbar puncture 9/4/2020   • Headache, tension-type    • Hypertension    • Insomnia    • Kidney calculi    • Memory loss    • Meningioma (CMS/HCC)     benign brain    • Migraine     ocular   • KACI (obstructive sleep apnea)    • Palpitations    • Polycystic ovaries    • Seizures (CMS/HCC)      Past Surgical History:   Procedure Laterality Date   • CYSTOSCOPY URETEROSCOPY LASER LITHOTRIPSY Right 3/7/2017    Procedure: CYSTOSCOPY RT URETEROSCOPY LASER LITHOTRIPSY W/ STENT, rerograde pyelogram ;  Surgeon: Rashid Malloy MD;  Location: [facility] OR;  Service:    • FOOT SURGERY Left     tendon repair   • HYSTERECTOMY     • OOPHORECTOMY     • SHOULDER SURGERY Right 2009   • TUBAL ABDOMINAL LIGATION     • WISDOM TOOTH EXTRACTION       General Information     Row Name 09/05/20 1255          PT Evaluation Time/Intention    Document Type  therapy note (daily note)  -     Mode of Treatment  physical therapy  -     Row Name 09/05/20 1255          General Information    Existing Precautions/Restrictions  fall  -     Row Name 09/05/20 1255          Cognitive Assessment/Intervention- PT/OT    Orientation Status (Cognition)  oriented x 4  -SM       User Key  (r) = Recorded By, (t) = Taken By, (c) = Cosigned By    Initials Name Provider Type     Terrie Cho PTA Physical Therapy Assistant        Mobility     Row Name 09/05/20 1255          Bed Mobility Assessment/Treatment    Bed Mobility Assessment/Treatment  supine-sit;sit-supine  -     Supine-Sit [place] (Bed Mobility)  conditional independence  -     Sit-Supine [place] (Bed Mobility)  conditional independence  -     Assistive Device (Bed Mobility)  bed rails;head of bed elevated  -     Row Name 09/05/20 1255          Sit-Stand Transfer    Sit-Stand [place]  (Transfers)  contact guard;minimum assist (75% patient effort)  -     Assistive Device (Sit-Stand Transfers)  walker, front-wheeled  -SM     Row Name 09/05/20 1255          Gait/Stairs Assessment/Training    [place] Level (Gait)  contact guard;minimum assist (75% patient effort)  -     Assistive Device (Gait)  walker, front-wheeled  -SM     Distance in Feet (Gait)  10ft x2  -SM     Pattern (Gait)  step-through;step-to  -SM     Deviations/Abnormal Patterns (Gait)  jeremy decreased;stride length decreased;ataxic  -SM     Bilateral Gait Deviations  forward flexed posture  -SM     Comment (Gait/Stairs)  slightly unsteady, occasionally losing balance backwards, min A to correct  -SM       User Key  (r) = Recorded By, (t) = Taken By, (c) = Cosigned By    Initials Name Provider Type    Terrie Dumont PTA Physical Therapy Assistant        Obj/Interventions     Row Name 09/05/20 1302          Therapeutic Exercise    Lower Extremity (Therapeutic Exercise)  gluteal sets;heel slides, bilateral;quad sets, bilateral;SAQ (short arc quad), bilateral;SLR (straight leg raise), bilateral  -     Lower Extremity Range of Motion (Therapeutic Exercise)  hip abduction/adduction, bilateral;ankle dorsiflexion/plantar flexion, bilateral  -     Exercise Type (Therapeutic Exercise)  AROM (active range of motion)  -SM     Position (Therapeutic Exercise)  supine  -SM     Sets/Reps (Therapeutic Exercise)  5-10 reps  -       User Key  (r) = Recorded By, (t) = Taken By, (c) = Cosigned By    Initials Name Provider Type    Terrie Dumont PTA Physical Therapy Assistant        Goals/Plan    No documentation.       Clinical Impression     Row Name 09/05/20 1303          Pain Assessment    Additional Documentation  Pain Scale: Numbers Pre/Post-Treatment (Group)  -[facility] Name 09/05/20 1303          Pain Scale: Numbers Pre/Post-Treatment    Pain Scale: Numbers, Pretreatment  0/10 - no pain  -     Pain Scale: Numbers,  "Post-Treatment  4/10  -     Pain Location - Side  Right  -     Pain Location  hip  -     Pre/Post Treatment Pain Comment  \"thigh\"  -SM     Pain Intervention(s)  Repositioned;Ambulation/increased activity;Rest  -     Row Name 09/05/20 1303          Positioning and Restraints    Pre-Treatment Position  in bed  -SM     Post Treatment Position  bed  -SM     In Bed  sitting EOB;call light within reach;encouraged to call for assist;with OT  -       User Key  (r) = Recorded By, (t) = Taken By, (c) = Cosigned By    Initials Name Provider Type    Terrie Dumont PTA Physical Therapy Assistant        Outcome Measures     Row Name 09/05/20 1307          How much help from another person do you currently need...    Turning from your back to your side while in flat bed without using bedrails?  3  -SM     Moving from lying on back to sitting on the side of a flat bed without bedrails?  3  -SM     Moving to and from a bed to a chair (including a wheelchair)?  3  -SM     Standing up from a chair using your arms (e.g., wheelchair, bedside chair)?  3  -SM     Climbing 3-5 steps with a railing?  1  -SM     To walk in hospital room?  3  -SM     AM-PAC 6 Clicks Score (PT)  16  -     Row Name 09/05/20 1307          Functional Assessment    Outcome Measure Options  AM-PAC 6 Clicks Basic Mobility (PT)  -       User Key  (r) = Recorded By, (t) = Taken By, (c) = Cosigned By    Initials Name Provider Type    Terrie Dumont PTA Physical Therapy Assistant        Physical Therapy Education                 Title: PT OT SLP Therapies (Done)     Topic: Physical Therapy (Done)     Point: Mobility training (Done)     Description:   Instruct learner(s) on safety and technique for assisting patient out of bed, chair or wheelchair.  Instruct in the proper use of assistive devices, such as walker, crutches, cane or brace.              Patient Friendly Description:   It's important to get you on your feet again, but we need " to do so in a way that is safe for you. Falling has serious consequences, and your personal safety is the most important thing of all.        When it's time to get out of bed, one of us or a family member will sit next to you on the bed to give you support.     If your doctor or nurse tells you to use a walker, crutches, a cane, or a brace, be sure you use it every time you get out of bed, even if you think you don't need it.    Learning Progress Summary           Patient Acceptance, E,TB,D, VU,NR by  at 9/5/2020 1308    Acceptance, E, VU by CW at 9/5/2020 1235    Comment:  Discussed role of OT and poc.    Acceptance, E,TB,D,H, VU,NR by  at 9/4/2020 1337   Significant Other Acceptance, E, VU by CW at 9/5/2020 1235    Comment:  Discussed role of OT and poc.                   Point: Home exercise program (Done)     Description:   Instruct learner(s) on appropriate technique for monitoring, assisting and/or progressing patient with therapeutic exercises and activities.              Learning Progress Summary           Patient Acceptance, E,TB,D, VU,NR by  at 9/5/2020 1308    Acceptance, E, VU by CW at 9/5/2020 1235    Comment:  Discussed role of OT and poc.    Acceptance, E,TB,D,H, VU,NR by  at 9/4/2020 1337   Significant Other Acceptance, E, VU by CW at 9/5/2020 1235    Comment:  Discussed role of OT and poc.                   Point: Body mechanics (Done)     Description:   Instruct learner(s) on proper positioning and spine alignment for patient and/or caregiver during mobility tasks and/or exercises.              Learning Progress Summary           Patient Acceptance, E,TB,D, VU,NR by  at 9/5/2020 1308    Acceptance, E, VU by CW at 9/5/2020 1235    Comment:  Discussed role of OT and poc.    Acceptance, E,TB,D,H, VU,NR by  at 9/4/2020 1337   Significant Other Acceptance, E, VU by CW at 9/5/2020 1235    Comment:  Discussed role of OT and poc.                   Point: Precautions (Done)     Description:    Instruct learner(s) on prescribed precautions during mobility and gait tasks              Learning Progress Summary           Patient Acceptance, E,TB,D, VU,NR by  at 9/5/2020 1308    Acceptance, E, VU by  at 9/5/2020 1235    Comment:  Discussed role of OT and poc.    Acceptance, E,TB,D,H, VU,NR by  at 9/4/2020 1337   Significant Other Acceptance, E, VU by  at 9/5/2020 1235    Comment:  Discussed role of OT and poc.                               User Key     Initials Effective Dates Name Provider Type Discipline     06/08/18 -  Nickie Masterson OTR Occupational Therapist OT     04/03/18 -  Carol Aquino, PT Physical Therapist PT     03/07/18 -  Terrie Cho, RIZWAN Physical Therapy Assistant PT              PT Recommendation and Plan     Outcome Summary/Treatment Plan (PT)  Anticipated Discharge Disposition (PT): inpatient rehabilitation facility  Plan of Care Reviewed With: patient  Progress: improving  Outcome Summary: Pt tolerated treatment well this date. Increased gait distance to 10ft x2, seated rest break between d/t fatigue. Pt occasionally unsteady, losing balance backwards. Pt stated she was concerned about going home d/t inability to navigate stairs at the moment d/t weakness. Hopeful for d/c to [facility] for continued skilled PT for balance and strengthening. Patient was intermittently wearing a face mask during this therapy encounter. Therapist used appropriate personal protective equipment including eye protection, mask, and gloves.  Mask used was standard procedure mask. Appropriate PPE was worn during the entire therapy session. Hand hygiene was completed before and after therapy session. Patient is not in enhanced droplet precautions.     Time Calculation:   PT Charges     Row Name 09/05/20 1316             Time Calculation    Start Time  1120  -      Stop Time  1158  -      Time Calculation (min)  38 min  -      PT Received On  09/05/20  -      PT - Next Appointment  09/06/20   -        User Key  (r) = Recorded By, (t) = Taken By, (c) = Cosigned By    Initials Name Provider Type     Terrie Cho PTA Physical Therapy Assistant        Therapy Charges for Today     Code Description Service Date Service Provider Modifiers Qty    54398626356 HC PT THER PROC EA 15 MIN 9/5/2020 Terrie Cho PTA GP 2    38556778748 HC GAIT TRAINING EA 15 MIN 9/5/2020 Terrie Cho PTA GP 1          PT G-Codes  Outcome Measure Options: AM-PAC 6 Clicks Basic Mobility (PT)  AM-PAC 6 Clicks Score (PT): 16  AM-PAC 6 Clicks Score (OT): 14    Terrie Cho PTA  9/5/2020

## 2020-09-06 LAB
ANION GAP SERPL CALCULATED.3IONS-SCNC: 12.3 MMOL/L (ref 5–15)
BUN SERPL-MCNC: 14 MG/DL (ref 6–20)
BUN/CREAT SERPL: 19.2 (ref 7–25)
CALCIUM SPEC-SCNC: 9.1 MG/DL (ref 8.6–10.5)
CHLORIDE SERPL-SCNC: 100 MMOL/L (ref 98–107)
CO2 SERPL-SCNC: 25.7 MMOL/L (ref 22–29)
CREAT SERPL-MCNC: 0.73 MG/DL (ref 0.57–1)
DEPRECATED RDW RBC AUTO: 40.1 FL (ref 37–54)
ERYTHROCYTE [DISTWIDTH] IN BLOOD BY AUTOMATED COUNT: 12.2 % (ref 12.3–15.4)
GFR SERPL CREATININE-BSD FRML MDRD: 87 ML/MIN/1.73
GLUCOSE BLDC GLUCOMTR-MCNC: 131 MG/DL (ref 70–130)
GLUCOSE BLDC GLUCOMTR-MCNC: 131 MG/DL (ref 70–130)
GLUCOSE BLDC GLUCOMTR-MCNC: 154 MG/DL (ref 70–130)
GLUCOSE BLDC GLUCOMTR-MCNC: 196 MG/DL (ref 70–130)
GLUCOSE SERPL-MCNC: 132 MG/DL (ref 65–99)
HCT VFR BLD AUTO: 38.6 % (ref 34–46.6)
HGB BLD-MCNC: 12.9 G/DL (ref 12–15.9)
MAGNESIUM SERPL-MCNC: 2.1 MG/DL (ref 1.6–2.6)
MCH RBC QN AUTO: 30.1 PG (ref 26.6–33)
MCHC RBC AUTO-ENTMCNC: 33.4 G/DL (ref 31.5–35.7)
MCV RBC AUTO: 90.2 FL (ref 79–97)
PLATELET # BLD AUTO: 250 10*3/MM3 (ref 140–450)
PMV BLD AUTO: 11.6 FL (ref 6–12)
POTASSIUM SERPL-SCNC: 3.4 MMOL/L (ref 3.5–5.2)
RBC # BLD AUTO: 4.28 10*6/MM3 (ref 3.77–5.28)
SODIUM SERPL-SCNC: 138 MMOL/L (ref 136–145)
VIT B12 BLD-MCNC: 369 PG/ML (ref 211–946)
WBC # BLD AUTO: 9.75 10*3/MM3 (ref 3.4–10.8)

## 2020-09-06 PROCEDURE — 97110 THERAPEUTIC EXERCISES: CPT

## 2020-09-06 PROCEDURE — 82607 VITAMIN B-12: CPT | Performed by: NURSE PRACTITIONER

## 2020-09-06 PROCEDURE — 82962 GLUCOSE BLOOD TEST: CPT

## 2020-09-06 PROCEDURE — 80048 BASIC METABOLIC PNL TOTAL CA: CPT | Performed by: HOSPITALIST

## 2020-09-06 PROCEDURE — 63710000001 INSULIN LISPRO (HUMAN) PER 5 UNITS: Performed by: INTERNAL MEDICINE

## 2020-09-06 PROCEDURE — 83735 ASSAY OF MAGNESIUM: CPT | Performed by: HOSPITALIST

## 2020-09-06 PROCEDURE — 85027 COMPLETE CBC AUTOMATED: CPT | Performed by: HOSPITALIST

## 2020-09-06 RX ADMIN — HYDROCODONE BITARTRATE AND ACETAMINOPHEN 1 TABLET: 5; 325 TABLET ORAL at 20:49

## 2020-09-06 RX ADMIN — INSULIN LISPRO 2 UNITS: 100 INJECTION, SOLUTION INTRAVENOUS; SUBCUTANEOUS at 12:23

## 2020-09-06 RX ADMIN — SODIUM CHLORIDE, PRESERVATIVE FREE 10 ML: 5 INJECTION INTRAVENOUS at 09:13

## 2020-09-06 RX ADMIN — PREGABALIN 50 MG: 25 CAPSULE ORAL at 09:12

## 2020-09-06 RX ADMIN — SODIUM CHLORIDE, PRESERVATIVE FREE 10 ML: 5 INJECTION INTRAVENOUS at 20:49

## 2020-09-06 RX ADMIN — PREGABALIN 50 MG: 25 CAPSULE ORAL at 20:49

## 2020-09-06 RX ADMIN — LIDOCAINE 1 PATCH: 50 PATCH TOPICAL at 09:10

## 2020-09-06 RX ADMIN — INSULIN LISPRO 2 UNITS: 100 INJECTION, SOLUTION INTRAVENOUS; SUBCUTANEOUS at 17:47

## 2020-09-06 RX ADMIN — SPIRONOLACTONE 100 MG: 50 TABLET, FILM COATED ORAL at 09:10

## 2020-09-06 RX ADMIN — CHLORTHALIDONE 25 MG: 25 TABLET ORAL at 09:10

## 2020-09-06 NOTE — PLAN OF CARE
Problem: Patient Care Overview  Goal: Plan of Care Review  Outcome: Ongoing (interventions implemented as appropriate)  Flowsheets (Taken 9/6/2020 8139)  Progress: improving  Plan of Care Reviewed With: patient  Outcome Summary: Pt tolerated treatment well this date. Increased gait distance to 40ft w/ Rw and CGA-min A, still slightly unsteady and requiring cues to shift more weight onto toes when losing balance. Pain/soreness noted in low back and into hips. Pt has been compliant w/ bed exercises, and encouraged to keep ambulating in room w/ nsg. Safety concern still lies w/ stair navigation before attempting to plan for home. If pt goes home, she will need a rolling walker ordered. Patient was intermittently wearing a face mask during this therapy encounter. Therapist used appropriate personal protective equipment including eye protection, mask, and gloves.  Mask used was standard procedure mask. Appropriate PPE was worn during the entire therapy session. Hand hygiene was completed before and after therapy session. Patient is not in enhanced droplet precautions.

## 2020-09-06 NOTE — PLAN OF CARE
Patient had an uneventful day today. Worked with physical therapy,  remained at bedside. Patient hopeful of going home today.

## 2020-09-06 NOTE — PROGRESS NOTES
"   LOS: 3 days   Patient Care Team:  Kya Kendrick PA-C as PCP - General  Kya Kendrick PA-C as PCP - Family Medicine  Humberto, HAN Moses as Nurse Practitioner (Family Medicine)    Chief Complaint: weakness in legs    Subjective     HA is resolved since blood patch. Feeling a little stronger each day.      Subjective:  Symptoms:  Improved.  She reports weakness.  No shortness of breath, malaise, cough, chest pain, headache, chest pressure, anorexia, diarrhea or anxiety.    Diet:  Adequate intake.  No nausea or vomiting.    Activity level: Impaired due to weakness.    Pain:  She reports no pain.        History taken from: patient chart family RN    Objective     Vital Signs  Temp:  [98.1 °F (36.7 °C)-98.2 °F (36.8 °C)] 98.1 °F (36.7 °C)  Heart Rate:  [] 89  Resp:  [18] 18  BP: (112-125)/(66-70) 112/70    Objective:  General Appearance:  Comfortable and in no acute distress.    Vital signs: (most recent): Blood pressure 112/70, pulse 89, temperature 98.1 °F (36.7 °C), temperature source Oral, resp. rate 18, height 162.6 cm (64.02\"), weight 120 kg (264 lb 8.8 oz), SpO2 97 %, not currently breastfeeding.  Vital signs are normal.  No fever.    Output: Producing urine.    HEENT: Normal HEENT exam.    Lungs:  Normal effort and normal respiratory rate.  Breath sounds clear to auscultation.  She is not in respiratory distress.    Heart: Normal rate.  Regular rhythm.    Abdomen: Abdomen is soft.  (Morbidly obese).  Bowel sounds are normal.   There is no abdominal tenderness.     Extremities: There is no dependent edema.    Pulses: Distal pulses are intact.    Neurological: Patient is alert and oriented to person, place and time.  Patient has normal reflexes and normal muscle tone.  (Weak in BLEs).    Pupils:  Pupils are equal, round, and reactive to light.    Skin:  Warm and dry.  No rash.             Results Review:     I reviewed the patient's new clinical results.  I reviewed the patient's other " test results and agree with the interpretation  I personally viewed and interpreted the patient's EKG/Telemetry data  Discussed with pt, , and RN    Results from last 7 days   Lab Units 09/06/20  0637 09/05/20  0422 09/04/20  0948 09/03/20  0442 09/02/20  0902   WBC 10*3/mm3 9.75 11.43* 9.58 10.49 8.19   HEMOGLOBIN g/dL 12.9 12.9 13.2 12.6 13.5   PLATELETS 10*3/mm3 250 261 281 251 147     Results from last 7 days   Lab Units 09/06/20  0637 09/05/20  1830 09/05/20  0422 09/04/20  0948 09/03/20 0442 09/02/20  0925   SODIUM mmol/L 138  --  134* 134* 138 140   POTASSIUM mmol/L 3.4* 3.7 3.3* 3.3* 3.3* 3.7   CHLORIDE mmol/L 100  --  99 95* 97* 101   CO2 mmol/L 25.7  --  23.3 25.0 25.8 24.4   BUN mg/dL 14  --  13 14 15 12   CREATININE mg/dL 0.73  --  0.81 0.85 0.74 0.66   CALCIUM mg/dL 9.1  --  9.3 9.8 9.3 9.3   MAGNESIUM mg/dL 2.1  --   --  2.0  --  1.6   PHOSPHORUS mg/dL  --   --   --   --   --  3.3   Estimated Creatinine Clearance: 125.4 mL/min (by C-G formula based on SCr of 0.73 mg/dL).    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 Armijo though might also benefit from NCV study here  -MRI brain and MRI C-spine both are fine here  -no evidence of respiratory compromise  -pt has pretty severe fibromyalgia so Neuro has started BID Lyrica and recommends continued PT/OT, possibly [facility]  -Neuro feels pt's presenting symptoms are most likely non-organic, but certainly her fibromyalgia could be  playing a role  -both Dr. Cancino and Dr. Boyer agree this is not GBS  -can f/u with Neuro as outpt (Dr. Stacy) for monitoring and possible NCV if symptoms persist or worsen    Post LP HA  -resolved s/p blood patch    PCOS/hyperglycemia  -covering with SSI here  -can restart Metformin at dc    Hypokalemia  -continue replacing orally, Mg++ fine    Hyponatremia  -stable today  -monitor    Full code  SCDs for DVT ppx  Further orders to follow as suggested by evolving hospital course).       Prasanna So MD  09/06/20  14:36    Time: 30min

## 2020-09-06 NOTE — NURSING NOTE
Acute rehab evaluation ongoing. Still some concerns over rehab appropriate diagnosis - neuro has signed off. Met with patient and spouse yesterday to discuss acute rehab admission criteria and program details. Patient would require Lisa brunner. Will continue to follow.     Thank you!    Chris Ramirez RN  p
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
## 2020-09-07 ENCOUNTER — READMISSION MANAGEMENT (OUTPATIENT)
Dept: CALL CENTER | Facility: HOSPITAL | Age: 45
End: 2020-09-07

## 2020-09-07 VITALS
OXYGEN SATURATION: 97 % | WEIGHT: 264.55 LBS | DIASTOLIC BLOOD PRESSURE: 65 MMHG | RESPIRATION RATE: 20 BRPM | TEMPERATURE: 97.6 F | BODY MASS INDEX: 45.17 KG/M2 | HEART RATE: 84 BPM | SYSTOLIC BLOOD PRESSURE: 110 MMHG | HEIGHT: 64 IN

## 2020-09-07 LAB
ANION GAP SERPL CALCULATED.3IONS-SCNC: 11.8 MMOL/L (ref 5–15)
BUN SERPL-MCNC: 11 MG/DL (ref 6–20)
BUN/CREAT SERPL: 14.1 (ref 7–25)
CALCIUM SPEC-SCNC: 9 MG/DL (ref 8.6–10.5)
CHLORIDE SERPL-SCNC: 97 MMOL/L (ref 98–107)
CO2 SERPL-SCNC: 27.2 MMOL/L (ref 22–29)
CREAT SERPL-MCNC: 0.78 MG/DL (ref 0.57–1)
GFR SERPL CREATININE-BSD FRML MDRD: 80 ML/MIN/1.73
GLUCOSE BLDC GLUCOMTR-MCNC: 114 MG/DL (ref 70–130)
GLUCOSE BLDC GLUCOMTR-MCNC: 140 MG/DL (ref 70–130)
GLUCOSE SERPL-MCNC: 129 MG/DL (ref 65–99)
MAGNESIUM SERPL-MCNC: 2.2 MG/DL (ref 1.6–2.6)
POTASSIUM SERPL-SCNC: 3.3 MMOL/L (ref 3.5–5.2)
SODIUM SERPL-SCNC: 136 MMOL/L (ref 136–145)

## 2020-09-07 PROCEDURE — 82962 GLUCOSE BLOOD TEST: CPT

## 2020-09-07 PROCEDURE — 83735 ASSAY OF MAGNESIUM: CPT | Performed by: HOSPITALIST

## 2020-09-07 PROCEDURE — 80048 BASIC METABOLIC PNL TOTAL CA: CPT | Performed by: HOSPITALIST

## 2020-09-07 RX ORDER — ALBUTEROL SULFATE 90 UG/1
AEROSOL, METERED RESPIRATORY (INHALATION)
Qty: 18 G | Refills: 0 | Status: SHIPPED | OUTPATIENT
Start: 2020-09-07 | End: 2020-09-28

## 2020-09-07 RX ORDER — PREGABALIN 50 MG/1
50 CAPSULE ORAL EVERY 12 HOURS SCHEDULED
Qty: 60 CAPSULE | Refills: 0 | Status: SHIPPED | OUTPATIENT
Start: 2020-09-07 | End: 2020-09-23 | Stop reason: SINTOL

## 2020-09-07 RX ORDER — POTASSIUM CHLORIDE 750 MG/1
10 TABLET, EXTENDED RELEASE ORAL DAILY
Qty: 30 TABLET | Refills: 0 | Status: SHIPPED | OUTPATIENT
Start: 2020-09-07 | End: 2022-01-03

## 2020-09-07 RX ORDER — CHOLECALCIFEROL (VITAMIN D3) 125 MCG
5 CAPSULE ORAL NIGHTLY
Qty: 30 TABLET | Refills: 0 | Status: SHIPPED | OUTPATIENT
Start: 2020-09-07 | End: 2020-09-28

## 2020-09-07 RX ADMIN — LIDOCAINE 1 PATCH: 50 PATCH TOPICAL at 09:01

## 2020-09-07 RX ADMIN — POTASSIUM CHLORIDE 40 MEQ: 10 CAPSULE, COATED, EXTENDED RELEASE ORAL at 09:03

## 2020-09-07 RX ADMIN — SODIUM CHLORIDE, PRESERVATIVE FREE 10 ML: 5 INJECTION INTRAVENOUS at 09:04

## 2020-09-07 RX ADMIN — SPIRONOLACTONE 100 MG: 50 TABLET, FILM COATED ORAL at 09:03

## 2020-09-07 RX ADMIN — CHLORTHALIDONE 25 MG: 25 TABLET ORAL at 09:03

## 2020-09-07 RX ADMIN — PREGABALIN 50 MG: 25 CAPSULE ORAL at 09:03

## 2020-09-07 NOTE — OUTREACH NOTE
Prep Survey      Responses   [facility] facility patient discharged from?  [place]   Is LACE score < 7 ?  No   Eligibility  [facility]   Date of Admission  09/03/20   Date of Discharge  09/07/20   Discharge Disposition  Home or Self Care   Discharge diagnosis  Weakness of both lower extremities    severe fibromyalgia    COVID-19 Test Status  Negative   Does the patient have one of the following disease processes/diagnoses(primary or secondary)?  Other   Does the patient have Home health ordered?  Yes   What is the Home health agency?   [facility]   Is there a DME ordered?  Yes   What DME was ordered?  [facility] [facility]   Prep survey completed?  Yes          Jnenifer Li RN

## 2020-09-07 NOTE — NURSING NOTE
Continue to follow, thus far patient does not have an acute rehab appropriate diagnosis. Will f/u for today's therapy. Patient would require insurance approval with Lisa.    Rachelle Cervantes RN  Rehab Admissions Nurse  [phone]

## 2020-09-07 NOTE — DISCHARGE PLACEMENT REQUEST
"Angie Marte (45 y.o. Female)     Date of Birth Social Security Number Address Home Phone MRN    1975  [address] [phone] [id number]    Tenriism Marital Status          Latter day        Admission Date Admission Type Admitting Provider Attending Provider Department, Room/Bed    9/3/20 Urgent Prasanna So MD B[place], John B, MD [address], [address]    Discharge Date Discharge Disposition Discharge Destination         Home or Self Care              Attending Provider:  Prasanna So MD    Allergies:  Iodine, Shellfish Allergy    Isolation:  None   Infection:  None   Code Status:  CPR    Ht:  162.6 cm (64.02\")   Wt:  120 kg (264 lb 8.8 oz)    Admission Cmt:  None   Principal Problem:  Weakness of both lower extremities [R29.898]                 Active Insurance as of 9/3/2020     Primary Coverage     Payor Plan Insurance Group Employer/Plan Group    [employer] [facility] [employer] PPO [id number]     Payor Plan Address Payor Plan Phone Number Payor Plan Fax Number Effective Dates    [address] [phone]  1/1/2018 - None Entered    [place] [id number]       Subscriber Name Subscriber Birth Date Member ID       JENS MARTE [birth year]1957 [id number]                 Emergency Contacts      (Rel.) Home Phone Work Phone Mobile Phone    Jens Marte (Spouse) [phone] -- [phone]            "

## 2020-09-07 NOTE — DISCHARGE SUMMARY
Patient Name: Angie Patel  : 1975  MRN: [id number]    Date of Admission: 9/3/2020  Date of Discharge:  2020  Primary Care Physician: Kya Kendrick PA-C      Chief Complaint:   Headache      Discharge Diagnoses     Active Hospital Problems    Diagnosis  POA   • **Weakness of both lower extremities [R29.898]  Yes   • Hypokalemia [E87.6]  Yes   • Headache, post-lumbar puncture [G97.1]  Yes   • Fibromyalgia [M79.7]  Yes   • Morbid obesity with BMI of 45.0-49.9, adult (CMS/HCC) [E66.01, Z68.42]  Not Applicable   • PCOS (polycystic ovarian syndrome) [E28.2]  Yes   • Insulin resistance syndrome [E88.81]  Yes   • Benign essential hypertension [I10]  Yes   • Hypercholesterolemia [E78.00]  Yes      Resolved Hospital Problems   No resolved problems to display.        Hospital Course      Very pleasant 44yo woman transferred here from [facility] for further eval and mgmt of possible GBS. Please see below for details of admission:    BLE weakness  -sent here from [facility] due to concern over possible GBS even though CSF studies not c/w it, and reflexes still present  -unable to get MR C-spine there and Dr. Armijo though might also benefit from NCV study here  -MRI brain and MRI C-spine both are fine here  -no evidence of respiratory compromise  -pt has pretty severe fibromyalgia so Neuro has started BID Lyrica and recommends continued PT/OT as outpt  -Neuro feels pt's presenting symptoms are most likely non-organic, but certainly her fibromyalgia could be playing a role  -both Dr. Cancino and Dr. Boyer agree this is not GBS  -can f/u with Neuro as outpt (Dr. Stacy) for monitoring and possible NCV if symptoms persist or worsen     Post LP HA  -resolved s/p blood patch     PCOS/hyperglycemia  -covering with SSI here  -can restart Metformin at dc     Hypokalemia  -continue replacing orally with daily KCl at home  -will need repeat BMP in a few days at PCP office  -Mg++ fine     Hyponatremia  -stable  today  -monitor      Day of Discharge     Feeling a little better each day. Eager to go home.     Physical Exam:  Temp:  [97.8 °F (36.6 °C)-98.6 °F (37 °C)] 97.8 °F (36.6 °C)  Heart Rate:  [84-89] 84  Resp:  [18-20] 20  BP: (111-120)/(63-96) 111/63  Body mass index is 45.39 kg/m².  Physical Exam  General Appearance:  Comfortable and in no acute distress.    Vital signs:Vital signs are normal.  No fever.    Output: Producing urine.    HEENT: Normal HEENT exam.    Lungs:  Normal effort and normal respiratory rate.  Breath sounds clear to auscultation.  She is not in respiratory distress.    Heart: Normal rate.  Regular rhythm.    Abdomen: Abdomen is soft.  (Morbidly obese).  Bowel sounds are normal.   There is no abdominal tenderness.     Extremities: There is no dependent edema.    Pulses: Distal pulses are intact.    Neurological: Patient is alert and oriented to person, place and time.  Patient has normal reflexes and normal muscle tone.  (Weak in BLEs).    Pupils:  Pupils are equal, round, and reactive to light.    Skin:  Warm and dry.  No rash.     Consultants     Consult Orders (all) (From admission, onward)     Start     Ordered    09/06/20 1619  Inpatient Case Management  Consult  Once     Provider:  (Not yet assigned)    09/06/20 1619    09/04/20 1305  Inpatient Rehab Admission Consult  Once     Provider:  (Not yet assigned)    09/04/20 1304    09/04/20 1221  Inpatient Anesthesia Pain Management Clinic Consult  Once     Specialty:  Pain Medicine  Provider:  (Not yet assigned)    09/04/20 1220    09/04/20 1126  Inpatient  Anesthesiology Physician Consult  Once,   Status:  Canceled     Specialty:  Anesthesiology  Provider:  (Not yet assigned)    09/04/20 1125    09/03/20 2124  Inpatient Case Management  Consult  Once     Provider:  (Not yet assigned)    09/03/20 2123    09/03/20 1936  Inpatient Neurology Consult General  Once     Specialty:  Neurology  Provider:  Justin Cancino MD     09/03/20 1935              Procedures     Imaging Results (All)     Procedure Component Value Units Date/Time    MRI Cervical Spine With & Without Contrast [[id number]] Collected:  09/05/20 1206     Updated:  09/05/20 1209    Narrative:       MR SCAN OF THE CERVICAL SPINE WITHOUT AND WITH INTRAVENOUS CONTRAST     HISTORY: Facial numbness, and numbness and tingling in both arms and  legs. Leg weakness.     The MR scan was performed with sagittal and axial images and includes T1  images without and with intravenous contrast. The following findings are  present:  1. The cervicomedullary junction is normal in position. There is no  intrinsic interbody cervical spinal cord. There is no abnormal  enhancement.  2. The foramen magnum and C1-2 levels are normal. The vertebral height  and disc spaces are well-maintained throughout the cervical spine and  there is no disc abnormality or central or foraminal encroachment.  3. The paraspinal soft tissues are unremarkable.     This report was finalized on 9/5/2020 12:06 PM by Dr. Adrian Antonio M.D.       MRI Brain With & Without Contrast [[id number]] Collected:  09/05/20 1016     Updated:  09/05/20 1101    Narrative:       MR SCAN OF THE BRAIN WITHOUT AND WITH INTRAVENOUS CONTRAST     HISTORY: Numbness and tingling in face and both arms and legs. Leg  weakness.     The MR scan was performed with sagittal and axial and coronal images  without and with intravenous contrast and compared to a previous MRI  scan dated 10/15/2019. The ventricles are normal in size and midline.  There is no evidence of intracranial hemorrhage or mass effect. The  diffusion sequence shows no evidence of acute infarct.     There is a very small enhancing meningioma high in the right posterior  frontal region measuring 10 mm and this shows no change from 10/15/2019.  There is also a faint area of enhancement in the right frontal lobe with  a central probable vessel that likely represents a  capillary  telangiectasia and this is unchanged from 10/15/2019. There are normal  flow voids in the major vessels. The sinuses and mastoid air cells are  clear.     CONCLUSION: Small meningioma high in the right posterior frontal region  unchanged from 10/15/2019. 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 The exam is otherwise unremarkable.     This report was finalized on 9/5/2020 10:58 AM by Dr. Adrian Antonio M.D.       [place] Guided Pain Management Spine [[id number]] Resulted:  09/04/20 1342     Updated:  09/04/20 1342    Narrative:       This procedure was auto-finalized with no dictation required.          Pertinent Labs     Results from last 7 days   Lab Units 09/06/20  0637 09/05/20  0422 09/04/20  0948 09/03/20  0442   WBC 10*3/mm3 9.75 11.43* 9.58 10.49   HEMOGLOBIN g/dL 12.9 12.9 13.2 12.6   PLATELETS 10*3/mm3 250 261 281 251     Results from last 7 days   Lab Units 09/07/20  0631 09/06/20  0637 09/05/20  1830 09/05/20  0422 09/04/20  0948   SODIUM mmol/L 136 138  --  134* 134*   POTASSIUM mmol/L 3.3* 3.4* 3.7 3.3* 3.3*   CHLORIDE mmol/L 97* 100  --  99 95*   CO2 mmol/L 27.2 25.7  --  23.3 25.0   BUN mg/dL 11 14  --  13 14   CREATININE mg/dL 0.78 0.73  --  0.81 0.85   GLUCOSE mg/dL 129* 132*  --  128* 202*   Estimated Creatinine Clearance: 117.4 mL/min (by C-G formula based on SCr of 0.78 mg/dL).  Results from last 7 days   Lab Units 09/03/20  0442 09/02/20  1515 09/02/20  0925   ALBUMIN g/dL 3.80 4.1 4.10   BILIRUBIN mg/dL 0.5  --  0.2   ALK PHOS U/L 42  --  39   AST (SGOT) U/L 15  --  17   ALT (SGPT) U/L 23  --  25     Results from last 7 days   Lab Units 09/07/20  0631 09/06/20  0637 09/05/20  0422 09/04/20  0948 09/03/20  0442 09/02/20  1515 09/02/20  0925   CALCIUM mg/dL 9.0 9.1 9.3 9.8 9.3  --  9.3   ALBUMIN g/dL  --   --   --   --  3.80 4.1 4.10   MAGNESIUM mg/dL 2.2 2.1  --  2.0  --   --  1.6   PHOSPHORUS mg/dL  --   --   --   --   --   --  3.3       Results from  last 7 days   Lab Units 09/02/20  0925   CK TOTAL U/L 69           Invalid input(s): LDLCALC        Test Results Pending at Discharge       Discharge Details        Discharge Medications      New Medications      Instructions Start Date   melatonin 5 MG tablet tablet   5 mg, Oral, Nightly      potassium chloride 10 MEQ CR tablet  Commonly known as:  K-DUR,KLOR-CON   10 mEq, Oral, Daily      pregabalin 50 MG capsule  Commonly known as:  LYRICA   50 mg, Oral, Every 12 Hours Scheduled         Changes to Medications      Instructions Start Date   furosemide 20 MG tablet  Commonly known as:  LASIX  What changed:    · how to take this  · when to take this   TAKE 1 TABLET BY MOUTH DAILY AS NEEDED         Continue These Medications      Instructions Start Date   Accu-Chek Camila device   Used to check blood sugars twice daily as needed for type 2 diabetes      accu-chek multiclix lancets   Used to check blood sugars twice daily as needed for type 2 diabetes      chlorthalidone 25 MG tablet  Commonly known as:  HYGROTON   25 mg, Oral, Daily      glucose blood test strip  Commonly known as:  Accu-Chek Camila   Used to check blood sugars twice daily as needed for type 2 diabetes      metFORMIN  MG 24 hr tablet  Commonly known as:  GLUCOPHAGE-XR   1,500 mg, Oral, Daily With Dinner      Motrin  MG tablet  Generic drug:  ibuprofen   2 tablets, Oral, Every 6 Hours PRN      spironolactone 100 MG tablet  Commonly known as:  ALDACTONE   100 mg, Oral, Daily      Ventolin  (90 Base) MCG/ACT inhaler  Generic drug:  albuterol sulfate HFA   INHALE 2 PUFFS BY MOUTH FOUR TIMES DAILY AS NEEDED FOR WHEEZING OR SHORTNESS OF BREATH             Allergies   Allergen Reactions   • Iodine Shortness Of Breath     Swelling     • Shellfish Allergy Shortness Of Breath     swelling         Discharge Disposition:  Home or Self Care    Discharge Diet:  Diet Order   Procedures   • Diet Regular; Consistent Carbohydrate       Discharge  Activity:   as tolerated, use walker when up    CODE STATUS:    Code Status and Medical Interventions:   Ordered at: 09/03/20 1738     Code Status:    CPR     Medical Interventions (Level of Support Prior to Arrest):    Full       Future Appointments   Date Time Provider Department Center   9/23/2020 12:00 PM Anatoly Mckinney MD [facility] None   10/13/2020  8:20 AM [facility] None   10/21/2020  3:15 PM Kya Kendrick PA-C [facility] None     Additional Instructions for the Follow-ups that You Need to Schedule     Discharge Follow-up with PCP   As directed       Currently Documented PCP:    Kya Kendrick PA-C    PCP Phone Number:    [phone]     Follow Up Details:  A[place] (PCP) in 3-5 days for recheck K+ and referral to PT and handicapped parking permit         Discharge Follow-up with Specified Provider: Dr. Stacy ([facility]); 1 Month   As directed      To:  Dr. Stacy ([facility])    Follow Up:  1 Month           Follow-up Information     [facility]UR[facility] Follow up in 3 month(s).    Specialty:  Neurology  Contact information:  [address] [address] [phone]  Additional information:  Phone Number: [phone]      List of hospitals in [place] and across the street from the [facility]. Located in [facility] with the number [address]00 on the building.           Kya Kendrick PA-C .    Specialty:  Family Medicine  Why:  Aroldo (PCP) in 3-5 days for recheck K+ and referral to PT and handicapped parking permit  Contact information:  [address] [facility] [address]  [phone]                   Additional Instructions for the Follow-ups that You Need to Schedule     Discharge Follow-up with PCP   As directed       Currently Documented PCP:    Kya Kendrick PA-C    PCP Phone Number:    [phone]     Follow Up Details:  Aroldo (PCP) in 3-5 days for recheck K+ and  referral to PT and handicapped parking permit         Discharge Follow-up with Specified Provider: Dr. Stacy ([facility]); 1 Month   As directed      To:  Dr. Stacy ([facility])    Follow Up:  1 Month           Time Spent on Discharge:  Greater than 30 minutes      Prasanna So MD  [facility]  09/07/20  1:11 PM

## 2020-09-07 NOTE — PROGRESS NOTES
Continued Stay Note  [facility]     Patient Name: Angie Patel  MRN: [id number]  Today's Date: 9/7/2020    Admit Date: 9/3/2020    Discharge Plan     Row Name 09/07/20 1405       Plan    Plan Comments  Pt needs walker for home.  Order faxed to [facility] and notified [facility]eb[facility]Hector's pt will need walker delivered today.   HANNA Raines        Discharge Codes    No documentation.       Expected Discharge Date and Time     Expected Discharge Date Expected Discharge Time    Sep 7, 2020             Carolina Roque RN

## 2020-09-07 NOTE — SIGNIFICANT NOTE
09/07/20 1502   Rehab Treatment   Discipline physical therapy assistant   Reason Treatment Not Performed other (see comments)  (pt about to d/c home; discussed home safety and education, also to keep up w/ her HEP; all questions and concerns answered)

## 2020-09-07 NOTE — PLAN OF CARE
Problem: Patient Care Overview  Goal: Plan of Care Review  Outcome: Ongoing (interventions implemented as appropriate)  Flowsheets (Taken 9/7/2020 3273)  Progress: improving  Plan of Care Reviewed With: patient  Outcome Summary: Patient up to bathroom with assist of one, waiting on walker to be D/C home. VSS, will continue to monitor.

## 2020-09-08 ENCOUNTER — TRANSITIONAL CARE MANAGEMENT TELEPHONE ENCOUNTER (OUTPATIENT)
Dept: CALL CENTER | Facility: HOSPITAL | Age: 45
End: 2020-09-08

## 2020-09-08 NOTE — OUTREACH NOTE
Call Center TCM Note      Responses   [facility]   COVID-19 Test Status  Negative   Does the patient have one of the following disease processes/diagnoses(primary or secondary)?  Other   TCM attempt successful?  Yes   Call start time  1702   Call end time  1706   Discharge diagnosis  Weakness of both lower extremities    severe fibromyalgia    Person spoke with today (if not patient) and relationship  Jens-spouse    Meds reviewed with patient/caregiver?  Yes   Is the patient having any side effects they believe may be caused by any medication additions or changes?  No   Does the patient have all medications ordered at discharge?  Yes   Is the patient taking all medications as directed (includes completed medication regime)?  Yes   Does the patient have a primary care provider?   Yes   Does the patient have an appointment with their PCP within 7 days of discharge?  Yes   Comments regarding PCP  Hospital d/c f/u appt is on 9/10/20 at 1:00 pm    Has the patient kept scheduled appointments due by today?  N/A   Comments  Patient will attend outpatient PT    What is the Home health agency?    reports there's no plan for home health    What DME was ordered?  [employer]   Has all DME been delivered?  Yes   Pulse Ox monitoring  None   Psychosocial issues?  No   Did the patient receive a copy of their discharge instructions?  Yes   Nursing interventions  Reviewed instructions with patient   What is the patient's perception of their health status since discharge?  Same   Is the patient/caregiver able to teach back signs and symptoms related to disease process for when to call PCP?  Yes   Is the patient/caregiver able to teach back signs and symptoms related to disease process for when to call 911?  Yes   Is the patient/caregiver able to teach back the hierarchy of who to call/visit for symptoms/problems? PCP, Specialist, Home health nurse, Urgent Care, ED, 911  Yes   If  the patient is a current smoker, are they able to teach back resources for cessation?  -- [Nonsmoker]   TCM call completed?  Yes          Patito Kelley RN    9/8/2020, 17:06

## 2020-09-08 NOTE — OUTREACH NOTE
Call Center TCM Note      Responses   [facility] patient discharged from?  [place]   COVID-19 Test Status  Negative   Does the patient have one of the following disease processes/diagnoses(primary or secondary)?  Other   TCM attempt successful?  No   Unsuccessful attempts  Attempt 1 [Pt and Jens-spouse ]          Patito Kelley RN    9/8/2020, 16:36

## 2020-09-08 NOTE — PROGRESS NOTES
Case Management Discharge Note      Final Note: DC'd home         Destination      No service has been selected for the patient.      Durable Medical Equipment      No service has been selected for the patient.      Dialysis/Infusion      No service has been selected for the patient.      Home Medical Care      No service has been selected for the patient.      Therapy      No service has been selected for the patient.      Community Resources      No service has been selected for the patient.             Final Discharge Disposition Code: 01 - home or self-care

## 2020-09-08 NOTE — PAYOR COMM NOTE
"P: [phone]  F: [phone]    DISCHARGED    CONTINUED STAY REVIEW AND DC SUMMARY    REF #[id number]      ID #[id number]          Angie Marte (45 y.o. Female)     Date of Birth Social Security Number Address Home Phone MRN    1975  [address] [facility] [place] [address] [phone] [id number]    Mosque Marital Status          Buddhist        Admission Date Admission Type Admitting Provider Attending Provider Department, Room/Bed    9/3/20 Urgent Prasanna So MD  [address], S520/1    Discharge Date Discharge Disposition Discharge Destination        9/7/2020 Home or Self Care              Attending Provider:  (none)   Allergies:  Iodine, Shellfish Allergy    Isolation:  None   Infection:  None   Code Status:  Prior    Ht:  162.6 cm (64.02\")   Wt:  120 kg (264 lb 8.8 oz)    Admission Cmt:  None   Principal Problem:  Weakness of both lower extremities [R29.898]                 Active Insurance as of 9/3/2020     Primary Coverage     Payor Plan Insurance Group Employer/Plan Group    [employer] P[employer] [id number]     Payor Plan Address Payor Plan Phone Number Payor Plan Fax Number Effective Dates    [id number] [phone]  1/1/2018 - None Entered    [place] [address]       Subscriber Name Subscriber Birth Date Member ID       JENS MARTE [birth year]1/1957 [id number]                 Emergency Contacts      (Rel.) Home Phone Work Phone Mobile Phone    Jens Marte (Spouse) [phone] -- [phone]               Physician Progress Notes       Prasanna So MD at 09/06/20 1436             LOS: 3 days   Patient Care Team:  Kya Kendrick PA-C as PCP - General  Kya Kendrick PA-C as PCP - Family Medicine  Cindy Paul APRN as Nurse Practitioner (Family Medicine)    Chief Complaint: weakness in legs    Subjective     HA is resolved since blood patch. Feeling a little stronger each " "day.      Subjective:  Symptoms:  Improved.  She reports weakness.  No shortness of breath, malaise, cough, chest pain, headache, chest pressure, anorexia, diarrhea or anxiety.    Diet:  Adequate intake.  No nausea or vomiting.    Activity level: Impaired due to weakness.    Pain:  She reports no pain.        History taken from: patient chart family RN    Objective     Vital Signs  Temp:  [98.1 °F (36.7 °C)-98.2 °F (36.8 °C)] 98.1 °F (36.7 °C)  Heart Rate:  [] 89  Resp:  [18] 18  BP: (112-125)/(66-70) 112/70    Objective:  General Appearance:  Comfortable and in no acute distress.    Vital signs: (most recent): Blood pressure 112/70, pulse 89, temperature 98.1 °F (36.7 °C), temperature source Oral, resp. rate 18, height 162.6 cm (64.02\"), weight 120 kg (264 lb 8.8 oz), SpO2 97 %, not currently breastfeeding.  Vital signs are normal.  No fever.    Output: Producing urine.    HEENT: Normal HEENT exam.    Lungs:  Normal effort and normal respiratory rate.  Breath sounds clear to auscultation.  She is not in respiratory distress.    Heart: Normal rate.  Regular rhythm.    Abdomen: Abdomen is soft.  (Morbidly obese).  Bowel sounds are normal.   There is no abdominal tenderness.     Extremities: There is no dependent edema.    Pulses: Distal pulses are intact.    Neurological: Patient is alert and oriented to person, place and time.  Patient has normal reflexes and normal muscle tone.  (Weak in BLEs).    Pupils:  Pupils are equal, round, and reactive to light.    Skin:  Warm and dry.  No rash.             Results Review:     I reviewed the patient's new clinical results.  I reviewed the patient's other test results and agree with the interpretation  I personally viewed and interpreted the patient's EKG/Telemetry data  Discussed with pt, , and RN    Results from last 7 days   Lab Units 09/06/20  0637 09/05/20  0422 09/04/20  0948 09/03/20  0442 09/02/20  0902   WBC 10*3/mm3 9.75 11.43* 9.58 10.49 8.19   "   HEMOGLOBIN g/dL 12.9 12.9 13.2 12.6 13.5   PLATELETS 10*3/mm3 250 261 281 251 147     Results from last 7 days   Lab Units 09/06/20  0637 09/05/20  1830 09/05/20  0422 09/04/20  0948 09/03/20  0442 09/02/20  0925   SODIUM mmol/L 138  --  134* 134* 138 140   POTASSIUM mmol/L 3.4* 3.7 3.3* 3.3* 3.3* 3.7   CHLORIDE mmol/L 100  --  99 95* 97* 101   CO2 mmol/L 25.7  --  23.3 25.0 25.8 24.4   BUN mg/dL 14  --  13 14 15 12   CREATININE mg/dL 0.73  --  0.81 0.85 0.74 0.66   CALCIUM mg/dL 9.1  --  9.3 9.8 9.3 9.3   MAGNESIUM mg/dL 2.1  --   --  2.0  --  1.6   PHOSPHORUS mg/dL  --   --   --   --   --  3.3   Estimated Creatinine Clearance: 125.4 mL/min (by C-G formula based on SCr of 0.73 mg/dL).    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 Armijo though might also benefit from NCV study here  -MRI brain and MRI C-spine both are fine here  -no evidence of respiratory compromise  -pt has pretty severe fibromyalgia so Neuro has started BID Lyrica and recommends continued PT/OT, possibly BAR  -Neuro feels pt's presenting symptoms are most likely non-organic, but certainly her fibromyalgia could be playing a role  -both Dr. Cancino and Dr. Boyer agree this is not GBS  -can f/u with Neuro as outpt (Dr. Stacy) for monitoring and possible NCV if symptoms persist or worsen    Post LP HA  -resolved s/p blood patch    PCOS/hyperglycemia  -covering with SSI here  -can restart Metformin at  "dc    Hypokalemia  -continue replacing orally, Mg++ fine    Hyponatremia  -stable today  -monitor    Full code  SCDs for DVT ppx  Further orders to follow as suggested by evolving hospital course).       Prasanna So MD  09/06/20  14:36    Time: 30min        Electronically signed by Prasanna So MD at 09/06/20 9890     Prasanna So MD at 09/05/20 1700             LOS: 2 days   Patient Care Team:  Kya Kendrick PA-C as PCP - General  Kya Kendrick PA-C as PCP - Family Medicine  [facility], HAN Moses as Nurse Practitioner (Family Medicine)    Chief Complaint: weakness    Subjective     HA is resolved since blood patch. Worked a little better with PT today.      Subjective:  Symptoms:  Improved.  She reports weakness.  No shortness of breath, malaise, cough, chest pain, headache, chest pressure, anorexia, diarrhea or anxiety.    Diet:  Adequate intake.  No nausea or vomiting.    Activity level: Impaired due to weakness.    Pain:  She reports no pain.        History taken from: patient chart family    Objective     Vital Signs  Temp:  [97.7 °F (36.5 °C)-98.6 °F (37 °C)] 97.7 °F (36.5 °C)  Heart Rate:  [] 89  Resp:  [16-20] 18  BP: (116-136)/(69-96) 136/76    Objective:  General Appearance:  Comfortable and in no acute distress.    Vital signs: (most recent): Blood pressure 136/76, pulse 89, temperature 97.7 °F (36.5 °C), temperature source Oral, resp. rate 18, height 162.6 cm (64.02\"), weight 120 kg (264 lb 8.8 oz), SpO2 96 %, not currently breastfeeding.  Vital signs are normal.  No fever.    Output: Producing urine.    HEENT: Normal HEENT exam.    Lungs:  Normal effort and normal respiratory rate.  Breath sounds clear to auscultation.  She is not in respiratory distress.    Heart: Normal rate.  Regular rhythm.    Abdomen: Abdomen is soft.  (Morbidly obese).  Bowel sounds are normal.   There is no abdominal tenderness.     Extremities: There is no dependent edema.    Pulses: Distal pulses " are intact.    Neurological: Patient is alert and oriented to person, place and time.  Patient has normal reflexes and normal muscle tone.  (Weak in BLEs).    Pupils:  Pupils are equal, round, and reactive to light.    Skin:  Warm and dry.  No rash.             Results Review:     I reviewed the patient's new clinical results.  I reviewed the patient's new imaging results and agree with the interpretation.  I reviewed the patient's other test results and agree with the interpretation  I personally viewed and interpreted the patient's EKG/Telemetry data  Discussed with pt and     Results from last 7 days   Lab Units 09/05/20 0422 09/04/20  0948 09/03/20 0442 09/02/20  0902   WBC 10*3/mm3 11.43* 9.58 10.49 8.19   HEMOGLOBIN g/dL 12.9 13.2 12.6 13.5   PLATELETS 10*3/mm3 261 281 251 147     Results from last 7 days   Lab Units 09/05/20 0422 09/04/20  0948 09/03/20 0442 09/02/20  0925   SODIUM mmol/L 134* 134* 138 140   POTASSIUM mmol/L 3.3* 3.3* 3.3* 3.7   CHLORIDE mmol/L 99 95* 97* 101   CO2 mmol/L 23.3 25.0 25.8 24.4   BUN mg/dL 13 14 15 12   CREATININE mg/dL 0.81 0.85 0.74 0.66   CALCIUM mg/dL 9.3 9.8 9.3 9.3   MAGNESIUM mg/dL  --  2.0  --  1.6   PHOSPHORUS mg/dL  --   --   --  3.3   Estimated Creatinine Clearance: 113.1 mL/min (by C-G formula based on SCr of 0.81 mg/dL).    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 Armijo though might also benefit from NCV study  "here  -MRI brain and MRI C-spine both are fine here  -no evidence of respiratory compromise  -pt has pretty severe fibromyalgia so Neuro has started BID Lyrica and recommends continued PT/OT, possibly BAR  -Neuro feels pt's presenting symptoms are most likely non-organic, but certainly her fibromyalgia could be playing a role    Post LP HA  -s/p blood patch    PCOS/hyperglycemia  -covering with SSI here  -can restart Metformin at dc    Hypokalemia  -continue replacing orally, Mg++ fine    Hyponatremia  -stable today  -monitor    Full code  SCDs for DVT ppx  Further orders to follow as suggested by evolving hospital course).       Prasanna So MD  20  17:03    Time: More than 50% of time spent in counseling and coordination of care:  Total face-to-face/floor time 40 min.  Time spent in counseling 30 min. Counseling included the following topics: pathophysiology and treatment of fibromyalgia        Electronically signed by Prasanna So MD at 20 5933     Alyssa Hammond APRN at 20 1218          DOS: 2020  NAME: Angie Patel   : 1975  PCP: Kya Kendrick PA-C  Chief Complaint   Patient presents with   • Headache     Patient seen in follow-up today; new to me          Subjective: No events overnight.  Patient reports she continues to require assistance to bathroom and feels very unsteady on her feet.     No family at bedside.     Objective:  Vital signs: /76 (BP Location: Left arm, Patient Position: Sitting)   Pulse 99   Temp 97.7 °F (36.5 °C) (Oral)   Resp 18   Ht 162.6 cm (64.02\")   Wt 120 kg (264 lb 8.8 oz)   SpO2 98%   BMI 45.39 kg/m²        HEENT: Normocephalic, atraumatic   COR: RRR  Resp: Even and unlabored  Extremities: Equal pulses, nondistal embolization    Neurological:   MS: AO. Language normal. No neglect. Higher integrative function normal  CN: II-XII normal  Motor: 5/5, normal tone  Reflexes: Toes downgoing  Sensory: Intact to light touch and " temperature  Coordination: Normal     Laboratory results:  Lab Results   Component Value Date    GLUCOSE 128 (H) 09/05/2020    CALCIUM 9.3 09/05/2020     (L) 09/05/2020    K 3.3 (L) 09/05/2020    CO2 23.3 09/05/2020    CL 99 09/05/2020    BUN 13 09/05/2020    CREATININE 0.81 09/05/2020    EGFRIFAFRI 88 06/29/2020    EGFRIFNONA 77 09/05/2020    BCR 16.0 09/05/2020    ANIONGAP 11.7 09/05/2020     Lab Results   Component Value Date    WBC 11.43 (H) 09/05/2020    HGB 12.9 09/05/2020    HCT 37.4 09/05/2020    MCV 88.4 09/05/2020     09/05/2020       West Nile virus negative        Review and interpretation of imaging:  MR SCAN OF THE CERVICAL SPINE WITHOUT AND WITH INTRAVENOUS CONTRAST     HISTORY: Facial numbness, and numbness and tingling in both arms and  legs. Leg weakness.     The MR scan was performed with sagittal and axial images and includes T1  images without and with intravenous contrast. The following findings are  present:  1. The cervicomedullary junction is normal in position. There is no  intrinsic interbody cervical spinal cord. There is no abnormal  enhancement.  2. The foramen magnum and C1-2 levels are normal. The vertebral height  and disc spaces are well-maintained throughout the cervical spine and  there is no disc abnormality or central or foraminal encroachment.  3. The paraspinal soft tissues are unremarkable.     This report was finalized on 9/5/2020 12:06 PM by Dr. Adrian Antonio M.D.     MR SCAN OF THE BRAIN WITHOUT AND WITH INTRAVENOUS CONTRAST     HISTORY: Numbness and tingling in face and both arms and legs. Leg  weakness.     The MR scan was performed with sagittal and axial and coronal images  without and with intravenous contrast and compared to a previous MRI  scan dated 10/15/2019. The ventricles are normal in size and midline.  There is no evidence of intracranial hemorrhage or mass effect. The  diffusion sequence shows no evidence of acute infarct.     There is a very  small enhancing meningioma high in the right posterior  frontal region measuring 10 mm and this shows no change from 10/15/2019.  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 There are normal  flow voids in the major vessels. The sinuses and mastoid air cells are  clear.     CONCLUSION: Small meningioma high in the right posterior frontal region  unchanged from 10/15/2019. 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 The exam is otherwise unremarkable.     This report was finalized on 9/5/2020 10:58 AM by Dr. Adrian Antonio M.D.     MRI Spine Lumbar WO     HISTORY:  Lower back pain radiating into bilateral lower extremities with paresthesias, worsening the last 5 days     TECHNIQUE:  Multiplanar multisequence imaging of the lumbar spine acquired without IV contrast utilizing a standard protocol.     COMPARISON: None     FINDINGS:     For the purposes of this dictation there appears to be 5 lumbar-type vertebra with the last fully formed disc space representing L5-S1.     Lumbar alignment is anatomic. Vertebral body heights are maintained. No focal aggressive marrow lesion is identified. Disc heights are preserved. The conus terminates at an expected level and the visualized distal cord signal is within normal limits.     Level by level:     L1-2: No significant thecal sac or foraminal narrowing.     L2-3: No significant thecal sac or foraminal narrowing.     L3-4: No significant thecal sac or foraminal narrowing     L4-5: Moderate facet degenerative change. Minimal disc bulge, slightly eccentric to the left but no significant thecal sac or foraminal narrowing is present.     L5-S1: Moderate right greater than left facet degenerative change without significant thecal sac or foraminal narrowing.           IMPRESSION:  1.  Degenerative  changes in the lower lumbar spine, predominantly due to facet degeneration without high-grade spinal canal or foraminal narrowing.     Signer Name: MARLEN CALDERON MD   Signed: 9/2/2020 10:16 AM   Workstation Name: [id number]    [facility]    Impression: This is a 45-year-old female with history of asthma, cardiomyopathy, diabetes mellitus (recently diagnosed), fibromyalgia, headaches/ocular migraines, hypertension, known meningioma, KACI, obesity (BMI 45.39) and seizure who was transferred from [facility] due to worsening lower extremity leg weakness and numbness which started several days prior to arrival.  Chart review reflects that patient was unable to ambulate without assistance of another person and walking aid.  Neurology concern at [place] was for GBS.  Evaluation by attending here on 9/4 revealed no signs and symptoms of [place] Barré syndrome paired with normal CSF findings.  Etiology of symptoms thought to be nonorganic/functional.  MRI of the lumbar spine was completed at [facility] which showed no evidence of high-grade spinal canal and/or foraminal narrowing; incidental degenerative changes noted in lower lumbar spine.  MRI of the cervical spine since completed here which was unremarkable.  MRI of the brain completed which showed small meningioma high in right posterior frontal region; unchanged from 10/19 imaging known to patient.  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    Neurologically patient reports some mild improvement in symptoms although she still is requiring assist x1 to ambulate; no motor deficits seen on exam.  No further neurological work-up indicated will initiate Lyrica due to diagnosis of fibromyalgia and recent diagnosis of diabetes with concern for diabetic peripheral neuropathy.  I recommend that patient follow-up in outpatient clinic with Dr. Rony Stacy and/or Óscar Wade to further  "evaluate need for EMG and/or further diagnostic testing.  Given mobility limitations would recommend outpatient physical therapy at minimum versus inpatient rehab stay.No further neurology workup indicated. We will sign off and see again upon request.          Plan:  Lyrica 50 mg every 12 hours  Follow-up with neurology in the next 3 months; Dr. Stacy or Dr. Wade-consider EMG of bilateral lower extremity    Case reviewed with  and he agrees with treatment plan above.     HAN Walter      Electronically signed by Alyssa Hammond APRN at 09/05/20 1230     Prasanna So MD at 09/04/20 1246             LOS: 1 day   Patient Care Team:  Kya Kendrick PA-C as PCP - General  Kya Kendrick PA-C as PCP - Family Medicine  [facility] as [facility] Pract[facility]er (Family Medicine)    Chief Complaint: BLE weakness    Subjective     Feels the same as yesterday. Still c/o weakness in legs. Also c/o headache if she sits up--has to lay flat in bed. Started after LP at [place] on 9/2. Denies any SOA.      Subjective:  Symptoms:  Stable.  She reports weakness and headache.  No shortness of breath, malaise, cough, chest pain, chest pressure, anorexia, diarrhea or anxiety.    Diet:  Adequate intake.  No nausea or vomiting.    Activity level: Impaired due to weakness.    Pain:  She complains of pain that is mild.  She reports pain is unchanged.  Pain is well controlled and requiring pain medication.        History taken from: patient chart family RN    Objective     Vital Signs  Temp:  [97.8 °F (36.6 °C)-98.6 °F (37 °C)] 97.8 °F (36.6 °C)  Heart Rate:  [] 95  Resp:  [16-18] 18  BP: (102-128)/(53-76) 128/73    Objective:  General Appearance:  Comfortable and in no acute distress.    Vital signs: (most recent): Blood pressure 128/73, pulse 95, temperature 97.8 °F (36.6 °C), temperature source Oral, resp. rate 18, height 162.6 cm (64.02\"), weight 120 kg (263 lb 11.2 oz), SpO2 97 %, not " currently breastfeeding.  Vital signs are normal.  No fever.    Output: Producing urine.    HEENT: Normal HEENT exam.    Lungs:  Normal effort and normal respiratory rate.  Breath sounds clear to auscultation.  She is not in respiratory distress.    Heart: Normal rate.  Regular rhythm.    Abdomen: Abdomen is soft.  (Morbidly obese).  Bowel sounds are normal.   There is no abdominal tenderness.     Extremities: There is no dependent edema.    Pulses: Distal pulses are intact.    Neurological: Patient is alert and oriented to person, place and time.  Patient has abnormal reflexes (no plantar reflexes present).  Patient has normal muscle tone.  (Weak in BLEs).    Pupils:  Pupils are equal, round, and reactive to light.    Skin:  Warm and dry.  No rash.             Results Review:     I reviewed the patient's new clinical results.  I reviewed the patient's other test results and agree with the interpretation  I personally viewed and interpreted the patient's EKG/Telemetry data  Discussed with pt, , RN, CCP, Dr. Armijo, and Dr. Boyer    Results from last 7 days   Lab Units 09/04/20  0948 09/03/20  0442 09/02/20  0902   WBC 10*3/mm3 9.58 10.49 8.19   HEMOGLOBIN g/dL 13.2 12.6 13.5   PLATELETS 10*3/mm3 281 251 147     Results from last 7 days   Lab Units 09/04/20  0948 09/03/20  0442 09/02/20  0925   SODIUM mmol/L 134* 138 140   POTASSIUM mmol/L 3.3* 3.3* 3.7   CHLORIDE mmol/L 95* 97* 101   CO2 mmol/L 25.0 25.8 24.4   BUN mg/dL 14 15 12   CREATININE mg/dL 0.85 0.74 0.66   CALCIUM mg/dL 9.8 9.3 9.3   MAGNESIUM mg/dL 2.0  --  1.6   PHOSPHORUS mg/dL  --   --  3.3   Estimated Creatinine Clearance: 107.7 mL/min (by C-G formula based on SCr of 0.85 mg/dL).    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 Armijo though might also benefit from NCV study here  -Neuro here recommends MRI brain  -have ordered both MRI brain and C-spine here  -no evidence of resp compromise  -PT ordered  -pt has pretty severe fibromyalgia so could also consider just Lyrica and PT and reassurance    Post LP HA  -anesthesia to see re: blood patch    PCOS/hyperglycemia  -covering with SSI here  -can restart Metformin at dc    Hypokalemia  -replace orally, Mg++ fine    Hyponatremia  -monitor    Full code  SCDs for DVT ppx  Further orders to follow as suggested by evolving hospital course).       Prasanna So MD  20  12:56    Time: 30min        Electronically signed by Prasanna So MD at 20 1300          Consult Notes      Justin Cancino MD at 20 0926      Consult Orders    1. Inpatient Case Management  Consult [[id number]] ordered by Nicolette Christensen MD at 20 8688                  Patient Identification:  NAME:  Angie Patel  Age:  45 y.o.   Sex:  female   :  1975   MRN:  [id number]     Referring physician:Ronnie Sarkar MD  Reason for consult: GBS      Chief complaint: Bilateral lower extremity pain over the last few days.    History of present illness: Patient stated that her condition literally started last Saturday.  She went with her  to see a movie.  Upon coming home that night she felt her feet were numb.  She described the numbness as feeling tingling sensation in her feet, hands and face.  She told her  about it.  She even told him that she did not have a hot shower to have such a feeling.  The face and hand tingling subsided but the the feet  one turned into numbness as as if it is cold and did not feel them right.  She had such a chronic problem and she did not make that much out of it.  On Sunday she started to have pain in her feet and legs on both sides.  She describes the pain as a sharp knifelike in her calf muscles.  On Monday patient went to work.  She works as a  in a school.  Is 4 hours job and it is not demanding.  Her coworkers noticed that she was moving slowly between her desk and the cabinets.  The condition kept deteriorating as per her story She started feeling weak all over her body.  Finally she seek medical help.  Patient had lumbar spine MRI which did not show any significant abnormality.  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    Patient mentions that she already had some problem with her feet happening on and off for a while.  She had been recently diagnosed with diabetes but she was using metformin 1500 mg nightly over the last 10 years for polycystic kidney and possible insulin resistant diabetes as per her story.  Patient also reported migraine, memory problem before.  She had brain MRI with incidental finding of meningioma on the right frontoparietal area as per her story.  She has been evaluated by neurosurgery which suggested not doing anything since it was an incidental finding and there is no pressure effect or any symptoms secondary to it.  Patient denied having any previous history of depression, and anxiety.  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    Allergies:  Iodine and Shellfish allergy         Hospital medications:    chlorthalidone 25 mg Oral Daily   insulin lispro 0-9 Units Subcutaneous TID AC   lidocaine 1 patch Transdermal Q24H   sodium chloride 10 mL Intravenous Q12H   spironolactone 100 mg Oral Daily       hold 1 each     •  acetaminophen **OR** acetaminophen **OR** acetaminophen  •  albuterol sulfate HFA  •  bisacodyl  •  bisacodyl  •  dextrose  •  dextrose  •   glucagon (human recombinant)  •  hold  •  magnesium hydroxide  •  melatonin  •  ondansetron **OR** ondansetron  •  potassium chloride  •  potassium chloride  •  sodium chloride  •  sodium chloride          Review of systems:    All system has been checked and nothing pertinent to her current condition.    Physical Exam:    Vitals Ranges:   Temp:  [97.8 °F (36.6 °C)-98.6 °F (37 °C)] 97.8 °F (36.6 °C)  Heart Rate:  [] 95  Resp:  [16-18] 18  BP: (102-128)/(53-76) 128/73      General Appearance: Well developed, well nourished, well groomed, not in pain or distress.  Neck and Spine:Normal range of motion.  Normal alignment. No mass or tenderness.   Cardiovascular: Normal heart sound  Respiratory: Bilateral air entry  Abdomen: Soft nontender    Neuro exam:  Mental Status:  Alert, oriented, fluent and cooperative. He has good grasp of both recent and remote memory.   CN : Grossly intact from 2-12.  Visual field: Mole on both eyes.  Pupils: Normal in size equal, round and reactive to light.  Motor: Patient had generalized weakness.  Her handgrip weakness has been improved with encouragement to reach  age-appropriate power and strength.  Her deep tendon reflexes were intact allover in fact it is Brisky on her knees.  Plantar reflexes were equivocal on both sides.    Sensory: Felt all sensory modalities in terms of touch, temperature and vibration sensation all over her body including her big toes and fingers.  Gait:   Managed to stand up slowly, with help.  Patient was walking on her heels and the big toe as if she is avoiding a wet ground.  She was rocking forward and backward but maintained herself in upright position and walk backward to her bed.                  Coordination: Normal on both upper and lower extremity.  NIHSS: Not applicable          Results review:   I reviewed the patient's new clinical results.    Data review:  Lab Results (last 24 hours)     Procedure Component Value Units Date/Time    POC  Glucose Once [[id number]]  (Abnormal) Collected:  09/04/20 0624    Specimen:  Blood Updated:  09/04/20 0627     Glucose 133 mg/dL     POC Glucose Once [[id number]]  (Abnormal) Collected:  09/03/20 2051    Specimen:  Blood Updated:  09/03/20 2052     Glucose 157 mg/dL     POC Glucose Once [[id number]]  (Normal) Collected:  09/03/20 1755    Specimen:  Blood Updated:  09/03/20 1757     Glucose 120 mg/dL     COVID PRE-OP / PRE-PROCEDURE SCREENING ORDER (NO ISOLATION) - Swab, Nasopharynx [[id number]] Collected:  09/03/20 1628    Specimen:  Swab from Nasopharynx Updated:  09/03/20 1642    Narrative:       The following orders were created for panel order COVID PRE-OP / PRE-PROCEDURE SCREENING ORDER (NO ISOLATION) - Swab, Nasopharynx.  Procedure                               Abnormality         Status                     ---------                               -----------         ------                     COVID-19,BIOTAP, NP/OP S...[[id number]]                      In process                   Please view results for these tests on the individual orders.    COVID-19,BIOTAP, NP/OP SWAB IN TRANSPORT MEDIA OR SALINE 24-36 HR TAT - Swab, Nasopharynx [[id number]] Collected:  09/03/20 1628    Specimen:  Swab from Nasopharynx Updated:  09/03/20 1642         Assessment :     Given the above history, physical examination and lab test; the  patient has no symptoms/signs of GBS.  She is complaining of sharp pain in her thigh and buttock rather than numbness in her feet at this point.  She felt all sensory modalities on her toes including vibration.  Her deep tendon reflexes were intact, in fact Brisky on her knees.  Her CSF was normal.  Altogether indicate nonorganic/functional symptoms.  She is carrying the following diagnoses:      GBS (Guillain-Newburgh syndrome) (CMS/HCC)    Benign essential hypertension    Hypercholesterolemia    PCOS (polycystic ovarian syndrome)    Insulin resistance syndrome    Morbid obesity with BMI of 45.0-49.9,  adult (CMS/HCC)    Fibromyalgia    Weakness of both lower extremities    Hypokalemia        Plan:    - I will hold on any intervention or treatment at this point in time.  Give the patient another day with reassurance, physical therapy.  - Patient may benefit from starting her on Lyrica for fibromyalgia symptoms.  - She may benefit from MRI of the brain for further evaluation of her meningioma as well as ruling out any pathology which could not be detected by clinical examination.    I will follow-up on the case and keep you updated.  Thank you for the consult please do not hesitate to call me if you have any further question or concern.      Justin Cancino MD  20  09:26    Electronically signed by Justin Cancino MD at 20 0900          Discharge Summary      Prasanna So MD at 20 1311              Patient Name: Angie Patel  : 1975  MRN: [id number]    Date of Admission: 9/3/2020  Date of Discharge:  2020  Primary Care Physician: Kya Kendrick PA-C      Chief Complaint:   Headache      Discharge Diagnoses     Active Hospital Problems    Diagnosis  POA   • **Weakness of both lower extremities [R29.898]  Yes   • Hypokalemia [E87.6]  Yes   • Headache, post-lumbar puncture [G97.1]  Yes   • Fibromyalgia [M79.7]  Yes   • Morbid obesity with BMI of 45.0-49.9, adult (CMS/HCC) [E66.01, Z68.42]  Not Applicable   • PCOS (polycystic ovarian syndrome) [E28.2]  Yes   • Insulin resistance syndrome [E88.81]  Yes   • Benign essential hypertension [I10]  Yes   • Hypercholesterolemia [E78.00]  Yes      Resolved Hospital Problems   No resolved problems to display.        Hospital Course      Very pleasant 46yo woman transferred here from [facility] for further eval and mgmt of possible GBS. Please see below for details of admission:    BLE weakness  -sent here from [facility] [place] due to concern over possible GBS even though CSF studies not c/w it, and reflexes still present  -unable to get MR C-spine  there and Dr. Armijo though might also benefit from NCV study here  -MRI brain and MRI C-spine both are fine here  -no evidence of respiratory compromise  -pt has pretty severe fibromyalgia so Neuro has started BID Lyrica and recommends continued PT/OT as outpt  -Neuro feels pt's presenting symptoms are most likely non-organic, but certainly her fibromyalgia could be playing a role  -both Dr. Cancino and Dr. Boyer agree this is not GBS  -can f/u with Neuro as outpt (Dr. Stacy) for monitoring and possible NCV if symptoms persist or worsen     Post LP HA  -resolved s/p blood patch     PCOS/hyperglycemia  -covering with SSI here  -can restart Metformin at dc     Hypokalemia  -continue replacing orally with daily KCl at home  -will need repeat BMP in a few days at PCP office  -Mg++ fine     Hyponatremia  -stable today  -monitor      Day of Discharge     Feeling a little better each day. Eager to go home.     Physical Exam:  Temp:  [97.8 °F (36.6 °C)-98.6 °F (37 °C)] 97.8 °F (36.6 °C)  Heart Rate:  [84-89] 84  Resp:  [18-20] 20  BP: (111-120)/(63-96) 111/63  Body mass index is 45.39 kg/m².  Physical Exam  General Appearance:  Comfortable and in no acute distress.    Vital signs:Vital signs are normal.  No fever.    Output: Producing urine.    HEENT: Normal HEENT exam.    Lungs:  Normal effort and normal respiratory rate.  Breath sounds clear to auscultation.  She is not in respiratory distress.    Heart: Normal rate.  Regular rhythm.    Abdomen: Abdomen is soft.  (Morbidly obese).  Bowel sounds are normal.   There is no abdominal tenderness.     Extremities: There is no dependent edema.    Pulses: Distal pulses are intact.    Neurological: Patient is alert and oriented to person, place and time.  Patient has normal reflexes and normal muscle tone.  (Weak in BLEs).    Pupils:  Pupils are equal, round, and reactive to light.    Skin:  Warm and dry.  No rash.     Consultants     Consult Orders (all) (From admission,  onward)     Start     Ordered    09/06/20 1619  Inpatient Case Management  Consult  Once     Provider:  (Not yet assigned)    09/06/20 1619    09/04/20 1305  Inpatient Rehab Admission Consult  Once     Provider:  (Not yet assigned)    09/04/20 1304    09/04/20 1221  Inpatient Anesthesia Pain Management Clinic Consult  Once     Specialty:  Pain Medicine  Provider:  (Not yet assigned)    09/04/20 1220    09/04/20 1126  Inpatient  Anesthesiology Physician Consult  Once,   Status:  Canceled     Specialty:  Anesthesiology  Provider:  (Not yet assigned)    09/04/20 1125    09/03/20 2124  Inpatient Case Management  Consult  Once     Provider:  (Not yet assigned)    09/03/20 2123    09/03/20 1936  Inpatient Neurology Consult General  Once     Specialty:  Neurology  Provider:  Justin Cancino MD    09/03/20 1935              Procedures     Imaging Results (All)     Procedure Component Value Units Date/Time    MRI Cervical Spine With & Without Contrast [[id number]] Collected:  09/05/20 1206     Updated:  09/05/20 1209    Narrative:       MR SCAN OF THE CERVICAL SPINE WITHOUT AND WITH INTRAVENOUS CONTRAST     HISTORY: Facial numbness, and numbness and tingling in both arms and  legs. Leg weakness.     The MR scan was performed with sagittal and axial images and includes T1  images without and with intravenous contrast. The following findings are  present:  1. The cervicomedullary junction is normal in position. There is no  intrinsic interbody cervical spinal cord. There is no abnormal  enhancement.  2. The foramen magnum and C1-2 levels are normal. The vertebral height  and disc spaces are well-maintained throughout the cervical spine and  there is no disc abnormality or central or foraminal encroachment.  3. The paraspinal soft tissues are unremarkable.     This report was finalized on 9/5/2020 12:06 PM by Dr. Adrian Antonio M.D.       MRI Brain With & Without Contrast [[id number]] Collected:   09/05/20 1016     Updated:  09/05/20 1101    Narrative:       MR SCAN OF THE BRAIN WITHOUT AND WITH INTRAVENOUS CONTRAST     HISTORY: Numbness and tingling in face and both arms and legs. Leg  weakness.     The MR scan was performed with sagittal and axial and coronal images  without and with intravenous contrast and compared to a previous MRI  scan dated 10/15/2019. The ventricles are normal in size and midline.  There is no evidence of intracranial hemorrhage or mass effect. The  diffusion sequence shows no evidence of acute infarct.     There is a very small enhancing meningioma high in the right posterior  frontal region measuring 10 mm and this shows no change from 10/15/2019.  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 There are normal  flow voids in the major vessels. The sinuses and mastoid air cells are  clear.     CONCLUSION: Small meningioma high in the right posterior frontal region  unchanged from 10/15/2019. 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 The exam is otherwise unremarkable.     This report was finalized on 9/5/2020 10:58 AM by Dr. Adrian Antonio M.D.       [place] [facility]ided [facility] Spine [[id number]] Resulted:  09/04/20 1342     Updated:  09/04/20 1342    Narrative:       This procedure was auto-finalized with no dictation required.          Pertinent Labs     Results from last 7 days   Lab Units 09/06/20  0637 09/05/20  0422 09/04/20  0948 09/03/20  0442   WBC 10*3/mm3 9.75 11.43* 9.58 10.49   HEMOGLOBIN g/dL 12.9 12.9 13.2 12.6   PLATELETS 10*3/mm3 250 261 281 251     Results from last 7 days   Lab Units 09/07/20  0631 09/06/20  0637 09/05/20  1830 09/05/20  0422 09/04/20  0948   SODIUM mmol/L 136 138  --  134* 134*   POTASSIUM mmol/L 3.3* 3.4* 3.7 3.3* 3.3*   CHLORIDE mmol/L 97* 100  --  99 95*   CO2 mmol/L  27.2 25.7  --  23.3 25.0   BUN mg/dL 11 14  --  13 14   CREATININE mg/dL 0.78 0.73  --  0.81 0.85   GLUCOSE mg/dL 129* 132*  --  128* 202*   Estimated Creatinine Clearance: 117.4 mL/min (by C-G formula based on SCr of 0.78 mg/dL).  Results from last 7 days   Lab Units 09/03/20  0442 09/02/20  1515 09/02/20  0925   ALBUMIN g/dL 3.80 4.1 4.10   BILIRUBIN mg/dL 0.5  --  0.2   ALK PHOS U/L 42  --  39   AST (SGOT) U/L 15  --  17   ALT (SGPT) U/L 23  --  25     Results from last 7 days   Lab Units 09/07/20  0631 09/06/20  0637 09/05/20  0422 09/04/20  0948 09/03/20 0442 09/02/20  1515 09/02/20  0925   CALCIUM mg/dL 9.0 9.1 9.3 9.8 9.3  --  9.3   ALBUMIN g/dL  --   --   --   --  3.80 4.1 4.10   MAGNESIUM mg/dL 2.2 2.1  --  2.0  --   --  1.6   PHOSPHORUS mg/dL  --   --   --   --   --   --  3.3       Results from last 7 days   Lab Units 09/02/20  0925   CK TOTAL U/L 69           Invalid input(s): LDLCALC        Test Results Pending at Discharge       Discharge Details        Discharge Medications      New Medications      Instructions Start Date   melatonin 5 MG tablet tablet   5 mg, Oral, Nightly      potassium chloride 10 MEQ CR tablet  Commonly known as:  K-DUR,KLOR-CON   10 mEq, Oral, Daily      pregabalin 50 MG capsule  Commonly known as:  LYRICA   50 mg, Oral, Every 12 Hours Scheduled         Changes to Medications      Instructions Start Date   furosemide 20 MG tablet  Commonly known as:  LASIX  What changed:    · how to take this  · when to take this   TAKE 1 TABLET BY MOUTH DAILY AS NEEDED         Continue These Medications      Instructions Start Date   Accu-Chek Camila device   Used to check blood sugars twice daily as needed for type 2 diabetes      accu-chek multiclix lancets   Used to check blood sugars twice daily as needed for type 2 diabetes      chlorthalidone 25 MG tablet  Commonly known as:  HYGROTON   25 mg, Oral, Daily      glucose blood test strip  Commonly known as:  Accu-Chek Camila   Used to check  blood sugars twice daily as needed for type 2 diabetes      metFORMIN  MG 24 hr tablet  Commonly known as:  GLUCOPHAGE-XR   1,500 mg, Oral, Daily With Dinner      Motrin  MG tablet  Generic drug:  ibuprofen   2 tablets, Oral, Every 6 Hours PRN      spironolactone 100 MG tablet  Commonly known as:  ALDACTONE   100 mg, Oral, Daily      Ventolin  (90 Base) MCG/ACT inhaler  Generic drug:  albuterol sulfate HFA   INHALE 2 PUFFS BY MOUTH FOUR TIMES DAILY AS NEEDED FOR WHEEZING OR SHORTNESS OF BREATH             Allergies   Allergen Reactions   • Iodine Shortness Of Breath     Swelling     • Shellfish Allergy Shortness Of Breath     swelling         Discharge Disposition:  Home or Self Care    Discharge Diet:  Diet Order   Procedures   • Diet Regular; Consistent Carbohydrate       Discharge Activity:   as tolerated, use walker when up    CODE STATUS:    Code Status and Medical Interventions:   Ordered at: 09/03/20 1738     Code Status:    CPR     Medical Interventions (Level of Support Prior to Arrest):    Full       Future Appointments   Date Time Provider Department Center   9/23/2020 12:00 PM Anatoly Mckinney MD [facility] None   10/13/2020  8:20 AM [facility] None   10/21/2020  3:15 PM Kya Kendrick PA-C [facility] None     Additional Instructions for the Follow-ups that You Need to Schedule     Discharge Follow-up with PCP   As directed       Currently Documented PCP:    Kya Kendrick PA-C    PCP Phone Number:    [phone]     Follow Up Details:  [place] (PCP) in 3-5 days for recheck K+ and referral to PT and handicapped parking permit         Discharge Follow-up with Specified Provider: Dr. Stacy ([facility] [place], operated by [facility]); 1 Month   As directed      To:  Dr. Stacy ([facility] [place], [facility])    Follow Up:  1 Month           Follow-up Information     [facility] Follow up in 3 month(s).    Specialty:  Neurology  Contact information:  [address]  [address]  [facility] [id number] [phone]  Additional information:  Phone Number: [phone]      [facility] and across the street from the [facility]. Located in [facility] with the number 4948 on the building.           Kya Kendrick PA-C .    Specialty:  Family Medicine  Why:  [place] (PCP) in 3-5 days for recheck K+ and referral to PT and handicapped parking permit  Contact information:  [address] [facility] [id number] [phone]                   Additional Instructions for the Follow-ups that You Need to Schedule     Discharge Follow-up with PCP   As directed       Currently Documented PCP:    Kya Kendrick PA-C    PCP Phone Number:    [phone]     Follow Up Details:  [place]o (PCP) in 3-5 days for recheck K+ and referral to PT and handicapped parking permit         Discharge Follow-up with Specified Provider: Dr. Stacy ([facility]); 1 Month   As directed      To:  Dr. Stacy ([facility])    Follow Up:  1 Month           Time Spent on Discharge:  Greater than 30 minutes      Arpan So MD  [facility]  09/07/20  1:11 PM                Electronically signed by Arpan So MD at 09/07/20 1334       Discharge Order (From admission, onward)     Start     Ordered    09/07/20 1307  Discharge patient  Once     Expected Discharge Date:  09/07/20    Discharge Disposition:  Home or Self Care    Physician of Record for Attribution - Please select from Treatment Team:  ARPAN SO [[id number]]    Review needed by CMO to determine Physician of Record:  No       Question Answer Comment   Physician of Record for Attribution - Please select from Treatment Team ARPAN SO    Review needed by CMO to determine Physician of Record No        09/07/20 6879

## 2020-09-10 ENCOUNTER — HOSPITAL ENCOUNTER (OUTPATIENT)
Dept: GENERAL RADIOLOGY | Facility: HOSPITAL | Age: 45
Discharge: HOME | End: 2020-09-10
Admitting: PHYSICIAN ASSISTANT

## 2020-09-10 ENCOUNTER — OFFICE VISIT (OUTPATIENT)
Dept: FAMILY MEDICINE CLINIC | Facility: CLINIC | Age: 45
End: 2020-09-10

## 2020-09-10 VITALS
DIASTOLIC BLOOD PRESSURE: 68 MMHG | HEART RATE: 125 BPM | BODY MASS INDEX: 45.24 KG/M2 | HEIGHT: 64 IN | OXYGEN SATURATION: 98 % | TEMPERATURE: 98.2 F | WEIGHT: 265 LBS | RESPIRATION RATE: 16 BRPM | SYSTOLIC BLOOD PRESSURE: 112 MMHG

## 2020-09-10 DIAGNOSIS — R29.898 WEAKNESS OF BOTH LOWER EXTREMITIES: ICD-10-CM

## 2020-09-10 DIAGNOSIS — Z09 HOSPITAL DISCHARGE FOLLOW-UP: ICD-10-CM

## 2020-09-10 DIAGNOSIS — Z09 HOSPITAL DISCHARGE FOLLOW-UP: Primary | ICD-10-CM

## 2020-09-10 DIAGNOSIS — M25.552 LEFT HIP PAIN: ICD-10-CM

## 2020-09-10 DIAGNOSIS — E87.6 HYPOKALEMIA: ICD-10-CM

## 2020-09-10 DIAGNOSIS — M79.7 FIBROMYALGIA: ICD-10-CM

## 2020-09-10 PROCEDURE — 99495 TRANSJ CARE MGMT MOD F2F 14D: CPT | Performed by: PHYSICIAN ASSISTANT

## 2020-09-10 PROCEDURE — 73502 X-RAY EXAM HIP UNI 2-3 VIEWS: CPT

## 2020-09-10 NOTE — PROGRESS NOTES
Transitional Care Follow Up Visit  Subjective     Angie Patel is a 45 y.o. female who presents for a transitional care management visit.    Within 48 business hours after discharge our office contacted her via telephone to coordinate her care and needs.      I reviewed and discussed the details of that call along with the discharge summary, hospital problems, inpatient lab results, inpatient diagnostic studies, and consultation reports with Angie.     Current outpatient and discharge medications have been reconciled for the patient.  Reviewed by: Kya Kendrick PA-C      Date of TCM Phone Call 9/7/2020   [facility]   Date of Admission 9/3/2020   Date of Discharge 9/7/2020   Discharge Disposition Home or Self Care     Risk for Readmission (LACE) Score: 9 (9/7/2020  6:00 AM)      History of Present Illness   Course During Hospital Stay:  Angie states she started having symptoms on August 31, 2020.  By September 1 she noticed that she was having pins and needlelike feeling sensation in feet, hands and then face. Her blood pressure was slightly elevated but was able to sit in a dark room to lower her blood pressure.  States her symptoms started to worsen.  States she was having difficulty walking.  She had numbness in her feet going up to her upper leg area.  He was advised to go to the emergency room to be evaluated for possible Lana Barré.  States she proceeded to go to [facility] on September 2,2020 where she was admitted.  She was later transferred down to [facility] on September 3, 2020.  She was admitted and discharged on September 7, 2020.  Angie states she had lumbar puncture and extensive work-up for possible [place] Barré.  States all of her test results showed she did not have [place] Barré.  Neurologist suspects she had a bad episode of fibromyalgia.  She was discharged home on Lyrica 3 mg to take twice daily.  She is due to schedule to follow-up with neurology for  possible EMG studies.  Has not made an appointment at this time.  Also noted to have a low potassium during her hospital stay.  She was instructed to follow-up with PCP for repeat BMP.  I have reviewed her discharge summary with her at office visit today.  Suggest that she have physical therapy. Angie she is feeling slightly better today.  She is able to use a walker when ambulating.  He is still having weakness/numbness of her lower extremities.  Denied any recent falls, injury, insect bites, fevers, chills, pain, shortness of air, wheezing, cough, swelling of extremities or upper respiratory symptoms.  She has not had any recent immunization.  She is waiting on Lyme's titer results on her spinal fluid.  This was taken at [facility].  Her appetite and sleep are improving.     The following portions of the patient's history were reviewed and updated as appropriate:   She  has a past medical history of Anxiety, Asthma, Cardiomyopathy (CMS/HCC), Diabetes mellitus (CMS/HCC), Fibromyalgia, Fibromyalgia, primary, Headache, post-lumbar puncture (9/4/2020), Headache, tension-type, Hypertension, Insomnia, Kidney calculi, Memory loss, Meningioma (CMS/HCC), Migraine, KACI (obstructive sleep apnea), Palpitations, Polycystic ovaries, and Seizures (CMS/[facility]).  She does not have any pertinent problems on file.  She  has a past surgical history that includes Hysterectomy; Tubal ligation; Oophorectomy; Foot surgery (Left); cystoscopy ureteroscopy laser lithotripsy (Right, 3/7/2017); [place] tooth extraction; and Shoulder surgery (Right, 2009).  Her family history includes Arthritis in her mother; Cancer in her maternal grandfather; Diabetes in her maternal grandmother and paternal aunt; Heart disease in her maternal grandfather, paternal aunt, paternal grandfather, and paternal uncle; Migraines in her mother; Neuropathy in her maternal grandmother; Seizures in her father; Thyroid disease in her maternal  grandmother.  She  reports that she has quit smoking. She has never used smokeless tobacco. She reports that she drank alcohol. She reports that she does not use drugs.  Current Outpatient Medications   Medication Sig Dispense Refill   • Blood Glucose Monitoring Suppl (ACCU-CHEK MANNY) device Used to check blood sugars twice daily as needed for type 2 diabetes 1 each 0   • chlorthalidone (HYGROTON) 25 MG tablet Take 1 tablet by mouth Daily. 90 tablet 3   • furosemide (LASIX) 20 MG tablet TAKE 1 TABLET BY MOUTH DAILY AS NEEDED (Patient taking differently: 20 mg.) 90 tablet 0   • glucose blood (Accu-Chek Manny) test strip Used to check blood sugars twice daily as needed for type 2 diabetes 100 each 12   • ibuprofen (MOTRIN IB) 200 MG tablet Take 2 tablets by mouth Every 6 (Six) Hours As Needed.     • Lancets (ACCU-CHEK MULTICLIX) lancets Used to check blood sugars twice daily as needed for type 2 diabetes 100 each 12   • melatonin 5 MG tablet tablet Take 1 tablet by mouth Every Night. 30 tablet 0   • metFORMIN ER (GLUCOPHAGE-XR) 750 MG 24 hr tablet Take 2 tablets by mouth Daily With Dinner. 60 tablet 6   • potassium chloride (K-DUR,KLOR-CON) 10 MEQ CR tablet Take 1 tablet by mouth Daily. 30 tablet 0   • pregabalin (LYRICA) 50 MG capsule Take 1 capsule by mouth Every 12 (Twelve) Hours. 60 capsule 0   • spironolactone (ALDACTONE) 100 MG tablet TAKE 1 TABLET BY MOUTH DAILY 90 tablet 0   • VENTOLIN  (90 Base) MCG/ACT inhaler INHALE 2 PUFFS BY MOUTH FOUR TIMES DAILY AS NEEDED FOR WHEEZING OR SHORTNESS OF BREATH 18 g 0     No current facility-administered medications for this visit.      Current Outpatient Medications on File Prior to Visit   Medication Sig   • Blood Glucose Monitoring Suppl (ACCU-CHEK MANNY) device Used to check blood sugars twice daily as needed for type 2 diabetes   • chlorthalidone (HYGROTON) 25 MG tablet Take 1 tablet by mouth Daily.   • furosemide (LASIX) 20 MG tablet TAKE 1 TABLET BY MOUTH  DAILY AS NEEDED (Patient taking differently: 20 mg.)   • glucose blood (Accu-Chek Camila) test strip Used to check blood sugars twice daily as needed for type 2 diabetes   • ibuprofen (MOTRIN IB) 200 MG tablet Take 2 tablets by mouth Every 6 (Six) Hours As Needed.   • Lancets (ACCU-CHEK MULTICLIX) lancets Used to check blood sugars twice daily as needed for type 2 diabetes   • melatonin 5 MG tablet tablet Take 1 tablet by mouth Every Night.   • metFORMIN ER (GLUCOPHAGE-XR) 750 MG 24 hr tablet Take 2 tablets by mouth Daily With Dinner.   • potassium chloride (K-DUR,KLOR-CON) 10 MEQ CR tablet Take 1 tablet by mouth Daily.   • pregabalin (LYRICA) 50 MG capsule Take 1 capsule by mouth Every 12 (Twelve) Hours.   • spironolactone (ALDACTONE) 100 MG tablet TAKE 1 TABLET BY MOUTH DAILY   • VENTOLIN  (90 Base) MCG/ACT inhaler INHALE 2 PUFFS BY MOUTH FOUR TIMES DAILY AS NEEDED FOR WHEEZING OR SHORTNESS OF BREATH     No current facility-administered medications on file prior to visit.      She is allergic to iodine and shellfish allergy..    Review of Systems   Constitutional: Negative for chills, fatigue and fever.   HENT: Negative.    Eyes: Negative.    Respiratory: Negative.  Negative for cough, chest tightness, shortness of breath and wheezing.    Cardiovascular: Negative.  Negative for chest pain, palpitations and leg swelling.   Gastrointestinal: Negative.  Negative for abdominal pain, anal bleeding, blood in stool, constipation, diarrhea, nausea and vomiting.   Endocrine: Negative.    Genitourinary: Negative.    Musculoskeletal: Negative.    Skin: Negative.    Allergic/Immunologic: Negative.    Neurological: Positive for weakness. Negative for dizziness, facial asymmetry, speech difficulty, light-headedness and headaches.   Hematological: Negative.    Psychiatric/Behavioral: Negative.    All other systems reviewed and are negative.      Objective   Physical Exam   Constitutional: She is oriented to person, place,  and time. Vital signs are normal. She appears well-developed and well-nourished.   Morbid obese female sitting on exam chair wearing facial mask.  Using a walker for ambulation   HENT:   Head: Normocephalic and atraumatic.   Neck: Trachea normal and phonation normal. Neck supple. No tracheal tenderness present. Carotid bruit is not present. No tracheal deviation and no edema present. No thyroid mass and no thyromegaly present.   Cardiovascular: Normal rate, regular rhythm, S1 normal, S2 normal, normal heart sounds and normal pulses.   No murmur heard.  Pulmonary/Chest: Effort normal and breath sounds normal.   Abdominal: Soft. Normal appearance, normal aorta and bowel sounds are normal. There is no hepatomegaly. There is no tenderness.   Neurological: She is alert and oriented to person, place, and time.   Skin: Skin is warm, dry and intact. Capillary refill takes less than 2 seconds.   Psychiatric: She has a normal mood and affect. Her speech is normal and behavior is normal. Judgment and thought content normal. Cognition and memory are normal.      I was wearing surgical mask during the entire office visit encounter.      Assessment/Plan   Diagnoses and all orders for this visit:    Hospital discharge follow-up  -     Basic Metabolic Panel  -     Ambulatory Referral to Physical Therapy Evaluate and treat  -     Ambulatory Referral to Neurology    Weakness of both lower extremities  -     Ambulatory Referral to Physical Therapy Evaluate and treat  -     Ambulatory Referral to Neurology    Hypokalemia  -     Basic Metabolic Panel  -     Ambulatory Referral to Physical Therapy Evaluate and treat    Fibromyalgia  -     Ambulatory Referral to Physical Therapy Evaluate and treat  -     Ambulatory Referral to Neurology    1.  New hospital discharge follow-up with lower extremity weakness/fibromyalgia: I have reviewed her [facility] hospital discharge paperwork with her at office visit today.  She was admitted to  [facility] on September 3,2020 with discharge date of September 7, 2020.  I will schedule physical therapy at [facility] physical [facility] [place] at patient's request.  Have placed referral to Dr. Percy Stacy, neurologist, for further evaluation of her lower extremity weakness.  She may need EMG studies.  She will continue her current Lyrica medication at home.  2.  New hypokalemia: We will check a BMP at office visit today.  She will be notified of test results when completed.  She will continue her potassium medication at home as directed for now.

## 2020-09-11 DIAGNOSIS — E83.52 HYPERCALCEMIA: Primary | ICD-10-CM

## 2020-09-11 LAB
B BURGDOR IGG PATRN SER IB-IMP: NEGATIVE
B BURGDOR IGM PATRN SER IB-IMP: NEGATIVE
BUN SERPL-MCNC: 15 MG/DL (ref 6–20)
BUN/CREAT SERPL: 18.8 (ref 7–25)
CALCIUM SERPL-MCNC: 10.8 MG/DL (ref 8.6–10.5)
CHLORIDE SERPL-SCNC: 96 MMOL/L (ref 98–107)
CO2 SERPL-SCNC: 28 MMOL/L (ref 22–29)
CREAT SERPL-MCNC: 0.8 MG/DL (ref 0.57–1)
GLUCOSE SERPL-MCNC: 92 MG/DL (ref 65–99)
P18 AB. IGG: NORMAL
P23 AB. IGG CSF: NORMAL
P23 AB. IGM CSF: NORMAL
P28 AB. IGG CSF: NORMAL
P30 AB. IGG CSF: NORMAL
P39 AB. IGG CSF: NORMAL
P39 AB. IGM CSF: NORMAL
P41 AB. IGG CSF: NORMAL
P41 AB. IGM CSF: NORMAL
P45 AB. IGG CSF: NORMAL
P58 AB. IGG CSF: NORMAL
P66 AB. IGG CSF: NORMAL
P93 AB. IGG CSF: NORMAL
POTASSIUM SERPL-SCNC: 4.2 MMOL/L (ref 3.5–5.2)
SODIUM SERPL-SCNC: 138 MMOL/L (ref 136–145)

## 2020-09-16 NOTE — PROGRESS NOTES
Subjective   Angie Patel is a 45 y.o. female.     New pt referred by Kya Kendrick PA-C for dm 2/ testing bs 0 x day last dm eye exam / last dm foot exam today with dr Mckinney      Patient is a 45-year-old who was referred for diabetes consultation by TONI Lujan.    She is  2 para 1.  She has polycystic ovarian syndrome diagnosed in  with symptoms of irregular and heavy periods and increased facial and body hair..  She was started on metformin and Spironolactone.  She had a hysterectomy () and left oophorectomy ().  She is not on estrogen replacement therapy.  She is on metformin  mg 2 tablets once a day and Spironolactone 100 mg once a day.    She has no history of gestational diabetes mellitus.  She had gestational hypertension and postpartum cardiomyopathy.    Hemoglobin A1c has ranged from 5.4% to 6.1% since 2016.  Her hemoglobin A1c vanna to 6.9% on 2020.  Her last meal was 10 AM.    She denies polyuria, polydipsia or nocturia.  She has no significant weight changes over the past 6 months.    Her last eye examination was in  and was normal.  She complains of intermittent numbness and tingling in her feet and fingers.  She has pins-and-needles sensation on on left lateral thigh.  She was started on Lyrica and 2020 but he had to be discontinued because she was having mood swings.  She will be seeing Dr. Rony Stacy for further evaluation.    She has hyperlipidemia and is on diet alone.    She has hypertension and is on chlorthalidone 25 mg once a day, Lasix 20 mg as needed and spironolactone 100 mg once a day.    She has sleep apnea and uses a CPAP regularly.  She wakes up rested.      The following portions of the patient's history were reviewed and updated as appropriate: allergies, current medications, past family history, past medical history, past social history, past surgical history and problem list.    Review of Systems   Constitutional:  "Positive for fatigue. Negative for chills and fever.   HENT: Negative.    Eyes: Negative.    Respiratory: Negative for chest tightness, shortness of breath and wheezing.    Cardiovascular: Positive for leg swelling. Negative for chest pain and palpitations.   Gastrointestinal: Negative for abdominal distention, abdominal pain, constipation and diarrhea.   Genitourinary: Negative for difficulty urinating, frequency and urgency.   Musculoskeletal: Positive for back pain (lower) and joint swelling (hip radiating down ). Negative for neck pain.   Neurological: Positive for numbness. Negative for dizziness, tremors, seizures, light-headedness and headaches.   Hematological: Does not bruise/bleed easily.   Psychiatric/Behavioral: Positive for sleep disturbance (sleep apnea using CPAP machine ). Negative for agitation and confusion. The patient is not nervous/anxious.      Objective      Vitals:    09/23/20 1222   BP: 114/66   BP Location: Right arm   Patient Position: Sitting   Cuff Size: Large Adult   Pulse: 103   SpO2: 98%   Weight: 123 kg (271 lb 12.8 oz)   Height: 162.6 cm (64.02\")     Physical Exam  Constitutional:       General: She is not in acute distress.     Appearance: She is not ill-appearing, toxic-appearing or diaphoretic.   HENT:      Mouth/Throat:      Pharynx: No oropharyngeal exudate or posterior oropharyngeal erythema.   Eyes:      General: No scleral icterus.        Right eye: No discharge.         Left eye: No discharge.      Extraocular Movements: Extraocular movements intact.      Conjunctiva/sclera: Conjunctivae normal.   Neck:      Musculoskeletal: Normal range of motion and neck supple. No neck rigidity or muscular tenderness.      Vascular: No carotid bruit.   Cardiovascular:      Rate and Rhythm: Normal rate and regular rhythm.      Heart sounds: Normal heart sounds. No murmur. No friction rub. No gallop.    Pulmonary:      Effort: Pulmonary effort is normal. No respiratory distress.      " Breath sounds: Normal breath sounds. No stridor. No wheezing, rhonchi or rales.   Chest:      Chest wall: No tenderness.   Abdominal:      General: Bowel sounds are normal. There is no distension.      Palpations: Abdomen is soft. There is no mass.      Tenderness: There is no abdominal tenderness. There is no guarding or rebound.      Hernia: No hernia is present.   Musculoskeletal:         General: No tenderness, deformity or signs of injury.      Right lower leg: No edema.      Left lower leg: No edema.   Lymphadenopathy:      Cervical: No cervical adenopathy.   Skin:     General: Skin is warm and dry.   Neurological:      General: No focal deficit present.      Mental Status: She is alert and oriented to person, place, and time.      Cranial Nerves: No cranial nerve deficit.      Comments: Intact light touch on lower ext       Office Visit on 09/10/2020   Component Date Value Ref Range Status   • Glucose 09/10/2020 92  65 - 99 mg/dL Final   • BUN 09/10/2020 15  6 - 20 mg/dL Final   • Creatinine 09/10/2020 0.80  0.57 - 1.00 mg/dL Final   • eGFR Non African Am 09/10/2020 78  >60 mL/min/1.73 Final   • eGFR African Am 09/10/2020 94  >60 mL/min/1.73 Final   • BUN/Creatinine Ratio 09/10/2020 18.8  7.0 - 25.0 Final   • Sodium 09/10/2020 138  136 - 145 mmol/L Final   • Potassium 09/10/2020 4.2  3.5 - 5.2 mmol/L Final   • Chloride 09/10/2020 96* 98 - 107 mmol/L Final   • Total CO2 09/10/2020 28.0  22.0 - 29.0 mmol/L Final   • Calcium 09/10/2020 10.8* 8.6 - 10.5 mg/dL Final     Assessment/Plan   Angie was seen today for diabetes.    Diagnoses and all orders for this visit:    Prediabetes  -     Comprehensive Metabolic Panel  -     Hemoglobin A1c  -     TSH  -     T4, Free  -     Microalbumin / Creatinine Urine Ratio - Urine, Clean Catch  -     C-Peptide  -     Glutamic Acid Decarboxylase    Elevated hemoglobin A1c  -     Comprehensive Metabolic Panel  -     Hemoglobin A1c  -     TSH  -     T4, Free  -     Microalbumin /  Creatinine Urine Ratio - Urine, Clean Catch  -     C-Peptide  -     Glutamic Acid Decarboxylase    PCOS (polycystic ovarian syndrome)  -     Comprehensive Metabolic Panel  -     Testosterone, Total, Women & Children  -     DHEA-Sulfate    Hyperlipidemia, unspecified hyperlipidemia type  -     Comprehensive Metabolic Panel  -     Lipid Panel  -     TSH  -     T4, Free    Essential hypertension  -     Comprehensive Metabolic Panel      She has at least prediabetes and elevated hemoglobin A1c since 2016.  She had one episode of elevated hemoglobin A1c of 6.9% which is in diabetic range.  She does not fulfill the diagnostic criteria for diabetes mellitus yet.  She may have peripheral neuropathy from diabetes mellitus.  Will defer work-up to the neurologist.  Continue metformin  mg 2 tablets once a day.  Check hemoglobin A1c, C-peptide, VERÓNICA antibody and urine microalbumin.    She has history of PCOS.  Continue spironolactone and metformin ER.  Check DHEAS and testosterone levels.    Continue chlorthalidone, Lasix, and spironolactone for hypertension.  Follow-up with Dr. Roblero    Continue low-fat diet.  Check lipid panel.  Continue regular exercise.    Flu vaccine this fall    Copy of my note sent to TONI Lujan and Dr. Roblero    RTC 4 mos

## 2020-09-17 DIAGNOSIS — R29.898 WEAKNESS OF BOTH LOWER EXTREMITIES: Primary | ICD-10-CM

## 2020-09-23 ENCOUNTER — READMISSION MANAGEMENT (OUTPATIENT)
Dept: CALL CENTER | Facility: HOSPITAL | Age: 45
End: 2020-09-23

## 2020-09-23 ENCOUNTER — OFFICE VISIT (OUTPATIENT)
Dept: ENDOCRINOLOGY | Age: 45
End: 2020-09-23

## 2020-09-23 VITALS
BODY MASS INDEX: 46.4 KG/M2 | SYSTOLIC BLOOD PRESSURE: 114 MMHG | WEIGHT: 271.8 LBS | OXYGEN SATURATION: 98 % | DIASTOLIC BLOOD PRESSURE: 66 MMHG | HEART RATE: 103 BPM | HEIGHT: 64 IN

## 2020-09-23 DIAGNOSIS — I10 ESSENTIAL HYPERTENSION: ICD-10-CM

## 2020-09-23 DIAGNOSIS — E28.2 PCOS (POLYCYSTIC OVARIAN SYNDROME): ICD-10-CM

## 2020-09-23 DIAGNOSIS — R73.03 PREDIABETES: Primary | ICD-10-CM

## 2020-09-23 DIAGNOSIS — E78.5 HYPERLIPIDEMIA, UNSPECIFIED HYPERLIPIDEMIA TYPE: ICD-10-CM

## 2020-09-23 DIAGNOSIS — R73.09 ELEVATED HEMOGLOBIN A1C: ICD-10-CM

## 2020-09-23 PROCEDURE — 99204 OFFICE O/P NEW MOD 45 MIN: CPT | Performed by: INTERNAL MEDICINE

## 2020-09-23 RX ORDER — POTASSIUM CHLORIDE 750 MG/1
10 TABLET, EXTENDED RELEASE ORAL DAILY
COMMUNITY
End: 2020-09-28

## 2020-09-23 NOTE — OUTREACH NOTE
Medical Week 3 Survey      Responses   [facility] patient discharged from?  [place]   COVID-19 Test Status  Negative   Does the patient have one of the following disease processes/diagnoses(primary or secondary)?  Other   Week 3 attempt successful?  No   Unsuccessful attempts  Attempt 1          Olimpia Barrera RN

## 2020-09-28 ENCOUNTER — READMISSION MANAGEMENT (OUTPATIENT)
Dept: CALL CENTER | Facility: HOSPITAL | Age: 45
End: 2020-09-28

## 2020-09-28 ENCOUNTER — OFFICE VISIT (OUTPATIENT)
Dept: FAMILY MEDICINE CLINIC | Facility: CLINIC | Age: 45
End: 2020-09-28

## 2020-09-28 VITALS
HEIGHT: 64 IN | BODY MASS INDEX: 46.44 KG/M2 | HEART RATE: 99 BPM | OXYGEN SATURATION: 99 % | TEMPERATURE: 97.7 F | DIASTOLIC BLOOD PRESSURE: 68 MMHG | WEIGHT: 272 LBS | RESPIRATION RATE: 16 BRPM | SYSTOLIC BLOOD PRESSURE: 118 MMHG

## 2020-09-28 DIAGNOSIS — R29.898 WEAKNESS OF BOTH LOWER EXTREMITIES: ICD-10-CM

## 2020-09-28 DIAGNOSIS — R26.9 ABNORMAL GAIT: Primary | ICD-10-CM

## 2020-09-28 PROCEDURE — 99213 OFFICE O/P EST LOW 20 MIN: CPT | Performed by: PHYSICIAN ASSISTANT

## 2020-09-28 RX ORDER — ALBUTEROL SULFATE 90 UG/1
AEROSOL, METERED RESPIRATORY (INHALATION)
Qty: 18 G | Refills: 0 | Status: SHIPPED | OUTPATIENT
Start: 2020-09-28 | End: 2020-10-27

## 2020-09-28 NOTE — PROGRESS NOTES
Subjective   Angie Patel is a 45 y.o. female presents for   Chief Complaint   Patient presents with   • Extremity Weakness     Management   • Gait Problem     Management       History of Present Illness     Angie is a 45-year-old female who presents for follow-up on gait disturbance and extremity weakness.  She has been seeing physical therapy at [facility].  States she is currently transitioning from walker to cane.  She needs a work note for [employer].  She is unable to work while on cane or using walker.  Has upcoming appointment with neurologist in November/December.  States she is gaining more strength each day but is not ready to walk unassisted at this time.  She has been taking her medication as directed.  Has been seeing the chiropractor at least twice weekly who does massages.  States this has helped with her muscle weakness.  Her appetite and sleep have been improving.  Denied any fevers, chills, upper respiratory symptoms, cough, chest pain, wheezing, or swelling of ankles.    The following portions of the patient's history were reviewed and updated as appropriate: allergies, current medications, past family history, past medical history, past social history, past surgical history and problem list.    Review of Systems   Constitutional: Negative for chills, fatigue and fever.   HENT: Negative.    Eyes: Negative.    Respiratory: Negative.  Negative for cough, chest tightness, shortness of breath and wheezing.    Cardiovascular: Negative.  Negative for chest pain, palpitations and leg swelling.   Gastrointestinal: Negative.  Negative for abdominal pain, blood in stool, constipation, diarrhea, nausea and vomiting.   Endocrine: Negative.    Genitourinary: Negative.    Musculoskeletal: Positive for arthralgias and gait problem.   Skin: Negative.    Allergic/Immunologic: Negative.    Neurological: Positive for weakness.   Hematological: Negative.    Psychiatric/Behavioral: Negative.  Negative for  "sleep disturbance.   All other systems reviewed and are negative.        Vitals:    20 0741   BP: 118/68   Pulse: 99   Resp: 16   Temp: 97.7 °F (36.5 °C)   SpO2: 99%   Weight: 123 kg (272 lb)   Height: 162.6 cm (64\")     Wt Readings from Last 3 Encounters:   20 123 kg (272 lb)   20 123 kg (271 lb 12.8 oz)   09/10/20 120 kg (265 lb)     BP Readings from Last 3 Encounters:   20 118/68   20 114/66   09/10/20 112/68     Social History     Socioeconomic History   • Marital status:      Spouse name: Not on file   • Number of children: Not on file   • Years of education: Not on file   • Highest education level: Not on file   Occupational History   • Occupation:      Employer: SELF-EMPLOYED     Comment: full time   Tobacco Use   • Smoking status: Former Smoker     Packs/day: 1.00     Years: 20.00     Pack years: 20.00     Types: Cigarettes     Quit date: 2016     Years since quittin.2   • Smokeless tobacco: Never Used   Substance and Sexual Activity   • Alcohol use: Yes     Comment: occasion   • Drug use: No   • Sexual activity: Defer     Partners: Male     Birth control/protection: Surgical     Comment: hysterectomy       Allergies   Allergen Reactions   • Iodine Shortness Of Breath     Swelling     • Shellfish Allergy Shortness Of Breath     swelling   • Lyrica [Pregabalin] Other (See Comments)     Mood swings, burning in legs        Body mass index is 46.69 kg/m².    Objective   Physical Exam  Constitutional:       Appearance: Normal appearance. She is well-developed and well-groomed. She is morbidly obese.      Interventions: Face mask in place.      Comments: Using a walker for ambulation   HENT:      Head: Normocephalic and atraumatic.   Neck:      Musculoskeletal: Neck supple.      Vascular: No carotid bruit.      Trachea: Trachea and phonation normal.   Cardiovascular:      Rate and Rhythm: Normal rate and regular rhythm.      Pulses: Normal pulses.      Heart " sounds: Normal heart sounds, S1 normal and S2 normal. No murmur.   Pulmonary:      Effort: Pulmonary effort is normal.      Breath sounds: Normal breath sounds.   Abdominal:      General: Bowel sounds are normal.      Palpations: Abdomen is soft. There is no hepatomegaly.      Tenderness: There is no abdominal tenderness.   Musculoskeletal:      Right lower leg: No edema.      Left lower leg: No edema.   Skin:     General: Skin is warm and dry.      Capillary Refill: Capillary refill takes less than 2 seconds.   Neurological:      Mental Status: She is alert and oriented to person, place, and time.   Psychiatric:         Attention and Perception: Attention and perception normal.         Mood and Affect: Mood and affect normal.         Speech: Speech normal.         Behavior: Behavior normal. Behavior is cooperative.         Thought Content: Thought content normal.         Cognition and Memory: Cognition and memory normal.         Judgment: Judgment normal.      I was wearing surgical mask during the entire office visit encounter.      Assessment/Plan   Angie was seen today for extremity weakness and gait problem.    Diagnoses and all orders for this visit:    Abnormal gait    Weakness of both lower extremities    Mrs. Angie Patel was seen in office today with improving weakness of bilateral lower extremities and abnormal gait.  I have reviewed her physical therapy notes from [facility].  I have discussed with her that she is not ready to go to her local gym for exercising.  She may continue doing her home exercises that was given to her by physical therapist.  I will give her a work excuse to return on November 1, 2020 pending any declining issues.  She will continue her current medication at home as directed.  I have asked her to schedule follow-up appointment in 1 month for reevaluation.  She voiced understanding.      [facility]  [id number] [address]  Dept: [phone]  Dept Fax: [phone]  Loc: [phone]  Loc Fax: [phone]           No visits with results within 2 Week(s) from this visit.   Latest known visit with results is:   Office Visit on 09/10/2020   Component Date Value Ref Range Status   • Glucose 09/10/2020 92  65 - 99 mg/dL Final   • BUN 09/10/2020 15  6 - 20 mg/dL Final   • Creatinine 09/10/2020 0.80  0.57 - 1.00 mg/dL Final   • eGFR Non African Am 09/10/2020 78  >60 mL/min/1.73 Final   • eGFR African Am 09/10/2020 94  >60 mL/min/1.73 Final   • BUN/Creatinine Ratio 09/10/2020 18.8  7.0 - 25.0 Final   • Sodium 09/10/2020 138  136 - 145 mmol/L Final   • Potassium 09/10/2020 4.2  3.5 - 5.2 mmol/L Final   • Chloride 09/10/2020 96* 98 - 107 mmol/L Final   • Total CO2 09/10/2020 28.0  22.0 - 29.0 mmol/L Final   • Calcium 09/10/2020 10.8* 8.6 - 10.5 mg/dL Final       Answers for HPI/ROS submitted by the patient on 9/26/2020   What is the primary reason for your visit?: Other  Please describe your symptoms.: Follow up for evaluation and work/gym notes  Have you had these symptoms before?: Yes  How long have you been having these symptoms?: Greater than 2 weeks

## 2020-09-28 NOTE — OUTREACH NOTE
Medical Week 3 Survey      Responses   [facility] patient discharged from?  [facility]   Does the patient have one of the following disease processes/diagnoses(primary or secondary)?  Other   Week 3 attempt successful?  No   Unsuccessful attempts  Attempt 2          Cindy Pereyra RN

## 2020-09-29 ENCOUNTER — TELEPHONE (OUTPATIENT)
Dept: NEUROLOGY | Facility: CLINIC | Age: 45
End: 2020-09-29

## 2020-09-29 ENCOUNTER — PROCEDURE VISIT (OUTPATIENT)
Dept: NEUROLOGY | Facility: CLINIC | Age: 45
End: 2020-09-29

## 2020-09-29 VITALS — HEIGHT: 64 IN | WEIGHT: 272 LBS | BODY MASS INDEX: 46.44 KG/M2

## 2020-09-29 DIAGNOSIS — G56.02 CARPAL TUNNEL SYNDROME OF LEFT WRIST: Primary | ICD-10-CM

## 2020-09-29 DIAGNOSIS — R29.898 WEAKNESS OF BOTH LOWER EXTREMITIES: ICD-10-CM

## 2020-09-29 DIAGNOSIS — G57.12 MERALGIA PARESTHETICA OF LEFT SIDE: ICD-10-CM

## 2020-09-29 PROCEDURE — 95886 MUSC TEST DONE W/N TEST COMP: CPT | Performed by: PSYCHIATRY & NEUROLOGY

## 2020-09-29 PROCEDURE — 95912 NRV CNDJ TEST 11-12 STUDIES: CPT | Performed by: PSYCHIATRY & NEUROLOGY

## 2020-09-29 NOTE — TELEPHONE ENCOUNTER
----- Message from David Figueredo sent at 9/29/2020  9:14 AM EDT -----  Regarding: EMG TEST - APPT with Dr Stacy  Patient called and stated that she received a call for an earlier appt and she was ok with coming to an appt for an evaluation but she is wondering if she should wait and follow up after EMG test or go ahead and come in now prior to test to see him.     Can you please contact patient and discuss?     [phone]

## 2020-09-29 NOTE — TELEPHONE ENCOUNTER
Spoke with patient scheduled her EMG for today at 2:30. Told her we'll keep her consult appt for December placed her on the waitlist for a sooner appt.

## 2020-09-29 NOTE — PROGRESS NOTES
EMG and Nerve Conduction Studies    I.      Instrument used: Neuromax 1002  II.     Please see data sheets for tabular summary of NCS and details on methods, temperatures and lab standards.   III.    EMG muscles tested for upper extremity studies include the deltoid, biceps, triceps, pronator teres, extensor digitorum communis, first dorsal interosseous and abductor pollicis brevis.    IV.   EMG muscles tested for lower extremity studies include the vastus lateralis, tibialis anterior, peroneus longus, medial gastrocnemius and extensor digitorum brevis.    V.    Additional muscles tested as needed.  Paraspinal muscles tested as needed.   VI.   Please see data sheets for tabular summary of EMG findings.   VII. The complete report includes the data sheets.      Indication: Left thigh numbness and tingling; left hand numbness more than right  History: 45-year-old woman who was hospitalized with a sending numbness and tingling from the feet ascending up into the legs and arms.  She has had some persistence of numbness in the left lateral thigh and left hand.  She was initially thought she may have Guillain Barré but a lumbar puncture showed a protein of 25 and no abnormal number of white cells.  The patient has non-insulin-dependent diabetes.      Ht: 162.6 cm  Wt: 123 kg  HbA1C: 46.69  Lab Results   Component Value Date    HGBA1C 6.90 (H) 06/29/2020     TSH:   Lab Results   Component Value Date    TSH 2.910 06/29/2020       Technical summary:  Nerve conduction studies were obtained in the left arm and left leg with 1 comparison in the right hand.  Skin temperatures were at least 32 °C measured on the palms and at the left ankle.  Needle examination was obtained on selected muscles of the left arm and left leg.    Results:  1.  Normal left median antidromic sensory latency of 3.6 ms (upper end of normal) with normal amplitude.  (The difference between this and the ulnar sensory latency was 0.5 ms which is a mild  abnormality)  2.  Prolonged left median orthodromic palmar sensory latency at 2.3 ms with normal amplitude.  Normal right median orthodromic palmar sensory latency at 2.2 ms with normal amplitude.  3.  Normal left ulnar sensory study.  4.  Normal left radial sensory study.  5.  Normal left median motor study.  6.  Normal left ulnar motor study.  7.  Normal left sural sensory study.  8.  Normal left superficial peroneal sensory study.  9.  Normal left peroneal motor study.  10.  Normal left pedal motor study.  11.  Needle examination of selected muscles of the left arm and left leg showed normal insertional activities throughout.  There were normal motor units and recruitment patterns throughout.    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  No evidence of a right median neuropathy.  No evidence of polyneuropathy.  No evidence of a left cervical or lumbosacral radiculopathy by this study.  Study results were discussed with the patient.    Rony Stacy M.D.        Addendum:  The patient's left thigh symptoms suggest meralgia paresthetica.  The lateral femoral cutaneous nerve conduction study is frequently abnormal in patients without symptoms and so it is felt to be not very reliable and so was not attempted.  GNS      Dictated utilizing Dragon dictation.

## 2020-09-30 DIAGNOSIS — R29.898 WEAKNESS OF BOTH LOWER EXTREMITIES: Primary | ICD-10-CM

## 2020-09-30 DIAGNOSIS — R26.9 ABNORMAL GAIT: ICD-10-CM

## 2020-09-30 LAB
ALBUMIN SERPL-MCNC: 4.6 G/DL (ref 3.5–5.2)
ALBUMIN/CREAT UR: 29 MG/G CREAT (ref 0–29)
ALBUMIN/GLOB SERPL: 1.6 G/DL
ALP SERPL-CCNC: 44 U/L (ref 39–117)
ALT SERPL-CCNC: 31 U/L (ref 1–33)
AST SERPL-CCNC: 20 U/L (ref 1–32)
BILIRUB SERPL-MCNC: 0.3 MG/DL (ref 0–1.2)
BUN SERPL-MCNC: 9 MG/DL (ref 6–20)
BUN/CREAT SERPL: 11.7 (ref 7–25)
C PEPTIDE SERPL-MCNC: 9.3 NG/ML (ref 1.1–4.4)
CALCIUM SERPL-MCNC: 9.8 MG/DL (ref 8.6–10.5)
CHLORIDE SERPL-SCNC: 98 MMOL/L (ref 98–107)
CHOLEST SERPL-MCNC: 142 MG/DL (ref 0–200)
CO2 SERPL-SCNC: 29.3 MMOL/L (ref 22–29)
CREAT SERPL-MCNC: 0.77 MG/DL (ref 0.57–1)
CREAT UR-MCNC: 97.7 MG/DL
DHEA-S SERPL-MCNC: 217 UG/DL (ref 57.3–279.2)
GAD65 AB SER IA-ACNC: <5 U/ML (ref 0–5)
GLOBULIN SER CALC-MCNC: 2.8 GM/DL
GLUCOSE SERPL-MCNC: 120 MG/DL (ref 65–99)
HBA1C MFR BLD: 6.7 % (ref 4.8–5.6)
HDLC SERPL-MCNC: 38 MG/DL (ref 40–60)
INTERPRETATION: NORMAL
LDLC SERPL CALC-MCNC: 60 MG/DL (ref 0–100)
Lab: NORMAL
MICROALBUMIN UR-MCNC: 28.6 UG/ML
POTASSIUM SERPL-SCNC: 4.2 MMOL/L (ref 3.5–5.2)
PROT SERPL-MCNC: 7.4 G/DL (ref 6–8.5)
SODIUM SERPL-SCNC: 138 MMOL/L (ref 136–145)
T4 FREE SERPL-MCNC: 1.27 NG/DL (ref 0.93–1.7)
TESTOST SERPL-MCNC: 36 NG/DL
TRIGL SERPL-MCNC: 220 MG/DL (ref 0–150)
TSH SERPL DL<=0.005 MIU/L-ACNC: 2.14 UIU/ML (ref 0.27–4.2)
VLDLC SERPL CALC-MCNC: 44 MG/DL

## 2020-09-30 RX ORDER — GABAPENTIN 100 MG/1
100 CAPSULE ORAL 2 TIMES DAILY
Qty: 60 CAPSULE | Refills: 0 | Status: SHIPPED | OUTPATIENT
Start: 2020-09-30 | End: 2020-10-28

## 2020-09-30 NOTE — PROGRESS NOTES
I spoke with patient and have discussed her EMG results.  We will try gabapentin 100 mg to take once a day for 2 days then increase to twice daily thereafter.  She will notify office of any increase mood swings with medication.  She has an appointment on October 21, 2020.  Will reevaluate at that time.

## 2020-10-11 ENCOUNTER — RESULTS ENCOUNTER (OUTPATIENT)
Dept: FAMILY MEDICINE CLINIC | Facility: CLINIC | Age: 45
End: 2020-10-11

## 2020-10-11 DIAGNOSIS — E83.52 HYPERCALCEMIA: ICD-10-CM

## 2020-10-14 RX ORDER — SPIRONOLACTONE 100 MG/1
100 TABLET, FILM COATED ORAL DAILY
Qty: 90 TABLET | Refills: 0 | Status: SHIPPED | OUTPATIENT
Start: 2020-10-14 | End: 2020-12-30

## 2020-10-27 RX ORDER — ALBUTEROL SULFATE 90 UG/1
AEROSOL, METERED RESPIRATORY (INHALATION)
Qty: 18 G | Refills: 3 | Status: SHIPPED | OUTPATIENT
Start: 2020-10-27 | End: 2021-02-12

## 2020-10-28 ENCOUNTER — OFFICE VISIT (OUTPATIENT)
Dept: FAMILY MEDICINE CLINIC | Facility: CLINIC | Age: 45
End: 2020-10-28

## 2020-10-28 VITALS
WEIGHT: 272.9 LBS | BODY MASS INDEX: 46.59 KG/M2 | SYSTOLIC BLOOD PRESSURE: 110 MMHG | RESPIRATION RATE: 14 BRPM | HEART RATE: 111 BPM | TEMPERATURE: 97.8 F | OXYGEN SATURATION: 98 % | HEIGHT: 64 IN | DIASTOLIC BLOOD PRESSURE: 60 MMHG

## 2020-10-28 DIAGNOSIS — R29.898 WEAKNESS OF BOTH LOWER EXTREMITIES: Primary | ICD-10-CM

## 2020-10-28 DIAGNOSIS — J30.2 SEASONAL ALLERGIC RHINITIS, UNSPECIFIED TRIGGER: ICD-10-CM

## 2020-10-28 DIAGNOSIS — E66.01 MORBID OBESITY WITH BMI OF 45.0-49.9, ADULT (HCC): ICD-10-CM

## 2020-10-28 DIAGNOSIS — E11.65 TYPE 2 DIABETES MELLITUS WITH HYPERGLYCEMIA, WITHOUT LONG-TERM CURRENT USE OF INSULIN (HCC): ICD-10-CM

## 2020-10-28 DIAGNOSIS — R26.9 ABNORMAL GAIT: ICD-10-CM

## 2020-10-28 PROCEDURE — 99214 OFFICE O/P EST MOD 30 MIN: CPT | Performed by: PHYSICIAN ASSISTANT

## 2020-10-28 NOTE — PROGRESS NOTES
Subjective   Angie Patel is a 45 y.o. female presents for   Chief Complaint   Patient presents with   • Extremity Weakness   • Obesity     weight check   • Ear Fullness       History of Present Illness     Angie is a 45-year-old female who presents for extremity weakness, with gait disturbances.  States she has been going to the therapy at least 2-3 times weekly.  Also seeing chiropractor at least once or twice weekly.  She is now walking with a cane.  Still feels unsteady but her strength and gait is improving.  States she feels like she is not ready to go back to working at [employer].  She does not feel strong enough to be able to walk through grocery stores.    She would like to have laparoscopic sleeve surgery.  Has an appointment with bariatric surgeon in January 2021.  She was told that she would need to do 6 months monthly checks in office for her weight before surgery could be performed.  States her diet has been improving.  She has been trying to cut back on sugar and carbohydrates in diet.  She is a type II diabetic and is currently sees endocrinology for this.  Would like to see a diabetic educator to help her with her carbohydrate issues.    States she has been having some left ear fullness and pressure for the last several days.  Denied any discharge, headache, runny nose, sore throat, dizziness, sneezing, sore throat or cough.  Has not used any over-the-counter medications.      The following portions of the patient's history were reviewed and updated as appropriate: allergies, current medications, past family history, past medical history, past social history, past surgical history and problem list.    Review of Systems   Constitutional: Negative for chills, fatigue and fever.   HENT: Positive for ear pain. Negative for congestion, ear discharge, hearing loss, postnasal drip, rhinorrhea, sinus pressure, sinus pain, sneezing and sore throat.    Eyes: Negative.    Respiratory: Negative.   "Negative for cough, chest tightness, shortness of breath and wheezing.    Cardiovascular: Negative.  Negative for chest pain, palpitations and leg swelling.   Gastrointestinal: Negative.    Endocrine: Negative.    Genitourinary: Negative.    Musculoskeletal: Positive for gait problem.   Skin: Negative.    Allergic/Immunologic: Negative.    Neurological: Positive for weakness. Negative for dizziness, light-headedness and headaches.   Hematological: Negative.    Psychiatric/Behavioral: Negative.  Negative for sleep disturbance.   All other systems reviewed and are negative.        Vitals:    10/28/20 0935   BP: 110/60   BP Location: Left arm   Patient Position: Sitting   Cuff Size: Adult   Pulse: 111   Resp: 14   Temp: 97.8 °F (36.6 °C)   TempSrc: Infrared   SpO2: 98%   Weight: 124 kg (272 lb 14.4 oz)   Height: 162.6 cm (64\")     Wt Readings from Last 3 Encounters:   10/28/20 124 kg (272 lb 14.4 oz)   20 123 kg (272 lb)   20 123 kg (272 lb)     BP Readings from Last 3 Encounters:   10/28/20 110/60   20 118/68   20 114/66     Social History     Socioeconomic History   • Marital status:      Spouse name: Not on file   • Number of children: Not on file   • Years of education: Not on file   • Highest education level: Not on file   Occupational History   • Occupation:      Employer: SELF-EMPLOYED     Comment: full time   Tobacco Use   • Smoking status: Former Smoker     Packs/day: 1.00     Years: 20.00     Pack years: 20.00     Types: Cigarettes     Quit date: 2016     Years since quittin.3   • Smokeless tobacco: Never Used   Substance and Sexual Activity   • Alcohol use: Yes     Comment: occasion   • Drug use: No   • Sexual activity: Defer     Partners: Male     Birth control/protection: Surgical     Comment: hysterectomy       Allergies   Allergen Reactions   • Iodine Shortness Of Breath     Swelling     • Shellfish Allergy Shortness Of Breath     swelling   • Lyrica " [Pregabalin] Other (See Comments)     Mood swings, burning in legs        Body mass index is 46.84 kg/m².    Objective   Physical Exam  Constitutional:       Appearance: Normal appearance. She is well-developed and well-groomed. She is morbidly obese.      Interventions: Face mask in place.   HENT:      Head: Normocephalic and atraumatic.      Jaw: There is normal jaw occlusion.      Right Ear: Hearing, tympanic membrane, ear canal and external ear normal.      Left Ear: Hearing, tympanic membrane, ear canal and external ear normal.      Nose: Nose normal.      Right Sinus: No maxillary sinus tenderness or frontal sinus tenderness.      Left Sinus: No maxillary sinus tenderness or frontal sinus tenderness.      Mouth/Throat:      Lips: Pink.      Mouth: Mucous membranes are moist.      Tongue: No lesions. Tongue does not deviate from midline.      Palate: No mass and lesions.      Pharynx: Oropharynx is clear. Uvula midline.      Tonsils: No tonsillar exudate or tonsillar abscesses.   Eyes:      General: Lids are normal.      Conjunctiva/sclera: Conjunctivae normal.   Neck:      Musculoskeletal: Neck supple. No edema.      Thyroid: No thyroid mass, thyromegaly or thyroid tenderness.      Trachea: Trachea and phonation normal. No tracheal tenderness.   Cardiovascular:      Rate and Rhythm: Normal rate and regular rhythm.      Pulses: Normal pulses.      Heart sounds: Normal heart sounds, S1 normal and S2 normal. No murmur.   Pulmonary:      Effort: Pulmonary effort is normal.      Breath sounds: Normal breath sounds and air entry.   Abdominal:      General: Bowel sounds are normal.      Palpations: Abdomen is soft. There is no hepatomegaly.      Tenderness: There is no abdominal tenderness.   Musculoskeletal:      Right lower leg: No edema.      Left lower leg: No edema.   Lymphadenopathy:      Cervical: No cervical adenopathy.      Right cervical: No superficial, deep or posterior cervical adenopathy.     Left  cervical: No superficial, deep or posterior cervical adenopathy.   Skin:     General: Skin is warm and dry.      Capillary Refill: Capillary refill takes less than 2 seconds.   Neurological:      Mental Status: She is alert and oriented to person, place, and time.   Psychiatric:         Attention and Perception: Attention and perception normal.         Mood and Affect: Mood and affect normal.         Speech: Speech normal.         Behavior: Behavior normal. Behavior is cooperative.         Thought Content: Thought content normal.         Cognition and Memory: Cognition and memory normal.         Judgment: Judgment normal.      I was wearing surgical mask during the entire office visit encounter.      Assessment/Plan   Diagnoses and all orders for this visit:    1. Weakness of both lower extremities (Primary)    2. Abnormal gait    3. Type 2 diabetes mellitus with hyperglycemia, without long-term current use of insulin (CMS/[facility])  -     Ambulatory Referral to Diabetic Education    4. Seasonal allergic rhinitis, unspecified trigger    5. Morbid obesity with BMI of 45.0-49.9, adult (CMS/[facility])    1. Improving abnormal gait with weakness of bilateral lower extremities: She was instructed continued the physical therapy and chiropractor.  She will keep her follow-up appointments with neurology as well.  I have given her a work excuse to possibly return on December 1, 2020.  Will reevaluate in 1 month.  2.  Chronic and stable type 2 diabetes: I will refer to diabetic educator.  She will keep her appointment with endocrinology as well.  Continue current medications at home as directed.  3.  New allergic rhinitis: I suspect her ear pressure is related to allergies.  May use over-the-counter Allegra or Claritin.  She will return to office if symptoms do not improve.  4.  Chronic morbid obesity: She is interested in having laparoscopic sleeve surgery.  I will complete her initial monthly diet progress paperwork and sent to the  bariatric surgeon.  10 you will return to office in 1 month for weight check.  Stressed the importance of decreasing sugar and carbohydrates in diet.  She is physically not able to exercise regularly due to her current neurological issues.    [facility]  [address] [place] [id number]  Dept: [phone]  Dept Fax: [phone]  Loc: [phone]  Loc Fax: [phone]               Answers for HPI/ROS submitted by the patient on 10/26/2020   What is the primary reason for your visit?: Other  Please describe your symptoms.: Follow up for leg pain and numbness  Have you had these symptoms before?: Yes  How long have you been having these symptoms?: Greater than 2 weeks  Please list any medications you are currently taking for this condition.: Motrin and tylenol alternating

## 2020-10-29 LAB
BUN SERPL-MCNC: 12 MG/DL (ref 6–20)
BUN/CREAT SERPL: 15.4 (ref 7–25)
CALCIUM SERPL-MCNC: 9.9 MG/DL (ref 8.6–10.5)
CHLORIDE SERPL-SCNC: 98 MMOL/L (ref 98–107)
CO2 SERPL-SCNC: 28.3 MMOL/L (ref 22–29)
CREAT SERPL-MCNC: 0.78 MG/DL (ref 0.57–1)
GLUCOSE SERPL-MCNC: 132 MG/DL (ref 65–99)
POTASSIUM SERPL-SCNC: 4.3 MMOL/L (ref 3.5–5.2)
SODIUM SERPL-SCNC: 138 MMOL/L (ref 136–145)

## 2020-10-30 DIAGNOSIS — M79.7 FIBROMYALGIA: Primary | ICD-10-CM

## 2020-10-30 RX ORDER — DICLOFENAC SODIUM 75 MG/1
75 TABLET, DELAYED RELEASE ORAL 2 TIMES DAILY
Qty: 60 TABLET | Refills: 3 | Status: SHIPPED | OUTPATIENT
Start: 2020-10-30 | End: 2021-01-12

## 2020-11-30 ENCOUNTER — OFFICE VISIT (OUTPATIENT)
Dept: FAMILY MEDICINE CLINIC | Facility: CLINIC | Age: 45
End: 2020-11-30

## 2020-11-30 VITALS
WEIGHT: 271 LBS | RESPIRATION RATE: 16 BRPM | HEIGHT: 64 IN | OXYGEN SATURATION: 98 % | SYSTOLIC BLOOD PRESSURE: 122 MMHG | DIASTOLIC BLOOD PRESSURE: 88 MMHG | BODY MASS INDEX: 46.26 KG/M2 | HEART RATE: 104 BPM | TEMPERATURE: 97.6 F

## 2020-11-30 DIAGNOSIS — E66.01 MORBID OBESITY WITH BMI OF 40.0-44.9, ADULT (HCC): Primary | ICD-10-CM

## 2020-11-30 DIAGNOSIS — R26.9 ABNORMAL GAIT: ICD-10-CM

## 2020-11-30 PROCEDURE — 99213 OFFICE O/P EST LOW 20 MIN: CPT | Performed by: PHYSICIAN ASSISTANT

## 2020-11-30 NOTE — PROGRESS NOTES
Subjective   Angie Patel is a 45 y.o. female presents for   Chief Complaint   Patient presents with   • Gait Problem     follow up   • Obesity     management       History of Present Illness     Angie is a 45 year old female who presents for gait management and obesity management.  She has lost 1 pound since October 28, 2020.   Angie states she has follow-up appointment with her neurologist next month.  States overall she has been improving with her gait and muscle weakness.  Has been doing physical therapy several days weekly.  She is also doing home therapy daily.  States she is walking with a cane but can walk for longer periods of time.  She is driving now as well.    Has upcoming appointment with bariatric surgeon in January 2021.  She is looking for at possibly getting a laparoscopic sleeve surgery for weight loss.  Her diet has been slightly healthy.  She has been trying to watch her carbohydrates and sugar.  Denied any current fevers, chills, upper respiratory symptoms, chest pain, shortness of air, dizziness, wheezing or swelling of ankles.  Has no other complaints today.      The following portions of the patient's history were reviewed and updated as appropriate: allergies, current medications, past family history, past medical history, past social history, past surgical history and problem list.    Review of Systems   Constitutional: Negative.    HENT: Negative.    Eyes: Negative.    Respiratory: Negative.    Cardiovascular: Negative.    Gastrointestinal: Negative.    Endocrine: Negative.    Genitourinary: Negative.    Musculoskeletal: Positive for gait problem.   Skin: Negative.    Allergic/Immunologic: Negative.    Hematological: Negative.    Psychiatric/Behavioral: Negative.  Negative for sleep disturbance and suicidal ideas.   All other systems reviewed and are negative.        Vitals:    11/30/20 0909   BP: 122/88   Pulse: 104   Resp: 16   Temp: 97.6 °F (36.4 °C)   SpO2: 98%   Weight: 123 kg (271  "lb)   Height: 162.6 cm (64\")     Wt Readings from Last 3 Encounters:   20 123 kg (271 lb)   10/28/20 124 kg (272 lb 14.4 oz)   20 123 kg (272 lb)     BP Readings from Last 3 Encounters:   20 122/88   10/28/20 110/60   20 118/68     Social History     Socioeconomic History   • Marital status:      Spouse name: Not on file   • Number of children: Not on file   • Years of education: Not on file   • Highest education level: Not on file   Occupational History   • Occupation:      Employer: SELF-EMPLOYED     Comment: full time   Tobacco Use   • Smoking status: Former Smoker     Packs/day: 1.00     Years: 20.00     Pack years: 20.00     Types: Cigarettes     Quit date: 2016     Years since quittin.4   • Smokeless tobacco: Never Used   Substance and Sexual Activity   • Alcohol use: Yes     Comment: occasion   • Drug use: No   • Sexual activity: Defer     Partners: Male     Birth control/protection: Surgical     Comment: hysterectomy       Allergies   Allergen Reactions   • Iodine Shortness Of Breath     Swelling     • Shellfish Allergy Shortness Of Breath     swelling   • Lyrica [Pregabalin] Other (See Comments)     Mood swings, burning in legs        Body mass index is 46.52 kg/m².    Objective   Physical Exam  Constitutional:       Appearance: Normal appearance. She is well-developed. She is morbidly obese.      Interventions: Face mask in place.      Comments: Walking with a cane   HENT:      Head: Normocephalic and atraumatic.   Neck:      Musculoskeletal: Neck supple.      Vascular: Normal carotid pulses. No carotid bruit, hepatojugular reflux or JVD.      Trachea: Trachea and phonation normal.   Cardiovascular:      Rate and Rhythm: Normal rate and regular rhythm.      Pulses: Normal pulses.      Heart sounds: Normal heart sounds, S1 normal and S2 normal. No murmur.   Pulmonary:      Effort: Pulmonary effort is normal.      Breath sounds: Normal breath sounds and air " entry.   Abdominal:      General: Bowel sounds are normal.      Palpations: Abdomen is soft. There is no hepatomegaly.      Tenderness: There is no abdominal tenderness.   Musculoskeletal:      Right lower leg: No edema.      Left lower leg: No edema.   Skin:     General: Skin is warm and dry.      Capillary Refill: Capillary refill takes less than 2 seconds.   Neurological:      Mental Status: She is alert and oriented to person, place, and time.   Psychiatric:         Attention and Perception: Attention and perception normal.         Mood and Affect: Mood and affect normal.         Speech: Speech normal.         Behavior: Behavior normal. Behavior is cooperative.         Thought Content: Thought content normal.         Cognition and Memory: Cognition and memory normal.         Judgment: Judgment normal.     I was wearing a surgical mask and face shield during the entire office visit/encounter.       Assessment/Plan   Diagnoses and all orders for this visit:    1. Morbid obesity with BMI of 40.0-44.9, adult (CMS/HCC) (Primary)    2. Abnormal gait      1.  Chronic with slight improvement morbid obesity: She has lost 1 pound since her office visit on October 28, 2020.  She will have upcoming appointment with bariatric surgeon in January 2021.  I have completed her obesity/diet paperwork and will fax to .   Angie will return to office in 1 month for weight management.  I have stressed the importance to continue decreasing her fatty food and carbohydrates in diet.  2.  Chronic and improving abnormal gait: She has been seeing the physical therapist which has been helping.  Has an appointment with her neurologist on December 22, 2020.  She will continue doing her exercises at home and physical therapy.  I have given her a work note to return on January 4, 2021.  Will reevaluate at next office visit.  I will hold updated immunizations for now due to her recent a sending paralysis.  Unsure if this was [place] related or not.    KARL Solorio [facility]  [address] [address]  Dept: [phone]  Dept Fax: [phone]  Loc: [phone]  Loc Fax: [phone]           Answers for HPI/ROS submitted by the patient on 11/23/2020   What is the primary reason for your visit?: Other  Please describe your symptoms.: Follow up for leg/hip issue  Have you had these symptoms before?: Yes  How long have you been having these symptoms?: Greater than 2 weeks

## 2020-12-02 ENCOUNTER — APPOINTMENT (OUTPATIENT)
Dept: DIABETES SERVICES | Facility: HOSPITAL | Age: 45
End: 2020-12-02

## 2020-12-02 ENCOUNTER — HOSPITAL ENCOUNTER (OUTPATIENT)
Dept: NUTRITION | Facility: HOSPITAL | Age: 45
Discharge: HOME | End: 2020-12-02
Admitting: PHYSICIAN ASSISTANT

## 2020-12-02 PROCEDURE — 97802 MEDICAL NUTRITION INDIV IN: CPT | Performed by: DIETITIAN, REGISTERED

## 2020-12-09 ENCOUNTER — APPOINTMENT (OUTPATIENT)
Dept: INFUSION THERAPY | Facility: HOSPITAL | Age: 45
End: 2020-12-09

## 2020-12-22 ENCOUNTER — OFFICE VISIT (OUTPATIENT)
Dept: NEUROLOGY | Facility: CLINIC | Age: 45
End: 2020-12-22

## 2020-12-22 VITALS — BODY MASS INDEX: 46.44 KG/M2 | HEART RATE: 99 BPM | WEIGHT: 272 LBS | OXYGEN SATURATION: 100 % | HEIGHT: 64 IN

## 2020-12-22 DIAGNOSIS — G57.02 PIRIFORMIS SYNDROME OF LEFT SIDE: ICD-10-CM

## 2020-12-22 DIAGNOSIS — M25.552 HIP PAIN, CHRONIC, LEFT: ICD-10-CM

## 2020-12-22 DIAGNOSIS — G57.12 MERALGIA PARESTHETICA OF LEFT SIDE: Primary | ICD-10-CM

## 2020-12-22 DIAGNOSIS — G89.29 HIP PAIN, CHRONIC, LEFT: ICD-10-CM

## 2020-12-22 PROCEDURE — 99213 OFFICE O/P EST LOW 20 MIN: CPT | Performed by: PSYCHIATRY & NEUROLOGY

## 2020-12-22 NOTE — PROGRESS NOTES
CC: Left leg numbness weakness and pain    HPI:  Angie Patel is a  45 y.o.  left-handed white female who I am seeing as a follow-up since I performed an EMG on her previously.  Also she was seen while hospitalized by Dr. Armijo and Dr. Cancino.  She originally presented to [facility] with progressive numbness and weakness in the legs and was transferred to [facility] for further evaluation.  Lumbar MRI at [facility] did not show any significant pathology affecting nerve roots.  While hospitalized at [facility] MRI of the cervical spine and brain were obtained which did not show any significant abnormalities.  Post discharge the patient had an EMG which I performed demonstrating very mild left median neuropathy at the wrist which was really not symptomatic.  The remaining portions of the study in the left arm and left leg were completely normal.    Her symptoms have evolved some with arm and right leg symptoms disappearing and only left leg symptoms create significant problems for her currently.  She is continuing physical therapy and they do a lot of stretching for her piriformis syndrome and she does indicate some suspicion of iliotibial band problems.  Some of her symptoms are quite suspicious for meralgia paresthetica also.  She describes fiery pain on the left lateral thigh and a relative with a large distribution but at other times she has more defined painful symptoms in the posterior hip as well as in the more medial aspects of the left hip with hip flexion.        Past Medical History:   Diagnosis Date   • Anxiety    • Asthma    • Diabetes mellitus (CMS/[facility])    • Fibromyalgia    • Fibromyalgia, primary    • Headache, post-lumbar puncture 9/4/2020   • Headache, tension-type    • Hypertension    • Kidney calculi    • Memory loss    • Meningioma (CMS/[facility])    • Migraine     ocular   • KACI (obstructive sleep apnea)     On CPAP   • Palpitations    • Polycystic ovaries    • Postpartum  cardiomyopathy 2002   • Preeclampsia 2002   • Seizures (CMS/HCC)          Past Surgical History:   Procedure Laterality Date   • CYSTOSCOPY URETEROSCOPY LASER LITHOTRIPSY Right 3/7/2017    Procedure: CYSTOSCOPY RT URETEROSCOPY LASER LITHOTRIPSY W/ STENT, rerograde pyelogram ;  Surgeon: Rashid Malloy MD;  Location: [facility] OR;  Service:    • FOOT SURGERY Left     tendon repair   • HYSTERECTOMY     • OOPHORECTOMY Left     for ovarian cysts   • SHOULDER SURGERY Right 2009   • TUBAL ABDOMINAL LIGATION     • WISDOM TOOTH EXTRACTION             Current Outpatient Medications:   •  chlorthalidone (HYGROTON) 25 MG tablet, Take 1 tablet by mouth Daily., Disp: 90 tablet, Rfl: 3  •  diclofenac (VOLTAREN) 75 MG EC tablet, Take 1 tablet by mouth 2 (Two) Times a Day., Disp: 60 tablet, Rfl: 3  •  furosemide (LASIX) 20 MG tablet, TAKE 1 TABLET BY MOUTH DAILY AS NEEDED (Patient taking differently: 20 mg.), Disp: 90 tablet, Rfl: 0  •  metFORMIN ER (GLUCOPHAGE-XR) 750 MG 24 hr tablet, Take 2 tablets by mouth Daily With Dinner., Disp: 60 tablet, Rfl: 6  •  potassium chloride (K-DUR,KLOR-CON) 10 MEQ CR tablet, Take 1 tablet by mouth Daily., Disp: 30 tablet, Rfl: 0  •  spironolactone (ALDACTONE) 100 MG tablet, TAKE 1 TABLET BY MOUTH DAILY, Disp: 90 tablet, Rfl: 0  •  Ventolin  (90 Base) MCG/ACT inhaler, INHALE 2 PUFFS BY MOUTH FOUR TIMES DAILY AS NEEDED FOR WHEEZING OR SHORTNESS OF BREATH, Disp: 18 g, Rfl: 3  •  Blood Glucose Monitoring Suppl (ACCU-CHEK MANNY) device, Used to check blood sugars twice daily as needed for type 2 diabetes, Disp: 1 each, Rfl: 0  •  glucose blood (Accu-Chek Manny) test strip, Used to check blood sugars twice daily as needed for type 2 diabetes, Disp: 100 each, Rfl: 12  •  ibuprofen (MOTRIN IB) 200 MG tablet, Take 2 tablets by mouth Every 6 (Six) Hours As Needed., Disp: , Rfl:   •  Lancets (ACCU-CHEK MULTICLIX) lancets, Used to check blood sugars twice daily as needed for type 2 diabetes,  Disp: 100 each, Rfl: 12      Family History   Problem Relation Age of Onset   • Heart disease Paternal Aunt    • Diabetes Paternal Aunt    • Diabetes Maternal Grandmother    • Neuropathy Maternal Grandmother    • Thyroid disease Maternal Grandmother    • Heart disease Maternal Grandfather    • Cancer Maternal Grandfather    • Heart disease Paternal Grandfather    • Heart disease Paternal Uncle    • Arthritis Mother    • Migraines Mother    • Thyroid disease Mother    • Obesity Mother    • Lupus Mother    • Seizures Father    • Breast cancer Neg Hx    • Ovarian cancer Neg Hx    • Colon cancer Neg Hx    • Deep vein thrombosis Neg Hx    • Pulmonary embolism Neg Hx          Social History     Socioeconomic History   • Marital status:      Spouse name: Not on file   • Number of children: Not on file   • Years of education: Not on file   • Highest education level: Not on file   Occupational History   • Occupation: dog groomer     Employer: SELF-EMPLOYED     Comment: full time   Tobacco Use   • Smoking status: Former Smoker     Packs/day: 1.00     Years: 20.00     Pack years: 20.00     Types: Cigarettes     Quit date: 2016     Years since quittin.4   • Smokeless tobacco: Never Used   Substance and Sexual Activity   • Alcohol use: Yes     Comment: occasion   • Drug use: No   • Sexual activity: Defer     Partners: Male     Birth control/protection: Surgical     Comment: hysterectomy         Allergies   Allergen Reactions   • Iodine Shortness Of Breath     Swelling     • Shellfish Allergy Shortness Of Breath     swelling   • Lyrica [Pregabalin] Other (See Comments)     Mood swings, burning in legs          Pain Scale: 4/10      ROS:  Review of Systems   Constitutional: Negative for activity change, appetite change and fatigue.   Eyes: Negative for pain, redness and itching.   Respiratory: Negative for cough, choking and shortness of breath.    Neurological: Positive for numbness and headaches. Negative for  "dizziness, tremors, seizures, syncope, facial asymmetry, speech difficulty, weakness and light-headedness.   Psychiatric/Behavioral: Negative for agitation, behavioral problems, confusion, decreased concentration, dysphoric mood, hallucinations, self-injury, sleep disturbance and suicidal ideas. The patient is not nervous/anxious and is not hyperactive.          I have reviewed and agree with the above ROS completed by the medical assistant.      Physical Exam:  Vitals:    12/22/20 1017   Pulse: 99   SpO2: 100%   Weight: 123 kg (272 lb)   Height: 162.6 cm (64\")     Orthostatic BP:    Body mass index is 46.69 kg/m².    Physical Exam  General: M obese white female no acute distress  HEENT: Normocephalic no evidence of trauma  Neck: Supple  Heart:  Extremities: Leg raising produces no symptoms with knee bent.  With knee straight she described some left anterior and medial hip pain.  Similar with hip internally rotated.      Neurological Exam:   Mental Status: Awake, alert, oriented to person, place and time.  Conversant without evidence of an affective disorder, thought disorder, delusions or hallucinations.  Attention span and concentration are normal.  HCF: No aphasia, apraxia or dysarthria.  Recent and remote memory intact.  Knowledge meals while hold computerof recent events intact.  CN: I:   II: Visual fields full without left inattention   III, IV, VI: Eye movements intact without nystagmus or ptosis.  Pupils equal  round and reactive to light.   V,VII: Light touch and pinprick intact all 3 divisions of V.  Facial muscles symmetrical.   VIII: Hearing intact to finger rub   IX,X: Soft palate elevates symmetrically   XI: Sternomastoid and trapezius are strong.   XII: Tongue midline without atrophy or fasciculations  Motor: Normal tone and bulk in the upper and lower extremities   Power testing: Full power in all muscles tested in both upper extremities.  Full power in all muscles tested in the right lower " extremity.  There was some giveaway weakness of the left iliopsoas muscle with some complaints of pain.  Remaining muscles in the left leg showed full power.  Arcos sign was positive with the association of pain in the left hip area.  Reflexes: Upper extremities: +2 diffusely        Lower extremities: +2 diffusely        Toe signs: Downgoing bilaterally  Sensory: Light touch: Diffusely intact in the upper extremities.  Diffusely intact in both legs below the knees        Pinprick: Similar to light touch        Vibration: Intact at the ankles        Position: Intact at the great toes    Cerebellar: Finger-to-nose:           Rapid movement:           Heel-to-shin: Intact  Gait and Station: Comes to stand with some hesitation.  Uses her small base quad cane in the left hand to help reduce weight shift to the left leg.    Results:      Lab Results   Component Value Date    GLUCOSE 129 (H) 09/07/2020    BUN 12 10/28/2020    CREATININE 0.78 10/28/2020    EGFRIFNONA 80 10/28/2020    EGFRIFAFRI 97 10/28/2020    BCR 15.4 10/28/2020    CO2 28.3 10/28/2020    CALCIUM 9.9 10/28/2020    PROTENTOTREF 7.4 09/23/2020    ALBUMIN 4.60 09/23/2020    LABIL2 1.6 09/23/2020    AST 20 09/23/2020    ALT 31 09/23/2020       Lab Results   Component Value Date    WBC 9.75 09/06/2020    HGB 12.9 09/06/2020    HCT 38.6 09/06/2020    MCV 90.2 09/06/2020     09/06/2020         .No results found for: RPR      Lab Results   Component Value Date    TSH 2.140 09/23/2020    J1ZYUTB 8.9 01/07/2019         Lab Results   Component Value Date    LWJRMXXF89 369 09/06/2020         Lab Results   Component Value Date    FOLATE 11.78 10/21/2016         Lab Results   Component Value Date    HGBA1C 6.70 (H) 09/23/2020         Lab Results   Component Value Date    GLUCOSE 129 (H) 09/07/2020    BUN 12 10/28/2020    CREATININE 0.78 10/28/2020    EGFRIFNONA 80 10/28/2020    EGFRIFAFRI 97 10/28/2020    BCR 15.4 10/28/2020    K 4.3 10/28/2020    CO2 28.3  10/28/2020    CALCIUM 9.9 10/28/2020    PROTENTOTREF 7.4 09/23/2020    ALBUMIN 4.60 09/23/2020    LABIL2 1.6 09/23/2020    AST 20 09/23/2020    ALT 31 09/23/2020         Lab Results   Component Value Date    WBC 9.75 09/06/2020    HGB 12.9 09/06/2020    HCT 38.6 09/06/2020    MCV 90.2 09/06/2020     09/06/2020             Assessment:   1.  Multifactorial left-sided hip region pain with probable contributions of piriformis syndrome, meralgia paresthetica and acetabular pain.  2.  Pain inhibition        Plan:  1.  We discussed options.  If the fiery pain on the lateral thigh is significant enough she could be referred for a block or release of the lateral femoral cutaneous nerve.  She is not interested in this at this time.  Otherwise she will continue with her therapy for her multifactorial pain.  I do not find clear objective findings to suggest a sciatic neuropathy at the piriformis muscle and so no further investigation of this anatomically is needed at present time.  2.  Return as needed                          Dictated utilizing Dragon dictation.

## 2020-12-30 ENCOUNTER — OFFICE VISIT (OUTPATIENT)
Dept: FAMILY MEDICINE CLINIC | Facility: CLINIC | Age: 45
End: 2020-12-30

## 2020-12-30 VITALS
DIASTOLIC BLOOD PRESSURE: 80 MMHG | OXYGEN SATURATION: 99 % | HEART RATE: 105 BPM | WEIGHT: 267.5 LBS | BODY MASS INDEX: 45.67 KG/M2 | SYSTOLIC BLOOD PRESSURE: 140 MMHG | HEIGHT: 64 IN | TEMPERATURE: 97.9 F

## 2020-12-30 DIAGNOSIS — E66.01 MORBID OBESITY WITH BMI OF 45.0-49.9, ADULT (HCC): Primary | ICD-10-CM

## 2020-12-30 PROCEDURE — 99213 OFFICE O/P EST LOW 20 MIN: CPT | Performed by: PHYSICIAN ASSISTANT

## 2020-12-30 RX ORDER — SPIRONOLACTONE 100 MG/1
100 TABLET, FILM COATED ORAL DAILY
Qty: 90 TABLET | Refills: 0 | Status: SHIPPED | OUTPATIENT
Start: 2020-12-30 | End: 2021-03-25

## 2020-12-30 NOTE — PROGRESS NOTES
"Subjective   Angie Patel is a 45 y.o. female presents for   Chief Complaint   Patient presents with   • Weight Loss       History of Present Illness     Angie is a 45-year-old female who presents for obesity management.  She has lost 4 pounds since November 30, 2020.   Angie states she has been trying to eat healthier and decreasing her portion sizes.  She has been trying to walk some for exercising.  Has been doing physical therapy for her paresthesia issue.  She is currently still using a cane.  Has been seeing the physical therapist at least once-twice weekly.  States she is not quite ready to go back to [employer] at this time.  States she does not feel steady enough to be able to pick out groceries and carrying them with her cane.    The following portions of the patient's history were reviewed and updated as appropriate: allergies, current medications, past family history, past medical history, past social history, past surgical history and problem list.    Review of Systems   Constitutional: Negative.    HENT: Negative.    Eyes: Negative.    Respiratory: Negative.  Negative for cough, chest tightness, shortness of breath and wheezing.    Cardiovascular: Negative.  Negative for chest pain, palpitations and leg swelling.   Gastrointestinal: Negative.    Endocrine: Negative.    Genitourinary: Negative.    Musculoskeletal: Negative.    Skin: Negative.    Allergic/Immunologic: Negative.    Neurological: Negative.  Negative for dizziness, light-headedness and headaches.   Hematological: Negative.    Psychiatric/Behavioral: Negative.  Negative for sleep disturbance.   All other systems reviewed and are negative.        Vitals:    12/30/20 1053   BP: 140/80   Pulse: 105   Temp: 97.9 °F (36.6 °C)   SpO2: 99%   Weight: 121 kg (267 lb 8 oz)   Height: 162.6 cm (64\")     Wt Readings from Last 3 Encounters:   12/30/20 121 kg (267 lb 8 oz)   12/22/20 123 kg (272 lb)   11/30/20 123 kg (271 lb)     BP Readings from Last 3 " Encounters:   20 140/80   20 122/88   10/28/20 110/60     Social History     Socioeconomic History   • Marital status:      Spouse name: Not on file   • Number of children: Not on file   • Years of education: Not on file   • Highest education level: Not on file   Occupational History   • Occupation:      Employer: SELF-EMPLOYED     Comment: full time   Tobacco Use   • Smoking status: Former Smoker     Packs/day: 1.00     Years: 20.00     Pack years: 20.00     Types: Cigarettes     Quit date: 2016     Years since quittin.5   • Smokeless tobacco: Never Used   Substance and Sexual Activity   • Alcohol use: Yes     Comment: occasion   • Drug use: No   • Sexual activity: Defer     Partners: Male     Birth control/protection: Surgical     Comment: hysterectomy       Allergies   Allergen Reactions   • Iodine Shortness Of Breath     Swelling     • Shellfish Allergy Shortness Of Breath     swelling   • Lyrica [Pregabalin] Other (See Comments)     Mood swings, burning in legs        Body mass index is 45.92 kg/m².    Objective   Physical Exam  Constitutional:       Appearance: Normal appearance. She is well-developed and well-groomed. She is morbidly obese.      Interventions: Face mask in place.   HENT:      Head: Normocephalic and atraumatic.   Neck:      Thyroid: No thyroid mass, thyromegaly or thyroid tenderness.      Trachea: Trachea and phonation normal. No tracheal tenderness.   Cardiovascular:      Rate and Rhythm: Normal rate and regular rhythm.      Pulses: Normal pulses.      Heart sounds: Normal heart sounds, S1 normal and S2 normal. No murmur.   Pulmonary:      Effort: Pulmonary effort is normal.      Breath sounds: Normal breath sounds and air entry.   Abdominal:      General: Bowel sounds are normal.      Palpations: Abdomen is soft. There is no hepatomegaly.      Tenderness: There is no abdominal tenderness.   Musculoskeletal:      Right lower leg: No edema.      Left  lower leg: No edema.   Skin:     General: Skin is warm and dry.      Capillary Refill: Capillary refill takes less than 2 seconds.   Neurological:      Mental Status: She is alert and oriented to person, place, and time.   Psychiatric:         Attention and Perception: Attention and perception normal.         Mood and Affect: Mood and affect normal.         Speech: Speech normal.         Behavior: Behavior normal. Behavior is cooperative.         Thought Content: Thought content normal.         Cognition and Memory: Cognition and memory normal.         Judgment: Judgment normal.     I was wearing a surgical mask and face shield during the entire office visit/encounter.      Assessment/Plan   Diagnoses and all orders for this visit:    1. Morbid obesity with BMI of 45.0-49.9, adult (CMS/[facility]) (Primary)      Mrs. Angie Paetl was seen in office today for morbid obesity management.  She has lost 4 pounds since November 30, 2020 office visit.  Have completed her bariatric weight loss form and will send copy to her bariatric surgeon.  She has upcoming appointment in January 2020.  These will also be scanned into her electronic medical record.  Have given her a work note to return on February 1, 2021.  We will continue her physical therapy appointments.    KARL Solorio [facility]  [address] [place] [id number]  Dept: [phone]  Dept Fax: [phone]  Loc: [phone]  Loc Fax: [phone]               Answers for HPI/ROS submitted by the patient on 12/28/2020   What is the primary reason for your visit?: Other  Please describe your symptoms.: Follow up for hospital stay in September.  Hip numbness and burning, pain.  Difficulty walking and doing stairs  Have you had these symptoms before?: Yes  How long have you been having these symptoms?: Greater than 2 weeks

## 2021-01-04 ENCOUNTER — OFFICE VISIT (OUTPATIENT)
Dept: CARDIOLOGY | Facility: CLINIC | Age: 46
End: 2021-01-04

## 2021-01-04 VITALS
SYSTOLIC BLOOD PRESSURE: 122 MMHG | WEIGHT: 272 LBS | HEIGHT: 64 IN | BODY MASS INDEX: 46.44 KG/M2 | OXYGEN SATURATION: 98 % | DIASTOLIC BLOOD PRESSURE: 84 MMHG | HEART RATE: 101 BPM

## 2021-01-04 DIAGNOSIS — I10 BENIGN ESSENTIAL HYPERTENSION: Primary | ICD-10-CM

## 2021-01-04 DIAGNOSIS — G47.33 OSA (OBSTRUCTIVE SLEEP APNEA): ICD-10-CM

## 2021-01-04 DIAGNOSIS — E66.01 MORBID OBESITY WITH BMI OF 45.0-49.9, ADULT (HCC): ICD-10-CM

## 2021-01-04 PROCEDURE — 93000 ELECTROCARDIOGRAM COMPLETE: CPT | Performed by: INTERNAL MEDICINE

## 2021-01-04 PROCEDURE — 99213 OFFICE O/P EST LOW 20 MIN: CPT | Performed by: INTERNAL MEDICINE

## 2021-01-04 NOTE — PROGRESS NOTES
PATIENTINFORMATION    Date of Office Visit: 2021  Encounter Provider: Behzad Roblero MD  Place of Service: [facility] CARDIOLOGY  Patient Name: Angie Patel  : 1975    Subjective:     Encounter Date:2021      Patient ID: Angie Patel is a 45 y.o. female.    Chief Complaint   Patient presents with   • Hypertension     8 months follow up    • Hyperlipidemia   • Diabetes     HPI  Ms. Patel is a 45 years old female patient with past medical history of morbid obesity, hypertension, type II DM, PCOS came to cardiology clinic for follow-up visit for palpitations and hypertension.  Since last visit she was admitted for bilateral lower extremity numbness and weakness that needed admission for about a week and she had lumbar puncture.  She has been getting physical therapy for months now and weakness has improved and currently she uses only a cane to get around.  Her blood pressure has been controlled well and she reports resolution of palpitations.  Otherwise she denied any significant chest pain, shortness of breath, presyncope or syncope, orthopnea or PND.  She had mild bilateral lower extremity swelling for which she has been taking Lasix with potassium.  She has been referred to bariatric surgery for weight loss.  Otherwise no significant complaints today.  She has been compliant with her CPAP machine    ROS   All systems reviewed and negative except as noted in HPI.    Past Medical History:   Diagnosis Date   • Anxiety    • Asthma    • Diabetes mellitus (CMS/HCC)    • Fibromyalgia    • Fibromyalgia, primary    • Headache, post-lumbar puncture 2020   • Headache, tension-type    • Hypertension    • Kidney calculi    • Memory loss    • Meningioma (CMS/HCC)    • Migraine     ocular   • KACI (obstructive sleep apnea)     On CPAP   • Palpitations    • Polycystic ovaries    • Postpartum cardiomyopathy    • Preeclampsia    • Seizures (CMS/HCC)        Past  Surgical History:   Procedure Laterality Date   • CYSTOSCOPY URETEROSCOPY LASER LITHOTRIPSY Right 3/7/2017    Procedure: CYSTOSCOPY RT URETEROSCOPY LASER LITHOTRIPSY W/ STENT, rerograde pyelogram ;  Surgeon: Rashid Malloy MD;  Location: [facility] OR;  Service:    • FOOT SURGERY Left     tendon repair   • HYSTERECTOMY     • OOPHORECTOMY Left     for ovarian cysts   • SHOULDER SURGERY Right    • TUBAL ABDOMINAL LIGATION     • WISDOM TOOTH EXTRACTION         Social History     Socioeconomic History   • Marital status:      Spouse name: Not on file   • Number of children: Not on file   • Years of education: Not on file   • Highest education level: Not on file   Occupational History   • Occupation:      Employer: SELF-EMPLOYED     Comment: full time   Tobacco Use   • Smoking status: Former Smoker     Packs/day: 1.00     Years: 20.00     Pack years: 20.00     Types: Cigarettes     Quit date: 2016     Years since quittin.5   • Smokeless tobacco: Never Used   Substance and Sexual Activity   • Alcohol use: Yes     Comment: occasion   • Drug use: No   • Sexual activity: Defer     Partners: Male     Birth control/protection: Surgical     Comment: hysterectomy       Family History   Problem Relation Age of Onset   • Heart disease Paternal Aunt    • Diabetes Paternal Aunt    • Diabetes Maternal Grandmother    • Neuropathy Maternal Grandmother    • Thyroid disease Maternal Grandmother    • Heart disease Maternal Grandfather    • Cancer Maternal Grandfather    • Heart disease Paternal Grandfather    • Heart disease Paternal Uncle    • Arthritis Mother    • Migraines Mother    • Thyroid disease Mother    • Obesity Mother    • Lupus Mother    • Seizures Father    • Breast cancer Neg Hx    • Ovarian cancer Neg Hx    • Colon cancer Neg Hx    • Deep vein thrombosis Neg Hx    • Pulmonary embolism Neg Hx            ECG 12 Lead    Date/Time: 2021 12:16 PM  Performed by: Behzad Roblero  "MD  Authorized by: Behzad Roblero MD   Comparison: compared with previous ECG from 3/2/2020  Rhythm: sinus rhythm  Rate: normal  Conduction: conduction normal  ST Segments: ST segments normal  T Waves: T waves normal  QRS axis: normal  Other findings: non-specific ST-T wave changes    Clinical impression: abnormal EKG               Objective:     /84   Pulse 101   Ht 162.6 cm (64\")   Wt 123 kg (272 lb)   SpO2 98%   BMI 46.69 kg/m²  Body mass index is 46.69 kg/m².     Constitutional:       General: Not in acute distress.     Appearance: Well-developed. Morbidly obese. Not diaphoretic.   Eyes:      Pupils: Pupils are equal, round, and reactive to light.   HENT:      Head: Normocephalic and atraumatic.   Neck:      Musculoskeletal: Normal range of motion and neck supple.      Thyroid: No thyromegaly.   Pulmonary:      Effort: Pulmonary effort is normal. No respiratory distress.      Breath sounds: Normal breath sounds. No wheezing. No rales.   Chest:      Chest wall: Not tender to palpatation.   Cardiovascular:      Normal rate. Regular rhythm.      No gallop.   Pulses:     Intact distal pulses.   Abdominal:      General: Bowel sounds are normal. There is no distension.      Palpations: Abdomen is soft.      Tenderness: There is no guarding.   Musculoskeletal: Normal range of motion.         General: No deformity.   Skin:     General: Skin is warm and dry.      Findings: No rash.   Neurological:      Mental Status: Alert and oriented to person, place, and time.      Cranial Nerves: No cranial nerve deficit.      Deep Tendon Reflexes: Reflexes are normal and symmetric.   Psychiatric:         Judgment: Judgment normal.         Review Of Data:       Assessment/Plan:           Palpitations-resolved     Benign essential hypertension-controlled well on chlorthalidone    Chest pain, atypical -resolved    BL LE edema-persistent.  Advised patient to start wearing compression stocking and elevate leg at night " and avoid Lasix.    Morbidly obese with complications including PCOS, diabetes, obstructive sleep apnea-she is going to see bariatric surgery.    Obstructive sleep apnea-on CPAP machine        Return to clinic after a year.    Diagnosis and plan of care discussed with patient and verbalized understanding.           Behzad Roblero MD  01/04/21  12:19 EST

## 2021-01-12 ENCOUNTER — CONSULT (OUTPATIENT)
Dept: BARIATRICS/WEIGHT MGMT | Facility: CLINIC | Age: 46
End: 2021-01-12

## 2021-01-12 VITALS
BODY MASS INDEX: 47.84 KG/M2 | HEIGHT: 63 IN | SYSTOLIC BLOOD PRESSURE: 142 MMHG | HEART RATE: 118 BPM | DIASTOLIC BLOOD PRESSURE: 85 MMHG | RESPIRATION RATE: 18 BRPM | WEIGHT: 270 LBS | TEMPERATURE: 97.1 F

## 2021-01-12 DIAGNOSIS — R53.82 CHRONIC FATIGUE: ICD-10-CM

## 2021-01-12 DIAGNOSIS — R73.03 PREDIABETES: ICD-10-CM

## 2021-01-12 DIAGNOSIS — M25.50 MULTIPLE JOINT PAIN: ICD-10-CM

## 2021-01-12 DIAGNOSIS — I10 ESSENTIAL HYPERTENSION: ICD-10-CM

## 2021-01-12 DIAGNOSIS — E28.2 PCOS (POLYCYSTIC OVARIAN SYNDROME): ICD-10-CM

## 2021-01-12 DIAGNOSIS — Z99.89 USE OF CANE AS AMBULATORY AID: ICD-10-CM

## 2021-01-12 DIAGNOSIS — G47.33 OSA (OBSTRUCTIVE SLEEP APNEA): ICD-10-CM

## 2021-01-12 DIAGNOSIS — R60.9 PITTING EDEMA: ICD-10-CM

## 2021-01-12 DIAGNOSIS — F41.9 ANXIETY: ICD-10-CM

## 2021-01-12 DIAGNOSIS — N39.3 URINARY, INCONTINENCE, STRESS FEMALE: ICD-10-CM

## 2021-01-12 DIAGNOSIS — E66.9 DIABETES MELLITUS TYPE 2 IN OBESE (HCC): ICD-10-CM

## 2021-01-12 DIAGNOSIS — E11.69 DIABETES MELLITUS TYPE 2 IN OBESE (HCC): ICD-10-CM

## 2021-01-12 DIAGNOSIS — I10 BENIGN ESSENTIAL HYPERTENSION: ICD-10-CM

## 2021-01-12 DIAGNOSIS — R12 HEARTBURN: ICD-10-CM

## 2021-01-12 DIAGNOSIS — E78.00 HYPERCHOLESTEROLEMIA: ICD-10-CM

## 2021-01-12 DIAGNOSIS — E66.01 OBESITY, CLASS III, BMI 40-49.9 (MORBID OBESITY) (HCC): Primary | ICD-10-CM

## 2021-01-12 DIAGNOSIS — E28.2 POLYCYSTIC OVARIES: ICD-10-CM

## 2021-01-12 PROBLEM — R07.89 CHEST TIGHTNESS OR PRESSURE: Status: RESOLVED | Noted: 2017-07-03 | Resolved: 2021-01-12

## 2021-01-12 PROBLEM — S99.921A FOOT INJURY, RIGHT, INITIAL ENCOUNTER: Status: RESOLVED | Noted: 2020-02-07 | Resolved: 2021-01-12

## 2021-01-12 PROBLEM — R29.898 WEAKNESS OF BOTH LOWER EXTREMITIES: Status: RESOLVED | Noted: 2020-09-02 | Resolved: 2021-01-12

## 2021-01-12 PROBLEM — H53.9 VISION CHANGES: Status: RESOLVED | Noted: 2017-05-01 | Resolved: 2021-01-12

## 2021-01-12 PROBLEM — R31.9 HEMATURIA: Status: RESOLVED | Noted: 2017-03-10 | Resolved: 2021-01-12

## 2021-01-12 PROBLEM — Z23 NEED FOR INFLUENZA VACCINATION: Status: RESOLVED | Noted: 2017-11-06 | Resolved: 2021-01-12

## 2021-01-12 PROBLEM — Z79.899 HIGH RISK MEDICATION USE: Status: RESOLVED | Noted: 2018-06-04 | Resolved: 2021-01-12

## 2021-01-12 PROBLEM — Z09 HOSPITAL DISCHARGE FOLLOW-UP: Status: RESOLVED | Noted: 2020-09-10 | Resolved: 2021-01-12

## 2021-01-12 PROBLEM — R10.9 ACUTE LEFT FLANK PAIN: Status: RESOLVED | Noted: 2017-11-06 | Resolved: 2021-01-12

## 2021-01-12 PROBLEM — I73.9 INTERMITTENT CLAUDICATION (HCC): Status: ACTIVE | Noted: 2021-01-12

## 2021-01-12 PROBLEM — G97.1 HEADACHE, POST-LUMBAR PUNCTURE: Status: RESOLVED | Noted: 2020-09-04 | Resolved: 2021-01-12

## 2021-01-12 PROBLEM — Z12.31 SCREENING MAMMOGRAM, ENCOUNTER FOR: Status: RESOLVED | Noted: 2018-03-05 | Resolved: 2021-01-12

## 2021-01-12 PROBLEM — M79.671 ACUTE PAIN OF RIGHT FOOT: Status: RESOLVED | Noted: 2020-02-07 | Resolved: 2021-01-12

## 2021-01-12 PROCEDURE — 99205 OFFICE O/P NEW HI 60 MIN: CPT | Performed by: NURSE PRACTITIONER

## 2021-01-12 RX ORDER — FUROSEMIDE 20 MG/1
20 TABLET ORAL AS NEEDED
COMMUNITY
End: 2022-05-10

## 2021-01-12 NOTE — PROGRESS NOTES
Bariatric Nutrition Counseling Interview    Patient Name: Angie Patel    YOB: 1975   Age: 45 y.o.  Sex: female  MRN: [id number]       Procedure considering: sleeve    Assessment:    Height:   160cm    Current weight:  270lbs   Highest weight:      current   Lowest weight: 190lbs  Patient goals: 160lbs and to resolve comorbidities related to obesity    Patient has tried to lose weight in the past including diets, weight loss medications, [facility] and [place]  Patient was unable to maintain this long term.    Diet history revealed  2-3 meals and large high carb snacks daily. Overall very high in carbs       Protein sources include meat, cheese, turkey, chicken  Drinks daily: water, diluted gatorade and a small amount of orange juice to take medications      Pre and post op nutrition education was completed. Patient verbalized understanding and queries were answered. Additional nutrition counseling will be available both pre and post op.  Patient is a suitable candidate for sleeve gastrectomy      Ashley Sheikh, BENITEZ  01/12/21  17:24 EST

## 2021-01-14 DIAGNOSIS — E28.2 PCOS (POLYCYSTIC OVARIAN SYNDROME): ICD-10-CM

## 2021-01-14 DIAGNOSIS — I10 ESSENTIAL HYPERTENSION: ICD-10-CM

## 2021-01-14 DIAGNOSIS — E78.5 HYPERLIPIDEMIA, UNSPECIFIED HYPERLIPIDEMIA TYPE: ICD-10-CM

## 2021-01-14 DIAGNOSIS — R73.03 PREDIABETES: Primary | ICD-10-CM

## 2021-01-22 ENCOUNTER — HOSPITAL ENCOUNTER (OUTPATIENT)
Dept: GENERAL RADIOLOGY | Facility: HOSPITAL | Age: 46
Discharge: HOME | End: 2021-01-22

## 2021-01-22 ENCOUNTER — HOSPITAL ENCOUNTER (OUTPATIENT)
Dept: CT IMAGING | Facility: HOSPITAL | Age: 46
Discharge: HOME | End: 2021-01-22

## 2021-01-22 ENCOUNTER — OFFICE VISIT (OUTPATIENT)
Dept: FAMILY MEDICINE CLINIC | Facility: CLINIC | Age: 46
End: 2021-01-22

## 2021-01-22 VITALS
BODY MASS INDEX: 47.96 KG/M2 | WEIGHT: 270.7 LBS | HEART RATE: 103 BPM | HEIGHT: 63 IN | TEMPERATURE: 97.5 F | DIASTOLIC BLOOD PRESSURE: 82 MMHG | OXYGEN SATURATION: 100 % | SYSTOLIC BLOOD PRESSURE: 122 MMHG

## 2021-01-22 DIAGNOSIS — M25.512 ACUTE PAIN OF LEFT SHOULDER: ICD-10-CM

## 2021-01-22 DIAGNOSIS — R42 DIZZINESS: ICD-10-CM

## 2021-01-22 DIAGNOSIS — R42 DIZZINESS: Primary | ICD-10-CM

## 2021-01-22 DIAGNOSIS — R41.0 CONFUSION: ICD-10-CM

## 2021-01-22 DIAGNOSIS — M54.2 NECK PAIN: ICD-10-CM

## 2021-01-22 DIAGNOSIS — R51.9 GENERALIZED HEADACHE: ICD-10-CM
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
## 2021-01-22 DIAGNOSIS — R51.9 GENERALIZED HEADACHE: Primary | ICD-10-CM

## 2021-01-22 PROCEDURE — 70450 CT HEAD/BRAIN W/O DYE: CPT

## 2021-01-22 PROCEDURE — 73030 X-RAY EXAM OF SHOULDER: CPT

## 2021-01-22 PROCEDURE — 99214 OFFICE O/P EST MOD 30 MIN: CPT | Performed by: PHYSICIAN ASSISTANT

## 2021-01-22 PROCEDURE — 72040 X-RAY EXAM NECK SPINE 2-3 VW: CPT

## 2021-01-22 RX ORDER — METHYLPREDNISOLONE 4 MG/1
TABLET ORAL
Qty: 21 TABLET | Refills: 0 | Status: SHIPPED | OUTPATIENT
Start: 2021-01-22 | End: 2021-01-29

## 2021-01-22 RX ORDER — CYCLOBENZAPRINE HCL 10 MG
10 TABLET ORAL 3 TIMES DAILY PRN
Qty: 30 TABLET | Refills: 0 | Status: SHIPPED | OUTPATIENT
Start: 2021-01-22 | End: 2021-02-26
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
## 2021-01-22 NOTE — PATIENT INSTRUCTIONS
Neck Exercises  Ask your health care provider which exercises are safe for you. Do exercises exactly as told by your health care provider and adjust them as directed. It is normal to feel mild stretching, pulling, tightness, or discomfort as you do these exercises. Stop right away if you feel sudden pain or your pain gets worse. Do not begin these exercises until told by your health care provider.  Neck exercises can be important for many reasons. They can improve strength and maintain flexibility in your neck, which will help your upper back and prevent neck pain.  Stretching exercises  Rotation neck stretching    1. Sit in a chair or stand up.  2. Place your feet flat on the floor, shoulder width apart.  3. Slowly turn your head (rotate) to the right until a slight stretch is felt. Turn it all the way to the right so you can look over your right shoulder. Do not tilt or tip your head.  4. Hold this position for 10-30 seconds.  5. Slowly turn your head (rotate) to the left until a slight stretch is felt. Turn it all the way to the left so you can look over your left shoulder. Do not tilt or tip your head.  6. Hold this position for 10-30 seconds.  Repeat __________ times. Complete this exercise __________ times a day.  Neck retraction  1. Sit in a sturdy chair or stand up.  2. Look straight ahead. Do not bend your neck.  3. Use your fingers to push your chin backward (retraction). Do not bend your neck for this movement. Continue to face straight ahead. If you are doing the exercise properly, you will feel a slight sensation in your throat and a stretch at the back of your neck.  4. Hold the stretch for 1-2 seconds.  Repeat __________ times. Complete this exercise __________ times a day.  Strengthening exercises  Neck press  1. Lie on your back on a firm bed or on the floor with a pillow under your head.  2. Use your neck muscles to push your head down on the pillow and straighten your spine.  3. Hold the position  as well as you can. Keep your head facing up (in a neutral position) and your chin tucked.  4. Slowly count to 5 while holding this position.  Repeat __________ times. Complete this exercise __________ times a day.  Isometrics  These are exercises in which you strengthen the muscles in your neck while keeping your neck still (isometrics).  1. Sit in a supportive chair and place your hand on your forehead.  2. Keep your head and face facing straight ahead. Do not flex or extend your neck while doing isometrics.  3. Push forward with your head and neck while pushing back with your hand. Hold for 10 seconds.  4. Do the sequence again, this time putting your hand against the back of your head. Use your head and neck to push backward against the hand pressure.  5. Finally, do the same exercise on either side of your head, pushing sideways against the pressure of your hand.  Repeat __________ times. Complete this exercise __________ times a day.  Prone head lifts  1. Lie face-down (prone position), resting on your elbows so that your chest and upper back are raised.  2. Start with your head facing downward, near your chest. Position your chin either on or near your chest.  3. Slowly lift your head upward. Lift until you are looking straight ahead. Then continue lifting your head as far back as you can comfortably stretch.  4. Hold your head up for 5 seconds. Then slowly lower it to your starting position.  Repeat __________ times. Complete this exercise __________ times a day.  Supine head lifts  1. Lie on your back (supine position), bending your knees to point to the ceiling and keeping your feet flat on the floor.  2. Lift your head slowly off the floor, raising your chin toward your chest.  3. Hold for 5 seconds.  Repeat __________ times. Complete this exercise __________ times a day.  Scapular retraction  1. Stand with your arms at your sides. Look straight ahead.  2. Slowly pull both shoulders (scapulae) backward  and downward (retraction) until you feel a stretch between your shoulder blades in your upper back.  3. Hold for 10-30 seconds.  4. Relax and repeat.  Repeat __________ times. Complete this exercise __________ times a day.  Contact a health care provider if:  · Your neck pain or discomfort gets much worse when you do an exercise.  · Your neck pain or discomfort does not improve within 2 hours after you exercise.  If you have any of these problems, stop exercising right away. Do not do the exercises again unless your health care provider says that you can.  Get help right away if:  · You develop sudden, severe neck pain.  If this happens, stop exercising right away. Do not do the exercises again unless your health care provider says that you can.  This information is not intended to replace advice given to you by your health care provider. Make sure you discuss any questions you have with your health care provider.      Shoulder Exercises  Ask your health care provider which exercises are safe for you. Do exercises exactly as told by your health care provider and adjust them as directed. It is normal to feel mild stretching, pulling, tightness, or discomfort as you do these exercises. Stop right away if you feel sudden pain or your pain gets worse. Do not begin these exercises until told by your health care provider.  Stretching exercises  External rotation and abduction  This exercise is sometimes called corner stretch. This exercise rotates your arm outward (external rotation) and moves your arm out from your body (abduction).  1.  a doorway with one of your feet slightly in front of the other. This is called a staggered stance. If you cannot reach your forearms to the door frame, stand facing a corner of a room.  2. Choose one of the following positions as told by your health care provider:  ? Place your hands and forearms on the door frame above your head.  ? Place your hands and forearms on the door  frame at the height of your head.  ? Place your hands on the door frame at the height of your elbows.  3. Slowly move your weight onto your front foot until you feel a stretch across your chest and in the front of your shoulders. Keep your head and chest upright and keep your abdominal muscles tight.  4. Hold for __________ seconds.  5. To release the stretch, shift your weight to your back foot.  Repeat __________ times. Complete this exercise __________ times a day.  Extension, standing  1. Stand and hold a broomstick, a cane, or a similar object behind your back.  ? Your hands should be a little wider than shoulder width apart.  ? Your palms should face away from your back.  2. Keeping your elbows straight and your shoulder muscles relaxed, move the stick away from your body until you feel a stretch in your shoulders (extension).  ? Avoid shrugging your shoulders while you move the stick. Keep your shoulder blades tucked down toward the middle of your back.  3. Hold for __________ seconds.  4. Slowly return to the starting position.  Repeat __________ times. Complete this exercise __________ times a day.  Range-of-motion exercises  Pendulum    1. Stand near a wall or a surface that you can hold onto for balance.  2. Bend at the waist and let your left / right arm hang straight down. Use your other arm to support you. Keep your back straight and do not lock your knees.  3. Relax your left / right arm and shoulder muscles, and move your hips and your trunk so your left / right arm swings freely. Your arm should swing because of the motion of your body, not because you are using your arm or shoulder muscles.  4. Keep moving your hips and trunk so your arm swings in the following directions, as told by your health care provider:  ? Side to side.  ? Forward and backward.  ? In clockwise and counterclockwise circles.  5. Continue each motion for __________ seconds, or for as long as told by your health care  provider.  6. Slowly return to the starting position.  Repeat __________ times. Complete this exercise __________ times a day.  Shoulder flexion, standing    1. Stand and hold a broomstick, a cane, or a similar object. Place your hands a little more than shoulder width apart on the object. Your left / right hand should be palm up, and your other hand should be palm down.  2. Keep your elbow straight and your shoulder muscles relaxed. Push the stick up with your healthy arm to raise your left / right arm in front of your body, and then over your head until you feel a stretch in your shoulder (flexion).  ? Avoid shrugging your shoulder while you raise your arm. Keep your shoulder blade tucked down toward the middle of your back.  3. Hold for __________ seconds.  4. Slowly return to the starting position.  Repeat __________ times. Complete this exercise __________ times a day.  Shoulder abduction, standing  1. Stand and hold a broomstick, a cane, or a similar object. Place your hands a little more than shoulder width apart on the object. Your left / right hand should be palm up, and your other hand should be palm down.  2. Keep your elbow straight and your shoulder muscles relaxed. Push the object across your body toward your left / right side. Raise your left / right arm to the side of your body (abduction) until you feel a stretch in your shoulder.  ? Do not raise your arm above shoulder height unless your health care provider tells you to do that.  ? If directed, raise your arm over your head.  ? Avoid shrugging your shoulder while you raise your arm. Keep your shoulder blade tucked down toward the middle of your back.  3. Hold for __________ seconds.  4. Slowly return to the starting position.  Repeat __________ times. Complete this exercise __________ times a day.  Internal rotation    1. Place your left / right hand behind your back, palm up.  2. Use your other hand to dangle an exercise band, a towel, or a  similar object over your shoulder. Grasp the band with your left / right hand so you are holding on to both ends.  3. Gently pull up on the band until you feel a stretch in the front of your left / right shoulder. The movement of your arm toward the center of your body is called internal rotation.  ? Avoid shrugging your shoulder while you raise your arm. Keep your shoulder blade tucked down toward the middle of your back.  4. Hold for __________ seconds.  5. Release the stretch by letting go of the band and lowering your hands.  Repeat __________ times. Complete this exercise __________ times a day.  Strengthening exercises  External rotation    1. Sit in a stable chair without armrests.  2. Secure an exercise band to a stable object at elbow height on your left / right side.  3. Place a soft object, such as a folded towel or a small pillow, between your left / right upper arm and your body to move your elbow about 4 inches (10 cm) away from your side.  4. Hold the end of the exercise band so it is tight and there is no slack.  5. Keeping your elbow pressed against the soft object, slowly move your forearm out, away from your abdomen (external rotation). Keep your body steady so only your forearm moves.  6. Hold for __________ seconds.  7. Slowly return to the starting position.  Repeat __________ times. Complete this exercise __________ times a day.  Shoulder abduction    1. Sit in a stable chair without armrests, or stand up.  2. Hold a __________ weight in your left / right hand, or hold an exercise band with both hands.  3. Start with your arms straight down and your left / right palm facing in, toward your body.  4. Slowly lift your left / right hand out to your side (abduction). Do not lift your hand above shoulder height unless your health care provider tells you that this is safe.  ? Keep your arms straight.  ? Avoid shrugging your shoulder while you do this movement. Keep your shoulder blade tucked down  toward the middle of your back.  5. Hold for __________ seconds.  6. Slowly lower your arm, and return to the starting position.  Repeat __________ times. Complete this exercise __________ times a day.  Shoulder extension  1. Sit in a stable chair without armrests, or stand up.  2. Secure an exercise band to a stable object in front of you so it is at shoulder height.  3. Hold one end of the exercise band in each hand. Your palms should face each other.  4. Straighten your elbows and lift your hands up to shoulder height.  5. Step back, away from the secured end of the exercise band, until the band is tight and there is no slack.  6. Squeeze your shoulder blades together as you pull your hands down to the sides of your thighs (extension). Stop when your hands are straight down by your sides. Do not let your hands go behind your body.  7. Hold for __________ seconds.  8. Slowly return to the starting position.  Repeat __________ times. Complete this exercise __________ times a day.  Shoulder row  1. Sit in a stable chair without armrests, or stand up.  2. Secure an exercise band to a stable object in front of you so it is at waist height.  3. Hold one end of the exercise band in each hand. Position your palms so that your thumbs are facing the ceiling (neutral position).  4. Bend each of your elbows to a 90-degree angle (right angle) and keep your upper arms at your sides.  5. Step back until the band is tight and there is no slack.  6. Slowly pull your elbows back behind you.  7. Hold for __________ seconds.  8. Slowly return to the starting position.  Repeat __________ times. Complete this exercise __________ times a day.  Shoulder press-ups    1. Sit in a stable chair that has armrests. Sit upright, with your feet flat on the floor.  2. Put your hands on the armrests so your elbows are bent and your fingers are pointing forward. Your hands should be about even with the sides of your body.  3. Push down on the  armrests and use your arms to lift yourself off the chair. Straighten your elbows and lift yourself up as much as you comfortably can.  ? Move your shoulder blades down, and avoid letting your shoulders move up toward your ears.  ? Keep your feet on the ground. As you get stronger, your feet should support less of your body weight as you lift yourself up.  4. Hold for __________ seconds.  5. Slowly lower yourself back into the chair.  Repeat __________ times. Complete this exercise __________ times a day.  Wall push-ups    1. Stand so you are facing a stable wall. Your feet should be about one arm-length away from the wall.  2. Lean forward and place your palms on the wall at shoulder height.  3. Keep your feet flat on the floor as you bend your elbows and lean forward toward the wall.  4. Hold for __________ seconds.  5. Straighten your elbows to push yourself back to the starting position.  Repeat __________ times. Complete this exercise __________ times a day.  This information is not intended to replace advice given to you by your health care provider. Make sure you discuss any questions you have with your health care provider.  Document Revised: 04/10/2020 Document Reviewed: 01/17/2020  Elsevier Patient Education © 2020 [employer]      Document Revised: 10/16/2019 Document Reviewed: 10/16/2019  Elsevier Patient Education © 2020 [employer]

## 2021-01-25 RX ORDER — CHLORTHALIDONE 25 MG/1
25 TABLET ORAL DAILY
Qty: 90 TABLET | Refills: 3 | Status: SHIPPED | OUTPATIENT
Start: 2021-01-25 | End: 2021-04-12 | Stop reason: SDUPTHER

## 2021-01-29 ENCOUNTER — OFFICE VISIT (OUTPATIENT)
Dept: FAMILY MEDICINE CLINIC | Facility: CLINIC | Age: 46
End: 2021-01-29

## 2021-01-29 VITALS
OXYGEN SATURATION: 99 % | DIASTOLIC BLOOD PRESSURE: 90 MMHG | SYSTOLIC BLOOD PRESSURE: 130 MMHG | WEIGHT: 265 LBS | TEMPERATURE: 96.9 F | HEART RATE: 115 BPM | BODY MASS INDEX: 46.95 KG/M2 | HEIGHT: 63 IN

## 2021-01-29 DIAGNOSIS — E66.01 OBESITY, CLASS III, BMI 40-49.9 (MORBID OBESITY) (HCC): Primary | ICD-10-CM

## 2021-01-29 PROCEDURE — 99213 OFFICE O/P EST LOW 20 MIN: CPT | Performed by: PHYSICIAN ASSISTANT
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
## 2021-02-03 ENCOUNTER — OFFICE VISIT (OUTPATIENT)
Dept: ENDOCRINOLOGY | Age: 46
End: 2021-02-03

## 2021-02-03 VITALS
BODY MASS INDEX: 48.34 KG/M2 | SYSTOLIC BLOOD PRESSURE: 122 MMHG | WEIGHT: 272.8 LBS | OXYGEN SATURATION: 98 % | HEART RATE: 118 BPM | DIASTOLIC BLOOD PRESSURE: 80 MMHG | HEIGHT: 63 IN

## 2021-02-03 DIAGNOSIS — I10 ESSENTIAL HYPERTENSION: ICD-10-CM

## 2021-02-03 DIAGNOSIS — G47.33 OSA (OBSTRUCTIVE SLEEP APNEA): ICD-10-CM

## 2021-02-03 DIAGNOSIS — E28.2 PCOS (POLYCYSTIC OVARIAN SYNDROME): Primary | ICD-10-CM

## 2021-02-03 DIAGNOSIS — E78.00 HYPERCHOLESTEROLEMIA: ICD-10-CM

## 2021-02-03 DIAGNOSIS — R73.03 PREDIABETES: ICD-10-CM

## 2021-02-03 PROCEDURE — 99214 OFFICE O/P EST MOD 30 MIN: CPT | Performed by: INTERNAL MEDICINE

## 2021-02-04 ENCOUNTER — TRANSCRIBE ORDERS (OUTPATIENT)
Dept: OBSTETRICS AND GYNECOLOGY | Facility: CLINIC | Age: 46
End: 2021-02-04

## 2021-02-04 DIAGNOSIS — Z01.818 OTHER SPECIFIED PRE-OPERATIVE EXAMINATION: Primary | ICD-10-CM

## 2021-02-04 LAB
ALBUMIN SERPL-MCNC: 4.5 G/DL (ref 3.5–5.2)
ALBUMIN/GLOB SERPL: 1.4 G/DL
ALP SERPL-CCNC: 46 U/L (ref 39–117)
ALT SERPL-CCNC: 28 U/L (ref 1–33)
AST SERPL-CCNC: 23 U/L (ref 1–32)
BILIRUB SERPL-MCNC: 0.3 MG/DL (ref 0–1.2)
BUN SERPL-MCNC: 12 MG/DL (ref 6–20)
BUN/CREAT SERPL: 15 (ref 7–25)
CALCIUM SERPL-MCNC: 9.8 MG/DL (ref 8.6–10.5)
CHLORIDE SERPL-SCNC: 96 MMOL/L (ref 98–107)
CHOLEST SERPL-MCNC: 156 MG/DL (ref 0–200)
CO2 SERPL-SCNC: 30.4 MMOL/L (ref 22–29)
CREAT SERPL-MCNC: 0.8 MG/DL (ref 0.57–1)
GLOBULIN SER CALC-MCNC: 3.3 GM/DL
GLUCOSE SERPL-MCNC: 144 MG/DL (ref 65–99)
HBA1C MFR BLD: 7.8 % (ref 4.8–5.6)
HDLC SERPL-MCNC: 37 MG/DL (ref 40–60)
INTERPRETATION: NORMAL
LDLC SERPL CALC-MCNC: 82 MG/DL (ref 0–100)
Lab: NORMAL
POTASSIUM SERPL-SCNC: 4.5 MMOL/L (ref 3.5–5.2)
PROT SERPL-MCNC: 7.8 G/DL (ref 6–8.5)
SODIUM SERPL-SCNC: 138 MMOL/L (ref 136–145)
TRIGL SERPL-MCNC: 223 MG/DL (ref 0–150)
TSH SERPL DL<=0.005 MIU/L-ACNC: 2.85 UIU/ML (ref 0.27–4.2)
VLDLC SERPL CALC-MCNC: 37 MG/DL (ref 5–40)

## 2021-02-12 RX ORDER — ALBUTEROL SULFATE 90 UG/1
AEROSOL, METERED RESPIRATORY (INHALATION)
Qty: 18 G | Refills: 3 | Status: SHIPPED | OUTPATIENT
Start: 2021-02-12 | End: 2021-05-12

## 2021-02-13 ENCOUNTER — LAB (OUTPATIENT)
Dept: LAB | Facility: HOSPITAL | Age: 46
End: 2021-02-13

## 2021-02-13 DIAGNOSIS — Z01.818 OTHER SPECIFIED PRE-OPERATIVE EXAMINATION: ICD-10-CM

## 2021-02-13 LAB — SARS-COV-2 RNA PNL SPEC NAA+PROBE: NOT DETECTED

## 2021-02-13 PROCEDURE — C9803 HOPD COVID-19 SPEC COLLECT: HCPCS

## 2021-02-13 PROCEDURE — 87635 SARS-COV-2 COVID-19 AMP PRB: CPT | Performed by: OBSTETRICS & GYNECOLOGY

## 2021-02-15 DIAGNOSIS — E11.9 CONTROLLED TYPE 2 DIABETES MELLITUS WITHOUT COMPLICATION, UNSPECIFIED WHETHER LONG TERM INSULIN USE (HCC): ICD-10-CM

## 2021-02-15 DIAGNOSIS — R73.03 PREDIABETES: Primary | ICD-10-CM

## 2021-02-15 RX ORDER — EMPAGLIFLOZIN 10 MG/1
10 TABLET, FILM COATED ORAL DAILY
Qty: 30 TABLET | Refills: 5 | Status: SHIPPED | OUTPATIENT
Start: 2021-02-15 | End: 2021-06-07

## 2021-02-16 ENCOUNTER — ANESTHESIA (OUTPATIENT)
Dept: GASTROENTEROLOGY | Facility: HOSPITAL | Age: 46
End: 2021-02-16

## 2021-02-16 ENCOUNTER — ANESTHESIA EVENT (OUTPATIENT)
Dept: GASTROENTEROLOGY | Facility: HOSPITAL | Age: 46
End: 2021-02-16

## 2021-02-16 ENCOUNTER — HOSPITAL ENCOUNTER (OUTPATIENT)
Facility: HOSPITAL | Age: 46
Setting detail: HOSPITAL OUTPATIENT SURGERY
Discharge: HOME | End: 2021-02-16
Attending: SURGERY | Admitting: SURGERY

## 2021-02-16 VITALS
SYSTOLIC BLOOD PRESSURE: 134 MMHG | HEIGHT: 63 IN | RESPIRATION RATE: 16 BRPM | HEART RATE: 99 BPM | WEIGHT: 272 LBS | TEMPERATURE: 97 F | OXYGEN SATURATION: 99 % | BODY MASS INDEX: 48.2 KG/M2 | DIASTOLIC BLOOD PRESSURE: 87 MMHG

## 2021-02-16 DIAGNOSIS — N39.3 URINARY, INCONTINENCE, STRESS FEMALE: ICD-10-CM

## 2021-02-16 DIAGNOSIS — M25.50 MULTIPLE JOINT PAIN: ICD-10-CM

## 2021-02-16 DIAGNOSIS — E28.2 PCOS (POLYCYSTIC OVARIAN SYNDROME): ICD-10-CM

## 2021-02-16 DIAGNOSIS — E28.2 POLYCYSTIC OVARIES: ICD-10-CM

## 2021-02-16 DIAGNOSIS — E66.01 OBESITY, CLASS III, BMI 40-49.9 (MORBID OBESITY) (HCC): ICD-10-CM

## 2021-02-16 DIAGNOSIS — R12 HEARTBURN: ICD-10-CM

## 2021-02-16 DIAGNOSIS — R73.03 PREDIABETES: ICD-10-CM

## 2021-02-16 DIAGNOSIS — E78.00 HYPERCHOLESTEROLEMIA: ICD-10-CM

## 2021-02-16 DIAGNOSIS — I10 ESSENTIAL HYPERTENSION: ICD-10-CM

## 2021-02-16 DIAGNOSIS — G47.33 OSA (OBSTRUCTIVE SLEEP APNEA): ICD-10-CM

## 2021-02-16 LAB — GLUCOSE BLDC GLUCOMTR-MCNC: 158 MG/DL (ref 70–130)

## 2021-02-16 PROCEDURE — 87081 CULTURE SCREEN ONLY: CPT | Performed by: SURGERY

## 2021-02-16 PROCEDURE — 82962 GLUCOSE BLOOD TEST: CPT

## 2021-02-16 PROCEDURE — 43239 EGD BIOPSY SINGLE/MULTIPLE: CPT | Performed by: SURGERY

## 2021-02-16 PROCEDURE — 25010000002 PROPOFOL 10 MG/ML EMULSION: Performed by: ANESTHESIOLOGY

## 2021-02-16 RX ORDER — PROPOFOL 10 MG/ML
VIAL (ML) INTRAVENOUS AS NEEDED
Status: DISCONTINUED | OUTPATIENT
Start: 2021-02-16 | End: 2021-02-16 | Stop reason: SURG

## 2021-02-16 RX ORDER — LIDOCAINE HYDROCHLORIDE 20 MG/ML
INJECTION, SOLUTION INFILTRATION; PERINEURAL AS NEEDED
Status: DISCONTINUED | OUTPATIENT
Start: 2021-02-16 | End: 2021-02-16 | Stop reason: SURG

## 2021-02-16 RX ORDER — PROPOFOL 10 MG/ML
VIAL (ML) INTRAVENOUS CONTINUOUS PRN
Status: DISCONTINUED | OUTPATIENT
Start: 2021-02-16 | End: 2021-02-16 | Stop reason: SURG

## 2021-02-16 RX ORDER — SODIUM CHLORIDE, SODIUM LACTATE, POTASSIUM CHLORIDE, CALCIUM CHLORIDE 600; 310; 30; 20 MG/100ML; MG/100ML; MG/100ML; MG/100ML
1000 INJECTION, SOLUTION INTRAVENOUS CONTINUOUS
Status: DISCONTINUED | OUTPATIENT
Start: 2021-02-16 | End: 2021-02-16 | Stop reason: HOSPADM

## 2021-02-16 RX ADMIN — SODIUM CHLORIDE, POTASSIUM CHLORIDE, SODIUM LACTATE AND CALCIUM CHLORIDE 1000 ML: 600; 310; 30; 20 INJECTION, SOLUTION INTRAVENOUS at 06:34

## 2021-02-16 RX ADMIN — PROPOFOL 250 MCG/KG/MIN: 10 INJECTION, EMULSION INTRAVENOUS at 07:32

## 2021-02-16 RX ADMIN — PROPOFOL 200 MG: 10 INJECTION, EMULSION INTRAVENOUS at 07:32

## 2021-02-16 RX ADMIN — LIDOCAINE HYDROCHLORIDE 60 MG: 20 INJECTION, SOLUTION INFILTRATION; PERINEURAL at 07:32

## 2021-02-16 NOTE — OP NOTE
Surgeon: Prasanna Katz Jr., M.D.    Preoperative Diagnosis: Dyspepsia    Postoperative Diagnosis: Gastritis    Procedure Performed: Transoral esophagogastroduodenoscopy with gastric antral biopsies    Indications: 45-year-old female who presents for preoperative valuation.  Patient does not take H2 blocker PPI on regular basis    Procedure:     The procedure, indications, preparation and potential complications were explained to the patient, who indicated understanding and signed the corresponding consent forms.  The patient was identified, taken to the endoscopy suite, and placed on the left side down decubitus position.  The patient underwent a MAC anesthesia and was appropriately monitored through the case by the anesthesia personnel with continuous pulse oximetry, blood pressure, and cardiac monitoring.  A bite block was placed.  After adequate IV sedation and using a transoral technique a lubed flexible endoscope was placed in the hypopharynx and advanced to the second portion of the duodenum without difficulty. The scope was then withdrawn back into the antrum of the stomach.  Cold forcep biopsies of the antrum were taken to rule out Helicobacter pylori.  The scope was retroflexed noting the body, fundus and cardia.  The scope was then withdrawn back into the esophagus after decompressing the stomach.  The Z line was noted and GE junction measured from the incisors.  The scope was then completely withdrawn.  The patient tolerated the procedure well and left the endoscopy suite in stable condition.  The findings are listed below.    Duodenum: Unremarkable  Antrum: Moderate patchy erythema  Body/Fundus: Unremarkable  Cardia: Unremarkable  Esophagus: Z-line regular, no erosive esophagitis; GE junction measured approximately 38 cm from the incisors    Recommendations:     Stop nonsteroidal medications and await biopsy results.  We will start on over-the-counter H2 blocker or PPI.  Follow-up in the office

## 2021-02-16 NOTE — H&P
Patient Care Team:  Kya Kendrick PA-C as PCP - General  Kya Kendrick PA-C as PCP - Family Medicine  Anatoly Mckinney MD as Consulting Physician (Endocrinology)  Behzad Roblero MD as Consulting Physician (Cardiology)  Rony Stacy MD as Consulting Physician (Neurology)  Carlie Sarmiento APRN as Nurse Practitioner (Bariatrics)    Chief complaint Heartburn and in need of preoperative clearance prior to surgery    Subjective     Patient is a 45 y.o. female who is a patient of ours and has undergone our extensive initial evaluation for bariatric surgery and needs to proceed with upper endoscopy for preoperative clearance prior to proceeding with surgery.  Please see the initial history and physical for further detailed information.      Review of Systems   Pertinent items are noted in HPI and no changes since last visit.    Past Medical History:   Diagnosis Date   • Anxiety    • Asthma    • Diabetes mellitus (CMS/HCC)    • Fibromyalgia    • Fibromyalgia, primary    • Headache, post-lumbar puncture 9/4/2020   • Headache, tension-type    • Hypertension    • Kidney calculi    • Memory loss    • Meningioma (CMS/HCC)    • Migraine     ocular   • KACI (obstructive sleep apnea)     On CPAP   • Palpitations    • Polycystic ovaries    • Postpartum cardiomyopathy 2002   • Preeclampsia 2002   • Seizures (CMS/HCC)     unclear about this diagnosis      Past Surgical History:   Procedure Laterality Date   • CYSTOSCOPY URETEROSCOPY LASER LITHOTRIPSY Right 3/7/2017    Procedure: CYSTOSCOPY RT URETEROSCOPY LASER LITHOTRIPSY W/ STENT, rerograde pyelogram ;  Surgeon: Rashid Malloy MD;  Location: [facility];  Service:    • FOOT SURGERY Left 2014    tendon repair   • HYSTERECTOMY  2007   • OOPHORECTOMY Left 2011    for ovarian cysts   • SHOULDER SURGERY Right 2009   • TUBAL ABDOMINAL LIGATION  2003   • WISDOM TOOTH EXTRACTION  1999     Family History   Problem Relation Age of Onset   • Heart disease  Paternal Aunt    • Diabetes Paternal Aunt    • Diabetes Maternal Grandmother    • Neuropathy Maternal Grandmother    • Thyroid disease Maternal Grandmother    • Heart disease Maternal Grandfather    • Cancer Maternal Grandfather    • Heart disease Paternal Grandfather    • Heart disease Paternal Uncle    • Arthritis Mother    • Migraines Mother    • Thyroid disease Mother    • Obesity Mother    • Lupus Mother    • Seizures Father    • Breast cancer Neg Hx    • Ovarian cancer Neg Hx    • Colon cancer Neg Hx    • Deep vein thrombosis Neg Hx    • Pulmonary embolism Neg Hx    • Malig Hyperthermia Neg Hx      Social History     Tobacco Use   • Smoking status: Former Smoker     Packs/day: 1.00     Years: 20.00     Pack years: 20.00     Types: Cigarettes     Quit date: 2016     Years since quittin.6   • Smokeless tobacco: Never Used   Substance Use Topics   • Alcohol use: Yes     Frequency: Monthly or less     Drinks per session: 1 or 2     Comment: occasion   • Drug use: No     Medications Prior to Admission   Medication Sig Dispense Refill Last Dose   • Blood Glucose Monitoring Suppl (ACCU-CHEK MANNY) device Used to check blood sugars twice daily as needed for type 2 diabetes 1 each 0 2/15/2021   • chlorthalidone (HYGROTON) 25 MG tablet TAKE 1 TABLET BY MOUTH DAILY 90 tablet 3 2/15/2021   • cyclobenzaprine (FLEXERIL) 10 MG tablet Take 1 tablet by mouth 3 (Three) Times a Day As Needed for Muscle Spasms. 30 tablet 0 2/15/2021   • Empagliflozin (Jardiance) 10 MG tablet Take 10 mg by mouth Daily. 30 tablet 5 2/15/2021   • furosemide (LASIX) 20 MG tablet Take 20 mg by mouth As Needed.   2/15/2021   • glucose blood (Accu-Chek Manny) test strip Used to check blood sugars twice daily as needed for type 2 diabetes 100 each 12 2/15/2021   • ibuprofen (MOTRIN IB) 200 MG tablet Take 2 tablets by mouth Every 6 (Six) Hours As Needed.   2/15/2021   • Lancets (ACCU-CHEK MULTICLIX) lancets Used to check blood sugars twice daily  "as needed for type 2 diabetes 100 each 12 2/15/2021   • metFORMIN ER (GLUCOPHAGE-XR) 750 MG 24 hr tablet Take 2 tablets by mouth Daily With Dinner. 60 tablet 6 2/15/2021   • potassium chloride (K-DUR,KLOR-CON) 10 MEQ CR tablet Take 1 tablet by mouth Daily. 30 tablet 0 2/15/2021   • spironolactone (ALDACTONE) 100 MG tablet TAKE 1 TABLET BY MOUTH DAILY 90 tablet 0 2/15/2021   • Ventolin  (90 Base) MCG/ACT inhaler INHALE 2 PUFFS BY MOUTH FOUR TIMES DAILY AS NEEDED FOR WHEEZING OR SHORTNESS OF BREATH 18 g 3 2/15/2021     Allergies:  Iodine, Shellfish allergy, and Lyrica [pregabalin]    Vital Signs  See PreOp record  Temp:  [97 °F (36.1 °C)] 97 °F (36.1 °C)  Heart Rate:  [115] 115  Resp:  [11] 11  BP: (118)/(69) 118/69    Flowsheet Rows      First Filed Value   Admission Height  160 cm (63\") Documented at 02/16/2021 0622   Admission Weight  123 kg (272 lb) Documented at 02/16/2021 0622           Physical Exam:   Heart: RR  Lungs: CTA B  Abd: soft and NT/ND  Ext: no clubbing, cyanosis    Results Review:    I have reviewed the patient's clinical results      Hypercholesterolemia    Obesity, Class III, BMI 40-49.9 (morbid obesity) (CMS/HCC)    PCOS (polycystic ovarian syndrome)    Multiple joint pain    KACI (obstructive sleep apnea)    Heartburn    Urinary, incontinence, stress female    Essential hypertension    Polycystic ovaries    Prediabetes      The risks and benefits of the procedure were discussed with the patient in detail and all questions were answered.  Possibility of perforation, bleeding, aspiration, anoxic brain injury, respiratory and/or cardiac arrest and death were discussed.  Consent will be signed and witnessed..     Prasanna Katz MD  02/16/21  06:43 EST  Time: Approximately 15 minutes was spent with the patient and over half that time was spent counseling.  All of the patients questions were answered.    "

## 2021-02-16 NOTE — ANESTHESIA POSTPROCEDURE EVALUATION
Patient: Angie Patel    Procedure Summary     Date: 02/16/21 Room / Location:  [facility]    Anesthesia Start: 0728 Anesthesia Stop: 0741    Procedure: ESOPHAGOGASTRODUODENOSCOPY WITH BIOPSY (N/A Esophagus) Diagnosis:       Obesity, Class III, BMI 40-49.9 (morbid obesity) (CMS/HCC)      Essential hypertension      Heartburn      Hypercholesterolemia      Polycystic ovaries      KACI (obstructive sleep apnea)      Urinary, incontinence, stress female      Multiple joint pain      PCOS (polycystic ovarian syndrome)      Prediabetes      (Obesity, Class III, BMI 40-49.9 (morbid obesity) (CMS/HCC) [E66.01])      (Essential hypertension [I10])      (Heartburn [R12])      (Hypercholesterolemia [E78.00])      (Polycystic ovaries [E28.2])      (KACI (obstructive sleep apnea) [G47.33])      (Urinary, incontinence, stress female [N39.3])      (Multiple joint pain [M25.50])      (PCOS (polycystic ovarian syndrome) [E28.2])      (Prediabetes [R73.03])    Surgeon: Prasanna Katz Jr., MD Provider: Celestine Allen MD    Anesthesia Type: MAC ASA Status: 3          Anesthesia Type: MAC    Vitals  No vitals data found for the desired time range.          Post Anesthesia Care and Evaluation    Patient location during evaluation: PHASE II  Patient participation: complete - patient participated  Level of consciousness: awake and alert  Pain management: adequate  Airway patency: patent  Anesthetic complications: No anesthetic complications  PONV Status: none  Cardiovascular status: acceptable and hemodynamically stable  Respiratory status: acceptable  Hydration status: acceptable

## 2021-02-16 NOTE — ANESTHESIA PREPROCEDURE EVALUATION
Anesthesia Evaluation     Patient summary reviewed and Nursing notes reviewed                Airway   Mallampati: III  TM distance: >3 FB  Neck ROM: full  Possible difficult intubation  Dental - normal exam     Pulmonary - normal exam   (+) a smoker Former, asthma,sleep apnea on CPAP,   Cardiovascular - normal exam    (+) hypertension 2 medications or greater, hyperlipidemia,     ROS comment: Hx postpartum cardiomyopathy    Neuro/Psych  (+) seizures, headaches, psychiatric history Anxiety,       ROS Comment: Migraines/meningioma  GI/Hepatic/Renal/Endo    (+) obesity, morbid obesity,  renal disease stones, diabetes mellitus,     Musculoskeletal     (+) myalgias,       ROS comment: Fibromyalgia  Abdominal   (+) obese,    Substance History      OB/GYN    (+) Preeclampsia, pregnancy induced hypertension    Comment: PCOS      Other                        Anesthesia Plan    ASA 3     MAC     intravenous induction     Anesthetic plan, all risks, benefits, and alternatives have been provided, discussed and informed consent has been obtained with: patient.

## 2021-02-16 NOTE — DISCHARGE INSTRUCTIONS

## 2021-02-17 ENCOUNTER — TELEPHONE (OUTPATIENT)
Dept: BARIATRICS/WEIGHT MGMT | Facility: CLINIC | Age: 46
End: 2021-02-17

## 2021-02-17 LAB — UREASE TISS QL: NEGATIVE

## 2021-02-17 NOTE — TELEPHONE ENCOUNTER
LVM for pt regarding negative result. Instructed pt to call the office with any questions.         ----- Message from Prasanna Katz Jr., MD sent at 2/17/2021  9:28 AM EST -----  Please call patient with negative results.

## 2021-02-26 ENCOUNTER — HOSPITAL ENCOUNTER (OUTPATIENT)
Dept: GENERAL RADIOLOGY | Facility: HOSPITAL | Age: 46
Discharge: HOME | End: 2021-02-26
Admitting: NURSE PRACTITIONER

## 2021-02-26 ENCOUNTER — OFFICE VISIT (OUTPATIENT)
Dept: FAMILY MEDICINE CLINIC | Facility: CLINIC | Age: 46
End: 2021-02-26

## 2021-02-26 VITALS
TEMPERATURE: 97.5 F | BODY MASS INDEX: 46.6 KG/M2 | DIASTOLIC BLOOD PRESSURE: 80 MMHG | WEIGHT: 263 LBS | OXYGEN SATURATION: 94 % | HEIGHT: 63 IN | HEART RATE: 97 BPM | SYSTOLIC BLOOD PRESSURE: 128 MMHG

## 2021-02-26 DIAGNOSIS — N39.3 URINARY, INCONTINENCE, STRESS FEMALE: ICD-10-CM

## 2021-02-26 DIAGNOSIS — I10 ESSENTIAL HYPERTENSION: ICD-10-CM

## 2021-02-26 DIAGNOSIS — E66.01 OBESITY, CLASS III, BMI 40-49.9 (MORBID OBESITY) (HCC): ICD-10-CM

## 2021-02-26 DIAGNOSIS — E28.2 PCOS (POLYCYSTIC OVARIAN SYNDROME): ICD-10-CM

## 2021-02-26 DIAGNOSIS — E78.00 HYPERCHOLESTEROLEMIA: ICD-10-CM

## 2021-02-26 DIAGNOSIS — G47.33 OSA (OBSTRUCTIVE SLEEP APNEA): ICD-10-CM

## 2021-02-26 DIAGNOSIS — E28.2 POLYCYSTIC OVARIES: ICD-10-CM

## 2021-02-26 DIAGNOSIS — R12 HEARTBURN: ICD-10-CM

## 2021-02-26 DIAGNOSIS — M25.50 MULTIPLE JOINT PAIN: ICD-10-CM

## 2021-02-26 DIAGNOSIS — R73.03 PREDIABETES: ICD-10-CM

## 2021-02-26 DIAGNOSIS — E66.01 OBESITY, CLASS III, BMI 40-49.9 (MORBID OBESITY) (HCC): Primary | ICD-10-CM

## 2021-02-26 PROCEDURE — 99213 OFFICE O/P EST LOW 20 MIN: CPT | Performed by: PHYSICIAN ASSISTANT

## 2021-02-26 PROCEDURE — 71046 X-RAY EXAM CHEST 2 VIEWS: CPT

## 2021-02-26 NOTE — PROGRESS NOTES
"Chief Complaint  Weight Check (#2)    Subjective          Angie Patel presents to [facility]  History of Present Illness  Angie is a 45-year-old female who presents for weight check with morbid obesity management.  She has lost 9 pounds since February 16, 2021.  She is trying to get laparoscopic bariatric surgery approval.  Needs monthly weight checks in office.  Overall she is feeling well today.  Her diet has been improving.  She has been trying to eat more protein and vegetables.  States she struggles with eating at regular intervals/time.  She has given up soda and eating more protein and vegetables.  She has been more physically active as well.  States she has been using her elliptical.  Sees physical therapy twice weekly who encouraged her to start doing the elliptical at least twice weekly.  Denied any chest pain, shortness of air, dizziness, vision changes or swelling of ankles.  Sees endocrinologist for PCOS.  States she was recently diagnosed with diabetes.  Bowel movements have been normal for her without dark black tarry stools.  Has no complaints today     Objective   Vital Signs:   /80   Pulse 97   Temp 97.5 °F (36.4 °C)   Ht 160 cm (63\")   Wt 119 kg (263 lb)   SpO2 94%   BMI 46.59 kg/m²     Physical Exam  Vitals signs and nursing note reviewed.   Constitutional:       Appearance: Normal appearance. She is well-developed and well-groomed. She is morbidly obese.      Interventions: Face mask in place.   HENT:      Head: Normocephalic and atraumatic.      Jaw: There is normal jaw occlusion.   Neck:      Musculoskeletal: Neck supple.      Thyroid: No thyroid mass, thyromegaly or thyroid tenderness.      Vascular: No carotid bruit.      Trachea: Trachea and phonation normal. No tracheal tenderness.   Cardiovascular:      Rate and Rhythm: Normal rate and regular rhythm.      Pulses: Normal pulses.      Heart sounds: Normal heart sounds, S1 normal and S2 normal. " No murmur.   Pulmonary:      Effort: Pulmonary effort is normal.      Breath sounds: Normal breath sounds and air entry.   Abdominal:      General: Bowel sounds are normal.      Palpations: Abdomen is soft. There is no hepatomegaly.      Tenderness: There is no abdominal tenderness.   Musculoskeletal:      Right lower leg: No edema.      Left lower leg: No edema.   Skin:     General: Skin is warm and dry.      Capillary Refill: Capillary refill takes less than 2 seconds.   Neurological:      Mental Status: She is alert and oriented to person, place, and time.   Psychiatric:         Attention and Perception: Attention and perception normal.         Mood and Affect: Mood and affect normal.         Speech: Speech normal.         Behavior: Behavior normal. Behavior is cooperative.         Thought Content: Thought content normal.         Cognition and Memory: Cognition and memory normal.         Judgment: Judgment normal.     I was wearing a surgical mask and face shield during the entire office visit/encounter.         Patient's (Body mass index is 46.59 kg/m².) indicates that they are morbidly obese (BMI > 40 or > 35 with obesity - related health condition) with obesity-related health conditions that include obstructive sleep apnea, hypertension, diabetes mellitus and dyslipidemias . Obesity is improving with lifestyle modifications. BMI is is above average; BMI management plan is completed. We discussed low calorie, low carb based diet program, portion control, increasing exercise, joining a fitness center or start home based exercise program, Weight Watchers or other Commercial based weight reduction program and consulting a Bariatric surgeon.     Result Review :                 Assessment and Plan    Diagnoses and all orders for this visit:    1. Obesity, Class III, BMI 40-49.9 (morbid obesity) (CMS/[facility]) (Primary)    Mrs. Angie Patel was seen in office today for obesity management.  She has lost 9 pounds since last  office visit. I have completed her bariatric surgery paperwork.  Will return to office in 1 month for weight management.  Stressed importance of eating healthy and exercising regularly.      Follow Up   Return in about 4 weeks (around 3/26/2021), or weight.  Patient was given instructions and counseling regarding her condition or for health maintenance advice. Please see specific information pulled into the AVS if appropriate.     KARL Solorio [address] [address]  Dept: [phone]  Dept Fax: [phone]  Loc: [phone]  Loc Fax: [phone]        Answers for HPI/ROS submitted by the patient on 2/20/2021   What is the primary reason for your visit?: Other  Please describe your symptoms.: Weight loss   Have you had these symptoms before?: Yes  How long have you been having these symptoms?: Greater than 2 weeks

## 2021-03-05 ENCOUNTER — TELEPHONE (OUTPATIENT)
Dept: BARIATRICS/WEIGHT MGMT | Facility: CLINIC | Age: 46
End: 2021-03-05

## 2021-03-05 NOTE — TELEPHONE ENCOUNTER
Called patient for pre-op nutrition follow-up. Pt states she has been overwhelmed because she is a newly diagnosed diabetic and is unsure what diet recommendations to follow. Started on Jardiance in addition to metformin and she is worried if she doesn't eat enough her blood sugar will get too low.  Diabetes educator recommended 5821-3216 kcals, 58-78g fat, 20g protein per meal and 40-60g CHO per meal. Encouraged her to follow these guidelines pre-op and make her endocrinologist aware that she will have dramatically reduced intake due to bariatric surgery in the event that medication changes need to be made at that time. Pt verbalized understanding. She has had a weight loss of 7 lbs since her intake appointment. Pt is demonstrating commitment to work on personal challenges prior to surgery. She is a good candidate for bariatric surgery.

## 2021-03-25 RX ORDER — SPIRONOLACTONE 100 MG/1
100 TABLET, FILM COATED ORAL DAILY
Qty: 90 TABLET | Refills: 0 | Status: SHIPPED | OUTPATIENT
Start: 2021-03-25 | End: 2021-06-23

## 2021-03-26 ENCOUNTER — OFFICE VISIT (OUTPATIENT)
Dept: FAMILY MEDICINE CLINIC | Facility: CLINIC | Age: 46
End: 2021-03-26

## 2021-03-26 VITALS
DIASTOLIC BLOOD PRESSURE: 80 MMHG | BODY MASS INDEX: 46.6 KG/M2 | SYSTOLIC BLOOD PRESSURE: 128 MMHG | TEMPERATURE: 97.5 F | WEIGHT: 263 LBS | HEIGHT: 63 IN | HEART RATE: 111 BPM | OXYGEN SATURATION: 97 %

## 2021-03-26 DIAGNOSIS — E66.01 OBESITY, CLASS III, BMI 40-49.9 (MORBID OBESITY) (HCC): Primary | ICD-10-CM

## 2021-03-26 PROCEDURE — 99213 OFFICE O/P EST LOW 20 MIN: CPT | Performed by: PHYSICIAN ASSISTANT

## 2021-03-26 NOTE — PATIENT INSTRUCTIONS
Mediterranean Diet  A Mediterranean diet refers to food and lifestyle choices that are based on the traditions of countries located on the [place]. This way of eating has been shown to help prevent certain conditions and improve outcomes for people who have chronic diseases, like kidney disease and heart disease.  What are tips for following this plan?  Lifestyle  · Cook and eat meals together with your family, when possible.  · Drink enough fluid to keep your urine clear or pale yellow.  · Be physically active every day. This includes:  ? Aerobic exercise like running or swimming.  ? Leisure activities like gardening, walking, or housework.  · Get 7-8 hours of sleep each night.  · If recommended by your health care provider, drink red wine in moderation. This means 1 glass a day for nonpregnant women and 2 glasses a day for men. A glass of wine equals 5 oz (150 mL).  Reading food labels    · Check the serving size of packaged foods. For foods such as rice and pasta, the serving size refers to the amount of cooked product, not dry.  · Check the total fat in packaged foods. Avoid foods that have saturated fat or trans fats.  · Check the ingredients list for added sugars, such as corn syrup.  Shopping  · At the grocery store, buy most of your food from the areas near the walls of the store. This includes:  ? Fresh fruits and vegetables (produce).  ? Grains, beans, nuts, and seeds. Some of these may be available in unpackaged forms or large amounts (in bulk).  ? Fresh seafood.  ? Poultry and eggs.  ? Low-fat dairy products.  · Buy whole ingredients instead of prepackaged foods.  · Buy fresh fruits and vegetables in-season from local farmers markets.  · Buy frozen fruits and vegetables in resealable bags.  · If you do not have access to quality fresh seafood, buy precooked frozen shrimp or canned fish, such as tuna, salmon, or sardines.  · Buy small amounts of raw or cooked vegetables, salads, or olives from  the deli or salad bar at your store.  · Stock your pantry so you always have certain foods on hand, such as olive oil, canned tuna, canned tomatoes, rice, pasta, and beans.  Cooking  · Cook foods with extra-virgin olive oil instead of using butter or other vegetable oils.  · Have meat as a side dish, and have vegetables or grains as your main dish. This means having meat in small portions or adding small amounts of meat to foods like pasta or stew.  · Use beans or vegetables instead of meat in common dishes like chili or lasagna.  · [place] with different cooking methods. Try roasting or broiling vegetables instead of steaming or sautéeing them.  · Add frozen vegetables to soups, stews, pasta, or rice.  · Add nuts or seeds for added healthy fat at each meal. You can add these to yogurt, salads, or vegetable dishes.  · Marinate fish or vegetables using olive oil, lemon juice, garlic, and fresh herbs.  Meal planning    · Plan to eat 1 vegetarian meal one day each week. Try to work up to 2 vegetarian meals, if possible.  · Eat seafood 2 or more times a week.  · Have healthy snacks readily available, such as:  ? Vegetable sticks with hummus.  ? Greek yogurt.  ? Fruit and nut trail mix.  · Eat balanced meals throughout the week. This includes:  ? Fruit: 2-3 servings a day  ? Vegetables: 4-5 servings a day  ? Low-fat dairy: 2 servings a day  ? Fish, poultry, or lean meat: 1 serving a day  ? Beans and legumes: 2 or more servings a week  ? Nuts and seeds: 1-2 servings a day  ? Whole grains: 6-8 servings a day  ? Extra-virgin olive oil: 3-4 servings a day  · Limit red meat and sweets to only a few servings a month  What are my food choices?  · Mediterranean diet  ? Recommended  § Grains: Whole-grain pasta. Brown rice. Bulgar wheat. Polenta. Couscous. Whole-wheat bread. Oatmeal. Quinoa.  § Vegetables: Artichokes. Beets. Broccoli. Cabbage. Carrots. Eggplant. Green beans. Chard. Kale. Spinach. Onions. Leeks. Peas. Squash.  Tomatoes. Peppers. Radishes.  § Fruits: Apples. Apricots. Avocado. Berries. Bananas. Cherries. Dates. Figs. Grapes. Nate. Melon. Oranges. Peaches. Plums. Pomegranate.  § Meats and other protein foods: Beans. Almonds. Sunflower seeds. Pine nuts. Peanuts. Cod. Frost. Scallops. Shrimp. Tuna. Tilapia. Clams. Oysters. Eggs.  § Dairy: Low-fat milk. Cheese. Greek yogurt.  § Beverages: Water. Red wine. Herbal tea.  § Fats and oils: Extra virgin olive oil. Avocado oil. Grape seed oil.  § Sweets and desserts: Greek yogurt with honey. Baked apples. Poached pears. Trail mix.  § Seasoning and other foods: Basil. Cilantro. Coriander. Cumin. Mint. Parsley. Carlo. Rosemary. Tarragon. Garlic. Oregano. Thyme. Pepper. Balsalmic vinegar. Tahini. Hummus. Tomato sauce. Olives. Mushrooms.  ? Limit these  § Grains: Prepackaged pasta or rice dishes. Prepackaged cereal with added sugar.  § Vegetables: Deep fried potatoes (french fries).  § Fruits: Fruit canned in syrup.  § Meats and other protein foods: Beef. Pork. Lamb. Poultry with skin. Hot dogs. Cisse.  § Dairy: Ice cream. Sour cream. Whole milk.  § Beverages: Juice. Sugar-sweetened soft drinks. Beer. Liquor and spirits.  § Fats and oils: Butter. Canola oil. Vegetable oil. Beef fat (tallow). Lard.  § Sweets and desserts: Cookies. Cakes. Pies. Candy.  § Seasoning and other foods: Mayonnaise. Premade sauces and marinades.  The items listed may not be a complete list. Talk with your dietitian about what dietary choices are right for you.  Summary  · The Mediterranean diet includes both food and lifestyle choices.  · Eat a variety of fresh fruits and vegetables, beans, nuts, seeds, and whole grains.  · Limit the amount of red meat and sweets that you eat.  · Talk with your health care provider about whether it is safe for you to drink red wine in moderation. This means 1 glass a day for nonpregnant women and 2 glasses a day for men. A glass of wine equals 5 oz (150 mL).  This information  is not intended to replace advice given to you by your health care provider. Make sure you discuss any questions you have with your health care provider.  Document Revised: 08/17/2017 Document Reviewed: 08/10/2017  Elsevier Patient Education © 2020 [employer]

## 2021-03-26 NOTE — PROGRESS NOTES
"Chief Complaint  Weight Check    Subjective          Angie Patel presents to [facility] PRIMARY CARE  History of Present Illness  Angie is a 45 year old female who presents seen in office today for obesity management.  She is trying to get laparoscopic sleeve approval.  Overall her diet has been healthy.  She has been cutting back on calories and portion sizes.  Struggles with eating 3 meals daily.  Has been increasing her exercising as well.  She is using her cane less as well.  Denied any chest pain, shortness of air, dizziness, vision changes or swelling of ankles.  Has follow-up appointment next month.  Doing well with her diabetic medication.  Has no complaints today.        Objective   Vital Signs:   /80   Pulse 111   Temp 97.5 °F (36.4 °C)   Ht 160 cm (63\")   Wt 119 kg (263 lb)   SpO2 97%   BMI 46.59 kg/m²     Physical Exam  Vitals and nursing note reviewed.   Constitutional:       Appearance: Normal appearance. She is well-developed and well-groomed. She is morbidly obese.      Interventions: Face mask in place.      Comments: Walking with a cane   HENT:      Head: Normocephalic and atraumatic.      Jaw: There is normal jaw occlusion.   Neck:      Thyroid: No thyroid mass, thyromegaly or thyroid tenderness.      Vascular: No carotid bruit.      Trachea: Trachea and phonation normal. No tracheal tenderness.   Cardiovascular:      Rate and Rhythm: Normal rate and regular rhythm.      Pulses: Normal pulses.      Heart sounds: Normal heart sounds, S1 normal and S2 normal. No murmur heard.     Pulmonary:      Effort: Pulmonary effort is normal.      Breath sounds: Normal breath sounds and air entry.   Abdominal:      General: Bowel sounds are normal.      Palpations: Abdomen is soft. There is no hepatomegaly.      Tenderness: There is no abdominal tenderness.   Musculoskeletal:      Cervical back: Neck supple.      Right lower leg: No edema.      Left lower leg: No edema. "   Skin:     General: Skin is warm and dry.      Capillary Refill: Capillary refill takes less than 2 seconds.   Neurological:      Mental Status: She is alert and oriented to person, place, and time.   Psychiatric:         Attention and Perception: Attention and perception normal.         Mood and Affect: Mood and affect normal.         Speech: Speech normal.         Behavior: Behavior normal. Behavior is cooperative.         Thought Content: Thought content normal.         Cognition and Memory: Cognition and memory normal.         Judgment: Judgment normal.     I was wearing a surgical mask and face shield during the entire office visit/encounter.     Result Review :                 Assessment and Plan    Diagnoses and all orders for this visit:    1. Obesity, Class III, BMI 40-49.9 (morbid obesity) (CMS/[facility]) (Primary)    Mrs. Angie Patel was seen in office today for obesity management.  She is trying to get laparoscopic sleeve approval.  This is her fifth month of weight management.  Her weight is currently stable.  She will continue to eat healthy and increasing her physical activity.  We will complete her bariatric surgery form and faxed to the appropriate number.  She will return to office in 1 month for weight management.      Follow Up   Return in about 4 weeks (around 4/23/2021), or obesity.  Patient was given instructions and counseling regarding her condition or for health maintenance advice. Please see specific information pulled into the AVS if appropriate.     KARL Solorio [facility]  [address] [place] [id number]  Dept: [phone]  Dept Fax: [phone]  Loc: [phone]  Loc Fax: [phone]        Answers for HPI/ROS submitted by the patient on 3/26/2021  Please describe your symptoms.: Weight check  Have you had these symptoms before?: Yes  How long have you been having these symptoms?: Greater than 2  weeks  What is the primary reason for your visit?: Other

## 2021-04-12 RX ORDER — CHLORTHALIDONE 25 MG/1
25 TABLET ORAL DAILY
Qty: 90 TABLET | Refills: 3 | Status: SHIPPED | OUTPATIENT
Start: 2021-04-12 | End: 2022-01-03

## 2021-04-16 ENCOUNTER — TELEMEDICINE (OUTPATIENT)
Dept: BARIATRICS/WEIGHT MGMT | Facility: CLINIC | Age: 46
End: 2021-04-16

## 2021-04-16 DIAGNOSIS — Z99.89 USE OF CANE AS AMBULATORY AID: ICD-10-CM

## 2021-04-16 DIAGNOSIS — R29.818 NEUROLOGICAL ABNORMALITY: ICD-10-CM

## 2021-04-16 DIAGNOSIS — G47.33 OSA (OBSTRUCTIVE SLEEP APNEA): ICD-10-CM

## 2021-04-16 DIAGNOSIS — E66.01 OBESITY, CLASS III, BMI 40-49.9 (MORBID OBESITY) (HCC): Primary | ICD-10-CM

## 2021-04-16 DIAGNOSIS — R53.82 CHRONIC FATIGUE: ICD-10-CM

## 2021-04-16 DIAGNOSIS — E11.69 DIABETES MELLITUS TYPE 2 IN OBESE (HCC): ICD-10-CM

## 2021-04-16 DIAGNOSIS — I10 ESSENTIAL HYPERTENSION: ICD-10-CM

## 2021-04-16 DIAGNOSIS — E66.9 DIABETES MELLITUS TYPE 2 IN OBESE (HCC): ICD-10-CM

## 2021-04-16 DIAGNOSIS — R26.9 ABNORMAL GAIT: ICD-10-CM

## 2021-04-16 PROCEDURE — 99213 OFFICE O/P EST LOW 20 MIN: CPT | Performed by: SURGERY

## 2021-04-16 NOTE — PROGRESS NOTES
[facility]RIC SURGERY  [address] [address] [phone]  [address] [id number] [phone]  Dept: [phone]  4/16/2021      Angie Patel.  [id number]  [id number]  1975  female      No chief complaint on file.    You have chosen to receive care through a telehealth visit.  Do you consent to use a video/audio connection for your medical care today? Yes    The patient is here for month 1/1 of their pre-operative physician supervised diet. Today's weight is   pounds and had a loss of 9 lbs. The patient states that she is following the recommendations given by our office and dietician including a high lean protein, low carb and low fat diet. We recommended adequate fruits and vegetable intake along with limited portion sizes. Patient is working on eliminating fast foods, fried foods, sweets and soda. Angie Patel has been increasing her daily water intake. She has been exercising: PT.    Patient states they have made positive changes including making better food choices  The patient admits to be struggling with nothing at this point    Review of Systems   Constitutional: Positive for fatigue.   Gastrointestinal: Positive for nausea.   Musculoskeletal: Positive for arthralgias, back pain and gait problem.   All other systems reviewed and are negative.    There were no vitals filed for this visit.  Patient Active Problem List   Diagnosis   • Magnetic resonance imaging of brain abnormal   • Fatigue   • Abnormal gait   • Hypercholesterolemia   • Meningioma (CMS/HCC)   • Migraine   • Obesity, Class III, BMI 40-49.9 (morbid obesity) (CMS/HCC)   • Vitamin D insufficiency   • Memory changes   • PCOS (polycystic ovarian syndrome)   • Menopausal symptoms   • Insulin resistance syndrome   • Right ureteral stone   • Constipation due to pain medication therapy   • Blurred vision, bilateral   • Anxiety   • History of  kidney stones   • Fibromyalgia   • Primary insomnia   • Multiple joint pain   • Chronic cough   • Thyroid enlargement   • Left-sided chest pain   • Neurological abnormality   • KACI (obstructive sleep apnea)   • Hypokalemia   • Left hip pain   • Intermittent claudication (CMS/HCC)   • Heartburn   • Urinary, incontinence, stress female   • Diabetes mellitus type 2 in obese (CMS/HCC)   • Peripartum cardiomyopathy   • Essential hypertension   • Polycystic ovaries   • Prediabetes   • Use of cane as ambulatory aid     There is no height or weight on file to calculate BMI.    The following portions of the patient's history were reviewed and updated as appropriate: active problem list, medication list, allergies, social history, notes from last encounter, lab results, endoscopy procedure notes    Physical Exam  Constitutional:       Appearance: Normal appearance.   HENT:      Head: Normocephalic and atraumatic.   Eyes:      Conjunctiva/sclera: Conjunctivae normal.   Pulmonary:      Effort: Pulmonary effort is normal.   Neurological:      Mental Status: She is alert and oriented to person, place, and time.   Psychiatric:         Mood and Affect: Mood normal.         Behavior: Behavior normal.         Thought Content: Thought content normal.         Judgment: Judgment normal.         Discussion/Plan:  Obesity/Morbid Obesity: Currently the patient's weight is decreasing. There are no medications prescribed.Treatment plan includes prescribed diet, prescribed exercise regimen and behavior modification.    I reviewed the appropriate dietary choices with the patient and encouraged the necessary changes. Recommended at least 70 grams of protein per day, around 35 grams of fats and less than 100 grams of carbohydrates. Reviewed calorie intake if patient wanted to calorie count and/or had BMR. Instructed patient to drink half of body weight in ounces per day and exercise a minimum of 150 minutes per week including both cardio and  strength training. Discussed the option of keeping a food journal which will help patient become more aware of the nutritional value of foods so they are more prepared after surgery.    The patient was given written materials from our office for education.   I answered all of the patients questions regarding dietary changes, exercise or surgical options.  The patient will follow up in 1 month. The total time spent face to face was approximately 20 minutes.     Encounter Diagnoses   Name Primary?   • Obesity, Class III, BMI 40-49.9 (morbid obesity) (CMS/HCC) Yes   • Diabetes mellitus type 2 in obese (CMS/HCC)    • Essential hypertension    • KACI (obstructive sleep apnea)    • Chronic fatigue    • Abnormal gait    • Neurological abnormality    • Use of cane as ambulatory aid

## 2021-05-12 RX ORDER — ALBUTEROL SULFATE 90 UG/1
AEROSOL, METERED RESPIRATORY (INHALATION)
Qty: 18 G | Refills: 3 | Status: SHIPPED | OUTPATIENT
Start: 2021-05-12 | End: 2021-08-06

## 2021-05-14 ENCOUNTER — PREP FOR SURGERY (OUTPATIENT)
Dept: OTHER | Facility: HOSPITAL | Age: 46
End: 2021-05-14

## 2021-05-14 DIAGNOSIS — E66.01 OBESITY, CLASS III, BMI 40-49.9 (MORBID OBESITY) (HCC): Primary | ICD-10-CM

## 2021-05-14 RX ORDER — SODIUM CHLORIDE 0.9 % (FLUSH) 0.9 %
3 SYRINGE (ML) INJECTION EVERY 12 HOURS SCHEDULED
Status: CANCELLED | OUTPATIENT
Start: 2021-05-14

## 2021-05-14 RX ORDER — PANTOPRAZOLE SODIUM 40 MG/10ML
40 INJECTION, POWDER, LYOPHILIZED, FOR SOLUTION INTRAVENOUS ONCE
Status: CANCELLED | OUTPATIENT
Start: 2021-06-09 | End: 2021-05-14

## 2021-05-14 RX ORDER — SCOLOPAMINE TRANSDERMAL SYSTEM 1 MG/1
1 PATCH, EXTENDED RELEASE TRANSDERMAL CONTINUOUS
Status: CANCELLED | OUTPATIENT
Start: 2021-06-09 | End: 2021-06-12

## 2021-05-14 RX ORDER — CEFAZOLIN SODIUM IN 0.9 % NACL 3 G/100 ML
3 INTRAVENOUS SOLUTION, PIGGYBACK (ML) INTRAVENOUS
Status: CANCELLED | OUTPATIENT
Start: 2021-06-09

## 2021-05-14 RX ORDER — SODIUM CHLORIDE 0.9 % (FLUSH) 0.9 %
3-10 SYRINGE (ML) INJECTION AS NEEDED
Status: CANCELLED | OUTPATIENT
Start: 2021-06-09

## 2021-05-14 RX ORDER — GABAPENTIN 250 MG/5ML
300 SOLUTION ORAL ONCE
Status: CANCELLED | OUTPATIENT
Start: 2021-06-09 | End: 2021-05-14

## 2021-05-14 RX ORDER — PROMETHAZINE HYDROCHLORIDE 25 MG/1
25 SUPPOSITORY RECTAL ONCE
Status: CANCELLED | OUTPATIENT
Start: 2021-06-09

## 2021-05-14 RX ORDER — CHLORHEXIDINE GLUCONATE 0.12 MG/ML
15 RINSE ORAL SEE ADMIN INSTRUCTIONS
Status: CANCELLED | OUTPATIENT
Start: 2021-06-09

## 2021-05-14 RX ORDER — ACETAMINOPHEN 160 MG/5ML
975 SOLUTION ORAL ONCE
Status: CANCELLED | OUTPATIENT
Start: 2021-06-09 | End: 2021-05-14

## 2021-05-14 RX ORDER — METOCLOPRAMIDE HYDROCHLORIDE 5 MG/ML
10 INJECTION INTRAMUSCULAR; INTRAVENOUS ONCE
Status: CANCELLED | OUTPATIENT
Start: 2021-06-09 | End: 2021-05-14

## 2021-05-14 RX ORDER — SODIUM CHLORIDE, SODIUM LACTATE, POTASSIUM CHLORIDE, CALCIUM CHLORIDE 600; 310; 30; 20 MG/100ML; MG/100ML; MG/100ML; MG/100ML
100 INJECTION, SOLUTION INTRAVENOUS CONTINUOUS
Status: CANCELLED | OUTPATIENT
Start: 2021-06-09

## 2021-05-14 RX ORDER — PROMETHAZINE HYDROCHLORIDE 25 MG/1
12.5 TABLET ORAL ONCE
Status: CANCELLED | OUTPATIENT
Start: 2021-06-09 | End: 2021-05-14

## 2021-05-20 ENCOUNTER — CONSULT (OUTPATIENT)
Dept: BARIATRICS/WEIGHT MGMT | Facility: CLINIC | Age: 46
End: 2021-05-20

## 2021-05-20 ENCOUNTER — TELEPHONE (OUTPATIENT)
Dept: NEUROSURGERY | Facility: CLINIC | Age: 46
End: 2021-05-20

## 2021-05-20 ENCOUNTER — LAB (OUTPATIENT)
Dept: LAB | Facility: HOSPITAL | Age: 46
End: 2021-05-20

## 2021-05-20 VITALS
RESPIRATION RATE: 18 BRPM | WEIGHT: 266 LBS | TEMPERATURE: 97.8 F | HEIGHT: 63 IN | BODY MASS INDEX: 47.13 KG/M2 | DIASTOLIC BLOOD PRESSURE: 97 MMHG | HEART RATE: 106 BPM | SYSTOLIC BLOOD PRESSURE: 149 MMHG

## 2021-05-20 DIAGNOSIS — E66.01 OBESITY, CLASS III, BMI 40-49.9 (MORBID OBESITY) (HCC): ICD-10-CM

## 2021-05-20 DIAGNOSIS — E66.01 OBESITY, CLASS III, BMI 40-49.9 (MORBID OBESITY) (HCC): Primary | ICD-10-CM

## 2021-05-20 DIAGNOSIS — I10 ESSENTIAL HYPERTENSION: ICD-10-CM

## 2021-05-20 DIAGNOSIS — R53.82 CHRONIC FATIGUE: ICD-10-CM

## 2021-05-20 DIAGNOSIS — G47.33 OSA (OBSTRUCTIVE SLEEP APNEA): ICD-10-CM

## 2021-05-20 DIAGNOSIS — E66.9 DIABETES MELLITUS TYPE 2 IN OBESE (HCC): ICD-10-CM

## 2021-05-20 DIAGNOSIS — E11.69 DIABETES MELLITUS TYPE 2 IN OBESE (HCC): ICD-10-CM

## 2021-05-20 DIAGNOSIS — Z71.3 DIETARY COUNSELING: ICD-10-CM

## 2021-05-20 DIAGNOSIS — E28.2 PCOS (POLYCYSTIC OVARIAN SYNDROME): ICD-10-CM

## 2021-05-20 DIAGNOSIS — E78.00 HYPERCHOLESTEROLEMIA: ICD-10-CM

## 2021-05-20 DIAGNOSIS — D32.9 MENINGIOMA (HCC): Primary | ICD-10-CM

## 2021-05-20 PROBLEM — Z99.89 USE OF CANE AS AMBULATORY AID: Status: RESOLVED | Noted: 2021-01-12 | Resolved: 2021-05-20

## 2021-05-20 PROBLEM — R73.03 PREDIABETES: Status: RESOLVED | Noted: 2021-01-12 | Resolved: 2021-05-20

## 2021-05-20 LAB
ALBUMIN SERPL-MCNC: 4.3 G/DL (ref 3.5–5.2)
ALBUMIN/GLOB SERPL: 1.2 G/DL
ALP SERPL-CCNC: 42 U/L (ref 39–117)
ALT SERPL W P-5'-P-CCNC: 31 U/L (ref 1–33)
ANION GAP SERPL CALCULATED.3IONS-SCNC: 12.5 MMOL/L (ref 5–15)
AST SERPL-CCNC: 17 U/L (ref 1–32)
BILIRUB SERPL-MCNC: 0.2 MG/DL (ref 0–1.2)
BUN SERPL-MCNC: 11 MG/DL (ref 6–20)
BUN/CREAT SERPL: 12.8 (ref 7–25)
CALCIUM SPEC-SCNC: 10.1 MG/DL (ref 8.6–10.5)
CHLORIDE SERPL-SCNC: 98 MMOL/L (ref 98–107)
CO2 SERPL-SCNC: 24.5 MMOL/L (ref 22–29)
CREAT SERPL-MCNC: 0.86 MG/DL (ref 0.57–1)
DEPRECATED RDW RBC AUTO: 41.6 FL (ref 37–54)
ERYTHROCYTE [DISTWIDTH] IN BLOOD BY AUTOMATED COUNT: 12.7 % (ref 12.3–15.4)
GFR SERPL CREATININE-BSD FRML MDRD: 72 ML/MIN/1.73
GLOBULIN UR ELPH-MCNC: 3.5 GM/DL
GLUCOSE SERPL-MCNC: 116 MG/DL (ref 65–99)
HCT VFR BLD AUTO: 45.6 % (ref 34–46.6)
HGB BLD-MCNC: 15.5 G/DL (ref 12–15.9)
MCH RBC QN AUTO: 30.6 PG (ref 26.6–33)
MCHC RBC AUTO-ENTMCNC: 34 G/DL (ref 31.5–35.7)
MCV RBC AUTO: 90.1 FL (ref 79–97)
PLATELET # BLD AUTO: 314 10*3/MM3 (ref 140–450)
PMV BLD AUTO: 11.6 FL (ref 6–12)
POTASSIUM SERPL-SCNC: 3.4 MMOL/L (ref 3.5–5.2)
PROT SERPL-MCNC: 7.8 G/DL (ref 6–8.5)
RBC # BLD AUTO: 5.06 10*6/MM3 (ref 3.77–5.28)
SODIUM SERPL-SCNC: 135 MMOL/L (ref 136–145)
WBC # BLD AUTO: 10.21 10*3/MM3 (ref 3.4–10.8)

## 2021-05-20 PROCEDURE — 85027 COMPLETE CBC AUTOMATED: CPT

## 2021-05-20 PROCEDURE — 36415 COLL VENOUS BLD VENIPUNCTURE: CPT

## 2021-05-20 PROCEDURE — 99215 OFFICE O/P EST HI 40 MIN: CPT | Performed by: SURGERY

## 2021-05-20 PROCEDURE — 80053 COMPREHEN METABOLIC PANEL: CPT

## 2021-05-20 RX ORDER — URSODIOL 300 MG/1
300 CAPSULE ORAL 2 TIMES DAILY
Qty: 60 CAPSULE | Refills: 5 | Status: SHIPPED | OUTPATIENT
Start: 2021-05-20 | End: 2021-12-17

## 2021-05-20 NOTE — PATIENT INSTRUCTIONS
Bariatric Manual    You were provided a manual specific to the procedure that you have chosen.  Please refer to that with any questions or call the office at [phone]

## 2021-05-20 NOTE — H&P
Bariatric Consult:  Referred by Kya Kendrick PA-C    Angie Patel is here today for consult on Consult (Sleeve Consult)      History of Present Illness:     Angie Patel is a 45 y.o. female with morbid obesity with co-morbidities including sleep apnea, diabetes, hypertension, dyslipidemia, cardiovascular disease, osteoarthritis, back pain and knee pain who presents for surgical consultation for the above procedure. Angie has completed the initial intake visit and has been examined by our nurse practitioner, dietician, psychologist and underwent the extensive educational teaching process under the guidance of our bariatric coordinator and myself. Angie also has seen the educational video OSITO on the surgical procedure if available. Angie attended today more educational teaching from our bariatric coordinator and myself. Angie has had an extensive medical workup including a visit with their primary care physician, EKG, chest radiograph, blood work, EGD or UGI and possibly further testing. These have been reviewed by me and discussed with the patient. Angie is now ready to proceed with surgery. Angie presently denies nausea, vomiting, fever, chills, chest pain, shortness of air, melena, hematochezia, hemetemesis, dysuria, frequency, hematuria, jaundice or abdominal pain.       Past Medical History:   Diagnosis Date   • Anxiety    • Asthma    • Diabetes mellitus (CMS/[facility])    • Fibromyalgia    • Fibromyalgia, primary    • Headache, post-lumbar puncture 9/4/2020   • Headache, tension-type    • Hypertension    • Kidney calculi    • Memory loss    • Meningioma (CMS/HCC)    • Migraine     ocular   • KACI (obstructive sleep apnea)     On CPAP   • Palpitations    • Polycystic ovaries    • Postpartum cardiomyopathy 2002   • Preeclampsia 2002   • Seizures (CMS/Formerly [facility])     unclear about this diagnosis        Encounter Diagnoses   Name Primary?   • Obesity, Class III, BMI 40-49.9 (morbid obesity) (CMS/HCC) Yes   • Diabetes  mellitus type 2 in obese (CMS/HCC)    • Essential hypertension    • Chronic fatigue    • Dietary counseling    • KACI (obstructive sleep apnea)    • Hypercholesterolemia    • PCOS (polycystic ovarian syndrome)        Past Surgical History:   Procedure Laterality Date   • CYSTOSCOPY URETEROSCOPY LASER LITHOTRIPSY Right 3/7/2017    Procedure: CYSTOSCOPY RT URETEROSCOPY LASER LITHOTRIPSY W/ STENT, rerograde pyelogram ;  Surgeon: Rashid Malloy MD;  Location: [facility] MAIN OR;  Service:    • ENDOSCOPY N/A 2/16/2021    Procedure: ESOPHAGOGASTRODUODENOSCOPY WITH BIOPSY;  Surgeon: Prasanna Katz Jr., MD;  Location: [facility] ENDOSCOPY;  Service: General;  Laterality: N/A;  PRE- DYSPEPSIA  POST- GASTRITIS   • FOOT SURGERY Left 2014    tendon repair   • HYSTERECTOMY  2007   • OOPHORECTOMY Left 2011    for ovarian cysts   • SHOULDER SURGERY Right 2009   • TUBAL ABDOMINAL LIGATION  2003   • WISDOM TOOTH EXTRACTION  1999       Patient Active Problem List   Diagnosis   • Magnetic resonance imaging of brain abnormal   • Fatigue   • Abnormal gait   • Hypercholesterolemia   • Meningioma (CMS/HCC)   • Migraine   • Obesity, Class III, BMI 40-49.9 (morbid obesity) (CMS/HCC)   • Vitamin D insufficiency   • Memory changes   • PCOS (polycystic ovarian syndrome)   • Menopausal symptoms   • Insulin resistance syndrome   • Right ureteral stone   • Constipation due to pain medication therapy   • Blurred vision, bilateral   • Anxiety   • History of kidney stones   • Dietary counseling   • Fibromyalgia   • Primary insomnia   • Multiple joint pain   • Chronic cough   • Thyroid enlargement   • Left-sided chest pain   • Neurological abnormality   • KACI (obstructive sleep apnea)   • Hypokalemia   • Left hip pain   • Intermittent claudication (CMS/HCC)   • Heartburn   • Urinary, incontinence, stress female   • Diabetes mellitus type 2 in obese (CMS/HCC)   • Peripartum cardiomyopathy   • Essential hypertension       Allergies   Allergen  Reactions   • Iodine Shortness Of Breath and Swelling          • Shellfish Allergy Shortness Of Breath and Swelling   • Lyrica [Pregabalin] Other (See Comments)     Mood swings, burning in legs          Current Outpatient Medications:   •  Blood Glucose Monitoring Suppl (ACCU-CHEK MANNY) device, Used to check blood sugars twice daily as needed for type 2 diabetes, Disp: 1 each, Rfl: 0  •  chlorthalidone (HYGROTON) 25 MG tablet, Take 1 tablet by mouth Daily., Disp: 90 tablet, Rfl: 3  •  Empagliflozin (Jardiance) 10 MG tablet, Take 10 mg by mouth Daily., Disp: 30 tablet, Rfl: 5  •  furosemide (LASIX) 20 MG tablet, Take 20 mg by mouth As Needed., Disp: , Rfl:   •  glucose blood (Accu-Chek Manny) test strip, Used to check blood sugars twice daily as needed for type 2 diabetes, Disp: 100 each, Rfl: 12  •  Lancets (ACCU-CHEK MULTICLIX) lancets, Used to check blood sugars twice daily as needed for type 2 diabetes, Disp: 100 each, Rfl: 12  •  metFORMIN ER (GLUCOPHAGE-XR) 750 MG 24 hr tablet, Take 2 tablets by mouth Daily With Dinner., Disp: 60 tablet, Rfl: 6  •  potassium chloride (K-DUR,KLOR-CON) 10 MEQ CR tablet, Take 1 tablet by mouth Daily., Disp: 30 tablet, Rfl: 0  •  spironolactone (ALDACTONE) 100 MG tablet, TAKE 1 TABLET BY MOUTH DAILY, Disp: 90 tablet, Rfl: 0  •  Ventolin  (90 Base) MCG/ACT inhaler, INHALE 2 PUFFS BY MOUTH FOUR TIMES DAILY AS NEEDED FOR WHEEZING OR SHORTNESS OF BREATH, Disp: 18 g, Rfl: 3  •  enoxaparin (Lovenox) 40 MG/0.4ML solution syringe, Inject 0.4 mL under the skin into the appropriate area as directed Daily for 14 days. Start after surgery unless instructed otherwise, Disp: 14 each, Rfl: 0  •  folic acid-vit B6-vit B12 (FOLBEE) 2.5-25-1 MG tablet tablet, Take 1 tablet by mouth Daily., Disp: 40 tablet, Rfl: 0  •  ursodiol (Actigall) 300 MG capsule, Take 1 capsule by mouth 2 (Two) Times a Day., Disp: 60 capsule, Rfl: 5    Social History     Socioeconomic History   • Marital status:       Spouse name: Not on file   • Number of children: Not on file   • Years of education: Not on file   • Highest education level: Not on file   Tobacco Use   • Smoking status: Former Smoker     Packs/day: 1.00     Years: 20.00     Pack years: 20.00     Types: Cigarettes     Quit date: 2016     Years since quittin.8   • Smokeless tobacco: Never Used   Vaping Use   • Vaping Use: Never used   Substance and Sexual Activity   • Alcohol use: Yes     Comment: occasion   • Drug use: No   • Sexual activity: Defer     Partners: Male     Birth control/protection: Surgical     Comment: hysterectomy       Family History   Problem Relation Age of Onset   • Heart disease Paternal Aunt    • Diabetes Paternal Aunt    • Diabetes Maternal Grandmother    • Neuropathy Maternal Grandmother    • Thyroid disease Maternal Grandmother    • Heart disease Maternal Grandfather    • Cancer Maternal Grandfather    • Heart disease Paternal Grandfather    • Heart disease Paternal Uncle    • Arthritis Mother    • Migraines Mother    • Thyroid disease Mother    • Obesity Mother    • Lupus Mother    • Seizures Father    • Breast cancer Neg Hx    • Ovarian cancer Neg Hx    • Colon cancer Neg Hx    • Deep vein thrombosis Neg Hx    • Pulmonary embolism Neg Hx    • Malig Hyperthermia Neg Hx        Review of Systems:  Review of Systems   Constitutional: Positive for fatigue.   Gastrointestinal: Positive for constipation.   Musculoskeletal: Positive for arthralgias and back pain.   All other systems reviewed and are negative.        Physical Exam:    Vital Signs:  Weight: 121 kg (266 lb)   Body mass index is 47.13 kg/m².  Temp: 97.8 °F (36.6 °C)   Heart Rate: 106   BP: 149/97       Physical Exam  Vitals reviewed.   HENT:      Head: Normocephalic and atraumatic.      Mouth/Throat:      Mouth: Mucous membranes are moist.      Pharynx: Oropharynx is clear.   Eyes:      General: No scleral icterus.     Extraocular Movements: Extraocular  movements intact.      Conjunctiva/sclera: Conjunctivae normal.      Pupils: Pupils are equal, round, and reactive to light.   Neck:      Thyroid: No thyromegaly.   Cardiovascular:      Rate and Rhythm: Normal rate.   Pulmonary:      Effort: Pulmonary effort is normal. No respiratory distress.      Breath sounds: Normal breath sounds. No stridor. No wheezing or rhonchi.   Abdominal:      General: Bowel sounds are normal.      Palpations: Abdomen is soft.      Tenderness: There is no abdominal tenderness. There is no right CVA tenderness, left CVA tenderness, guarding or rebound.      Hernia: No hernia is present.   Musculoskeletal:         General: Normal range of motion.      Cervical back: Normal range of motion and neck supple.   Lymphadenopathy:      Cervical: No cervical adenopathy.   Skin:     General: Skin is warm and dry.      Findings: No erythema.   Neurological:      Mental Status: She is alert and oriented to person, place, and time.   Psychiatric:         Mood and Affect: Mood normal.         Behavior: Behavior normal.         Thought Content: Thought content normal.         Judgment: Judgment normal.           Assessment:    Angie Patel is a 45 y.o. year old female with medically complicated severe obesity with a BMI of Body mass index is 47.13 kg/m². and multiple co-morbidities listed in the encounter diagnosis.    I think she is an appropriate candidate for this surgery, and is ready to proceed.      Plan/Discussion/Summary:  No hiatal hernia per me.  No PPI.  H. pylori negative    The patient has returned to the office for a surgical consultation and has requested to proceed with a laparoscopic gastric sleeve.  I have had the opportunity to obtain a history, examine the patient and review the patient's chart.    The patient understands that surgery is a tool and that weight loss is not guaranteed but only seen in the context of appropriate use, regular follow up, exercise and making appropriate  food choices.     I personally discussed the potential complications of the laparoscopic gastric sleeve with this patient.  The patient is well aware of potential complications of the surgery that include but not limited to bleeding, infections, deep vein thrombosis, pulmonary embolism, pulmonary complications such as pneumonia, cardiac event, hernias, small bowel obstruction, damage to the spleen or other organs, bowel injury, disfiguring scars, failure to lose weight, need for additional surgery, conversion to an open procedure and death.  The patient is also aware of complications which apply in particular to the gastric sleeve and can include but not limited to the leakage of gastric contents at the staple line, the development of an intra-abdominal abscess, gastroesophageal reflux disease, Pinon's esophagus, ulcers, vitamin/mineral deficiencies, strictures, and the possibility of converting this procedure to a Anne-en-Y gastric bypass. The patient also understands the possibility of requiring an acid reducer medication for the rest of their life.    The risks, benefits, potential complications and alternative therapies were discussed at great length as outlined in our extensive consent forms, online consent and educational teaching processes.    The patient has confirmed the participation in the programs extensive educational activities.    All questions and concerns were answered to patient's satisfaction.  The patient now wishes to proceed with surgery.     The patient has agreed to a postoperative course of anitcoagulant therapy.      I instructed patient to start on a H2 blocker or proton pump inhibitor if not already on one of these medications.    I explained in detail the procedures that we perform.  All of these procedures have a chance to convert to open if any technical challenges or complications do occur.  Bariatric surgery is not cosmetic surgery but rather a tool to help a patient make a  life-long commitment lifestyle change including diet, exercise, behavior changes, and taking supplemental vitamins and minerals.    Problems after surgery may require more operations to correct them.    The risks, benefits, alternatives, and potential complications of all of the procedures were explained in detail including, but not limited to death, anesthesia and medication adverse effect, deep venous thrombosis, pulmonary embolism, trocar site/incisional hernia, wound infection, abdominal infection, bleeding, failure to lose weight, gain weight, a change in body image, metabolic complications with vitamin deficiences and anemia.    Weight loss expectations were discussed with the patient in detail. The weight loss operations most commonly performed are the sleeve gastrectomy and the Anne-en-Y gastric bypass. These operations result in weight losses up to approximately 25-35% of initial body weight 12 to 24 months after surgery with the gastric bypass usually the higher percent of weight loss but depends on patient using the tool.    For the gastric bypass and loop duodenal switch (YI-S) the risks include but not limited to the following early complications:  Anastomotic leak/peritonitis, Anne/Alimentary/biliopancreatic limb obstruction, severe & minor wound infection/seroma, and nausea/vomiting.  Late complications can include but are not limited to malnutrition, vitamin deficiencies, frequent loose stools,  stomal stenosis, marginal ulcer, bowel obstruction, intussusception, internal, and incisional hernia.    Regarding the gastric sleeve, there is less long-term outcome data and higher risk of dysphagia and reflux leading to possible Pinon's esophagus compared to a gastric bypass, as well as risk of internal visceral/organ injury, splenectomy, bleeding, infection, leak (which could require further intervention possible conversion to Anne-en-Y gastric bypass), stenosis and possibility of regaining  weight.    Angie was counseled regarding diagnostic results, instructions for management, risk factor reductions, prognosis, patient and family education, impressions, risks and benefits of treatment options and importance of compliance with treatment. Total time of the encounter was over 45 minutes counseling the patient regarding the procedure as above and reviewing as well as ordering labs, medications and the procedure.  The chart was also reviewed prior to seeing the patient reviewing previous testing, studies and labs.    Theresa Report   As part of this patient's treatment plan I am prescribing controlled substances. The patient has been made aware of appropriate use of such medications, including potential risk of somnolence, limited ability to drive and /or work safely, and potential for dependence or overdose. It has also been made clear that these medications are for use by this patient only, without concomitant use of alcohol or other substances unless prescribed.    Angie has completed prescribing agreement detailing terms of continued prescribing of controlled substances, including monitoring THERESA reports, urine drug screening, and pill counts if necessary. Angie is aware that inappropriate use will result in cessation of prescribing such medications.    THERESA report has been reviewed      History and physical exam exhibit continued safe and appropriate use of controlled substances.      Angie understands the surgical procedures and the different surgical options that are available.  She understands the lifestyle changes that are required after surgery and has agreed to follow the guidelines outlined in the weight management program.  She also expressed understanding of the risks involved and had all of female questions answered and desires to proceed.      Prasanna Katz MD  5/20/2021

## 2021-05-22 DIAGNOSIS — R73.9 HYPERGLYCEMIA: ICD-10-CM

## 2021-05-24 RX ORDER — METFORMIN HYDROCHLORIDE 750 MG/1
1500 TABLET, EXTENDED RELEASE ORAL
Qty: 60 TABLET | Refills: 6 | Status: SHIPPED | OUTPATIENT
Start: 2021-05-24 | End: 2021-06-21 | Stop reason: SDUPTHER

## 2021-05-26 ENCOUNTER — TELEPHONE (OUTPATIENT)
Dept: ENDOCRINOLOGY | Age: 46
End: 2021-05-26

## 2021-05-26 DIAGNOSIS — E11.65 TYPE 2 DIABETES MELLITUS WITH HYPERGLYCEMIA, WITHOUT LONG-TERM CURRENT USE OF INSULIN (HCC): ICD-10-CM

## 2021-05-26 RX ORDER — BLOOD-GLUCOSE METER
EACH MISCELLANEOUS
Qty: 1 KIT | Refills: 0 | Status: SHIPPED | OUTPATIENT
Start: 2021-05-26 | End: 2021-06-07

## 2021-06-02 ENCOUNTER — OFFICE VISIT (OUTPATIENT)
Dept: FAMILY MEDICINE CLINIC | Facility: CLINIC | Age: 46
End: 2021-06-02

## 2021-06-02 VITALS
OXYGEN SATURATION: 99 % | TEMPERATURE: 97.7 F | SYSTOLIC BLOOD PRESSURE: 110 MMHG | RESPIRATION RATE: 14 BRPM | DIASTOLIC BLOOD PRESSURE: 64 MMHG | HEART RATE: 102 BPM

## 2021-06-02 DIAGNOSIS — J01.00 ACUTE NON-RECURRENT MAXILLARY SINUSITIS: Primary | ICD-10-CM

## 2021-06-02 PROCEDURE — 99213 OFFICE O/P EST LOW 20 MIN: CPT | Performed by: PHYSICIAN ASSISTANT

## 2021-06-02 RX ORDER — AMOXICILLIN 500 MG/1
1000 CAPSULE ORAL 3 TIMES DAILY
Qty: 30 CAPSULE | Refills: 0 | Status: ON HOLD | OUTPATIENT
Start: 2021-06-02 | End: 2021-06-09

## 2021-06-02 NOTE — PROGRESS NOTES
Chief Complaint  Sinusitis, Headache, and Cough    Subjective          Angie Patel presents to [facility]  History of Present Illness  Angie is a 46-year-old female who presents for new onset sinus pressure, headache, cough, and sore throat for the past 1 week.  States her son had similar symptoms.  States she has had a clear rhinorrhea to stuffy nose.  Has had increased sinus pressure in her cheekbones, scratchy throat and occasional cough.  Denied any fevers, chills, shortness of air, wheezing, GI upset or lost of taste/smell.  Has used over-the-counter Dayquil and Nyquil.  Will need laparoscopic gastric sleeve surgery next week.  Appetite and sleep have been normal for her.  Has had both Covid immunizations.     Objective   Vital Signs:   /64 (BP Location: Left arm, Patient Position: Sitting, Cuff Size: Adult)   Pulse 102   Temp 97.7 °F (36.5 °C) (Infrared)   Resp 14   SpO2 99%     Physical Exam  Vitals and nursing note reviewed.   Constitutional:       Appearance: Normal appearance. She is well-developed and well-groomed. She is morbidly obese.      Interventions: Face mask in place.   HENT:      Head: Normocephalic and atraumatic.      Jaw: There is normal jaw occlusion.      Right Ear: Hearing, ear canal and external ear normal. Tympanic membrane is bulging.      Left Ear: Hearing, ear canal and external ear normal. Tympanic membrane is bulging.      Nose:      Right Sinus: Maxillary sinus tenderness present. No frontal sinus tenderness.      Left Sinus: Maxillary sinus tenderness present. No frontal sinus tenderness.      Mouth/Throat:      Lips: Pink.      Mouth: Mucous membranes are moist.      Tongue: No lesions.      Palate: No mass.      Pharynx: Oropharynx is clear. Uvula midline.   Eyes:      General: Lids are normal.      Conjunctiva/sclera: Conjunctivae normal.      Pupils: Pupils are equal, round, and reactive to light.   Neck:      Trachea: Trachea and  phonation normal. No tracheal tenderness.   Cardiovascular:      Rate and Rhythm: Normal rate and regular rhythm.      Pulses: Normal pulses.      Heart sounds: Normal heart sounds, S1 normal and S2 normal. No murmur heard.     Pulmonary:      Effort: Pulmonary effort is normal.      Breath sounds: Normal breath sounds and air entry.   Abdominal:      General: Bowel sounds are normal.      Palpations: Abdomen is soft. There is no hepatomegaly.      Tenderness: There is no abdominal tenderness. There is no right CVA tenderness, left CVA tenderness, guarding or rebound. Negative signs include Rocha's sign, Rovsing's sign, McBurney's sign, psoas sign and obturator sign.   Musculoskeletal:      Cervical back: Neck supple. No edema.      Right lower leg: No edema.      Left lower leg: No edema.   Lymphadenopathy:      Cervical: No cervical adenopathy.      Right cervical: No superficial, deep or posterior cervical adenopathy.     Left cervical: No superficial, deep or posterior cervical adenopathy.   Skin:     General: Skin is warm and dry.      Capillary Refill: Capillary refill takes less than 2 seconds.   Neurological:      Mental Status: She is alert and oriented to person, place, and time.      Deep Tendon Reflexes:      Reflex Scores:       Patellar reflexes are 2+ on the right side and 2+ on the left side.  Psychiatric:         Attention and Perception: Attention and perception normal.         Mood and Affect: Mood and affect normal.         Speech: Speech normal.         Behavior: Behavior normal. Behavior is cooperative.         Thought Content: Thought content normal.         Cognition and Memory: Cognition and memory normal.         Judgment: Judgment normal.     Patient was wearing a mask when I enter room.  I enter the room wearing hair covering, face shield, mask and gown/gloves.  Appropriate PPE was worn by medical assistant and myself during the office encounter.  I washed hands thoroughly after leaving  the patient's room after removal of PPE.         Result Review :                 Assessment and Plan    Diagnoses and all orders for this visit:    1. Acute non-recurrent maxillary sinusitis (Primary)  -     amoxicillin (AMOXIL) 500 MG capsule; Take 2 capsules by mouth 3 (Three) Times a Day.  Dispense: 30 capsule; Refill: 0    Mrs. Angie Patel was seen in office today with new acute maxillary sinusitis.  I have prescribed amoxicillin antibiotic to pharmacy.  Will take as directed.  Continue for pain control patient.  Will return to office if no improvement.    Follow Up   No follow-ups on file.  Patient was given instructions and counseling regarding her condition or for health maintenance advice. Please see specific information pulled into the AVS if appropriate.     KARL Solorio [address] [address]  Dept: [phone]  Dept Fax: [phone]  Loc: [phone]  Loc Fax: [phone]        Answers for HPI/ROS submitted by the patient on 6/2/2021  Please describe your symptoms.: Allergy like symptoms, stuffy nose, mucus (?) cough.  Like a cold  Have you had these symptoms before?: No  How long have you been having these symptoms?: 5-7 days  Please list any medications you are currently taking for this condition.: Dayquil/Nyquil  Please describe any probable cause for these symptoms. : Picked my son up from college and he was sick.  He tested negative for Covid and we are both Vax  What is the primary reason for your visit?: Other

## 2021-06-07 ENCOUNTER — PRE-ADMISSION TESTING (OUTPATIENT)
Dept: PREADMISSION TESTING | Facility: HOSPITAL | Age: 46
End: 2021-06-07

## 2021-06-07 VITALS
BODY MASS INDEX: 47.12 KG/M2 | HEART RATE: 86 BPM | DIASTOLIC BLOOD PRESSURE: 74 MMHG | RESPIRATION RATE: 16 BRPM | OXYGEN SATURATION: 99 % | SYSTOLIC BLOOD PRESSURE: 124 MMHG | HEIGHT: 63 IN | TEMPERATURE: 98 F

## 2021-06-07 LAB — SARS-COV-2 ORF1AB RESP QL NAA+PROBE: NOT DETECTED

## 2021-06-07 PROCEDURE — C9803 HOPD COVID-19 SPEC COLLECT: HCPCS | Performed by: NURSE PRACTITIONER

## 2021-06-07 PROCEDURE — U0004 COV-19 TEST NON-CDC HGH THRU: HCPCS | Performed by: NURSE PRACTITIONER

## 2021-06-07 RX ORDER — FAMOTIDINE 10 MG
10 TABLET ORAL DAILY
COMMUNITY
End: 2021-08-03

## 2021-06-07 RX ORDER — MULTIPLE VITAMINS W/ MINERALS TAB 9MG-400MCG
1 TAB ORAL DAILY
COMMUNITY
End: 2021-06-10 | Stop reason: HOSPADM

## 2021-06-07 NOTE — DISCHARGE INSTRUCTIONS
Take only the following medications the morning of surgery:  NONE    ARRIVAL TIME: 700AM          Do not take Bariatric Vitamins, Folic Acid, Actigall (if applicable) or Lovenox Injections (if applicable) the morning of surgery.  If you have a history of blood clots or have a BMI greater than 50, Dr. Katz may order Lovenox for after surgery. Do not take Lovenox blood thinner before surgery.      General Instructions:   • Drink one 20 ounce Gatorade G2 the evening before surgery.  Nothing red in color.  • Do not eat solid food after midnight the night before surgery.    • The morning of surgery have another 20 ounce Gatorade G2.  Again, nothing red in color.  Your drink must be completed 2 hours before your arrival time.   • Patients who avoid smoking, chewing tobacco and alcohol for 4 weeks prior to surgery have a reduced risk of post-operative complications.  Quit smoking as many days before surgery as you can.  • Do not smoke, use chewing tobacco or drink alcohol the day of surgery.   • Bring any papers given to you in the doctor's office.  Wear clean comfortable clothes.  • Do not wear contact lenses, false eyelashes or make-up.  Bring a case for your glasses.   • Bring crutches or walker if applicable.  • Remove all piercings.  Leave jewelry and any other valuables at home.  • Remove fingernail polish, gel overlays or any artificial nails.  • Hair extensions with metal clips must be removed prior to surgery.  • The Pre-Admission Testing nurse will instruct you to bring medications if unable to obtain an accurate list in Pre-Admission Testing.    Preventing a Surgical Site Infection:  • For 2 to 3 days before surgery, avoid shaving with a razor because the razor can irritate skin and make it easier to develop an infection.    • Any areas of open skin can increase the risk of a post-operative wound infection by allowing bacteria to enter and travel throughout the body.  Notify your surgeon if you have any skin  wounds / rashes even if it is not near the expected surgical site.  The area will need assessed to determine if surgery should be delayed until it is healed.  • 2 days prior to surgery, take a shower using a fresh bar of anti-bacterial soap (such as Dial).  Use a clean washcloth and dry with a clean towel.    • The day prior to surgery, take a shower using a fresh bar of anti-bacterial soap (such as Dial).  Use a clean washcloth and dry with a clean towel.  Sleep in a clean bed with clean clothing.  Do not allow pets to sleep with you.  • The morning of surgery shower using a fresh bar of anti-bacterial soap (such as Dial).  Use a clean washcloth and dry with a clean towel.  Follow the Chlorhexidine instructions below.    CHLORHEXIDINE CLOTH INSTRUCTIONS  The morning of surgery follow these instructions using the Chlorhexidine cloths you've been given.  These steps reduce bacteria on the body.  Do not use the cloths near your eyes, ears mouth, genitalia or on open wounds.  Throw the cloths away after use but do not try to flush them down a toilet.    • Open and remove one cloth at a time from the package.    • Leave the cloth unfolded and begin the bathing.  • Massage the skin with the cloths using gentle pressure to remove bacteria.  Do not scrub harshly.   • Follow the steps below with one 2% CHG cloth per area (6 total cloths).  • One cloth for neck, shoulders and chest.  • One cloth for both arms, hands, fingers and underarms (do underarms last).  • One cloth for the abdomen followed by groin.  • One cloth for right leg and foot including between the toes.  • One cloth for left leg and foot including between the toes.  • The last cloth is to be used for the back of the neck, back and buttocks.    Allow the CHG to air dry 3 minutes on the skin which will give it time to work and decrease the chance of irritation.  The skin may feel sticky until it is dry.  Do not rinse with water or any other liquid or you will  lose the beneficial effects of the CHG.  If mild skin irritation occurs, do rinse the skin to remove the CHG.  Report this to the nurse at time of admission.  Do not apply lotions, creams, ointments, deodorants or perfumes after using the clothes. Dress in clean clothes before coming to the hospital.    • Ask your surgeon if you will be receiving antibiotics prior to surgery.  • Make sure you, your family, and all healthcare providers clean their hands with soap and water or an alcohol based hand  before caring for you or your wound.      Day of surgery:  Your arrival time is approximately two hours before your scheduled surgery time.  Upon arrival, a Pre-op nurse and Anesthesiologist will review your health history, obtain vital signs, and answer questions you may have.  A Pre-op nurse will start an IV and you may receive medication in preparation for surgery, including something to help you relax.  If applicable, we do ask that you have your C-PAP/BI-PAP machine available. It can be utilized the night of surgery.     Please be aware that surgery does come with discomfort.  We want to make every effort to control your discomfort so please discuss any uncontrolled symptoms with your nurse.   Your doctor will most likely have prescribed pain medications.      If you are going home after surgery you will receive individualized written care instructions before being discharged.  A responsible adult must drive you to and from the hospital on the day of your surgery and stay with you for 24 hours.  Discharge prescriptions can be filled by the hospital pharmacy during regular pharmacy hours.  If you are having surgery late in the day/evening your prescription may be e-prescribed to your pharmacy.  Please verify your pharmacy hours or chose a 24 hour pharmacy to avoid not having access to your prescription because your pharmacy has closed for the day.    If you are staying overnight following surgery, you will be  transported to your hospital room following the recovery period.  [facility] has all private rooms.    If you have any questions please call Pre-Admission Testing at [phone].  Deductibles and co-payments are collected on the day of service. Please be prepared to pay the required co-pay, deductible or deposit on the day of service as defined by your plan.    Patient Education for Self-Quarantine Process    Following your COVID testing, we strongly recommend that you do not leave your home after you have been tested for COVID except to get medical care. This includes not going to work, school or to public areas.  If this is not possible for you to do please limit your activities to only required outings.  Be sure to wear a mask when you are with other people, practice social distancing and wash your hands frequently.      The following items provide additional details to keep you safe.  • Wash your hands with soap and water frequently for at least 20 seconds.   • Avoid touching your eyes, nose and mouth with unwashed hands.  • Do not share anything - utensils, towels, food from the same bowl.   • Have your own utensils, drinking glass, dishes, towels and bedding.   • Do not have visitors.   • Do use FaceTime to stay in touch with family and friends.  • You should stay in a specific room away from others if possible.   • Stay at least 6 feet away from others in the home if you cannot have a dedicated room to yourself.   • Do not snuggle with your pet. While the CDC says there is no evidence that pets can spread COVID-19 or be infected from humans, it is probably best to avoid “petting, snuggling, being kissed or licked and sharing food (during self-quarantine)”, according to the CDC.   • Sanitize household surfaces daily. Include all high touch areas (door handles, light switches, phones, countertops, etc.)  • Do not share a bathroom with others, if possible.   • Wear a mask around others in your  home if you are unable to stay in a separate room or 6 feet apart. If  you are unable to wear a mask, have your family member wear a mask if they must be within 6 feet of you.   Call your surgeon immediately if you experience any of the following symptoms:  • Sore Throat  • Shortness of Breath or difficulty breathing  • Cough  • Chills  • Body soreness or muscle pain  • Headache  • Fever  • New loss of taste or smell  • Do not arrive for your surgery ill.  Your procedure will need to be rescheduled to another time.  You will need to call your physician before the day of surgery to avoid any unnecessary exposure to hospital staff as well as other patients.

## 2021-06-09 ENCOUNTER — ANESTHESIA EVENT (OUTPATIENT)
Dept: PERIOP | Facility: HOSPITAL | Age: 46
End: 2021-06-09

## 2021-06-09 ENCOUNTER — HOSPITAL ENCOUNTER (INPATIENT)
Facility: HOSPITAL | Age: 46
LOS: 1 days | Discharge: HOME | End: 2021-06-10
Attending: SURGERY | Admitting: SURGERY

## 2021-06-09 ENCOUNTER — ANESTHESIA (OUTPATIENT)
Dept: PERIOP | Facility: HOSPITAL | Age: 46
End: 2021-06-09

## 2021-06-09 DIAGNOSIS — Z98.84 S/P LAPAROSCOPIC SLEEVE GASTRECTOMY: Primary | ICD-10-CM

## 2021-06-09 DIAGNOSIS — E66.01 OBESITY, CLASS III, BMI 40-49.9 (MORBID OBESITY) (HCC): ICD-10-CM

## 2021-06-09 PROBLEM — K44.9 HIATAL HERNIA: Status: ACTIVE | Noted: 2021-06-09

## 2021-06-09 LAB — GLUCOSE BLDC GLUCOMTR-MCNC: 102 MG/DL (ref 70–130)

## 2021-06-09 PROCEDURE — 25010000002 METOCLOPRAMIDE PER 10 MG: Performed by: SURGERY

## 2021-06-09 PROCEDURE — 25010000002 PROPOFOL 10 MG/ML EMULSION: Performed by: REGISTERED NURSE

## 2021-06-09 PROCEDURE — 25010000002 MAGNESIUM SULFATE PER 500 MG OF MAGNESIUM: Performed by: REGISTERED NURSE

## 2021-06-09 PROCEDURE — 25010000002 ONDANSETRON PER 1 MG: Performed by: REGISTERED NURSE

## 2021-06-09 PROCEDURE — C1889 IMPLANT/INSERT DEVICE, NOC: HCPCS | Performed by: SURGERY

## 2021-06-09 PROCEDURE — 25010000002 FENTANYL CITRATE (PF) 50 MCG/ML SOLUTION: Performed by: REGISTERED NURSE

## 2021-06-09 PROCEDURE — 25010000002 SUCCINYLCHOLINE PER 20 MG: Performed by: REGISTERED NURSE

## 2021-06-09 PROCEDURE — 25010000002 MIDAZOLAM PER 1 MG: Performed by: ANESTHESIOLOGY

## 2021-06-09 PROCEDURE — 82962 GLUCOSE BLOOD TEST: CPT

## 2021-06-09 PROCEDURE — 43775 LAP SLEEVE GASTRECTOMY: CPT | Performed by: NURSE PRACTITIONER

## 2021-06-09 PROCEDURE — 0BQT4ZZ REPAIR DIAPHRAGM, PERCUTANEOUS ENDOSCOPIC APPROACH: ICD-10-PCS | Performed by: SURGERY

## 2021-06-09 PROCEDURE — 43775 LAP SLEEVE GASTRECTOMY: CPT | Performed by: SURGERY

## 2021-06-09 PROCEDURE — 25010000002 DEXAMETHASONE PER 1 MG: Performed by: REGISTERED NURSE

## 2021-06-09 PROCEDURE — 88307 TISSUE EXAM BY PATHOLOGIST: CPT | Performed by: SURGERY

## 2021-06-09 PROCEDURE — 25010000002 ENOXAPARIN PER 10 MG: Performed by: SURGERY

## 2021-06-09 PROCEDURE — 0DB64Z3 EXCISION OF STOMACH, PERCUTANEOUS ENDOSCOPIC APPROACH, VERTICAL: ICD-10-PCS | Performed by: SURGERY

## 2021-06-09 DEVICE — ENDOPATH ECHELON ENDOSCOPIC LINEAR CUTTER RELOADS, GREEN, 60MM
Type: IMPLANTABLE DEVICE | Site: STOMACH | Status: FUNCTIONAL
Brand: ECHELON ENDOPATH

## 2021-06-09 DEVICE — SEALANT WND FIBRIN TISSEEL PREFIL/SYR/PRIMAFZ 4ML: Type: IMPLANTABLE DEVICE | Site: STOMACH | Status: FUNCTIONAL

## 2021-06-09 DEVICE — STPL/LN REINF PERISTRIP DRY VERITAS THN: Type: IMPLANTABLE DEVICE | Site: STOMACH | Status: FUNCTIONAL

## 2021-06-09 DEVICE — ENDOPATH ECHELON ENDOSCOPIC LINEAR CUTTER RELOADS, GOLD, 60MM
Type: IMPLANTABLE DEVICE | Site: STOMACH | Status: FUNCTIONAL
Brand: ECHELON ENDOPATH

## 2021-06-09 RX ORDER — SODIUM CHLORIDE, SODIUM LACTATE, POTASSIUM CHLORIDE, CALCIUM CHLORIDE 600; 310; 30; 20 MG/100ML; MG/100ML; MG/100ML; MG/100ML
150 INJECTION, SOLUTION INTRAVENOUS CONTINUOUS
Status: DISCONTINUED | OUTPATIENT
Start: 2021-06-09 | End: 2021-06-10 | Stop reason: HOSPADM

## 2021-06-09 RX ORDER — ACETAMINOPHEN 500 MG
1000 TABLET ORAL EVERY 12 HOURS SCHEDULED
Status: DISCONTINUED | OUTPATIENT
Start: 2021-06-09 | End: 2021-06-10 | Stop reason: HOSPADM

## 2021-06-09 RX ORDER — PROPOFOL 10 MG/ML
VIAL (ML) INTRAVENOUS AS NEEDED
Status: DISCONTINUED | OUTPATIENT
Start: 2021-06-09 | End: 2021-06-09 | Stop reason: SURG

## 2021-06-09 RX ORDER — ONDANSETRON 4 MG/1
4 TABLET, ORALLY DISINTEGRATING ORAL EVERY 4 HOURS PRN
Status: DISCONTINUED | OUTPATIENT
Start: 2021-06-09 | End: 2021-06-10 | Stop reason: HOSPADM

## 2021-06-09 RX ORDER — CHLORHEXIDINE GLUCONATE 0.12 MG/ML
15 RINSE ORAL SEE ADMIN INSTRUCTIONS
Status: COMPLETED | OUTPATIENT
Start: 2021-06-09 | End: 2021-06-09

## 2021-06-09 RX ORDER — CYANOCOBALAMIN 1000 UG/ML
1000 INJECTION, SOLUTION INTRAMUSCULAR; SUBCUTANEOUS ONCE
Status: COMPLETED | OUTPATIENT
Start: 2021-06-10 | End: 2021-06-10

## 2021-06-09 RX ORDER — ALBUTEROL SULFATE 2.5 MG/3ML
2.5 SOLUTION RESPIRATORY (INHALATION) EVERY 4 HOURS PRN
Status: DISCONTINUED | OUTPATIENT
Start: 2021-06-09 | End: 2021-06-10 | Stop reason: HOSPADM

## 2021-06-09 RX ORDER — PROMETHAZINE HYDROCHLORIDE 12.5 MG/1
12.5 TABLET ORAL EVERY 4 HOURS PRN
Status: DISCONTINUED | OUTPATIENT
Start: 2021-06-09 | End: 2021-06-10 | Stop reason: HOSPADM

## 2021-06-09 RX ORDER — ACETAMINOPHEN 160 MG/5ML
1000 SOLUTION ORAL EVERY 12 HOURS SCHEDULED
Status: DISCONTINUED | OUTPATIENT
Start: 2021-06-09 | End: 2021-06-10 | Stop reason: HOSPADM

## 2021-06-09 RX ORDER — CEFAZOLIN SODIUM IN 0.9 % NACL 3 G/100 ML
3 INTRAVENOUS SOLUTION, PIGGYBACK (ML) INTRAVENOUS
Status: COMPLETED | OUTPATIENT
Start: 2021-06-09 | End: 2021-06-09

## 2021-06-09 RX ORDER — HALOPERIDOL 5 MG/ML
0.5 INJECTION INTRAMUSCULAR EVERY 4 HOURS PRN
Status: DISCONTINUED | OUTPATIENT
Start: 2021-06-09 | End: 2021-06-10 | Stop reason: HOSPADM

## 2021-06-09 RX ORDER — HYDRALAZINE HYDROCHLORIDE 20 MG/ML
5 INJECTION INTRAMUSCULAR; INTRAVENOUS
Status: DISCONTINUED | OUTPATIENT
Start: 2021-06-09 | End: 2021-06-09 | Stop reason: HOSPADM

## 2021-06-09 RX ORDER — MAGNESIUM HYDROXIDE 1200 MG/15ML
LIQUID ORAL AS NEEDED
Status: DISCONTINUED | OUTPATIENT
Start: 2021-06-09 | End: 2021-06-09 | Stop reason: HOSPADM

## 2021-06-09 RX ORDER — PROMETHAZINE HYDROCHLORIDE 25 MG/1
12.5 TABLET ORAL ONCE
Status: DISCONTINUED | OUTPATIENT
Start: 2021-06-09 | End: 2021-06-09 | Stop reason: HOSPADM

## 2021-06-09 RX ORDER — DIPHENHYDRAMINE HYDROCHLORIDE 50 MG/ML
25 INJECTION INTRAMUSCULAR; INTRAVENOUS EVERY 4 HOURS PRN
Status: DISCONTINUED | OUTPATIENT
Start: 2021-06-09 | End: 2021-06-10 | Stop reason: HOSPADM

## 2021-06-09 RX ORDER — PROCHLORPERAZINE EDISYLATE 5 MG/ML
10 INJECTION INTRAMUSCULAR; INTRAVENOUS EVERY 6 HOURS PRN
Status: DISCONTINUED | OUTPATIENT
Start: 2021-06-09 | End: 2021-06-10 | Stop reason: HOSPADM

## 2021-06-09 RX ORDER — MIRTAZAPINE 15 MG/1
15 TABLET, ORALLY DISINTEGRATING ORAL NIGHTLY
Status: DISCONTINUED | OUTPATIENT
Start: 2021-06-09 | End: 2021-06-10 | Stop reason: HOSPADM

## 2021-06-09 RX ORDER — ROCURONIUM BROMIDE 10 MG/ML
INJECTION, SOLUTION INTRAVENOUS AS NEEDED
Status: DISCONTINUED | OUTPATIENT
Start: 2021-06-09 | End: 2021-06-09 | Stop reason: SURG

## 2021-06-09 RX ORDER — PANTOPRAZOLE SODIUM 40 MG/10ML
40 INJECTION, POWDER, LYOPHILIZED, FOR SOLUTION INTRAVENOUS ONCE
Status: COMPLETED | OUTPATIENT
Start: 2021-06-09 | End: 2021-06-09

## 2021-06-09 RX ORDER — FENTANYL CITRATE 50 UG/ML
INJECTION, SOLUTION INTRAMUSCULAR; INTRAVENOUS AS NEEDED
Status: DISCONTINUED | OUTPATIENT
Start: 2021-06-09 | End: 2021-06-09 | Stop reason: SURG

## 2021-06-09 RX ORDER — DEXAMETHASONE SODIUM PHOSPHATE 10 MG/ML
INJECTION INTRAMUSCULAR; INTRAVENOUS AS NEEDED
Status: DISCONTINUED | OUTPATIENT
Start: 2021-06-09 | End: 2021-06-09 | Stop reason: SURG

## 2021-06-09 RX ORDER — MIDAZOLAM HYDROCHLORIDE 1 MG/ML
1 INJECTION INTRAMUSCULAR; INTRAVENOUS
Status: DISCONTINUED | OUTPATIENT
Start: 2021-06-09 | End: 2021-06-09 | Stop reason: HOSPADM

## 2021-06-09 RX ORDER — DIPHENHYDRAMINE HYDROCHLORIDE 50 MG/ML
12.5 INJECTION INTRAMUSCULAR; INTRAVENOUS
Status: DISCONTINUED | OUTPATIENT
Start: 2021-06-09 | End: 2021-06-09 | Stop reason: HOSPADM

## 2021-06-09 RX ORDER — LABETALOL HYDROCHLORIDE 5 MG/ML
5 INJECTION, SOLUTION INTRAVENOUS
Status: DISCONTINUED | OUTPATIENT
Start: 2021-06-09 | End: 2021-06-09 | Stop reason: HOSPADM

## 2021-06-09 RX ORDER — FENTANYL CITRATE 50 UG/ML
50 INJECTION, SOLUTION INTRAMUSCULAR; INTRAVENOUS
Status: DISCONTINUED | OUTPATIENT
Start: 2021-06-09 | End: 2021-06-09 | Stop reason: HOSPADM

## 2021-06-09 RX ORDER — SODIUM CHLORIDE 0.9 % (FLUSH) 0.9 %
10 SYRINGE (ML) INJECTION AS NEEDED
Status: DISCONTINUED | OUTPATIENT
Start: 2021-06-09 | End: 2021-06-09 | Stop reason: HOSPADM

## 2021-06-09 RX ORDER — NALBUPHINE HCL 10 MG/ML
10 AMPUL (ML) INJECTION EVERY 4 HOURS PRN
Status: DISCONTINUED | OUTPATIENT
Start: 2021-06-09 | End: 2021-06-09 | Stop reason: HOSPADM

## 2021-06-09 RX ORDER — METOCLOPRAMIDE HYDROCHLORIDE 5 MG/ML
10 INJECTION INTRAMUSCULAR; INTRAVENOUS ONCE
Status: COMPLETED | OUTPATIENT
Start: 2021-06-09 | End: 2021-06-09

## 2021-06-09 RX ORDER — MAGNESIUM SULFATE HEPTAHYDRATE 500 MG/ML
INJECTION, SOLUTION INTRAMUSCULAR; INTRAVENOUS AS NEEDED
Status: DISCONTINUED | OUTPATIENT
Start: 2021-06-09 | End: 2021-06-09 | Stop reason: SURG

## 2021-06-09 RX ORDER — METOCLOPRAMIDE HYDROCHLORIDE 5 MG/ML
10 INJECTION INTRAMUSCULAR; INTRAVENOUS EVERY 6 HOURS
Status: DISCONTINUED | OUTPATIENT
Start: 2021-06-09 | End: 2021-06-10 | Stop reason: HOSPADM

## 2021-06-09 RX ORDER — EPHEDRINE SULFATE 50 MG/ML
5 INJECTION, SOLUTION INTRAVENOUS ONCE AS NEEDED
Status: DISCONTINUED | OUTPATIENT
Start: 2021-06-09 | End: 2021-06-09 | Stop reason: HOSPADM

## 2021-06-09 RX ORDER — ONDANSETRON 2 MG/ML
4 INJECTION INTRAMUSCULAR; INTRAVENOUS ONCE AS NEEDED
Status: DISCONTINUED | OUTPATIENT
Start: 2021-06-09 | End: 2021-06-09 | Stop reason: HOSPADM

## 2021-06-09 RX ORDER — DIPHENHYDRAMINE HCL 25 MG
25 CAPSULE ORAL
Status: DISCONTINUED | OUTPATIENT
Start: 2021-06-09 | End: 2021-06-09 | Stop reason: HOSPADM

## 2021-06-09 RX ORDER — SODIUM CHLORIDE 9 MG/ML
INJECTION, SOLUTION INTRAVENOUS AS NEEDED
Status: DISCONTINUED | OUTPATIENT
Start: 2021-06-09 | End: 2021-06-09 | Stop reason: HOSPADM

## 2021-06-09 RX ORDER — SCOLOPAMINE TRANSDERMAL SYSTEM 1 MG/1
1 PATCH, EXTENDED RELEASE TRANSDERMAL CONTINUOUS
Status: DISCONTINUED | OUTPATIENT
Start: 2021-06-09 | End: 2021-06-10 | Stop reason: HOSPADM

## 2021-06-09 RX ORDER — IBUPROFEN 600 MG/1
600 TABLET ORAL ONCE AS NEEDED
Status: DISCONTINUED | OUTPATIENT
Start: 2021-06-09 | End: 2021-06-09 | Stop reason: HOSPADM

## 2021-06-09 RX ORDER — LORAZEPAM 2 MG/ML
1 INJECTION INTRAMUSCULAR EVERY 12 HOURS PRN
Status: DISCONTINUED | OUTPATIENT
Start: 2021-06-09 | End: 2021-06-10 | Stop reason: HOSPADM

## 2021-06-09 RX ORDER — SODIUM CHLORIDE, SODIUM LACTATE, POTASSIUM CHLORIDE, CALCIUM CHLORIDE 600; 310; 30; 20 MG/100ML; MG/100ML; MG/100ML; MG/100ML
100 INJECTION, SOLUTION INTRAVENOUS CONTINUOUS
Status: DISCONTINUED | OUTPATIENT
Start: 2021-06-09 | End: 2021-06-09 | Stop reason: HOSPADM

## 2021-06-09 RX ORDER — SUCCINYLCHOLINE CHLORIDE 20 MG/ML
INJECTION INTRAMUSCULAR; INTRAVENOUS AS NEEDED
Status: DISCONTINUED | OUTPATIENT
Start: 2021-06-09 | End: 2021-06-09 | Stop reason: SURG

## 2021-06-09 RX ORDER — HYDROMORPHONE HYDROCHLORIDE 1 MG/ML
0.5 INJECTION, SOLUTION INTRAMUSCULAR; INTRAVENOUS; SUBCUTANEOUS
Status: DISCONTINUED | OUTPATIENT
Start: 2021-06-09 | End: 2021-06-09 | Stop reason: HOSPADM

## 2021-06-09 RX ORDER — FAMOTIDINE 10 MG/ML
20 INJECTION, SOLUTION INTRAVENOUS EVERY 12 HOURS SCHEDULED
Status: DISCONTINUED | OUTPATIENT
Start: 2021-06-09 | End: 2021-06-10 | Stop reason: HOSPADM

## 2021-06-09 RX ORDER — HYDROMORPHONE HYDROCHLORIDE 1 MG/ML
0.25 INJECTION, SOLUTION INTRAMUSCULAR; INTRAVENOUS; SUBCUTANEOUS
Status: DISCONTINUED | OUTPATIENT
Start: 2021-06-09 | End: 2021-06-09 | Stop reason: HOSPADM

## 2021-06-09 RX ORDER — ACETAMINOPHEN 160 MG/5ML
975 SOLUTION ORAL ONCE
Status: COMPLETED | OUTPATIENT
Start: 2021-06-09 | End: 2021-06-09

## 2021-06-09 RX ORDER — LABETALOL HYDROCHLORIDE 5 MG/ML
10 INJECTION, SOLUTION INTRAVENOUS
Status: DISCONTINUED | OUTPATIENT
Start: 2021-06-09 | End: 2021-06-10 | Stop reason: HOSPADM

## 2021-06-09 RX ORDER — PROMETHAZINE HYDROCHLORIDE 12.5 MG/1
12.5 SUPPOSITORY RECTAL EVERY 4 HOURS PRN
Status: DISCONTINUED | OUTPATIENT
Start: 2021-06-09 | End: 2021-06-10 | Stop reason: HOSPADM

## 2021-06-09 RX ORDER — SODIUM CHLORIDE 0.9 % (FLUSH) 0.9 %
3-10 SYRINGE (ML) INJECTION AS NEEDED
Status: DISCONTINUED | OUTPATIENT
Start: 2021-06-09 | End: 2021-06-09 | Stop reason: HOSPADM

## 2021-06-09 RX ORDER — OXYCODONE AND ACETAMINOPHEN 10; 325 MG/1; MG/1
1 TABLET ORAL EVERY 4 HOURS PRN
Status: DISCONTINUED | OUTPATIENT
Start: 2021-06-09 | End: 2021-06-09 | Stop reason: HOSPADM

## 2021-06-09 RX ORDER — ONDANSETRON 2 MG/ML
INJECTION INTRAMUSCULAR; INTRAVENOUS AS NEEDED
Status: DISCONTINUED | OUTPATIENT
Start: 2021-06-09 | End: 2021-06-09 | Stop reason: SURG

## 2021-06-09 RX ORDER — HYDROCODONE BITARTRATE AND ACETAMINOPHEN 7.5; 325 MG/1; MG/1
1 TABLET ORAL ONCE AS NEEDED
Status: DISCONTINUED | OUTPATIENT
Start: 2021-06-09 | End: 2021-06-09 | Stop reason: HOSPADM

## 2021-06-09 RX ORDER — KETAMINE HYDROCHLORIDE 10 MG/ML
INJECTION INTRAMUSCULAR; INTRAVENOUS AS NEEDED
Status: DISCONTINUED | OUTPATIENT
Start: 2021-06-09 | End: 2021-06-09 | Stop reason: SURG

## 2021-06-09 RX ORDER — ONDANSETRON 4 MG/1
4 TABLET, FILM COATED ORAL EVERY 4 HOURS PRN
Status: DISCONTINUED | OUTPATIENT
Start: 2021-06-09 | End: 2021-06-10 | Stop reason: HOSPADM

## 2021-06-09 RX ORDER — BUPIVACAINE HYDROCHLORIDE AND EPINEPHRINE 5; 5 MG/ML; UG/ML
INJECTION, SOLUTION PERINEURAL AS NEEDED
Status: DISCONTINUED | OUTPATIENT
Start: 2021-06-09 | End: 2021-06-09 | Stop reason: HOSPADM

## 2021-06-09 RX ORDER — BUPIVACAINE HYDROCHLORIDE AND EPINEPHRINE 2.5; 5 MG/ML; UG/ML
INJECTION, SOLUTION EPIDURAL; INFILTRATION; INTRACAUDAL; PERINEURAL
Status: COMPLETED | OUTPATIENT
Start: 2021-06-09 | End: 2021-06-09

## 2021-06-09 RX ORDER — ONDANSETRON 2 MG/ML
4 INJECTION INTRAMUSCULAR; INTRAVENOUS EVERY 4 HOURS PRN
Status: DISCONTINUED | OUTPATIENT
Start: 2021-06-09 | End: 2021-06-10 | Stop reason: HOSPADM

## 2021-06-09 RX ORDER — PROMETHAZINE HYDROCHLORIDE 25 MG/1
25 TABLET ORAL ONCE AS NEEDED
Status: COMPLETED | OUTPATIENT
Start: 2021-06-09 | End: 2021-06-09

## 2021-06-09 RX ORDER — GABAPENTIN 250 MG/5ML
300 SOLUTION ORAL ONCE
Status: DISCONTINUED | OUTPATIENT
Start: 2021-06-09 | End: 2021-06-09 | Stop reason: HOSPADM

## 2021-06-09 RX ORDER — LIDOCAINE HYDROCHLORIDE 20 MG/ML
INJECTION, SOLUTION INFILTRATION; PERINEURAL AS NEEDED
Status: DISCONTINUED | OUTPATIENT
Start: 2021-06-09 | End: 2021-06-09 | Stop reason: SURG

## 2021-06-09 RX ORDER — PROMETHAZINE HYDROCHLORIDE 25 MG/1
25 SUPPOSITORY RECTAL ONCE AS NEEDED
Status: COMPLETED | OUTPATIENT
Start: 2021-06-09 | End: 2021-06-09

## 2021-06-09 RX ORDER — PROMETHAZINE HYDROCHLORIDE 25 MG/1
25 SUPPOSITORY RECTAL ONCE
Status: DISCONTINUED | OUTPATIENT
Start: 2021-06-09 | End: 2021-06-09 | Stop reason: HOSPADM

## 2021-06-09 RX ORDER — NITROGLYCERIN 0.4 MG/1
0.4 TABLET SUBLINGUAL
Status: DISCONTINUED | OUTPATIENT
Start: 2021-06-09 | End: 2021-06-10 | Stop reason: HOSPADM

## 2021-06-09 RX ORDER — NALOXONE HCL 0.4 MG/ML
0.2 VIAL (ML) INJECTION AS NEEDED
Status: DISCONTINUED | OUTPATIENT
Start: 2021-06-09 | End: 2021-06-09 | Stop reason: HOSPADM

## 2021-06-09 RX ORDER — SODIUM CHLORIDE 0.9 % (FLUSH) 0.9 %
10 SYRINGE (ML) INJECTION EVERY 12 HOURS SCHEDULED
Status: DISCONTINUED | OUTPATIENT
Start: 2021-06-09 | End: 2021-06-09 | Stop reason: HOSPADM

## 2021-06-09 RX ORDER — SODIUM CHLORIDE 0.9 % (FLUSH) 0.9 %
3 SYRINGE (ML) INJECTION EVERY 12 HOURS SCHEDULED
Status: DISCONTINUED | OUTPATIENT
Start: 2021-06-09 | End: 2021-06-10 | Stop reason: HOSPADM

## 2021-06-09 RX ORDER — SPIRONOLACTONE 100 MG/1
100 TABLET, FILM COATED ORAL DAILY
Status: DISCONTINUED | OUTPATIENT
Start: 2021-06-09 | End: 2021-06-10 | Stop reason: HOSPADM

## 2021-06-09 RX ORDER — PROMETHAZINE HYDROCHLORIDE 25 MG/1
25 TABLET ORAL ONCE AS NEEDED
Status: DISCONTINUED | OUTPATIENT
Start: 2021-06-09 | End: 2021-06-09 | Stop reason: HOSPADM

## 2021-06-09 RX ORDER — HYDROMORPHONE HYDROCHLORIDE 2 MG/1
2 TABLET ORAL EVERY 4 HOURS PRN
Status: DISCONTINUED | OUTPATIENT
Start: 2021-06-09 | End: 2021-06-10 | Stop reason: HOSPADM

## 2021-06-09 RX ORDER — FLUMAZENIL 0.1 MG/ML
0.2 INJECTION INTRAVENOUS AS NEEDED
Status: DISCONTINUED | OUTPATIENT
Start: 2021-06-09 | End: 2021-06-09 | Stop reason: HOSPADM

## 2021-06-09 RX ORDER — SODIUM CHLORIDE, SODIUM LACTATE, POTASSIUM CHLORIDE, CALCIUM CHLORIDE 600; 310; 30; 20 MG/100ML; MG/100ML; MG/100ML; MG/100ML
9 INJECTION, SOLUTION INTRAVENOUS CONTINUOUS
Status: DISCONTINUED | OUTPATIENT
Start: 2021-06-09 | End: 2021-06-09 | Stop reason: HOSPADM

## 2021-06-09 RX ORDER — LIDOCAINE HYDROCHLORIDE 10 MG/ML
0.5 INJECTION, SOLUTION EPIDURAL; INFILTRATION; INTRACAUDAL; PERINEURAL ONCE AS NEEDED
Status: DISCONTINUED | OUTPATIENT
Start: 2021-06-09 | End: 2021-06-09 | Stop reason: HOSPADM

## 2021-06-09 RX ORDER — NALBUPHINE HCL 10 MG/ML
2 AMPUL (ML) INJECTION EVERY 4 HOURS PRN
Status: DISCONTINUED | OUTPATIENT
Start: 2021-06-09 | End: 2021-06-09 | Stop reason: HOSPADM

## 2021-06-09 RX ADMIN — ROCURONIUM BROMIDE 30 MG: 50 INJECTION INTRAVENOUS at 10:43

## 2021-06-09 RX ADMIN — SODIUM CHLORIDE, POTASSIUM CHLORIDE, SODIUM LACTATE AND CALCIUM CHLORIDE 500 ML: 600; 310; 30; 20 INJECTION, SOLUTION INTRAVENOUS at 08:18

## 2021-06-09 RX ADMIN — BUPIVACAINE HYDROCHLORIDE AND EPINEPHRINE BITARTRATE 20 ML: 2.5; .005 INJECTION, SOLUTION EPIDURAL; INFILTRATION; INTRACAUDAL; PERINEURAL at 10:21

## 2021-06-09 RX ADMIN — LIDOCAINE HYDROCHLORIDE 100 MG: 20 INJECTION, SOLUTION INFILTRATION; PERINEURAL at 10:14

## 2021-06-09 RX ADMIN — FENTANYL CITRATE 50 MCG: 50 INJECTION INTRAMUSCULAR; INTRAVENOUS at 10:34

## 2021-06-09 RX ADMIN — PROPOFOL 250 MG: 10 INJECTION, EMULSION INTRAVENOUS at 10:14

## 2021-06-09 RX ADMIN — METOCLOPRAMIDE HYDROCHLORIDE 10 MG: 5 INJECTION INTRAMUSCULAR; INTRAVENOUS at 20:27

## 2021-06-09 RX ADMIN — MIRTAZAPINE 15 MG: 15 TABLET, ORALLY DISINTEGRATING ORAL at 20:28

## 2021-06-09 RX ADMIN — CEFAZOLIN 3 G: 1 INJECTION, POWDER, FOR SOLUTION INTRAMUSCULAR; INTRAVENOUS; PARENTERAL at 10:18

## 2021-06-09 RX ADMIN — THIAMINE HYDROCHLORIDE 250 ML/HR: 100 INJECTION, SOLUTION INTRAMUSCULAR; INTRAVENOUS at 23:55

## 2021-06-09 RX ADMIN — HYOSCYAMINE SULFATE 125 MCG: 0.12 TABLET, ORALLY DISINTEGRATING ORAL at 16:06

## 2021-06-09 RX ADMIN — ONDANSETRON 4 MG: 2 INJECTION INTRAMUSCULAR; INTRAVENOUS at 10:20

## 2021-06-09 RX ADMIN — SCOLOPAMINE TRANSDERMAL SYSTEM 1 PATCH: 1 PATCH, EXTENDED RELEASE TRANSDERMAL at 07:58

## 2021-06-09 RX ADMIN — KETAMINE HYDROCHLORIDE 20 MG: 10 INJECTION INTRAMUSCULAR; INTRAVENOUS at 11:09

## 2021-06-09 RX ADMIN — FAMOTIDINE 20 MG: 10 INJECTION INTRAVENOUS at 16:02

## 2021-06-09 RX ADMIN — METOCLOPRAMIDE HYDROCHLORIDE 10 MG: 5 INJECTION INTRAMUSCULAR; INTRAVENOUS at 08:18

## 2021-06-09 RX ADMIN — SODIUM CHLORIDE, POTASSIUM CHLORIDE, SODIUM LACTATE AND CALCIUM CHLORIDE 150 ML/HR: 600; 310; 30; 20 INJECTION, SOLUTION INTRAVENOUS at 23:55

## 2021-06-09 RX ADMIN — ACETAMINOPHEN 1000 MG: 500 TABLET, FILM COATED ORAL at 20:28

## 2021-06-09 RX ADMIN — SPIRONOLACTONE 100 MG: 100 TABLET, FILM COATED ORAL at 16:02

## 2021-06-09 RX ADMIN — FENTANYL CITRATE 50 MCG: 50 INJECTION INTRAMUSCULAR; INTRAVENOUS at 10:25

## 2021-06-09 RX ADMIN — MAGNESIUM SULFATE HEPTAHYDRATE 2 G: 500 INJECTION, SOLUTION INTRAMUSCULAR; INTRAVENOUS at 10:18

## 2021-06-09 RX ADMIN — SUGAMMADEX 600 MG: 100 INJECTION, SOLUTION INTRAVENOUS at 11:05

## 2021-06-09 RX ADMIN — SODIUM CHLORIDE, PRESERVATIVE FREE 3 ML: 5 INJECTION INTRAVENOUS at 16:06

## 2021-06-09 RX ADMIN — SODIUM CHLORIDE, POTASSIUM CHLORIDE, SODIUM LACTATE AND CALCIUM CHLORIDE 100 ML/HR: 600; 310; 30; 20 INJECTION, SOLUTION INTRAVENOUS at 12:50

## 2021-06-09 RX ADMIN — KETAMINE HYDROCHLORIDE 20 MG: 10 INJECTION INTRAMUSCULAR; INTRAVENOUS at 10:25

## 2021-06-09 RX ADMIN — CHLORHEXIDINE GLUCONATE 15 ML: 1.2 RINSE ORAL at 07:58

## 2021-06-09 RX ADMIN — PANTOPRAZOLE SODIUM 40 MG: 40 INJECTION, POWDER, FOR SOLUTION INTRAVENOUS at 08:19

## 2021-06-09 RX ADMIN — SUCCINYLCHOLINE CHLORIDE 175 MG: 20 INJECTION, SOLUTION INTRAMUSCULAR; INTRAVENOUS; PARENTERAL at 10:14

## 2021-06-09 RX ADMIN — METOCLOPRAMIDE HYDROCHLORIDE 10 MG: 5 INJECTION INTRAMUSCULAR; INTRAVENOUS at 16:02

## 2021-06-09 RX ADMIN — DEXAMETHASONE SODIUM PHOSPHATE 10 MG: 10 INJECTION INTRAMUSCULAR; INTRAVENOUS at 10:20

## 2021-06-09 RX ADMIN — PROMETHAZINE HYDROCHLORIDE 25 MG: 25 SUPPOSITORY RECTAL at 09:55

## 2021-06-09 RX ADMIN — ACETAMINOPHEN ORAL SOLUTION 975 MG: 325 SOLUTION ORAL at 07:54

## 2021-06-09 RX ADMIN — PROPOFOL 200 MCG/KG/MIN: 10 INJECTION, EMULSION INTRAVENOUS at 10:16

## 2021-06-09 RX ADMIN — BUPIVACAINE HYDROCHLORIDE AND EPINEPHRINE BITARTRATE 40 ML: 2.5; .005 INJECTION, SOLUTION EPIDURAL; INFILTRATION; INTRACAUDAL; PERINEURAL at 10:17

## 2021-06-09 RX ADMIN — MIDAZOLAM 1 MG: 1 INJECTION INTRAMUSCULAR; INTRAVENOUS at 09:13

## 2021-06-09 RX ADMIN — SODIUM CHLORIDE, POTASSIUM CHLORIDE, SODIUM LACTATE AND CALCIUM CHLORIDE: 600; 310; 30; 20 INJECTION, SOLUTION INTRAVENOUS at 11:11

## 2021-06-09 RX ADMIN — ACETAMINOPHEN 1000 MG: 500 TABLET, FILM COATED ORAL at 16:02

## 2021-06-09 RX ADMIN — ENOXAPARIN SODIUM 40 MG: 40 INJECTION SUBCUTANEOUS at 11:56

## 2021-06-09 RX ADMIN — SODIUM CHLORIDE, PRESERVATIVE FREE 3 ML: 5 INJECTION INTRAVENOUS at 20:28

## 2021-06-09 RX ADMIN — SODIUM CHLORIDE, POTASSIUM CHLORIDE, SODIUM LACTATE AND CALCIUM CHLORIDE 150 ML/HR: 600; 310; 30; 20 INJECTION, SOLUTION INTRAVENOUS at 16:02

## 2021-06-09 RX ADMIN — FAMOTIDINE 20 MG: 10 INJECTION INTRAVENOUS at 20:28

## 2021-06-09 RX ADMIN — ROCURONIUM BROMIDE 50 MG: 50 INJECTION INTRAVENOUS at 10:25

## 2021-06-09 NOTE — ANESTHESIA PROCEDURE NOTES
Bilateral Rectus Sheath Block      Patient reassessed immediately prior to procedure    Start time: 6/9/2021 10:16 AM  Stop time: 6/9/2021 10:18 AM  Reason for block: at surgeon's request and post-op pain management  Performed by  Anesthesiologist: Jyoti Tubbs MD  Preanesthetic Checklist  Completed: patient identified, IV checked, site marked, risks and benefits discussed, surgical consent, monitors and equipment checked, pre-op evaluation and timeout performed  Prep:  Pt Position: sitting  Sterile barriers:cap, gloves and mask  Prep: ChloraPrep  Patient monitoring: blood pressure monitoring, continuous pulse oximetry and EKG  Procedure  Sedation:no  Performed under: general  Guidance:ultrasound guided  ULTRASOUND INTERPRETATION. Using ultrasound guidance a 21 G gauge needle was placed in close proximity to the nerve, at which point, under ultrasound guidance anesthetic was injected in the area of the nerve and spread of the anesthesia was seen on ultrasound in close proximity thereto.  There were no abnormalities seen on ultrasound; a digital image was taken; and the patient tolerated the procedure with no complications. Images:still images obtained, printed/placed on chart    Laterality:BilateralInjection Technique:single-shot  Needle Type:short-bevel and echogenic  Needle Gauge:21 G  Resistance on Injection: none    Medications Used: bupivacaine-EPINEPHrine PF (MARCAINE w/EPI) 0.25% -1:[id number] injection, 40 mL  Med admintered at 6/9/2021 10:17 AM      Post Assessment  Injection Assessment: negative aspiration for heme, no paresthesia on injection and incremental injection  Patient Tolerance:comfortable throughout block  Complications:no

## 2021-06-09 NOTE — ANESTHESIA PROCEDURE NOTES
Airway  Urgency: elective    Date/Time: 6/9/2021 10:16 AM  Airway not difficult    General Information and Staff    Patient location during procedure: OR  CRNA: Maribel Daugherty CRNA    Indications and Patient Condition  Indications for airway management: airway protection    Preoxygenated: yes  MILS maintained throughout  Mask difficulty assessment: 1 - vent by mask    Final Airway Details  Final airway type: endotracheal airway      Successful airway: ETT  Cuffed: yes   Successful intubation technique: direct laryngoscopy  Endotracheal tube insertion site: oral  Blade: Jarod  Blade size: 3  ETT size (mm): 7.5  Cormack-Lehane Classification: grade IIb - view of arytenoids or posterior of glottis only  Placement verified by: chest auscultation and capnometry   Measured from: lips  ETT/EBT  to lips (cm): 21  Number of attempts at approach: 1  Assessment: lips, teeth, and gum same as pre-op and atraumatic intubation    Additional Comments  Atraumatic insertion

## 2021-06-09 NOTE — OP NOTE
PREOPERATIVE DIAGNOSIS:  Morbid obesity with multiple comorbidities as referenced in the most recent history and physical.    POSTOPERATIVE DIAGNOSIS:    1:  Morbid obesity with multiple comorbidities as referenced in the most recent history and physical.  2:  Paraesophageal Hernia    PROCEDURES PERFORMED:  1.  Laparoscopic sleeve gastrectomy.  2.  Laparoscopic paraesophageal hernia repair.  3.  Tisseel application.     SURGEON:  Prasanna Katz Jr., MD    ASSISTANT: Pratik MCFARLAND [facility]        Surgery assisted and facilitated by a certified physician assistant, who directly resulted in a decreased operative time, anesthetic time, wound exposure, and possibly of an operative wound infection, thereby decreasing patient morbidity and ultimately total expenditures. The surgical assistant assisted in placement of trochars, take down of the gastrocolic omentum, short gastric vessels and dissection at the angle of His.  Also assisted in retraction of the stomach during stapling so as not to kink the gastric sleeve.  Also assisted in removing of the gastric specimen, closure of the fascial defect as well as closure of the skin incisions. They also assisted in retraction of the stomach during the hiatal hernia repair, dissection of the hernia sac and closure of the crura.      ANESTHESIA:  General endotracheal and TAP block    ESTIMATED BLOOD LOSS:   Less than 25 mL unless dictated below.    FLUIDS:  Crystalloids.    SPECIMENS:  Gastric remnant    DRAINS:  None.    COUNTS:  Correct.    COMPLICATIONS:  None.    INDICATIONS:  This patient with morbid obesity and associated comorbidities presents for elective laparoscopic, possible open sleeve gastrectomy.  The patient has received medical clearance to proceed.  The patient has undergone our extensive educational process and consent process and wishes to proceed.    DESCRIPTION OF PROCEDURE:  The patient was brought to the operating room and placed supine upon the operating  room table. SCD hose were placed.  The patient underwent uneventful general endotracheal anesthesia per the anesthesiology staff. The abdomen was prepped with ChloraPrep and draped in the usual sterile fashion.  An Ioban was used as well if not allergic.  Anesthesia staff either passed a 18 [place] gastric tube into the stomach to decompress.  A 5-10 mm transverse incision was made a few centimeters above and to the left of the umbilicus and the peritoneal cavity entered under direct camera visualization using a 5 or 10 mm 0° laparoscope and an Optiview trocar.  The abdomen was then insufflated to a pressure of 15-16 mmHg with CO2 gas.  Exploratory laparoscopy revealed no evidence of injury from the entrance technique and no significant abnormalities unless addendum dictated below.  An angled laparoscope was then used.  The patient was placed in reverse Trendelenburg position.  Under direct camera visualization a 12 mm trocar was placed in the right lateral subcostal position.  A 12 mm trocar was placed in the right midabdominal position.  A 5 mm trocar was placed in the left midabdominal position. A Neela retractor was placed through an epigastric incision and used to elevate the left lobe of the liver.  The fat pad was elevated and the left cata exposed.  At this point approximately assisted along the greater curvature, the gastrocolic omentum was divided with the Enseal and this proceeded superiorly to the angle of His taking down the short gastric vessels.  All posterior attachments of the lesser sac and posterior aspect of the stomach to the pancreas were taken down as well.  The left cata was exposed along its length.  Dissection then proceeded medially taking down the greater curvature with an Enseal until just proximal to the pylorus. The standard clamp and 18 [place] balloon bougie were used to size the gastric sleeve. The stomach was marked in the 3 positions using indelible ink.  The 3 markings were at  the angle of Hiss, the incisura and antrum using 1cm, 3cm and 5cm respectively. The 1st load was green on the [place] Flex stapler with single side Veritas Radha-Strip and this was placed 5 cm proximal to the pylorus.  The next several loads were gold placed with single side absorbable Veritas Radha-Strips depending on the size of the stomach. Careful attention was made to stay 1 cm from the esophagus.  Areas of the reinforced staple line were oversewn with absorbable sutures as needed for bleeding or questionable staple lines. At this point, the gastrectomy specimen was withdrawn through the 12 mm trocar site incision. The specimen staple line was inspected and was intact.  The specimen was then sent off to pathology.   At this point, the sleeve was submerged under saline and using the orogastric tube to insufflate the stomach, a leak test was performed.  This revealed the sleeve to be watertight, no air bubbles, no leak, and no bleeding seen from the staple lines and no significant abnormalities.  Irrigation fluid from the abdomen was then suctioned free.  The gastric sleeve staple line was then treated with  Tisseel fibrin glue. The fascia at the 12 mm trocar site incision was closed with a single 0 Vicryl suture in a figure-of-eight fashion placed under direct laparoscopic camera visualization with a suture passer and tying the knot extracorporeally.  The fascia in the area was infiltrated with local anesthesia. All incisions were then infiltrated with local anesthetic. The remaining trocars were removed under direct camera visualization with no bleeding noted from their sites.  The abdomen was desufflated of gas. The skin in each incision was closed using 4-0 antibiotic impregnated Monocryl in a subcuticular stitch followed by Dermabond.  The patient tolerated the procedure well without complication and was taken to the recovery room in stable condition.  All sponge, needle, and instrument counts were  correct.    The patient was noted to have a paraesophageal hernia.  The phrenoesophageal membrane was opened up with electrocautery and the right and left crura were cleaned off using sharp and blunt dissection.  The peritoneum overlying the right cata was incised and the plane along the hernia sac was developed.  The dissection then extended anteriorly and laterally to the left cata.  The base of the crural confluence was dissected free of adhesions to the hernia sac.  The hernia sac was carefully dissected into the mediastinum with caudal traction.  The interfaces between the pleura, pericardium, spine, and aorta were developed as a dissection was carried cephalically to the top of the hernia sac.  Sac contents were completely reduced back into the abdominal cavity.  Careful attention was made not to injure the vagus nerve.  The esophagus was identified and dissected circumferentially and along its previous stomach course in order to reduce tension, allowing the gastroesophageal junction to rest comfortably within the abdominal cavity.  The gastrosplenic ligament and the short gastric vessels were divided with the Enseal device.  The retroesophageal window was developed and the esophagus was retracted caudally.  The crural pillars were then approximated with 0 Ethibond sutures.  Anterior reinforcement was performed as well if needed.

## 2021-06-09 NOTE — ANESTHESIA PROCEDURE NOTES
Left subcostal TAP block      Patient reassessed immediately prior to procedure    Start time: 6/9/2021 10:19 AM  Stop time: 6/9/2021 10:22 AM  Reason for block: at surgeon's request and post-op pain management  Performed by  Anesthesiologist: Jyoti Tubbs MD  Preanesthetic Checklist  Completed: patient identified, IV checked, site marked, risks and benefits discussed, surgical consent, monitors and equipment checked, pre-op evaluation and timeout performed  Prep:  Pt Position: sitting  Sterile barriers:cap, gloves and mask  Prep: ChloraPrep  Patient monitoring: blood pressure monitoring, continuous pulse oximetry and EKG  Procedure  Sedation:no  Performed under: general  Guidance:ultrasound guided  ULTRASOUND INTERPRETATION. Using ultrasound guidance a 21 G gauge needle was placed in close proximity to the nerve, at which point, under ultrasound guidance anesthetic was injected in the area of the nerve and spread of the anesthesia was seen on ultrasound in close proximity thereto.  There were no abnormalities seen on ultrasound; a digital image was taken; and the patient tolerated the procedure with no complications. Images:still images obtained, printed/placed on chart    Laterality:left  Block Type:TAP  Injection Technique:single-shot  Needle Type:short-bevel and echogenic  Needle Gauge:21 G      Medications Used: bupivacaine-EPINEPHrine PF (MARCAINE w/EPI) 0.25% -1:[id number] injection, 20 mL  Med admintered at 6/9/2021 10:21 AM      Medications  Comment:     Post Assessment  Injection Assessment: negative aspiration for heme, no paresthesia on injection and incremental injection  Patient Tolerance:comfortable throughout block  Complications:no

## 2021-06-09 NOTE — ANESTHESIA PREPROCEDURE EVALUATION
Anesthesia Evaluation     history of anesthetic complications:  NPO Solid Status: > 8 hours             Airway   TM distance: >3 FB  Neck ROM: full  Possible difficult intubation  Dental - normal exam     Pulmonary - normal exam   (+) asthma,sleep apnea,   Cardiovascular - normal exam    ECG reviewed    (+) hypertension, hyperlipidemia,     ROS comment: ECG NSR  Echo- good EF, no valvular anomolies    Neuro/Psych  GI/Hepatic/Renal/Endo    (+) obesity, morbid obesity,  renal disease, diabetes mellitus,     Musculoskeletal     Abdominal    Substance History      OB/GYN          Other                        Anesthesia Plan    ASA 3     general with block   (  D/W R&B of GA including but not limited to: heart, lung, liver, kidney, neurologic problems, positioning injuries, dental damage, corneal abrasion and TMJ.  .)  intravenous induction     Anesthetic plan, all risks, benefits, and alternatives have been provided, discussed and informed consent has been obtained with: patient.

## 2021-06-09 NOTE — ANESTHESIA POSTPROCEDURE EVALUATION
"Patient: Angie Patel    Procedure Summary     Date: 06/09/21 Room / Location:  [facility]    Anesthesia Start: 1004 Anesthesia Stop: 1127    Procedure: GASTRIC SLEEVE LAPAROSCOPIC, PARAESOPHAGEAL HERNIA REPAIR (N/A Abdomen) Diagnosis:       Obesity, Class III, BMI 40-49.9 (morbid obesity) (CMS/[facility])      (Obesity, Class III, BMI 40-49.9 (morbid obesity) (CMS/[facility]) [E66.01])    Surgeons: Prasanna Katz Jr., MD Provider: Jyoti Tubbs MD    Anesthesia Type: general with block ASA Status: 3          Anesthesia Type: general with block    Vitals  Vitals Value Taken Time   /84 06/09/21 1248   Temp 36.2 °C (97.2 °F) 06/09/21 1126   Pulse 97 06/09/21 1250   Resp 20 06/09/21 1230   SpO2 95 % 06/09/21 1250   Vitals shown include unvalidated device data.        Post Anesthesia Care and Evaluation    Patient location during evaluation: bedside  Patient participation: complete - patient participated  Level of consciousness: awake and alert  Pain management: adequate  Airway patency: patent  Anesthetic complications: No anesthetic complications    Cardiovascular status: acceptable  Respiratory status: acceptable  Hydration status: acceptable    Comments: /80   Pulse 105   Temp 36.2 °C (97.2 °F) (Temporal)   Resp 20   Ht 160 cm (62.99\")   Wt 115 kg (253 lb 1.4 oz)   SpO2 95%   BMI 44.84 kg/m²       "

## 2021-06-10 ENCOUNTER — READMISSION MANAGEMENT (OUTPATIENT)
Dept: CALL CENTER | Facility: HOSPITAL | Age: 46
End: 2021-06-10

## 2021-06-10 VITALS
HEART RATE: 95 BPM | WEIGHT: 266 LBS | SYSTOLIC BLOOD PRESSURE: 130 MMHG | OXYGEN SATURATION: 92 % | DIASTOLIC BLOOD PRESSURE: 74 MMHG | BODY MASS INDEX: 45.41 KG/M2 | HEIGHT: 64 IN | RESPIRATION RATE: 18 BRPM | TEMPERATURE: 98.7 F

## 2021-06-10 LAB
ALBUMIN SERPL-MCNC: 4.5 G/DL (ref 3.5–5.2)
ALBUMIN/GLOB SERPL: 1.4 G/DL
ALP SERPL-CCNC: 36 U/L (ref 39–117)
ALT SERPL W P-5'-P-CCNC: 81 U/L (ref 1–33)
ANION GAP SERPL CALCULATED.3IONS-SCNC: 14.9 MMOL/L (ref 5–15)
AST SERPL-CCNC: 55 U/L (ref 1–32)
BASOPHILS # BLD AUTO: 0.03 10*3/MM3 (ref 0–0.2)
BASOPHILS NFR BLD AUTO: 0.2 % (ref 0–1.5)
BILIRUB SERPL-MCNC: 0.4 MG/DL (ref 0–1.2)
BUN SERPL-MCNC: 8 MG/DL (ref 6–20)
BUN/CREAT SERPL: 13.1 (ref 7–25)
CALCIUM SPEC-SCNC: 9.2 MG/DL (ref 8.6–10.5)
CHLORIDE SERPL-SCNC: 101 MMOL/L (ref 98–107)
CO2 SERPL-SCNC: 24.1 MMOL/L (ref 22–29)
CREAT SERPL-MCNC: 0.61 MG/DL (ref 0.57–1)
DEPRECATED RDW RBC AUTO: 40.7 FL (ref 37–54)
EOSINOPHIL # BLD AUTO: 0 10*3/MM3 (ref 0–0.4)
EOSINOPHIL NFR BLD AUTO: 0 % (ref 0.3–6.2)
ERYTHROCYTE [DISTWIDTH] IN BLOOD BY AUTOMATED COUNT: 12.5 % (ref 12.3–15.4)
GFR SERPL CREATININE-BSD FRML MDRD: 106 ML/MIN/1.73
GLOBULIN UR ELPH-MCNC: 3.3 GM/DL
GLUCOSE SERPL-MCNC: 104 MG/DL (ref 65–99)
HCT VFR BLD AUTO: 44.7 % (ref 34–46.6)
HGB BLD-MCNC: 15.2 G/DL (ref 12–15.9)
IMM GRANULOCYTES # BLD AUTO: 0.11 10*3/MM3 (ref 0–0.05)
IMM GRANULOCYTES NFR BLD AUTO: 0.8 % (ref 0–0.5)
LAB AP CASE REPORT: NORMAL
LYMPHOCYTES # BLD AUTO: 1.36 10*3/MM3 (ref 0.7–3.1)
LYMPHOCYTES NFR BLD AUTO: 10.3 % (ref 19.6–45.3)
MAGNESIUM SERPL-MCNC: 2.2 MG/DL (ref 1.6–2.6)
MCH RBC QN AUTO: 30.3 PG (ref 26.6–33)
MCHC RBC AUTO-ENTMCNC: 34 G/DL (ref 31.5–35.7)
MCV RBC AUTO: 89.2 FL (ref 79–97)
MONOCYTES # BLD AUTO: 0.78 10*3/MM3 (ref 0.1–0.9)
MONOCYTES NFR BLD AUTO: 5.9 % (ref 5–12)
NEUTROPHILS NFR BLD AUTO: 10.98 10*3/MM3 (ref 1.7–7)
NEUTROPHILS NFR BLD AUTO: 82.8 % (ref 42.7–76)
NRBC BLD AUTO-RTO: 0 /100 WBC (ref 0–0.2)
PATH REPORT.FINAL DX SPEC: NORMAL
PATH REPORT.GROSS SPEC: NORMAL
PHOSPHATE SERPL-MCNC: 2.9 MG/DL (ref 2.5–4.5)
PLATELET # BLD AUTO: 271 10*3/MM3 (ref 140–450)
PMV BLD AUTO: 11.9 FL (ref 6–12)
POTASSIUM SERPL-SCNC: 3.5 MMOL/L (ref 3.5–5.2)
PROT SERPL-MCNC: 7.8 G/DL (ref 6–8.5)
RBC # BLD AUTO: 5.01 10*6/MM3 (ref 3.77–5.28)
SODIUM SERPL-SCNC: 140 MMOL/L (ref 136–145)
WBC # BLD AUTO: 13.26 10*3/MM3 (ref 3.4–10.8)

## 2021-06-10 PROCEDURE — 84100 ASSAY OF PHOSPHORUS: CPT | Performed by: SURGERY

## 2021-06-10 PROCEDURE — 25010000002 ENOXAPARIN PER 10 MG: Performed by: SURGERY

## 2021-06-10 PROCEDURE — 25010000002 METOCLOPRAMIDE PER 10 MG: Performed by: SURGERY

## 2021-06-10 PROCEDURE — 83735 ASSAY OF MAGNESIUM: CPT | Performed by: SURGERY

## 2021-06-10 PROCEDURE — 85025 COMPLETE CBC W/AUTO DIFF WBC: CPT | Performed by: SURGERY

## 2021-06-10 PROCEDURE — 25010000002 THIAMINE PER 100 MG: Performed by: SURGERY

## 2021-06-10 PROCEDURE — 80053 COMPREHEN METABOLIC PANEL: CPT | Performed by: SURGERY

## 2021-06-10 PROCEDURE — 25010000002 CYANOCOBALAMIN PER 1000 MCG: Performed by: SURGERY

## 2021-06-10 RX ORDER — HYDROMORPHONE HYDROCHLORIDE 2 MG/1
2 TABLET ORAL EVERY 4 HOURS PRN
Qty: 14 TABLET | Refills: 0 | Status: SHIPPED | OUTPATIENT
Start: 2021-06-10 | End: 2021-06-13

## 2021-06-10 RX ORDER — ONDANSETRON 4 MG/1
4 TABLET, ORALLY DISINTEGRATING ORAL EVERY 4 HOURS PRN
Qty: 14 TABLET | Refills: 0 | Status: SHIPPED | OUTPATIENT
Start: 2021-06-10 | End: 2021-06-15

## 2021-06-10 RX ADMIN — CYANOCOBALAMIN 1000 MCG: 1000 INJECTION INTRAMUSCULAR; SUBCUTANEOUS at 08:47

## 2021-06-10 RX ADMIN — SPIRONOLACTONE 100 MG: 100 TABLET, FILM COATED ORAL at 08:47

## 2021-06-10 RX ADMIN — METOCLOPRAMIDE HYDROCHLORIDE 10 MG: 5 INJECTION INTRAMUSCULAR; INTRAVENOUS at 08:48

## 2021-06-10 RX ADMIN — ACETAMINOPHEN 1000 MG: 500 TABLET, FILM COATED ORAL at 08:46

## 2021-06-10 RX ADMIN — HYOSCYAMINE SULFATE 125 MCG: 0.12 TABLET, ORALLY DISINTEGRATING ORAL at 04:38

## 2021-06-10 RX ADMIN — SODIUM CHLORIDE, POTASSIUM CHLORIDE, SODIUM LACTATE AND CALCIUM CHLORIDE 150 ML/HR: 600; 310; 30; 20 INJECTION, SOLUTION INTRAVENOUS at 08:46

## 2021-06-10 RX ADMIN — ENOXAPARIN SODIUM 40 MG: 40 INJECTION SUBCUTANEOUS at 08:47

## 2021-06-10 RX ADMIN — FAMOTIDINE 20 MG: 10 INJECTION INTRAVENOUS at 08:48

## 2021-06-10 RX ADMIN — SODIUM CHLORIDE, PRESERVATIVE FREE 3 ML: 5 INJECTION INTRAVENOUS at 08:47

## 2021-06-10 RX ADMIN — SODIUM CHLORIDE, POTASSIUM CHLORIDE, SODIUM LACTATE AND CALCIUM CHLORIDE 150 ML/HR: 600; 310; 30; 20 INJECTION, SOLUTION INTRAVENOUS at 01:59

## 2021-06-10 RX ADMIN — METOCLOPRAMIDE HYDROCHLORIDE 10 MG: 5 INJECTION INTRAMUSCULAR; INTRAVENOUS at 01:59

## 2021-06-10 NOTE — DISCHARGE SUMMARY
"Discharge Summary    Patient name: Angie Patel    Medical record number: [id number]    Admission date: 6/9/2021  Discharge date:  6/10/2021    Attending physician: Dr. Prasanna Katz    Primary care physician: Kya Kendrick PA-C    Referring physician: Prasanna Katz Jr., MD  [address] [place] [address]    Condition on discharge: Stable    Primary Diagnoses:  Morbid obesity with co-morbidities    Operative Procedure:  Laparoscopic sleeve gastrectomy with PEH repair     Angie Patel  is post op day one status post procedure listed. Patient denies shortness of air and lower extremity pain. Feels better than yesterday. No vomiting this am. Ambulating well and using incentive spirometer.          /74 (BP Location: Right arm, Patient Position: Lying)   Pulse 95   Temp 98.7 °F (37.1 °C) (Oral)   Resp 18   Ht 162.6 cm (64\")   Wt 121 kg (266 lb)   SpO2 92%   BMI 45.66 kg/m²     General:  alert, appears stated age and cooperative   Abdomen: soft, bowel sounds active, appropriate tenderness   Incision:   healing well, no drainage, no erythema, no hernia, no seroma, no swelling, no dehiscence, incision well approximated   Heart: Regular rate   Lungs: Clear to auscultation bilaterally     I reviewed the patient's new clinical results.     Lab Results (last 24 hours)     Procedure Component Value Units Date/Time    Tissue Pathology Exam [[id number]] Collected: 06/09/21 1100    Specimen: Tissue from Stomach Updated: 06/10/21 1034     Case Report --     Surgical Pathology Report                         Case: [id number]                                  Authorizing Provider:  Prasanna Katz Jr.,   Collected:           06/09/2021 11:00 AM                                 MD                                                                           Ordering Location:     [facility]  Received:            06/09/2021 11:51 AM                                 [facility] OR              " "                                                         Pathologist:           Nichole Pugh MD                                                    Specimen:    Stomach, PARTIAL STOMACH                                                                    Final Diagnosis --     1. Stomach, Partial Gastrectomy:              A. Benign oxyntic mucosa without diagnostic abnormality.    [facility]       Gross Description --     1. Received in formalin labeled \"portion of stomach\" is a partial gastrectomy specimen consisting of a 25 cm long greater curvature and a 20 cm single staple line margin.  The serosa is smooth tan pink with minimal amount of adherent adipose tissue.  The mucosa is tan pink and focally erythematous with normal rugae and the wall thickness averages 0.3 cm.  Representative sections are submitted as 1A - 1C.    jap/uso/jab/pkm         Comprehensive Metabolic Panel [[id number]]  (Abnormal) Collected: 06/10/21 0417    Specimen: Blood Updated: 06/10/21 0536     Glucose 104 mg/dL      BUN 8 mg/dL      Creatinine 0.61 mg/dL      Sodium 140 mmol/L      Potassium 3.5 mmol/L      Chloride 101 mmol/L      CO2 24.1 mmol/L      Calcium 9.2 mg/dL      Total Protein 7.8 g/dL      Albumin 4.50 g/dL      ALT (SGPT) 81 U/L      AST (SGOT) 55 U/L      Alkaline Phosphatase 36 U/L      Total Bilirubin 0.4 mg/dL      eGFR Non African Amer 106 mL/min/1.73      Globulin 3.3 gm/dL      A/G Ratio 1.4 g/dL      BUN/Creatinine Ratio 13.1     Anion Gap 14.9 mmol/L     Narrative:      GFR Normal >60  Chronic Kidney Disease <60  Kidney Failure <15      Phosphorus [[id number]]  (Normal) Collected: 06/10/21 0417    Specimen: Blood Updated: 06/10/21 0536     Phosphorus 2.9 mg/dL     Magnesium [[id number]]  (Normal) Collected: 06/10/21 0417    Specimen: Blood Updated: 06/10/21 0536     Magnesium 2.2 mg/dL     CBC & Differential [[id number]]  (Abnormal) Collected: 06/10/21 0417    Specimen: Blood Updated: 06/10/21 0456    Narrative:      " The following orders were created for panel order CBC & Differential.  Procedure                               Abnormality         Status                     ---------                               -----------         ------                     CBC Auto Differential[[id number]]        Abnormal            Final result                 Please view results for these tests on the individual orders.    CBC Auto Differential [[id number]]  (Abnormal) Collected: 06/10/21 0417    Specimen: Blood Updated: 06/10/21 0456     WBC 13.26 10*3/mm3      RBC 5.01 10*6/mm3      Hemoglobin 15.2 g/dL      Hematocrit 44.7 %      MCV 89.2 fL      MCH 30.3 pg      MCHC 34.0 g/dL      RDW 12.5 %      RDW-SD 40.7 fl      MPV 11.9 fL      Platelets 271 10*3/mm3      Neutrophil % 82.8 %      Lymphocyte % 10.3 %      Monocyte % 5.9 %      Eosinophil % 0.0 %      Basophil % 0.2 %      Immature Grans % 0.8 %      Neutrophils, Absolute 10.98 10*3/mm3      Lymphocytes, Absolute 1.36 10*3/mm3      Monocytes, Absolute 0.78 10*3/mm3      Eosinophils, Absolute 0.00 10*3/mm3      Basophils, Absolute 0.03 10*3/mm3      Immature Grans, Absolute 0.11 10*3/mm3      nRBC 0.0 /100 WBC              Assessment:      Doing well postoperatively.      Plan:   1. Continue Stage 1 diet  2. Continue with ambulation and Incentive spirometry  3. Plan for d/c home    Patient was seen and examined by Dr. Katz.    Hospital Course: The patient is a very pleasant 46 y.o. female that was admitted to the hospital with morbid obesity who underwent above procedure.  Postoperatively the patient was transferred to the bariatric unit per protocol.  Patient remained afebrile and hemodynamically stable.  Patient was up ambulating well and using incentive spirometer.  Postoperatively day 1 patient was started on stage I bariatric diet and continued with good ambulation.  Lovenox was continued.  Patient was then discharged home.      Discharge medications:      Discharge Medications       New Medications      Instructions Start Date   HYDROmorphone 2 MG tablet  Commonly known as: Dilaudid   2 mg, Oral, Every 4 Hours PRN      ondansetron ODT 4 MG disintegrating tablet  Commonly known as: ZOFRAN-ODT   4 mg, Translingual, Every 4 Hours PRN         Changes to Medications      Instructions Start Date   potassium chloride 10 MEQ CR tablet  Commonly known as: K-DUR,KLOR-CON  What changed:   · when to take this  · reasons to take this   10 mEq, Oral, Daily         Continue These Medications      Instructions Start Date   chlorthalidone 25 MG tablet  Commonly known as: HYGROTON   25 mg, Oral, Daily      famotidine 10 MG tablet  Commonly known as: PEPCID   10 mg, Oral, Daily      folic acid-vit B6-vit B12 2.5-25-1 MG tablet tablet  Commonly known as: FOLBEE   1 tablet, Oral, Daily      furosemide 20 MG tablet  Commonly known as: LASIX   20 mg, Oral, As Needed      glucose blood test strip  Commonly known as: Accu-Chek Camila   Used to check blood sugars twice daily as needed for type 2 diabetes      metFORMIN  MG 24 hr tablet  Commonly known as: GLUCOPHAGE-XR   1,500 mg, Oral, Daily With Dinner      spironolactone 100 MG tablet  Commonly known as: ALDACTONE   100 mg, Oral, Daily      ursodiol 300 MG capsule  Commonly known as: Actigall   300 mg, Oral, 2 Times Daily      Ventolin  (90 Base) MCG/ACT inhaler  Generic drug: albuterol sulfate HFA   INHALE 2 PUFFS BY MOUTH FOUR TIMES DAILY AS NEEDED FOR WHEEZING OR SHORTNESS OF BREATH         Stop These Medications    multivitamin with minerals tablet tablet            Discharge instructions:  Per Bariatric manual; per our protocol      Follow-up appointment: Follow up with Dr. Katz in the office as scheduled.  If not already scheduled call for appointment at [phone].

## 2021-06-10 NOTE — CASE MANAGEMENT/SOCIAL WORK
Case Management Discharge Note      Final Note: Home with no needs. Transport by private auto.         Selected Continued Care - Admitted Since 6/9/2021     Destination    No services have been selected for the patient.              Durable Medical Equipment    No services have been selected for the patient.              Dialysis/Infusion    No services have been selected for the patient.              Home Medical Care    No services have been selected for the patient.              Therapy    No services have been selected for the patient.              Community Resources    No services have been selected for the patient.              Community & DME    No services have been selected for the patient.                  Transportation Services  Private: Car    Final Discharge Disposition Code: 01 - home or self-care

## 2021-06-10 NOTE — DISCHARGE INSTRUCTIONS
GOING HOME AFTER GASTRIC SLEEVE/ GASTRIC BYPASS SURGERY  [facility]: Post-Operative Information/Instructions  Prasanna Katz Jr., MD  General Patient Instructions for Discharge   - Call Surgeon's office at [phone] for follow-up appointment.    - Be sure you, the patient, have a follow-up appointment to be seen within seven (7) days after discharge. If not, please call [phone] to schedule an appointment. If you are discharged on a Saturday or Sunday, please call Monday to schedule the appointment.  - Contact the Surgeon at [phone] for any questions or concerns, including temperature greater than or equal to 101F, shortness of breath, leg swelling, redness at incision sites, nausea, vomiting, chills, or problems or questions.    - Follow the Gastric Stage 1 Diet    à Clear liquids, room temperature, sugar-free, caffeine-free, non-carbonated, 70 grams of protein, No Straws.  - You may shower. No tub bath for 2 weeks.  - No lifting, pushing, pulling, or tugging >25 pounds for 3 weeks.  - Ambulate every 3 hours while awake minimum for seven (7) days, increase distance daily.  - For the next several weeks, you are at an increased risk for blood clot formation. Therefore, you should walk regularly. You should not sit for prolonged periods of time, more than 45 minutes, without getting up and walking for 5-10 minutes. This includes any car rides, including the drive home from the hospital. If driving any distance greater than 30 miles over the next two (2) weeks, stop every 30-45 minutes and walk for 5-10 minutes each time.  - Continue using Incentive Spirometer and coughing exercises at least every two (2) hours while awake for one week.  - Continue use of CPAP/BIPAP for diagnosis of sleep apnea as directed.  - No driving or operating machinery allowed while taking narcotic (prescription) pain medication, and until you feel comfortable forcefully applying the brakes if needed. (This  usually takes more than 3 days.)    - Make an appointment with your Primary Care Physician within one week post-op to look at your home medications for possible changes or discontinuity.   Medications  - The nurse will provide a list of medications for you to continue at home   - If you received a Lovenox (Enoxaparin) or Apixiban (Eliquis) prescription at pre-op visit with Surgeon, start taking the medicine the morning after discharge unless directed otherwise.    - If you were prescribed Lovenox (Enoxaparin), review the education/teaching material/video with the nurse.    - Take post op pain meds as prescribed as needed.   - Continue Foltx until finished.   - Resume use of Actigall (Ursodiol) one (1) week after surgery if patient still has gallbladder. You should have been given a prescription at your pre-op visit. Contact the office if you do not have the prescription.   - Resume bariatric vitamin regimen as instructed in pre-op education with bariatric coordinator.    - Zegerid or Prilosec OTC (or generic) by mouth once daily for four (4) weeks unless you are already taking a proton pump inhibitor as home medication. Follow dosing instructions on package.   Nausea/Vomiting:  The following are possible causes for nausea/vomiting:  - Drinking too much or too fast.  - Sinus drainage/post nasal drip for allergy sufferers (you may take Sudafed, Claritin, Tylenol Sinus/Allergy, or other decongestants and nose sprays to help with this discomfort).  - Low blood sugar (sweating, shaky, irritable, weakness, dizzy or tunnel-vision) - treatment is to sip 100% fruit juice - no sugar added until symptoms subside.  - Acid in fruit juice - (may dilute with water or avoid).  - Eating or drinking something that is not on clear liquid (stage 1) diet.  Any nausea/vomiting that prohibits you from keeping fluids down for greater than 24 hours requires a call to the surgeon's office.  Urine:  Use your urine color as a guide to  determine if you are drinking enough fluid. The darker the urine, the more fluids you need to drink. Urine should be clear to light yellow if you are getting enough fluid. If you should experience frequency, burning or pain with urination, blood in urine, contact us or your primary care physician for possible UTI (urinary tract infection), which could require antibiotics (liquid preferred).  Bowel Movements:  You may not have a bowel movement for 2-5 days after going home. You may then experience liquid, runny or loose stools for approximately 3-4 weeks following surgery. This would require you to drink even more fluids to prevent dehydration. Some patients may experience constipation, which can be treated with increased fluids, drinking warm liquids, increased activity and the use of a Fleets Enema, Milk of Magnesia, or suppositories. The first couple of bowel movements could be bloody, tarry black or dark maroon in color. This is OK as long as the stool returns to a normal color in 1-2 days. If however, you have frequent or a large amount of bloody or tarry black stools and/or become light-headed or dizzy, you may be bleeding and require urgent attention. Please call us right away.  Abdominal Incisions:  You will have small incisions. Do not scrub incisions, but allow the warm, soapy water to run over the incisions, rinse well, and pat dry. You may use any brand of anti-bacterial soap. Do not use Peroxide or Neosporin type ointments on sites, unless instructed to do so by a surgeon or nurse. Monitor daily for signs/symptoms of infection, which might include: drainage with a foul odor, pain, redness, swelling or heat at the incision sight; fever, body aches and chills. If you suspect infection or have a fever, give us a call.  Pain:  You will be given a prescription for pain medication to control your pain. If you feel the dose is too strong, you may take half the ordered dose, or you may take Tylenol adult liquid  per package instructions for minor pain. Do not take any medications that contain aspirin or aspirin products.  Do not take medications like: Motrin, Aleve, Ibuprofen, Advil, Naproxen, Celebrex, Daypro, Bextra, Meloxicam or other medications commonly used for arthritis or joint pain.  No steroids or cortisone injections. There may be pain, which should improve every few days. Pain should not suddenly get worse or more intense. Pain that suddenly changes and is constant and severe should be called in to the surgeon's office. Any sudden pain in the lower extremities with associated warmth and redness should be called in to the surgeon's office immediately. Do not rub or massage this area, as it could be a blood clot.  Diet:  Remain on the clear liquid diet (stage 1) per your  which includes 70 grams of protein each day, sugar free, non carbonated and no straws. Day 1 is the day of surgery. If you are tolerating the stage 1 diet, you may then proceed to stage 2 diet, as instructed in the . Do not progress to the stage 2 diet if you are having nausea/vomiting. Refer to the Basic Nutrition and Food Principles guide.  Medications:  The nurse will let you know which medications you will need to continue once you go home. Do not take any medications that are extended or time released if you had the gastric bypass procedure, OK to take if you had the gastric sleeve procedure. Large capsules can be opened and diluted with clear liquids. Check with your physician or pharmacist as to which pills may be crushed and which capsules may be opened and diluted safely. Continue taking Foltx as surgeon orders. If you still have your gall bladder and were prescribed Actigall (Ursodiol), you may resume this medication one week after your surgery. You will remain on Actigall (Ursodiol) for approximately 6 months. The dose is 1 pill, 2 times each day for 6 months.  Activity:  Continue your deep breathing and  coughing exercises with your Incentive Spirometer breathing device at least every 3 hours while awake (10 repetitions each time) for one week. May use CPAP. This will help to prevent respiratory problems such as pneumonia. No lifting, pulling or tugging anything over 25 pounds for 3 weeks after surgery. You may shower but no tub baths, hot tubs or swimming for 2 weeks. Moderate walking is recommended every 3 hours while awake minimum, increase distance daily. Further exercise will be discussed at the first post-op visit. No driving or operating machinery allow until off narcotic pain medication and until you feel comfortable forcefully applying the brakes (usually takes 3 or more days). For the next few weeks you are at an increased risk for blood clot formation. Therefore you should walk regularly and you should not sit for prolonged periods of time, more than 45 minutes without getting up and walking for 5-10 minutes. This includes car rides. Including riding home from the hospital. If riding a distance greater than 30 miles over the next 2 weeks stop every 30-45 minutes and walk 5-10 mintues each time. No tanning bed use for 8 weeks after surgery and in general, not recommended due to the increased risk for skin cancer. Incisions will burn/blister very badly with tanning bed use.  Illness:  Your primary care physician should treat general illness such as ear infections, sinus infections, and viral type illnesses, etc. Medications prescribed should be liquid/elixir form when possible, for the first 30 days.  General:  In general, it is recommended that you weigh yourself no more than once per week. Let the weight come off you and concentrate on more important things. Remember the weight was not gained overnight, nor will it be lost overnight. Gastric Bypass/ Gastric Sleeve weight loss will continue over a period of 12-18 months. Do not  yourself according to how others are doing after surgery, as this will  cause unnecessary discouragement.  THE ABOVE ARE GENERAL GUIDELINES TO ASSIST YOU ONCE HOME, IF YOU ARE IN DOUBT, OR YOU HAVE ANY QUESTIONS, CALL US AT THE NUMBERS LISTED BELOW.  IN THE EVENT OF SUDDEN CHEST PAIN, SHORTNESS OF BREATH, OR ANY LIFE THREATENING CONDITION, CALL 911.  Any time you are evaluated or admitted to another facility, please have someone notify the surgeon's office.  Supplements:  70 grams of protein taken EVERY DAY. Remember to drink at least 64 ounces of fluid a day, sipping slowly early on. Increase this amount during the summertime. Sipping slowly will not stretch your new stomach. Drinking too fast or gulping liquids will cause brief discomfort and early could cause staple line disruption (leak). With eating, tiny bites, then chew, chew, chew, and swallow. Lay your fork/spoon down for 2-3 minutes, and then take your next bite. Your pouch will tell you within 1-2 bites if it is going to tolerate what you are eating.   Protein Vendors:  Refer to protein vendors' handout from consult class. You can always find protein drinks at the bariatric office, grocery stores, [employer], drug [employer], [employer], health food stores, and on the Internet. Find one high in protein (15-30 grams per serving) and low carb (less than 18 grams per serving).  Now is a great time to re-read your . Please review specific instructions given to you at discharge by your physician (surgeon).  HOW/WHEN TO CONTACT US:  It is imperative that you contact us with any of the following:    Ÿ fever greater than 101 degrees  Ÿ shortness of breath  Ÿ leg swelling  Ÿ body aches  Ÿ shaking chills  Ÿ nausea and vomitting  Ÿ pain that has worsened  Ÿ redness at incision sites  Ÿ pus or foul smelling drainage from an incision or wound  Ÿ inability to keep fluids down for more than a day  Ÿ any other condition you feel needs our attention.  [facility] - Bariatric: [phone] call this number anytime 24 hours a  day / 7 days a week.  Teach-back Questions to be answered by the patient prior to discharge.   What complications would prompt you to call your doctor when you return home? _________________    What is the purpose of your prescribed medication? ________________  What are some potential side effects of the medications you will be taking at home? _______________

## 2021-06-11 ENCOUNTER — TRANSITIONAL CARE MANAGEMENT TELEPHONE ENCOUNTER (OUTPATIENT)
Dept: CALL CENTER | Facility: HOSPITAL | Age: 46
End: 2021-06-11

## 2021-06-11 NOTE — OUTREACH NOTE
Call Center TCM Note      Responses   List of hospitals in [place] patient discharged from?  [facility]   Does the patient have one of the following disease processes/diagnoses(primary or secondary)?  General Surgery   TCM attempt successful?  Yes   Call start time  1014   Call end time  1016   Discharge diagnosis  Laparoscopic sleeve gastrectomy with PEH repair   Meds reviewed with patient/caregiver?  Yes   Is the patient having any side effects they believe may be caused by any medication additions or changes?  No   Does the patient have all medications related to this admission filled (includes all antibiotics, pain medications, etc.)  Yes   Is the patient taking all medications as directed (includes completed medication regime)?  Yes   Does the patient have a follow up appointment scheduled with their surgeon?  Yes   Has the patient kept scheduled appointments due by today?  N/A   Comments  Post Op appt. 6/15/21 @ 10am.   Has home health visited the patient within 72 hours of discharge?  N/A   Psychosocial issues?  No   Did the patient receive a copy of their discharge instructions?  Yes   Nursing interventions  Reviewed instructions with patient   What is the patient's perception of their health status since discharge?  Improving   Nursing interventions  Nurse provided patient education   Is the patient /caregiver able to teach back basic post-op care?  Continue use of incentive spirometry at least 1 week post discharge, Practice 'cough and deep breath', Drive as instructed by MD in discharge instructions, Take showers only when approved by MD-sponge bathe until then, No tub bath, swimming, or hot tub until instructed by MD, Keep incision areas clean,dry and protected, Lifting as instructed by MD in discharge instructions   Is the patient/caregiver able to teach back signs and symptoms of incisional infection?  Fever, Incisional warmth, Increased drainage or bleeding, Increased redness, swelling or pain at the incisonal  site   Is the patient/caregiver able to teach back steps to recovery at home?  Eat a well-balance diet, Rest and rebuild strength, gradually increase activity   If the patient is a current smoker, are they able to teach back resources for cessation?  Not a smoker   Is the patient/caregiver able to teach back the hierarchy of who to call/visit for symptoms/problems? PCP, Specialist, Home health nurse, Urgent Care, ED, 911  Yes   TCM call completed?  Yes          Jaylin Mcintyre RN    6/11/2021, 10:18 EDT

## 2021-06-15 ENCOUNTER — OFFICE VISIT (OUTPATIENT)
Dept: BARIATRICS/WEIGHT MGMT | Facility: CLINIC | Age: 46
End: 2021-06-15

## 2021-06-15 VITALS
WEIGHT: 247 LBS | RESPIRATION RATE: 18 BRPM | HEIGHT: 63 IN | DIASTOLIC BLOOD PRESSURE: 81 MMHG | TEMPERATURE: 97.8 F | SYSTOLIC BLOOD PRESSURE: 120 MMHG | BODY MASS INDEX: 43.77 KG/M2 | HEART RATE: 98 BPM

## 2021-06-15 DIAGNOSIS — Z98.84 S/P LAPAROSCOPIC SLEEVE GASTRECTOMY: ICD-10-CM

## 2021-06-15 DIAGNOSIS — Z71.3 DIETARY COUNSELING: ICD-10-CM

## 2021-06-15 DIAGNOSIS — R73.9 HYPERGLYCEMIA: ICD-10-CM

## 2021-06-15 DIAGNOSIS — E66.01 OBESITY, CLASS III, BMI 40-49.9 (MORBID OBESITY) (HCC): Primary | ICD-10-CM

## 2021-06-15 PROCEDURE — 99024 POSTOP FOLLOW-UP VISIT: CPT | Performed by: NURSE PRACTITIONER

## 2021-06-15 NOTE — PROGRESS NOTES
[facility] BARIATRIC [facility] BARIATRIC SURGERY  [address] [facility] [address]  [facility] [address]  [phone]  [address] [address] [phone]  Dept: [phone]  6/15/2021      Angie Patel.  [id number]  [id number]  1975  female      Chief Complaint   Patient presents with   • Post-op     1 WEEK POST OP SLEEVE       BH Post-Op Bariatric Surgery:   Angie Patel is status post laparopscopic Laparoscopic Sleeve with PHR procedure, performed on 6/9/21.     HPI:   Today's weight is 112 kg (247 lb) pounds, today's BMI is Body mass index is 43.77 kg/m².,@ has a  loss of 19 pounds since the last visit and@ weight loss since surgery is 19 pounds. The patient reports a decreased portion size and loss of appetite.  Angie Patel denies nausea, vomiting, dysphagia and reports incisional discomfort and nausea with vitamins. The patient c/o appropriate post-op incisional discomfort that is improving. she is doing well with protein and water intake so far. Taking their vitamins, walking and using IS. Denies fevers, chills, chest pain or shortness of air. She states that she has trouble tolerating vitamins but is able to tolerate Centrum minis.  She takes 1 of these daily.       Diet and Exercise: Diet history reviewed and discussed with the patient. Weight loss/gains to date discussed with the patient. No carbonated beverage consumption and exercising regularly- walking frequently.   Supplements: multivitamins, B-12, calcium, iron, B-1 and Vitamin D.   Patient is taking blood thinner as prescribed: Lovenox  Current outpatient and discharge medications have been reconciled for the patient.  Reviewed by: Deborah Longo, HAN        Review of Systems   Gastrointestinal:        Incisional pain   All other systems reviewed and are negative.      Patient Active Problem List   Diagnosis   • Magnetic resonance imaging of brain abnormal   • Fatigue   • Abnormal gait   •  Hypercholesterolemia   • Meningioma (CMS/HCC)   • Migraine   • Obesity, Class III, BMI 40-49.9 (morbid obesity) (CMS/HCC)   • Vitamin D insufficiency   • Memory changes   • PCOS (polycystic ovarian syndrome)   • Menopausal symptoms   • Insulin resistance syndrome   • Right ureteral stone   • Constipation due to pain medication therapy   • Blurred vision, bilateral   • Anxiety   • History of kidney stones   • Dietary counseling   • Fibromyalgia   • Primary insomnia   • Multiple joint pain   • Chronic cough   • Thyroid enlargement   • Left-sided chest pain   • Neurological abnormality   • KACI (obstructive sleep apnea)   • Hypokalemia   • Left hip pain   • Intermittent claudication (CMS/HCC)   • Heartburn   • Urinary, incontinence, stress female   • Diabetes mellitus type 2 in obese (CMS/HCC)   • Peripartum cardiomyopathy   • Essential hypertension   • Hiatal hernia   • S/P laparoscopic sleeve gastrectomy       The following portions of the patient's history were reviewed and updated as appropriate: allergies, current medications, past medical history, past surgical history and problem list.    Vitals:    06/15/21 0951   BP: 120/81   Pulse: 98   Resp: 18   Temp: 97.8 °F (36.6 °C)       Physical Exam  Constitutional:       Appearance: Normal appearance. She is obese.   HENT:      Head: Normocephalic and atraumatic.   Eyes:      Extraocular Movements: Extraocular movements intact.      Conjunctiva/sclera: Conjunctivae normal.      Pupils: Pupils are equal, round, and reactive to light.   Cardiovascular:      Rate and Rhythm: Normal rate and regular rhythm.      Heart sounds: Normal heart sounds.   Pulmonary:      Effort: Pulmonary effort is normal. No respiratory distress.      Breath sounds: Normal breath sounds.   Abdominal:      General: Bowel sounds are normal. There is no distension.      Palpations: Abdomen is soft. There is no mass.      Tenderness: There is no abdominal tenderness. There is no guarding.    Musculoskeletal:         General: Normal range of motion.      Cervical back: Normal range of motion and neck supple.   Skin:     General: Skin is warm and dry.   Neurological:      General: No focal deficit present.      Mental Status: She is alert and oriented to person, place, and time.   Psychiatric:         Mood and Affect: Mood normal.         Behavior: Behavior normal.         Thought Content: Thought content normal.         Judgment: Judgment normal.         Assessment:   Post-op, the patient is doing well.     Encounter Diagnoses   Name Primary?   • Hyperglycemia    • Obesity, Class III, BMI 40-49.9 (morbid obesity) (CMS/HCC) Yes   • S/P laparoscopic sleeve gastrectomy    • Dietary counseling        Plan:   Reviewed postoperative diet progression.  All questions answered.  Will check labs at next visit.    Reviewed with patient the importance of following the manual for diet progression. Increase activity as tolerated. Continue increasing daily intake of protein and water.   Return to work: the patient is to return to 3 weeks from their surgery date with no restrictions unless they develop medical problems in which we will see them back in the office. They received a note in our office today with their return to work date.  Activity restrictions: no lifting, pushing or pulling over 25lbs for 3 weeks.   Recommended patient be sure to get at least 70 grams of protein per day. Discussed with the patient the recommended amount of water per day to intake. Reviewed vitamin requirements. Be sure to do routine exercise and increase activity as tolerated. No asa, nsaids or steroids for 8 weeks if gastric sleeve procedure and lifelong if gastric bypass procedure.. Patient may use miralax as needed if necessary.     Instructions / Recommendations: dietary counseling recommended, recommended a daily protein intake of  grams, vitamin supplement(s) recommended, recommended exercising at least 150 minutes per  week, behavior modifications recommended and instructed to call the office for concerns, questions, or problems.     The patient was instructed to follow up at one month follow up appt.     The patient was counseled regarding post op bariatric manual

## 2021-06-21 ENCOUNTER — OFFICE VISIT (OUTPATIENT)
Dept: FAMILY MEDICINE CLINIC | Facility: CLINIC | Age: 46
End: 2021-06-21

## 2021-06-21 VITALS
WEIGHT: 242 LBS | HEART RATE: 92 BPM | BODY MASS INDEX: 42.88 KG/M2 | SYSTOLIC BLOOD PRESSURE: 120 MMHG | DIASTOLIC BLOOD PRESSURE: 76 MMHG | OXYGEN SATURATION: 99 % | HEIGHT: 63 IN | TEMPERATURE: 97.8 F

## 2021-06-21 DIAGNOSIS — E66.01 OBESITY, CLASS III, BMI 40-49.9 (MORBID OBESITY) (HCC): ICD-10-CM

## 2021-06-21 DIAGNOSIS — R73.9 HYPERGLYCEMIA: ICD-10-CM

## 2021-06-21 DIAGNOSIS — Z09 HOSPITAL DISCHARGE FOLLOW-UP: Primary | ICD-10-CM

## 2021-06-21 PROCEDURE — 99213 OFFICE O/P EST LOW 20 MIN: CPT | Performed by: PHYSICIAN ASSISTANT

## 2021-06-21 RX ORDER — METFORMIN HYDROCHLORIDE 750 MG/1
750 TABLET, EXTENDED RELEASE ORAL
Qty: 60 TABLET | Refills: 6
Start: 2021-06-21 | End: 2022-04-28 | Stop reason: SDUPTHER

## 2021-06-21 NOTE — PROGRESS NOTES
"Chief Complaint  surgery follow up and Obesity (Laparoscopic sleeve surgery)    Subjective          Angie Patel presents to [facility] PRIMARY CARE  History of Present Illness  Angie is a 46-year-old female who presents for hospital discharge follow-up for morbid obesity with status post laparoscopic sleeve surgery.  I have reviewed her hospital discharge paperwork with her at office visit today.  She is admitted to [facility] on June 9, 2021 with discharge date of Lucila 10, 2021.  She has lost 21 pounds since March 2021.  She is been doing protein and low calorie diet.  Try to keep fluids at 64 ounces daily.  Overall she is tolerating her surgery well.  States she is still slightly tired but that is improving.  Denied any current chest pain, shortness of air, wheezing, dizziness, or swelling of ankles.  Her bowel movements have been slightly decreased since surgery.  Her blood sugars have been ranging anywhere from 72-1 01.  Recently decreased her Metformin to 750 mg once daily.  Sleep has been normal.     Objective   Vital Signs:   /76   Pulse 92   Temp 97.8 °F (36.6 °C)   Ht 160 cm (62.99\")   Wt 110 kg (242 lb)   SpO2 99%   BMI 42.88 kg/m²     Physical Exam  Vitals and nursing note reviewed.   Constitutional:       Appearance: Normal appearance. She is well-developed and well-groomed. She is morbidly obese.      Interventions: Face mask in place.   HENT:      Head: Normocephalic and atraumatic.   Neck:      Thyroid: No thyroid mass, thyromegaly or thyroid tenderness.      Vascular: No carotid bruit.      Trachea: Trachea and phonation normal. No tracheal tenderness.   Cardiovascular:      Rate and Rhythm: Normal rate and regular rhythm.      Pulses: Normal pulses.      Heart sounds: Normal heart sounds, S1 normal and S2 normal. No murmur heard.     Pulmonary:      Effort: Pulmonary effort is normal.      Breath sounds: Normal breath sounds and air entry. "   Abdominal:      General: Bowel sounds are normal.      Palpations: Abdomen is soft. There is no hepatomegaly.      Tenderness: There is no abdominal tenderness. There is no right CVA tenderness, left CVA tenderness, guarding or rebound. Negative signs include Rocha's sign, Rovsing's sign, McBurney's sign, psoas sign and obturator sign.   Musculoskeletal:      Cervical back: Neck supple.      Right lower leg: No edema.      Left lower leg: No edema.   Skin:     General: Skin is warm and dry.      Capillary Refill: Capillary refill takes less than 2 seconds.   Neurological:      Mental Status: She is alert and oriented to person, place, and time.   Psychiatric:         Attention and Perception: Attention and perception normal.         Mood and Affect: Mood and affect normal.         Speech: Speech normal.         Behavior: Behavior normal. Behavior is cooperative.         Thought Content: Thought content normal.         Cognition and Memory: Cognition and memory normal.         Judgment: Judgment normal.     I was wearing a surgical mask and face shield during the entire office visit/encounter.     Result Review :            Current outpatient and discharge medications have been reconciled for the patient.  Reviewed by: Kya Kendrick PA-C           Assessment and Plan    Diagnoses and all orders for this visit:    1. Hospital discharge follow-up (Primary)    2. Obesity, Class III, BMI 40-49.9 (morbid obesity) (CMS/HCC)    3. Hyperglycemia  -     metFORMIN ER (GLUCOPHAGE-XR) 750 MG 24 hr tablet; Take 1 tablet by mouth Daily With Dinner.  Dispense: 60 tablet; Refill: 6    1.  New hospital discharge for morbid obesity/gastric sleeve surgery: I reviewed her hospital discharge paperwork with her at office visit today.  Currently doing well.  She will keep her follow-up appointments with bariatric surgeon.  Have been given her encouragement and praise for her weight loss.  2.  Chronic and improving hyperglycemia: Her  blood sugars have been trending lower since her surgery.  She will decrease her Metformin to 750 mg once daily.  She will update sugar readings as directed.  We will keep her follow-up appointments with her endocrinologist as well.    Follow Up   No follow-ups on file.  Patient was given instructions and counseling regarding her condition or for health maintenance advice. Please see specific information pulled into the AVS if appropriate.     [facility] [address] [address]  Dept: [phone]  Dept Fax: [phone]  Loc: [phone]  Loc Fax: [phone]        Answers for HPI/ROS submitted by the patient on 6/14/2021  Please describe your symptoms.: 2 week post op  Have you had these symptoms before?: No  How long have you been having these symptoms?: 1-2 weeks  What is the primary reason for your visit?: Other

## 2021-06-23 RX ORDER — SPIRONOLACTONE 100 MG/1
100 TABLET, FILM COATED ORAL DAILY
Qty: 90 TABLET | Refills: 0 | Status: SHIPPED | OUTPATIENT
Start: 2021-06-23 | End: 2021-09-21

## 2021-07-13 ENCOUNTER — OFFICE VISIT (OUTPATIENT)
Dept: BARIATRICS/WEIGHT MGMT | Facility: CLINIC | Age: 46
End: 2021-07-13

## 2021-07-13 VITALS
SYSTOLIC BLOOD PRESSURE: 119 MMHG | HEART RATE: 79 BPM | TEMPERATURE: 97.5 F | HEIGHT: 63 IN | BODY MASS INDEX: 41.2 KG/M2 | DIASTOLIC BLOOD PRESSURE: 79 MMHG | WEIGHT: 232.5 LBS

## 2021-07-13 DIAGNOSIS — E04.9 THYROID ENLARGEMENT: ICD-10-CM

## 2021-07-13 DIAGNOSIS — E55.9 VITAMIN D INSUFFICIENCY: ICD-10-CM

## 2021-07-13 DIAGNOSIS — E78.5 HYPERLIPIDEMIA, UNSPECIFIED HYPERLIPIDEMIA TYPE: ICD-10-CM

## 2021-07-13 DIAGNOSIS — I10 ESSENTIAL HYPERTENSION: ICD-10-CM

## 2021-07-13 DIAGNOSIS — E66.9 DIABETES MELLITUS TYPE 2 IN OBESE (HCC): ICD-10-CM

## 2021-07-13 DIAGNOSIS — E11.69 DIABETES MELLITUS TYPE 2 IN OBESE (HCC): ICD-10-CM

## 2021-07-13 DIAGNOSIS — E11.9 CONTROLLED TYPE 2 DIABETES MELLITUS WITHOUT COMPLICATION, UNSPECIFIED WHETHER LONG TERM INSULIN USE (HCC): Primary | ICD-10-CM

## 2021-07-13 DIAGNOSIS — R68.89 ABNORMAL ENDOCRINE LABORATORY TEST FINDING: ICD-10-CM

## 2021-07-13 DIAGNOSIS — E28.2 PCOS (POLYCYSTIC OVARIAN SYNDROME): ICD-10-CM

## 2021-07-13 DIAGNOSIS — R53.82 CHRONIC FATIGUE: ICD-10-CM

## 2021-07-13 DIAGNOSIS — E88.81 INSULIN RESISTANCE SYNDROME: ICD-10-CM

## 2021-07-13 DIAGNOSIS — E78.00 HYPERCHOLESTEROLEMIA: ICD-10-CM

## 2021-07-13 DIAGNOSIS — E87.6 HYPOKALEMIA: ICD-10-CM

## 2021-07-13 DIAGNOSIS — Z98.84 S/P LAPAROSCOPIC SLEEVE GASTRECTOMY: ICD-10-CM

## 2021-07-13 DIAGNOSIS — E66.01 OBESITY, CLASS III, BMI 40-49.9 (MORBID OBESITY) (HCC): Primary | ICD-10-CM

## 2021-07-13 DIAGNOSIS — G47.33 OSA (OBSTRUCTIVE SLEEP APNEA): ICD-10-CM

## 2021-07-13 PROCEDURE — 99024 POSTOP FOLLOW-UP VISIT: CPT | Performed by: NURSE PRACTITIONER

## 2021-07-13 RX ORDER — MULTIPLE VITAMINS W/ MINERALS TAB 9MG-400MCG
1 TAB ORAL EVERY 12 HOURS
COMMUNITY

## 2021-07-13 NOTE — PROGRESS NOTES
[facility] BARIATRIC [facility] BARIATRIC SURGERY  [address] [facility] [address]  [facility] [address]  [phone]  [address] [facility] [address]  [facility] [address]  [phone]  Dept: [phone]  7/13/2021      Angie Patel.  [id number]  [id number]  1975  female      Chief Complaint   Patient presents with   • Post-op     1 month Sleeve       BH Post-Op Bariatric Surgery:   Angie Patel is status post Laparoscopic Sleeve with paraesophageal hernia repair procedure, performed on 6/9/21.     HPI:   Today's weight is 105 kg (232 lb 8 oz) pounds, today's BMI is Body mass index is 41.2 kg/m².,@ has a  loss of 14.5 pounds since the last visit and@ weight loss since surgery is 34 pounds. The patient reports a decreased portion size and loss of appetite.      Angie Patel denies nausea, vomiting, dysphagia and reports tolerating regular diet.  She is experiencing some nausea between meals.  Says she becomes very hungry all of the sudden and feels like she needs to eat.  She usually has a cheese stick or high protein yogurt during those times which seems to make her feel better.       Diet and Exercise: Diet history reviewed and discussed with the patient. Weight loss/gains to date discussed with the patient. The patient states they are eating 60-80 grams of protein per day. She reports eating 3 meals per day, a typical portion size of 1/2 cup, eating 1 snacks per day, drinking 5 or more 8-oz. glasses of water per day, no carbonated beverage consumption and exercising regularly.     She is getting 60-80 grams of protein daily.  She is trying to eat her protein as she is tired of protein shakes.  She has been walking 5-6 times a week up to 4.5 miles and doing a modified boot camp    Supplements: Centrum one a day BID.     Review of Systems   Constitutional: Positive for activity change, appetite change and unexpected weight change.   All other systems reviewed and are negative.      Patient  Active Problem List   Diagnosis   • Magnetic resonance imaging of brain abnormal   • Fatigue   • Abnormal gait   • Hypercholesterolemia   • Meningioma (CMS/HCC)   • Migraine   • Obesity, Class III, BMI 40-49.9 (morbid obesity) (CMS/HCC)   • Vitamin D insufficiency   • Memory changes   • PCOS (polycystic ovarian syndrome)   • Menopausal symptoms   • Insulin resistance syndrome   • Right ureteral stone   • Constipation due to pain medication therapy   • Blurred vision, bilateral   • Anxiety   • History of kidney stones   • Dietary counseling   • Fibromyalgia   • Primary insomnia   • Multiple joint pain   • Chronic cough   • Thyroid enlargement   • Left-sided chest pain   • Neurological abnormality   • KACI (obstructive sleep apnea)   • Hypokalemia   • Left hip pain   • Intermittent claudication (CMS/HCC)   • Heartburn   • Urinary, incontinence, stress female   • Diabetes mellitus type 2 in obese (CMS/HCC)   • Peripartum cardiomyopathy   • Essential hypertension   • Hiatal hernia   • S/P laparoscopic sleeve gastrectomy       Past Medical History:   Diagnosis Date   • Anxiety    • Asthma    • Diabetes mellitus (CMS/HCC)    • Fibromyalgia    • Fibromyalgia, primary    • Headache, post-lumbar puncture 9/4/2020   • Headache, tension-type    • Hypertension    • Kidney calculi    • Memory loss    • Meningioma (CMS/HCC)    • Migraine     ocular   • KACI (obstructive sleep apnea)     On CPAP   • Palpitations    • Polycystic ovaries    • PONV (postoperative nausea and vomiting)     NAUSEA    • Postpartum cardiomyopathy 2002   • Preeclampsia 2002   • Skin abrasion 06/07/2021    RIGHT ABD- FROM DOG'S SEIZURE        The following portions of the patient's history were reviewed and updated as appropriate: allergies, current medications, past medical history, past surgical history and problem list.    Vitals:    07/13/21 1344   BP: 119/79   Pulse: 79   Temp: 97.5 °F (36.4 °C)       Physical Exam  Constitutional:       Appearance:  Normal appearance. She is obese.   HENT:      Head: Normocephalic and atraumatic.      Mouth/Throat:      Mouth: Mucous membranes are moist.   Eyes:      General: No scleral icterus.     Extraocular Movements: Extraocular movements intact.      Conjunctiva/sclera: Conjunctivae normal.      Pupils: Pupils are equal, round, and reactive to light.   Cardiovascular:      Rate and Rhythm: Normal rate and regular rhythm.      Heart sounds: Normal heart sounds.   Pulmonary:      Effort: Pulmonary effort is normal. No respiratory distress.      Breath sounds: Normal breath sounds.   Abdominal:      General: Bowel sounds are normal. There is no distension.      Palpations: Abdomen is soft. There is no mass.      Tenderness: There is no abdominal tenderness. There is no guarding.   Musculoskeletal:         General: Normal range of motion.      Cervical back: Normal range of motion and neck supple.   Skin:     General: Skin is warm and dry.   Neurological:      General: No focal deficit present.      Mental Status: She is alert and oriented to person, place, and time.   Psychiatric:         Mood and Affect: Mood normal.         Behavior: Behavior normal.         Thought Content: Thought content normal.         Judgment: Judgment normal.         Assessment:   Post-op, the patient doing well.     Encounter Diagnoses   Name Primary?   • Obesity, Class III, BMI 40-49.9 (morbid obesity) (CMS/HCC) Yes   • S/P laparoscopic sleeve gastrectomy    • Diabetes mellitus type 2 in obese (CMS/HCC)    • Thyroid enlargement    • Insulin resistance syndrome    • PCOS (polycystic ovarian syndrome)    • Vitamin D insufficiency    • Essential hypertension    • Hypercholesterolemia    • Hypokalemia    • KACI (obstructive sleep apnea)    • Chronic fatigue        Plan:   Orders placed for lab work.  She is going to have labs drawn by endocrinology at the end of the month.  She can have our labs drawn at the same time.   Reviewed proper postoperative  diet.  Questions answered.    She is keeping track of her blood sugars and I have reinforced this.  Sounds like she may be having some hypoglycemia episodes.  Encouraged her to eat 3 meals and 2 snacks with all being high protein.  The protein will help aid in weight loss as well as blood sugar management.    She has no restrictions.    Encouraged patient to be sure to get plenty of lean protein per day through small frequent meals all with a protein source.   Activity restrictions: none.   Recommended patient be sure to get at least 70 grams of protein per day by eating small, frequent meals all with high lean protein choices. Be sure to limit/cut back on daily carbohydrate intake. Discussed with the patient the recommended amount of water per day to intake- half of body weight in ounces. Reviewed vitamin requirements. Be sure to do routine exercise, 150 minutes per week minimum, including both cardio and strength training.     Instructions / Recommendations: dietary counseling recommended, recommended a daily protein intake of  grams, vitamin supplement(s) recommended, recommended exercising at least 150 minutes per week, behavior modifications recommended and instructed to call the office for concerns, questions, or problems.     The patient was instructed to follow up in 2 months.   Total time spent during this encounter today was 25 mins

## 2021-07-20 LAB
ALBUMIN SERPL-MCNC: 4.1 G/DL (ref 3.5–5.2)
ALBUMIN/GLOB SERPL: 1.4 G/DL
ALP SERPL-CCNC: 42 U/L (ref 39–117)
ALT SERPL-CCNC: 38 U/L (ref 1–33)
AST SERPL-CCNC: 19 U/L (ref 1–32)
BILIRUB SERPL-MCNC: 0.5 MG/DL (ref 0–1.2)
BUN SERPL-MCNC: 12 MG/DL (ref 6–20)
BUN/CREAT SERPL: 13.5 (ref 7–25)
CALCIUM SERPL-MCNC: 10.1 MG/DL (ref 8.6–10.5)
CHLORIDE SERPL-SCNC: 100 MMOL/L (ref 98–107)
CHOLEST SERPL-MCNC: 159 MG/DL (ref 0–200)
CO2 SERPL-SCNC: 30.1 MMOL/L (ref 22–29)
CREAT SERPL-MCNC: 0.89 MG/DL (ref 0.57–1)
GLOBULIN SER CALC-MCNC: 3 GM/DL
GLUCOSE SERPL-MCNC: 91 MG/DL (ref 65–99)
HBA1C MFR BLD: 5.9 % (ref 4.8–5.6)
HDLC SERPL-MCNC: 33 MG/DL (ref 40–60)
IMP & REVIEW OF LAB RESULTS: NORMAL
LDLC SERPL CALC-MCNC: 98 MG/DL (ref 0–100)
POTASSIUM SERPL-SCNC: 4.1 MMOL/L (ref 3.5–5.2)
PROT SERPL-MCNC: 7.1 G/DL (ref 6–8.5)
SODIUM SERPL-SCNC: 139 MMOL/L (ref 136–145)
TRIGL SERPL-MCNC: 156 MG/DL (ref 0–150)
TSH SERPL DL<=0.005 MIU/L-ACNC: 2.96 UIU/ML (ref 0.27–4.2)
VLDLC SERPL CALC-MCNC: 28 MG/DL (ref 5–40)

## 2021-08-03 ENCOUNTER — OFFICE VISIT (OUTPATIENT)
Dept: ENDOCRINOLOGY | Age: 46
End: 2021-08-03

## 2021-08-03 VITALS
DIASTOLIC BLOOD PRESSURE: 70 MMHG | OXYGEN SATURATION: 98 % | HEART RATE: 87 BPM | SYSTOLIC BLOOD PRESSURE: 112 MMHG | HEIGHT: 63 IN | BODY MASS INDEX: 39.97 KG/M2 | WEIGHT: 225.6 LBS

## 2021-08-03 DIAGNOSIS — E78.00 HYPERCHOLESTEROLEMIA: ICD-10-CM

## 2021-08-03 DIAGNOSIS — Z98.84 S/P LAPAROSCOPIC SLEEVE GASTRECTOMY: ICD-10-CM

## 2021-08-03 DIAGNOSIS — E28.2 PCOS (POLYCYSTIC OVARIAN SYNDROME): ICD-10-CM

## 2021-08-03 DIAGNOSIS — E11.9 TYPE 2 DIABETES MELLITUS WITHOUT COMPLICATION, WITHOUT LONG-TERM CURRENT USE OF INSULIN (HCC): Primary | ICD-10-CM

## 2021-08-03 DIAGNOSIS — I10 ESSENTIAL HYPERTENSION: ICD-10-CM

## 2021-08-03 DIAGNOSIS — E55.9 VITAMIN D INSUFFICIENCY: ICD-10-CM

## 2021-08-03 PROCEDURE — 99214 OFFICE O/P EST MOD 30 MIN: CPT | Performed by: INTERNAL MEDICINE

## 2021-08-06 RX ORDER — ALBUTEROL SULFATE 90 UG/1
AEROSOL, METERED RESPIRATORY (INHALATION)
Qty: 18 G | Refills: 3 | Status: SHIPPED | OUTPATIENT
Start: 2021-08-06 | End: 2021-10-27

## 2021-09-21 RX ORDER — SPIRONOLACTONE 100 MG/1
100 TABLET, FILM COATED ORAL DAILY
Qty: 90 TABLET | Refills: 0 | Status: SHIPPED | OUTPATIENT
Start: 2021-09-21 | End: 2021-12-20

## 2021-10-27 RX ORDER — ALBUTEROL SULFATE 90 UG/1
AEROSOL, METERED RESPIRATORY (INHALATION)
Qty: 18 G | Refills: 3 | Status: SHIPPED | OUTPATIENT
Start: 2021-10-27 | End: 2022-01-16

## 2021-11-02 ENCOUNTER — OFFICE VISIT (OUTPATIENT)
Dept: BARIATRICS/WEIGHT MGMT | Facility: CLINIC | Age: 46
End: 2021-11-02

## 2021-11-02 ENCOUNTER — LAB (OUTPATIENT)
Dept: LAB | Facility: HOSPITAL | Age: 46
End: 2021-11-02

## 2021-11-02 VITALS
HEIGHT: 63 IN | BODY MASS INDEX: 37.74 KG/M2 | SYSTOLIC BLOOD PRESSURE: 116 MMHG | WEIGHT: 213 LBS | DIASTOLIC BLOOD PRESSURE: 82 MMHG | HEART RATE: 80 BPM | RESPIRATION RATE: 18 BRPM | TEMPERATURE: 98.4 F

## 2021-11-02 DIAGNOSIS — E04.9 THYROID ENLARGEMENT: ICD-10-CM

## 2021-11-02 DIAGNOSIS — E78.00 HYPERCHOLESTEROLEMIA: ICD-10-CM

## 2021-11-02 DIAGNOSIS — R53.82 CHRONIC FATIGUE: ICD-10-CM

## 2021-11-02 DIAGNOSIS — E11.69 DIABETES MELLITUS TYPE 2 IN OBESE (HCC): ICD-10-CM

## 2021-11-02 DIAGNOSIS — E66.9 OBESITY, CLASS II, BMI 35-39.9: Primary | ICD-10-CM

## 2021-11-02 DIAGNOSIS — E28.2 PCOS (POLYCYSTIC OVARIAN SYNDROME): ICD-10-CM

## 2021-11-02 DIAGNOSIS — E66.9 DIABETES MELLITUS TYPE 2 IN OBESE (HCC): ICD-10-CM

## 2021-11-02 DIAGNOSIS — I10 ESSENTIAL HYPERTENSION: ICD-10-CM

## 2021-11-02 DIAGNOSIS — E88.81 INSULIN RESISTANCE SYNDROME: ICD-10-CM

## 2021-11-02 DIAGNOSIS — E55.9 VITAMIN D INSUFFICIENCY: ICD-10-CM

## 2021-11-02 DIAGNOSIS — Z98.84 S/P LAPAROSCOPIC SLEEVE GASTRECTOMY: ICD-10-CM

## 2021-11-02 DIAGNOSIS — E87.6 HYPOKALEMIA: ICD-10-CM

## 2021-11-02 DIAGNOSIS — G47.33 OSA (OBSTRUCTIVE SLEEP APNEA): ICD-10-CM

## 2021-11-02 DIAGNOSIS — Z71.3 DIETARY COUNSELING: ICD-10-CM

## 2021-11-02 DIAGNOSIS — E66.01 OBESITY, CLASS III, BMI 40-49.9 (MORBID OBESITY) (HCC): ICD-10-CM

## 2021-11-02 PROBLEM — E66.812 OBESITY, CLASS II, BMI 35-39.9: Status: ACTIVE | Noted: 2017-05-01

## 2021-11-02 LAB
25(OH)D3 SERPL-MCNC: 44 NG/ML (ref 30–100)
ALBUMIN SERPL-MCNC: 4.2 G/DL (ref 3.5–5.2)
ALBUMIN/GLOB SERPL: 1.2 G/DL
ALP SERPL-CCNC: 45 U/L (ref 39–117)
ALT SERPL W P-5'-P-CCNC: 19 U/L (ref 1–33)
ANION GAP SERPL CALCULATED.3IONS-SCNC: 7.6 MMOL/L (ref 5–15)
AST SERPL-CCNC: 17 U/L (ref 1–32)
BASOPHILS # BLD AUTO: 0.02 10*3/MM3 (ref 0–0.2)
BASOPHILS NFR BLD AUTO: 0.2 % (ref 0–1.5)
BILIRUB SERPL-MCNC: 0.4 MG/DL (ref 0–1.2)
BUN SERPL-MCNC: 17 MG/DL (ref 6–20)
BUN/CREAT SERPL: 23.6 (ref 7–25)
CALCIUM SPEC-SCNC: 10 MG/DL (ref 8.6–10.5)
CHLORIDE SERPL-SCNC: 105 MMOL/L (ref 98–107)
CO2 SERPL-SCNC: 30.4 MMOL/L (ref 22–29)
CREAT SERPL-MCNC: 0.72 MG/DL (ref 0.57–1)
DEPRECATED RDW RBC AUTO: 36.8 FL (ref 37–54)
EOSINOPHIL # BLD AUTO: 0.09 10*3/MM3 (ref 0–0.4)
EOSINOPHIL NFR BLD AUTO: 1.1 % (ref 0.3–6.2)
ERYTHROCYTE [DISTWIDTH] IN BLOOD BY AUTOMATED COUNT: 11.6 % (ref 12.3–15.4)
FERRITIN SERPL-MCNC: 110 NG/ML (ref 13–150)
FOLATE SERPL-MCNC: >20 NG/ML (ref 4.78–24.2)
GFR SERPL CREATININE-BSD FRML MDRD: 88 ML/MIN/1.73
GLOBULIN UR ELPH-MCNC: 3.6 GM/DL
GLUCOSE SERPL-MCNC: 94 MG/DL (ref 65–99)
HCT VFR BLD AUTO: 42.3 % (ref 34–46.6)
HGB BLD-MCNC: 14.5 G/DL (ref 12–15.9)
IMM GRANULOCYTES # BLD AUTO: 0.02 10*3/MM3 (ref 0–0.05)
IMM GRANULOCYTES NFR BLD AUTO: 0.2 % (ref 0–0.5)
IRON 24H UR-MRATE: 105 MCG/DL (ref 37–145)
LYMPHOCYTES # BLD AUTO: 2.79 10*3/MM3 (ref 0.7–3.1)
LYMPHOCYTES NFR BLD AUTO: 33.7 % (ref 19.6–45.3)
MCH RBC QN AUTO: 30 PG (ref 26.6–33)
MCHC RBC AUTO-ENTMCNC: 34.3 G/DL (ref 31.5–35.7)
MCV RBC AUTO: 87.6 FL (ref 79–97)
MONOCYTES # BLD AUTO: 0.45 10*3/MM3 (ref 0.1–0.9)
MONOCYTES NFR BLD AUTO: 5.4 % (ref 5–12)
NEUTROPHILS NFR BLD AUTO: 4.91 10*3/MM3 (ref 1.7–7)
NEUTROPHILS NFR BLD AUTO: 59.4 % (ref 42.7–76)
NRBC BLD AUTO-RTO: 0 /100 WBC (ref 0–0.2)
PLATELET # BLD AUTO: 291 10*3/MM3 (ref 140–450)
PMV BLD AUTO: 11.8 FL (ref 6–12)
POTASSIUM SERPL-SCNC: 3.3 MMOL/L (ref 3.5–5.2)
PREALB SERPL-MCNC: 20.6 MG/DL (ref 20–40)
PROT SERPL-MCNC: 7.8 G/DL (ref 6–8.5)
RBC # BLD AUTO: 4.83 10*6/MM3 (ref 3.77–5.28)
SODIUM SERPL-SCNC: 143 MMOL/L (ref 136–145)
WBC # BLD AUTO: 8.28 10*3/MM3 (ref 3.4–10.8)

## 2021-11-02 PROCEDURE — 99213 OFFICE O/P EST LOW 20 MIN: CPT | Performed by: NURSE PRACTITIONER

## 2021-11-02 PROCEDURE — 83921 ORGANIC ACID SINGLE QUANT: CPT

## 2021-11-02 PROCEDURE — 85025 COMPLETE CBC W/AUTO DIFF WBC: CPT

## 2021-11-02 PROCEDURE — 82306 VITAMIN D 25 HYDROXY: CPT

## 2021-11-02 PROCEDURE — 84425 ASSAY OF VITAMIN B-1: CPT

## 2021-11-02 PROCEDURE — 80053 COMPREHEN METABOLIC PANEL: CPT

## 2021-11-02 PROCEDURE — 36415 COLL VENOUS BLD VENIPUNCTURE: CPT

## 2021-11-02 PROCEDURE — 83540 ASSAY OF IRON: CPT

## 2021-11-02 PROCEDURE — 82746 ASSAY OF FOLIC ACID SERUM: CPT

## 2021-11-02 PROCEDURE — 84134 ASSAY OF PREALBUMIN: CPT

## 2021-11-02 PROCEDURE — 82728 ASSAY OF FERRITIN: CPT

## 2021-11-02 RX ORDER — PREDNISOLONE ACETATE 10 MG/ML
SUSPENSION/ DROPS OPHTHALMIC
COMMUNITY
Start: 2021-08-06 | End: 2021-12-17

## 2021-11-02 NOTE — PROGRESS NOTES
[facility] BARIATRIC [facility] BARIATRIC SURGERY  [address] [facility] [address]  [facility] [address]  [phone]  [address] [facility] [address]  [facility] [address]  [phone]  Dept: [phone]  11/2/2021      Angie Patel.  [id number]  [id number]  1975  female      Chief Complaint   Patient presents with   • Follow-up     3 month sleeve follow up        Post-Op Bariatric Surgery:   Angie Patel is status post Laparoscopic Sleeve with paraesophageal hernia repair procedure, performed on 6/9/2021.     HPI:   Today's weight is 96.6 kg (213 lb) pounds, today's BMI is Body mass index is 37.74 kg/m².,@ has a  loss of 19 pounds since the last visit and@ weight loss since surgery is 53 pounds. The patient reports a decreased portion size and loss of appetite.      Angie Patel denies nausea, vomiting, reflux, dysphagia and reports tolerating regular diet.  She is struggling with getting her protein in per day.  She is only able to eat about 2-3 oz for each meal.  She has been in [place] helping her mom as there have been 3 deaths in the family 2 of which were COVID related.  Once she returned home there was another death on her father's side of the family.  She is currently working 2 jobs and feeling fatigued.  She also c/o hair loss which is coming out in clumps.     Diet and Exercise: Diet history reviewed and discussed with the patient. Weight loss/gains to date discussed with the patient. The patient states they are eating 50-70 grams of protein per day. She reports eating 2 meals per day, a typical portion size of 1/2 cup, eating 2 snacks per day, drinking 5 or more 8-oz. glasses of water per day, no carbonated beverage consumption and exercising regularly.   She usually has around 30-45 g of protein in the morning and throughout the day has beef jerky, nuts, and chicken.      Supplements: bMTV.     Review of Systems   HENT:        Alopecia     All other systems reviewed and are  negative.      Patient Active Problem List   Diagnosis   • Magnetic resonance imaging of brain abnormal   • Fatigue   • Abnormal gait   • Hypercholesterolemia   • Meningioma (HCC)   • Migraine   • Vitamin D insufficiency   • Memory changes   • PCOS (polycystic ovarian syndrome)   • Menopausal symptoms   • Insulin resistance syndrome   • Right ureteral stone   • Constipation due to pain medication therapy   • Blurred vision, bilateral   • Obesity, Class II, BMI 35-39.9   • Anxiety   • History of kidney stones   • Dietary counseling   • Fibromyalgia   • Primary insomnia   • Multiple joint pain   • Chronic cough   • Thyroid enlargement   • Left-sided chest pain   • Neurological abnormality   • KACI (obstructive sleep apnea)   • Hypokalemia   • Left hip pain   • Intermittent claudication (HCC)   • Heartburn   • Urinary, incontinence, stress female   • Diabetes mellitus type 2 in obese (HCC)   • Peripartum cardiomyopathy   • Essential hypertension   • Hiatal hernia   • S/P laparoscopic sleeve gastrectomy       Past Medical History:   Diagnosis Date   • Anxiety    • Asthma    • Diabetes mellitus (HCC)    • Fibromyalgia    • Fibromyalgia, primary    • Headache, post-lumbar puncture 9/4/2020   • Headache, tension-type    • Hypertension    • Kidney calculi    • Memory loss    • Meningioma (HCC)    • Migraine     ocular   • KACI (obstructive sleep apnea)     On CPAP   • Palpitations    • Polycystic ovaries    • PONV (postoperative nausea and vomiting)     NAUSEA    • Postpartum cardiomyopathy 2002   • Preeclampsia 2002   • Skin abrasion 06/07/2021    RIGHT ABD- FROM DOG'S SEIZURE        The following portions of the patient's history were reviewed and updated as appropriate: allergies, current medications, past medical history, past surgical history and problem list.    Vitals:    11/02/21 0729   BP: 116/82   Pulse: 80   Resp: 18   Temp: 98.4 °F (36.9 °C)       Physical Exam  Constitutional:       General: She is not in acute  distress.     Appearance: Normal appearance. She is obese.   HENT:      Head: Normocephalic and atraumatic.      Mouth/Throat:      Mouth: Mucous membranes are moist.   Eyes:      General: No scleral icterus.     Extraocular Movements: Extraocular movements intact.      Conjunctiva/sclera: Conjunctivae normal.      Pupils: Pupils are equal, round, and reactive to light.   Cardiovascular:      Rate and Rhythm: Normal rate and regular rhythm.      Heart sounds: Normal heart sounds.   Pulmonary:      Effort: Pulmonary effort is normal. No respiratory distress.      Breath sounds: Normal breath sounds.   Abdominal:      General: Bowel sounds are normal. There is no distension.      Palpations: Abdomen is soft. There is no mass.      Tenderness: There is no abdominal tenderness. There is no guarding.   Musculoskeletal:         General: Normal range of motion.      Cervical back: Normal range of motion and neck supple.   Skin:     General: Skin is warm and dry.   Neurological:      General: No focal deficit present.      Mental Status: She is alert and oriented to person, place, and time.   Psychiatric:         Mood and Affect: Mood normal.         Behavior: Behavior normal.         Thought Content: Thought content normal.         Judgment: Judgment normal.         Assessment:   Post-op, the patient is doing well.     Encounter Diagnoses   Name Primary?   • Obesity, Class II, BMI 35-39.9 Yes   • S/P laparoscopic sleeve gastrectomy    • Dietary counseling    • Hypercholesterolemia    • PCOS (polycystic ovarian syndrome)    • Diabetes mellitus type 2 in obese (HCC)    • KACI (obstructive sleep apnea)        Plan:   Labs today.    Discussed ways to add protein to diet using genpro.   Biotin/collagen/protein for hair loss  Encouraged patient to be sure to get plenty of lean protein per day through small frequent meals all with a protein source.   Activity restrictions: none.   Recommended patient be sure to get at least 70  grams of protein per day by eating small, frequent meals all with high lean protein choices. Be sure to limit/cut back on daily carbohydrate intake. Discussed with the patient the recommended amount of water per day to intake- half of body weight in ounces. Reviewed vitamin requirements. Be sure to do routine exercise, 150 minutes per week minimum, including both cardio and strength training.     Instructions / Recommendations: dietary counseling recommended, recommended a daily protein intake of  grams, vitamin supplement(s) recommended, recommended exercising at least 150 minutes per week, behavior modifications recommended and instructed to call the office for concerns, questions, or problems.     The patient was instructed to follow up in 3 months.     Total time spent during this encounter today was 25 minutes.

## 2021-11-04 RX ORDER — POTASSIUM CHLORIDE 20 MEQ/1
20 TABLET, EXTENDED RELEASE ORAL 2 TIMES DAILY
Qty: 14 TABLET | Refills: 0 | Status: SHIPPED | OUTPATIENT
Start: 2021-11-04 | End: 2021-11-11

## 2021-11-05 LAB
Lab: NORMAL
METHYLMALONATE SERPL-SCNC: 167 NMOL/L (ref 0–378)

## 2021-11-11 LAB — VIT B1 BLD-SCNC: 138.5 NMOL/L (ref 66.5–200)

## 2021-11-28 NOTE — PROGRESS NOTES
"      Angie Patel is a 42 y.o. patient who presents for follow up of   Chief Complaint   Patient presents with   • Gynecologic Exam     f/u labs     41 yo est pt here for discussion of labs. She had a normal pap/HPV, normal thyroid, normal FHS andFI elevated at 57. She is on metformin 1000mg qam and 500 qhs. She did not tolerate oral weight loss meds and has been dieting and working out and still not losing weight. She has had PCOS since 2006 and feels like she has been stuck since then. The lower abdominal pain she had last visit turned out to be a kidney stone. She is also having constant RUQ pain, constipation, diarrhea and reflux and has not had a workup for these issues.         The following portions of the patient's history were reviewed and updated as appropriate: allergies, current medications and problem list.    Review of Systems   Constitutional: Positive for fatigue and unexpected weight change (gain). Negative for activity change, appetite change and fever.   Gastrointestinal: Positive for abdominal pain, constipation, diarrhea, nausea and vomiting. Negative for abdominal distention.   Endocrine: Positive for heat intolerance.   Genitourinary: Negative for dyspareunia, hematuria, pelvic pain, urgency, vaginal bleeding and vaginal discharge.   Musculoskeletal: Positive for back pain.   Neurological: Positive for headaches.   Psychiatric/Behavioral: Negative for dysphoric mood and sleep disturbance.   All other systems reviewed and are negative.      /84   Ht 162.6 cm (64.02\")   Wt 114 kg (251 lb)   BMI 43.06 kg/m²     Physical Exam   Constitutional: She is oriented to person, place, and time. She appears well-developed and well-nourished.   HENT:   Head: Normocephalic and atraumatic.   Abdominal: Soft. Bowel sounds are normal. She exhibits no distension and no mass. There is tenderness (RUQ TTP). There is no rebound and no guarding. No hernia.   Neurological: She is alert and oriented to " person, place, and time.   Skin: Skin is warm and dry.   Psychiatric: She has a normal mood and affect. Her behavior is normal. Judgment and thought content normal.   Nursing note and vitals reviewed.    A/P:  1. LAP- small physiologic cyst on US, was kidney stone. Pain resolved.  2. PCOS- pt is on metformin and spironolactone and still has trouble losing weight and does not feel well. Will refer to endocrinology to see if we can improve her PCOS symptoms.  3. RUQ pain, N/V/GERD- refer GI.   4. F/U 1 year annual or prn.     Assessment/Plan   Angie was seen today for gynecologic exam.    Diagnoses and all orders for this visit:    PCOS (polycystic ovarian syndrome)  -     Ambulatory Referral to Endocrinology    Gastroesophageal reflux disease, esophagitis presence not specified  -     Ambulatory Referral to Gastroenterology    Chronic RUQ pain  -     Ambulatory Referral to Gastroenterology    Alternating constipation and diarrhea  -     Ambulatory Referral to Gastroenterology                   No Follow-up on file.      Deborah Alcazar MD  4/16/18  11:00 PM   18

## 2021-12-17 ENCOUNTER — OFFICE VISIT (OUTPATIENT)
Dept: FAMILY MEDICINE CLINIC | Facility: CLINIC | Age: 46
End: 2021-12-17

## 2021-12-17 DIAGNOSIS — R52 GENERALIZED BODY ACHES: ICD-10-CM

## 2021-12-17 DIAGNOSIS — R50.9 FEVER, UNSPECIFIED FEVER CAUSE: Primary | ICD-10-CM

## 2021-12-17 DIAGNOSIS — R05.9 COUGH: ICD-10-CM

## 2021-12-17 DIAGNOSIS — R51.9 GENERALIZED HEADACHE: ICD-10-CM

## 2021-12-17 PROCEDURE — 87804 INFLUENZA ASSAY W/OPTIC: CPT | Performed by: PHYSICIAN ASSISTANT

## 2021-12-17 PROCEDURE — 99214 OFFICE O/P EST MOD 30 MIN: CPT | Performed by: PHYSICIAN ASSISTANT

## 2021-12-17 RX ORDER — DEXTROMETHORPHAN HYDROBROMIDE AND PROMETHAZINE HYDROCHLORIDE 15; 6.25 MG/5ML; MG/5ML
5 SYRUP ORAL 4 TIMES DAILY PRN
Qty: 118 ML | Refills: 0 | Status: SHIPPED | OUTPATIENT
Start: 2021-12-17 | End: 2022-09-23 | Stop reason: SDUPTHER

## 2021-12-17 NOTE — PROGRESS NOTES
"Chief Complaint  Chest Pain (tightness), Nasal Congestion, Headache, Fatigue, Chills, and Generalized Body Aches    Subjective          Angie Patel presents to [facility] PRIMARY CARE  History of Present Illness  Angie is a 46-year-old female who presents with nasal congestion, headache, fatigue, chills, body aches and chest tightness for the past 5 days.  States it started off with some head congestion and fatigue.  She has had chills, chest congestion, ear pressure, sinus pressure, dry cough, thick rhinorrhea and scratchy throat.  Denied any shortness of air, wheezing, chest pain, loss of taste/smell or GI upset.  Has been using over-the-counter Mucinex without relief.  Has tried Tylenol for headaches.  Diet has been normal for her.  Sleep has been restless due to cough and body aches.  Review of Systems   Constitutional: Positive for chills and fatigue.   HENT: Positive for congestion, postnasal drip and rhinorrhea.    Respiratory: Positive for cough and chest tightness. Negative for shortness of breath and wheezing.    Cardiovascular: Negative for chest pain, palpitations and leg swelling.   Gastrointestinal: Negative for constipation, diarrhea, nausea and vomiting.   Musculoskeletal: Positive for myalgias.   All other systems reviewed and are negative.    Objective   Vital Signs:   /80   Pulse 104   Temp 97.2 °F (36.2 °C)   Ht 160 cm (62.99\")   SpO2 98%   BMI 37.74 kg/m²     Physical Exam  Vitals and nursing note reviewed.   Constitutional:       Appearance: Normal appearance. She is well-developed and well-groomed. She is obese.      Interventions: Face mask in place.   HENT:      Head: Normocephalic and atraumatic.      Jaw: There is normal jaw occlusion.      Right Ear: Hearing, tympanic membrane, ear canal and external ear normal.      Left Ear: Hearing, tympanic membrane, ear canal and external ear normal.      Nose: Congestion present.      Right Sinus: No maxillary sinus " tenderness or frontal sinus tenderness.      Left Sinus: No maxillary sinus tenderness or frontal sinus tenderness.      Mouth/Throat:      Lips: Pink.      Mouth: Mucous membranes are moist.      Dentition: Normal dentition.      Tongue: No lesions.      Pharynx: Oropharynx is clear. Uvula midline. No pharyngeal swelling, oropharyngeal exudate, posterior oropharyngeal erythema or uvula swelling.      Tonsils: No tonsillar exudate.   Eyes:      General: Lids are normal.      Conjunctiva/sclera: Conjunctivae normal.      Pupils: Pupils are equal, round, and reactive to light.   Neck:      Trachea: Trachea and phonation normal. No tracheal tenderness.   Cardiovascular:      Rate and Rhythm: Normal rate and regular rhythm.      Pulses: Normal pulses.      Heart sounds: Normal heart sounds, S1 normal and S2 normal. No murmur heard.      Pulmonary:      Effort: Pulmonary effort is normal.      Breath sounds: Normal breath sounds and air entry.   Abdominal:      General: Bowel sounds are normal.      Palpations: Abdomen is soft.      Tenderness: There is no abdominal tenderness. There is no right CVA tenderness, left CVA tenderness, guarding or rebound. Negative signs include Rocha's sign, Rovsing's sign, McBurney's sign, psoas sign and obturator sign.   Musculoskeletal:      Cervical back: Neck supple.      Right lower leg: No edema.      Left lower leg: No edema.   Lymphadenopathy:      Cervical: No cervical adenopathy.      Right cervical: No superficial, deep or posterior cervical adenopathy.     Left cervical: No superficial, deep or posterior cervical adenopathy.   Skin:     General: Skin is warm and dry.      Capillary Refill: Capillary refill takes less than 2 seconds.   Neurological:      Mental Status: She is alert and oriented to person, place, and time.   Psychiatric:         Attention and Perception: Attention and perception normal.         Mood and Affect: Mood and affect normal.         Speech: Speech  normal.         Behavior: Behavior is cooperative.         Thought Content: Thought content normal.         Cognition and Memory: Cognition and memory normal.         Judgment: Judgment normal.     Patient was wearing a mask when I enter room.  I enter the room wearing hair covering, face shield, N 95 mask and gown/gloves.  Appropriate PPE was worn by medical assistant and myself during the office encounter.  I washed hands thoroughly after leaving the patient's room after removal of PPE.     Result Review :          Results for orders placed or performed in visit on 12/17/21   COVID-19,[facility] ROUTINE, NP/OP SWAB IN TRANSPORT MEDIA OR ESWAB 72 HR TAT - Swab, Anterior nasal    Specimen: Anterior nasal; Swab   Result Value Ref Range    SARS-CoV-2, EULOGIO Not Detected Not Detected   SARS-CoV-2, EULOGIO 2 DAY TAT - ,   Result Value Ref Range    [facility] SARS-COV-2, EULGOIO 2 DAY TAT Performed    POC Influenza A / B    Specimen: Swab   Result Value Ref Range    Rapid Influenza A Ag Negative Negative    Rapid Influenza B Ag Negative Negative    Internal Control Passed Passed    Lot Number [id number]     Expiration Date 12-04-22                  Assessment and Plan    Diagnoses and all orders for this visit:    1. Fever, unspecified fever cause (Primary)  -     COVID-19,[facility] ROUTINE, NP/OP SWAB IN TRANSPORT MEDIA OR ESWAB 72 HR TAT - Swab, Anterior nasal  -     POC Influenza A / B  -     promethazine-dextromethorphan (PROMETHAZINE-DM) 6.25-15 MG/5ML syrup; Take 5 mL by mouth 4 (Four) Times a Day As Needed for Cough.  Dispense: 118 mL; Refill: 0  -     QUESTIONNAIRE SERIES    2. Cough  -     COVID-19,[facility] ROUTINE, NP/OP SWAB IN TRANSPORT MEDIA OR ESWAB 72 HR TAT - Swab, Anterior nasal  -     POC Influenza A / B  -     promethazine-dextromethorphan (PROMETHAZINE-DM) 6.25-15 MG/5ML syrup; Take 5 mL by mouth 4 (Four) Times a Day As Needed for Cough.  Dispense: 118 mL; Refill: 0  -     QUESTIONNAIRE SERIES    3. Generalized body  aches  -     COVID-19,[facility] ROUTINE, NP/OP SWAB IN TRANSPORT MEDIA OR ESWAB 72 HR TAT - Swab, Anterior nasal  -     POC Influenza A / B  -     promethazine-dextromethorphan (PROMETHAZINE-DM) 6.25-15 MG/5ML syrup; Take 5 mL by mouth 4 (Four) Times a Day As Needed for Cough.  Dispense: 118 mL; Refill: 0  -     QUESTIONNAIRE SERIES    4. Generalized headache  -     COVID-19,[facility] ROUTINE, NP/OP SWAB IN TRANSPORT MEDIA OR ESWAB 72 HR TAT - Swab, Anterior nasal  -     POC Influenza A / B  -     promethazine-dextromethorphan (PROMETHAZINE-DM) 6.25-15 MG/5ML syrup; Take 5 mL by mouth 4 (Four) Times a Day As Needed for Cough.  Dispense: 118 mL; Refill: 0  -     QUESTIONNAIRE SERIES    Angie was seen in office today with cough, fevers, generalized body aches and headaches.  Covid test was collected and will be sent to the laboratory for further evaluation.  In office flu test was negative.  Have sent promethazine/dextromethorphan cough syrup to pharmacy.  She will quarantine at home until test results are completed.  She has been immunized against Covid.  Stressed to stay hydrated and rest.  She voiced understanding.  Have given her a work excuse.    Follow Up   Return if symptoms worsen or fail to improve.  Patient was given instructions and counseling regarding her condition or for health maintenance advice. Please see specific information pulled into the AVS if appropriate.     KARL Solorio [facility]  [address] [place] [id number]  Dept: [phone]  Dept Fax: [phone]  Loc: [phone]  Loc Fax: [phone]

## 2021-12-18 VITALS
HEIGHT: 63 IN | RESPIRATION RATE: 16 BRPM | SYSTOLIC BLOOD PRESSURE: 120 MMHG | TEMPERATURE: 97.2 F | DIASTOLIC BLOOD PRESSURE: 80 MMHG | HEART RATE: 104 BPM | OXYGEN SATURATION: 98 % | BODY MASS INDEX: 37.74 KG/M2

## 2021-12-18 LAB
LABCORP SARS-COV-2, NAA 2 DAY TAT: NORMAL
SARS-COV-2 RNA RESP QL NAA+PROBE: NOT DETECTED

## 2021-12-20 LAB
EXPIRATION DATE: NORMAL
FLUAV AG NPH QL: NEGATIVE
FLUBV AG NPH QL: NEGATIVE
INTERNAL CONTROL: NORMAL
Lab: NORMAL

## 2021-12-20 RX ORDER — SPIRONOLACTONE 100 MG/1
100 TABLET, FILM COATED ORAL DAILY
Qty: 90 TABLET | Refills: 0 | Status: SHIPPED | OUTPATIENT
Start: 2021-12-20 | End: 2022-01-03

## 2022-01-03 ENCOUNTER — OFFICE VISIT (OUTPATIENT)
Dept: CARDIOLOGY | Facility: CLINIC | Age: 47
End: 2022-01-03

## 2022-01-03 VITALS
HEIGHT: 62 IN | RESPIRATION RATE: 16 BRPM | DIASTOLIC BLOOD PRESSURE: 84 MMHG | OXYGEN SATURATION: 98 % | BODY MASS INDEX: 38.83 KG/M2 | SYSTOLIC BLOOD PRESSURE: 132 MMHG | WEIGHT: 211 LBS | HEART RATE: 81 BPM

## 2022-01-03 DIAGNOSIS — R00.2 PALPITATIONS: Primary | ICD-10-CM

## 2022-01-03 PROCEDURE — 93000 ELECTROCARDIOGRAM COMPLETE: CPT | Performed by: INTERNAL MEDICINE

## 2022-01-03 PROCEDURE — 99213 OFFICE O/P EST LOW 20 MIN: CPT | Performed by: INTERNAL MEDICINE

## 2022-01-03 RX ORDER — SPIRONOLACTONE 50 MG/1
100 TABLET, FILM COATED ORAL DAILY
Start: 2022-01-03 | End: 2022-04-05

## 2022-01-03 NOTE — PROGRESS NOTES
PATIENTINFORMATION    Date of Office Visit: 2022  Encounter Provider: Behzad Roblero MD  Place of Service: [facility] CARDIOLOGY  Patient Name: Angie Patel  : 1975    Subjective:     Encounter Date:2022      Patient ID: Angie Patel is a 46 y.o. female.    Chief Complaint   Patient presents with   • Hypertension     1 year follow up      HPI  Ms. Patel is a pleasant 47 yo lady with past medical history of palpitations, obesity with complications came to cardiology clinic for follow-up visit.  Since last visit she had bariatric surgery with sleeve gastrectomy and has lost significant amount of weight.  She has been off CPAP machine and dose of Metformin reduced.  She reports complete resolution of palpitations.  She has increased level of activity and exercise regularly.  Otherwise no significant new acute events since last visit.      ROS   All systems reviewed and negative except as noted in HPI    Past Medical History:   Diagnosis Date   • Anxiety    • Asthma    • Diabetes mellitus (HCC)    • Fibromyalgia    • Fibromyalgia, primary    • Headache, post-lumbar puncture 2020   • Headache, tension-type    • Hypertension    • Kidney calculi    • Memory loss    • Meningioma (HCC)    • Migraine     ocular   • KACI (obstructive sleep apnea)     On CPAP   • Palpitations    • Polycystic ovaries    • PONV (postoperative nausea and vomiting)     NAUSEA    • Postpartum cardiomyopathy    • Preeclampsia    • Skin abrasion 2021    RIGHT ABD- FROM DOG'S SEIZURE        Past Surgical History:   Procedure Laterality Date   • CYSTOSCOPY URETEROSCOPY LASER LITHOTRIPSY Right 3/7/2017    Procedure: CYSTOSCOPY RT URETEROSCOPY LASER LITHOTRIPSY W/ STENT, rerograde pyelogram ;  Surgeon: Rashid Malloy MD;  Location: [facility];  Service:    • ENDOSCOPY N/A 2021    Procedure: ESOPHAGOGASTRODUODENOSCOPY WITH BIOPSY;  Surgeon: Prasanna Katz Jr., MD;   Location: [facility] ENDOSCOPY;  Service: General;  Laterality: N/A;  PRE- DYSPEPSIA  POST- GASTRITIS   • FOOT SURGERY Left     tendon repair   • GASTRIC SLEEVE LAPAROSCOPIC N/A 2021    Procedure: GASTRIC SLEEVE LAPAROSCOPIC, PARAESOPHAGEAL HERNIA REPAIR;  Surgeon: Prasanna Katz Jr., MD;  [place] [facility] [place] [facility]  Service: Bariatric;  Laterality: N/A;   • HYSTERECTOMY     • OOPHORECTOMY Left     for ovarian cysts   • SHOULDER SURGERY Right    • TUBAL ABDOMINAL LIGATION     • WISDOM TOOTH EXTRACTION         Social History     Socioeconomic History   • Marital status:    Tobacco Use   • Smoking status: Former Smoker     Packs/day: 1.00     Years: 20.00     Pack years: 20.00     Types: Cigarettes     Quit date: 2016     Years since quittin.5   • Smokeless tobacco: Never Used   Vaping Use   • Vaping Use: Never used   Substance and Sexual Activity   • Alcohol use: Yes     Comment: occasion   • Drug use: No   • Sexual activity: Defer     Partners: Male     Birth control/protection: Surgical     Comment: hysterectomy       Family History   Problem Relation Age of Onset   • Heart disease Paternal Aunt    • Diabetes Paternal Aunt    • Diabetes Maternal Grandmother    • Neuropathy Maternal Grandmother    • Thyroid disease Maternal Grandmother    • Heart disease Maternal Grandfather    • Cancer Maternal Grandfather    • Heart disease Paternal Grandfather    • Heart disease Paternal Uncle    • Arthritis Mother    • Migraines Mother    • Thyroid disease Mother    • Obesity Mother    • Lupus Mother    • Seizures Father    • Breast cancer Neg Hx    • Ovarian cancer Neg Hx    • Colon cancer Neg Hx    • Deep vein thrombosis Neg Hx    • Pulmonary embolism Neg Hx    • Malig Hyperthermia Neg Hx            ECG 12 Lead    Date/Time: 1/3/2022 10:21 AM  Performed by: Behzad Roblero MD  Authorized by: Behzad Roblero MD   Comparison: compared with previous ECG from 2021  Similar  "to previous ECG  Rhythm: sinus rhythm  Rate: normal  Conduction: conduction normal  ST Segments: ST segments normal  T Waves: T waves normal  QRS axis: normal  Other: no other findings    Clinical impression: normal ECG               Objective:     /84 (BP Location: Right arm, Patient Position: Sitting, Cuff Size: Large Adult)   Pulse 81   Resp 16   Ht 157.5 cm (62\")   Wt 95.7 kg (211 lb)   LMP  (LMP Unknown)   SpO2 98%   BMI 38.59 kg/m²  Body mass index is 38.59 kg/m².     Constitutional:       General: Not in acute distress.     Appearance: Well-developed. Not diaphoretic.   Eyes:      Pupils: Pupils are equal, round, and reactive to light.   HENT:      Head: Normocephalic and atraumatic.   Neck:      Thyroid: No thyromegaly.   Pulmonary:      Effort: Pulmonary effort is normal. No respiratory distress.      Breath sounds: Normal breath sounds. No wheezing. No rales.   Chest:      Chest wall: Not tender to palpatation.   Cardiovascular:      Normal rate. Regular rhythm.      No gallop.   Pulses:     Intact distal pulses.   Edema:     Peripheral edema absent.   Abdominal:      General: Bowel sounds are normal. There is no distension.      Palpations: Abdomen is soft.      Tenderness: There is no guarding.   Musculoskeletal: Normal range of motion.         General: No deformity.      Cervical back: Normal range of motion and neck supple. Skin:     General: Skin is warm and dry.      Findings: No rash.   Neurological:      Mental Status: Alert and oriented to person, place, and time.      Cranial Nerves: No cranial nerve deficit.      Deep Tendon Reflexes: Reflexes are normal and symmetric.   Psychiatric:         Judgment: Judgment normal.         Review Of Data: Reviewed recent labs and documentation  Lipid panel at goal  A      Assessment/Plan:       Palpitations-resolved     Benign essential hypertension-controlled well on chlorthalidone  She reports normal blood pressure readings at home.  I will " discontinue chlorthalidone she will monitor her blood pressure at home and call with logs in few weeks  Chest pain, atypical -resolved    BL LE edema-completely resolved.  She takes Lasix only rarely    Morbidly obese with complications including PCOS, diabetes, obstructive sleep apnea  -Has lost more than 65 pounds since sleeve gastrectomy    Obstructive sleep apnea-off CPAP machine since after weight loss    Type II DM on Metformin-most recent A1c is at goal        I have discontinued chlorthalidone and reduced spironolactone by half.  She will monitor blood pressure at home.    Follow-up with cardiology clinic in 1 year or sooner with any concerning symptoms.        Diagnosis and plan of care discussed with patient and verbalized understanding.           Behzad Roblero MD  01/03/22  11:19 EST

## 2022-01-16 RX ORDER — ALBUTEROL SULFATE 90 UG/1
AEROSOL, METERED RESPIRATORY (INHALATION)
Qty: 18 G | Refills: 3 | Status: SHIPPED | OUTPATIENT
Start: 2022-01-16 | End: 2022-04-08 | Stop reason: SDUPTHER

## 2022-01-27 DIAGNOSIS — E78.5 HYPERLIPIDEMIA, UNSPECIFIED HYPERLIPIDEMIA TYPE: ICD-10-CM

## 2022-01-27 DIAGNOSIS — R68.89 ABNORMAL ENDOCRINE LABORATORY TEST FINDING: ICD-10-CM

## 2022-01-27 DIAGNOSIS — E11.9 TYPE 2 DIABETES MELLITUS WITHOUT COMPLICATION, WITHOUT LONG-TERM CURRENT USE OF INSULIN: ICD-10-CM

## 2022-01-27 DIAGNOSIS — I10 ESSENTIAL HYPERTENSION: ICD-10-CM

## 2022-01-27 DIAGNOSIS — E78.00 HYPERCHOLESTEROLEMIA: Primary | ICD-10-CM

## 2022-03-21 RX ORDER — SPIRONOLACTONE 100 MG/1
100 TABLET, FILM COATED ORAL DAILY
Qty: 90 TABLET | Refills: 0 | OUTPATIENT
Start: 2022-03-21

## 2022-04-05 RX ORDER — SPIRONOLACTONE 50 MG/1
25 TABLET, FILM COATED ORAL DAILY
Qty: 90 TABLET | Refills: 3 | Status: SHIPPED | OUTPATIENT
Start: 2022-04-05 | End: 2022-05-10

## 2022-04-08 RX ORDER — ALBUTEROL SULFATE 90 UG/1
2 AEROSOL, METERED RESPIRATORY (INHALATION) 4 TIMES DAILY PRN
Qty: 18 G | Refills: 0 | Status: SHIPPED | OUTPATIENT
Start: 2022-04-08 | End: 2022-04-28 | Stop reason: SDUPTHER

## 2022-04-08 RX ORDER — ALBUTEROL SULFATE 90 UG/1
2 AEROSOL, METERED RESPIRATORY (INHALATION) 4 TIMES DAILY PRN
Qty: 18 G | Refills: 0 | OUTPATIENT
Start: 2022-04-08

## 2022-04-21 RX ORDER — CHLORTHALIDONE 25 MG/1
25 TABLET ORAL DAILY
Qty: 90 TABLET | Refills: 3 | Status: SHIPPED | OUTPATIENT
Start: 2022-04-21 | End: 2022-11-17

## 2022-04-21 NOTE — TELEPHONE ENCOUNTER
Last OV 1/3/22.  Next OV 1/5/23.  Labs 11/2/21.  Does not meet protocol.  Appears to have been discontinued 1/3/22.  [facility]

## 2022-04-28 DIAGNOSIS — R73.9 HYPERGLYCEMIA: ICD-10-CM

## 2022-04-29 RX ORDER — METFORMIN HYDROCHLORIDE 750 MG/1
750 TABLET, EXTENDED RELEASE ORAL
Qty: 60 TABLET | Refills: 0 | Status: SHIPPED | OUTPATIENT
Start: 2022-04-29 | End: 2022-07-31

## 2022-04-29 RX ORDER — ALBUTEROL SULFATE 90 UG/1
2 AEROSOL, METERED RESPIRATORY (INHALATION) 4 TIMES DAILY PRN
Qty: 18 G | Refills: 0 | Status: SHIPPED | OUTPATIENT
Start: 2022-04-29 | End: 2022-07-31

## 2022-05-10 DIAGNOSIS — I10 BENIGN ESSENTIAL HYPERTENSION: Primary | ICD-10-CM

## 2022-05-10 RX ORDER — FUROSEMIDE 20 MG/1
20 TABLET ORAL DAILY PRN
Qty: 30 TABLET | Refills: 3 | Status: SHIPPED | OUTPATIENT
Start: 2022-05-10

## 2022-05-10 RX ORDER — SPIRONOLACTONE 50 MG/1
25 TABLET, FILM COATED ORAL DAILY
Qty: 90 TABLET | Refills: 3 | Status: SHIPPED | OUTPATIENT
Start: 2022-05-10 | End: 2022-05-10

## 2022-05-10 RX ORDER — SPIRONOLACTONE 50 MG/1
50 TABLET, FILM COATED ORAL DAILY
Qty: 90 TABLET | Refills: 3 | Status: SHIPPED | OUTPATIENT
Start: 2022-05-10 | End: 2022-12-28 | Stop reason: SDUPTHER

## 2022-05-10 NOTE — PROGRESS NOTES
Patient called with worsening bilateral leg swelling without other symptoms.  Blood pressure has been running normal off chlorthalidone.  Currently on spironolactone 25 daily.  She has old she responded better to spironolactone 50 or 100 milligrams p.o. daily.  I will increase Aldactone to 50 mg p.o. daily and call in prescription for Lasix that she will only use as needed.  Leg elevation at night and compression stockings encouraged.  BMP in 2 weeks

## 2022-07-31 DIAGNOSIS — R73.9 HYPERGLYCEMIA: ICD-10-CM

## 2022-07-31 RX ORDER — ALBUTEROL SULFATE 90 UG/1
AEROSOL, METERED RESPIRATORY (INHALATION)
Qty: 18 G | Refills: 0 | Status: SHIPPED | OUTPATIENT
Start: 2022-07-31 | End: 2022-10-23

## 2022-07-31 RX ORDER — METFORMIN HYDROCHLORIDE 750 MG/1
TABLET, EXTENDED RELEASE ORAL
Qty: 30 TABLET | Refills: 0 | Status: SHIPPED | OUTPATIENT
Start: 2022-07-31 | End: 2022-11-07 | Stop reason: SDUPTHER

## 2022-10-23 RX ORDER — ALBUTEROL SULFATE 90 UG/1
AEROSOL, METERED RESPIRATORY (INHALATION)
Qty: 18 G | Refills: 0 | Status: SHIPPED | OUTPATIENT
Start: 2022-10-23 | End: 2022-12-09

## 2022-11-07 DIAGNOSIS — R73.9 HYPERGLYCEMIA: ICD-10-CM

## 2022-11-07 RX ORDER — METFORMIN HYDROCHLORIDE 750 MG/1
750 TABLET, EXTENDED RELEASE ORAL
Qty: 30 TABLET | Refills: 0 | Status: SHIPPED | OUTPATIENT
Start: 2022-11-07

## 2022-12-09 RX ORDER — ALBUTEROL SULFATE 90 UG/1
AEROSOL, METERED RESPIRATORY (INHALATION)
Qty: 8 G | Refills: 0 | Status: SHIPPED | OUTPATIENT
Start: 2022-12-09 | End: 2023-02-11 | Stop reason: SDUPTHER

## 2022-12-28 ENCOUNTER — TELEPHONE (OUTPATIENT)
Dept: CARDIOLOGY | Facility: CLINIC | Age: 47
End: 2022-12-28

## 2022-12-28 RX ORDER — SPIRONOLACTONE 50 MG/1
50 TABLET, FILM COATED ORAL DAILY
Qty: 90 TABLET | Refills: 0 | Status: SHIPPED | OUTPATIENT
Start: 2022-12-28

## 2022-12-28 NOTE — TELEPHONE ENCOUNTER
----- Message from Behzad Roblero MD sent at 12/27/2022  1:46 PM EST -----  Can you please check how much spironolactone Angie is currently on and refill accordingly? Thank you!

## 2023-02-13 RX ORDER — ALBUTEROL SULFATE 90 UG/1
2 AEROSOL, METERED RESPIRATORY (INHALATION)
Qty: 8 G | Refills: 0 | Status: SHIPPED | OUTPATIENT
Start: 2023-02-13 | End: 2023-03-27 | Stop reason: SDUPTHER

## 2023-03-27 RX ORDER — ALBUTEROL SULFATE 90 UG/1
2 AEROSOL, METERED RESPIRATORY (INHALATION)
Qty: 8 G | Refills: 0 | Status: SHIPPED | OUTPATIENT
Start: 2023-03-27

## 2023-05-05 DIAGNOSIS — R73.9 HYPERGLYCEMIA: ICD-10-CM

## 2023-05-05 RX ORDER — ALBUTEROL SULFATE 90 UG/1
AEROSOL, METERED RESPIRATORY (INHALATION)
Qty: 18 G | OUTPATIENT
Start: 2023-05-05

## 2023-05-05 RX ORDER — METFORMIN HYDROCHLORIDE 750 MG/1
TABLET, EXTENDED RELEASE ORAL
Qty: 30 TABLET | Refills: 0 | OUTPATIENT
Start: 2023-05-05

## 2023-05-08 RX ORDER — SPIRONOLACTONE 50 MG/1
TABLET, FILM COATED ORAL
Qty: 90 TABLET | Refills: 0 | Status: SHIPPED | OUTPATIENT
Start: 2023-05-08

## 2023-05-08 RX ORDER — FUROSEMIDE 20 MG/1
TABLET ORAL
Qty: 30 TABLET | Refills: 3 | Status: SHIPPED | OUTPATIENT
Start: 2023-05-08

## 2023-05-30 NOTE — PLAN OF CARE
Problem: Adult Inpatient Plan of Care  Goal: Plan of Care Review  Outcome: Ongoing, Progressing  Flowsheets (Taken 6/9/2021 1751)  Progress: improving  Plan of Care Reviewed With: patient  Outcome Summary: Pt vitals stable. Very drowsy on admission. Scheduled meds for pain. Tolerating ice chips. Pt ambulating and voiding. Encourage IS use. WIll continue to monitor   Goal Outcome Evaluation:  Plan of Care Reviewed With: patient        Progress: improving  Outcome Summary: Pt vitals stable. Very drowsy on admisson. Scheduled meds for pain. Tolerating ice chips. Pt ambulating and voiding. Encourage IS use. WIll continue to monitor  
Goal Outcome Evaluation:  Plan of Care Reviewed With: patient        Progress: improving  Outcome Summary: VSS. +ambulation in halls. +voided. IVF continued. Tolerating ice chips well. No c/o nausea or vomiting. Some c/o abdominal discomfort/gas pain - PRN Levsin administered. +IS use. Will continue to monitor  
Heart and Lungs unremarkable

## 2023-07-31 RX ORDER — ALBUTEROL SULFATE 90 UG/1
1 AEROSOL, METERED RESPIRATORY (INHALATION) EVERY 4 HOURS PRN
Qty: 18 G | Refills: 0 | Status: SHIPPED | OUTPATIENT
Start: 2023-07-31

## 2023-07-31 RX ORDER — SPIRONOLACTONE 50 MG/1
TABLET, FILM COATED ORAL
Qty: 90 TABLET | Refills: 0 | Status: SHIPPED | OUTPATIENT
Start: 2023-07-31 | End: 2023-08-07

## 2023-08-07 RX ORDER — SPIRONOLACTONE 50 MG/1
TABLET, FILM COATED ORAL
Qty: 90 TABLET | Refills: 0 | Status: SHIPPED | OUTPATIENT
Start: 2023-08-07

## 2023-08-26 NOTE — PLAN OF CARE
Problem: Patient Care Overview  Goal: Plan of Care Review  Outcome: Ongoing (interventions implemented as appropriate)  Flowsheets (Taken 9/5/2020 0406)  Progress: improving  Plan of Care Reviewed With: patient  Outcome Summary: Pt tolerated treatment well this date. Increased gait distance to 10ft x2, seated rest break between d/t fatigue. Pt occasionally unsteady, losing balance backwards. Pt stated she was concerned about going home d/t inability to navigate stairs at the moment d/t weakness. Hopeful for d/c to [facility] for continued skilled PT for balance and strengthening. Patient was intermittently wearing a face mask during this therapy encounter. Therapist used appropriate personal protective equipment including eye protection, mask, and gloves.  Mask used was standard procedure mask. Appropriate PPE was worn during the entire therapy session. Hand hygiene was completed before and after therapy session. Patient is not in enhanced droplet precautions.      no

## 2024-02-12 RX ORDER — SPIRONOLACTONE 50 MG/1
50 TABLET, FILM COATED ORAL DAILY
Qty: 90 TABLET | Refills: 0 | Status: SHIPPED | OUTPATIENT
Start: 2024-02-12

## 2024-12-02 ENCOUNTER — TELEPHONE (OUTPATIENT)
Dept: BARIATRICS/WEIGHT MGMT | Facility: CLINIC | Age: 49
End: 2024-12-02
Payer: COMMERCIAL

## 2024-12-02 NOTE — TELEPHONE ENCOUNTER
Left voice message for patient to call back and schedule yearly bariatric follow up appointment.     Neurology Progress Note    SUBJECTIVE/OBJECTIVE/INTERVAL EVENTS: Patient seen and examined at bedside w/ neuro attending and team.     INTERVAL HISTORY:     REVIEW OF SYSTEMS: Otherwise denies fever, chills, headaches, vision changes, blurry vision, double vision, nausea, vomiting, hearing change, focal weakness, focal numbness, parasthesias, bowel/ bladder incontinence.  Few questions of a 10-system ROS was performed and is negative except for those items noted above and/or in the HPI.    VITALS & EXAMINATION:  Vital Signs Last 24 Hrs  T(C): 36.8 (2023 05:29), Max: 37.1 (2023 16:46)  T(F): 98.2 (2023 05:29), Max: 98.7 (2023 16:46)  HR: 85 (2023 05:29) (73 - 93)  BP: 98/60 (2023 05:29) (98/60 - 125/83)  BP(mean): --  RR: 18 (2023 05:29) (18 - 20)  SpO2: 99% (2023 05:29) (97% - 99%)    Parameters below as of 2023 05:29  Patient On (Oxygen Delivery Method): room air        General:  Constitutional: Female, appears stated age   Head: Normocephalic; Eyes: clear sclera;   Extremities: No cyanosis; Skin: No jourdan edema of LE  Resp: breathing comfortably     Neurological (>12):  MS: Awake, alert.  Follows commands. Attends to examiner  Language: Speech is hypophonic, clear, fluent, good repetition,  comprehension, registration of words.  CNs: PERRL (R 3mm, L 3mm). VFF. EOMI. V1-3 intact LT, No facial asymmetry b/l. Hearing grossly normal b/l. Tongue midline.     Motor - Normal bulk and tone throughout. No pronator drift.    L/R         Deltoid  5/5    Biceps   5/5      Triceps  5/5         Wrist Extension 5/5   Wrist Flexion  5/5      5/5   L/R         Hip Flexion  5/5    Hip Extension  5/5  Knee Extension  5/5  Dorsiflexion  5/5      Plantar Flexion 5/5     Sensation: Intact to LT b/l. Cortical: Extinction on DSS (neglect): none  Reflexes L/R:  Biceps(C5) 2/2  BR(C6) 2/2   Triceps(C7)  2/2 Patellar(L4)   2/2   Toes: mute  Coordination: No dysmetria to FTN b/l UE  Gait: Normal Romberg. No postural instability. Normal stance.    LABORATORY:  CBC                       13.6   6.87  )-----------( 254      ( 2023 20:51 )             41.1     Chem     138  |  100  |  11  ----------------------------<  79  3.9   |  26  |  0.72    Ca    10.1      2023 20:51    TPro  7.9  /  Alb  4.9  /  TBili  0.3  /  DBili  x   /  AST  43<H>  /  ALT  73<H>  /  AlkPhos  71      LFTs LIVER FUNCTIONS - ( 2023 20:51 )  Alb: 4.9 g/dL / Pro: 7.9 g/dL / ALK PHOS: 71 U/L / ALT: 73 U/L / AST: 43 U/L / GGT: x           Coagulopathy PT/INR - ( 2023 20:51 )   PT: 13.1 sec;   INR: 1.13 ratio         PTT - ( 2023 20:51 )  PTT:32.1 sec  Lipid Panel   A1c   Cardiac enzymes     U/A Urinalysis Basic - ( 2023 21:25 )    Color: Light Yellow / Appearance: Clear / S.010 / pH: x  Gluc: x / Ketone: Trace  / Bili: Negative / Urobili: Negative   Blood: x / Protein: Negative / Nitrite: Negative   Leuk Esterase: Negative / RBC: 7 /hpf / WBC 1 /HPF   Sq Epi: x / Non Sq Epi: 4 /hpf / Bacteria: Negative      CSF  Immunological  Other    STUDIES & IMAGING: (EEG, CT, MR, U/S, TTE/PANCHITO):

## (undated) DEVICE — 500ML,PRESSURE INFUSER W/STOPCOCK: Brand: MEDLINE

## (undated) DEVICE — ECHELON FLEX POWERED PLUS LONG ARTICULATING ENDOSCOPIC LINEAR CUTTER, 60MM: Brand: ECHELON FLEX

## (undated) DEVICE — KTTNER ENDO BLNT DISSCT

## (undated) DEVICE — TUBING, SUCTION, 1/4" X 10', STRAIGHT: Brand: MEDLINE

## (undated) DEVICE — PK OSC LAP SLV 40

## (undated) DEVICE — FRCP BX RADJAW4 NDL 2.8 240CM LG OG BX40

## (undated) DEVICE — GLV SURG SENSICARE MICRO PF LF 7.5 STRL

## (undated) DEVICE — LN SMPL CO2 SHTRM SD STREAM W/M LUER

## (undated) DEVICE — PRT BIOP SEALS

## (undated) DEVICE — KT ORCA ORCAPOD DISP STRL

## (undated) DEVICE — ENDOPATH XCEL BLADELESS TROCARS WITH STABILITY SLEEVES: Brand: ENDOPATH XCEL

## (undated) DEVICE — LAPAROSCOPIC SMOKE FILTRATION SYSTEM: Brand: PALL LAPAROSHIELD® PLUS LAPAROSCOPIC SMOKE FILTRATION SYSTEM

## (undated) DEVICE — PK URETSCP 40

## (undated) DEVICE — CATH URETRL PA CONE TP 8F

## (undated) DEVICE — ENSEAL LAPAROSCOPIC TISSUE SEALER G2 ARTICULATING CURVED JAW FOR USE WITH G2 GENERATOR 5MM DIAMETER 45CM SHAFT LENGTH: Brand: ENSEAL

## (undated) DEVICE — BITEBLOCK OMNI BLOC

## (undated) DEVICE — MARKR SKIN W/RULR AND LBL

## (undated) DEVICE — MSK PROC CURAPLEX O2 2/ADAPT 7FT

## (undated) DEVICE — APL DUPLOSPRAYER MIS 40CM

## (undated) DEVICE — TROCAR: Brand: KII OPTICAL ACCESS SYSTEM

## (undated) DEVICE — FIBR LASR HOLMIUM ACCUMAX 200 1PT USE

## (undated) DEVICE — 30977 SEE SHARP - ENHANCED INTRAOPERATIVE LAPAROSCOPE CLEANING & DEFOGGING: Brand: 30977 SEE SHARP - ENHANCED INTRAOPERATIVE LAPAROSCOPE CLEANING & DEFOGGING

## (undated) DEVICE — BALN STD BOUGIE 18F

## (undated) DEVICE — CONN TBG Y 5 IN 1 LF STRL

## (undated) DEVICE — VIOLET BRAIDED (POLYGLACTIN 910), SYNTHETIC ABSORBABLE SUTURE: Brand: COATED VICRYL

## (undated) DEVICE — PATIENT RETURN ELECTRODE, SINGLE-USE, CONTACT QUALITY MONITORING, ADULT, WITH 9FT CORD, FOR PATIENTS WEIGING OVER 33LBS. (15KG): Brand: MEGADYNE

## (undated) DEVICE — GLV SURG SENSICARE PI MIC PF SZ8.5 LF STRL

## (undated) DEVICE — SENSR O2 OXIMAX FNGR A/ 18IN NONSTR

## (undated) DEVICE — URETERAL DILATATION SYSTEM

## (undated) DEVICE — ADAPT CLN BIOGUARD AIR/H2O DISP

## (undated) DEVICE — NITINOL WIRE WITH HYDROPHILIC TIP: Brand: SENSOR

## (undated) DEVICE — GLV SURG SENSICARE PI PF LF 8.5 GRN STRL

## (undated) DEVICE — GOWN,NON-REINFORCED,SIRUS,SET IN SLV,XL: Brand: MEDLINE

## (undated) DEVICE — CLMP STD 22CM DISP